# Patient Record
Sex: MALE | Race: WHITE | NOT HISPANIC OR LATINO | Employment: OTHER | URBAN - METROPOLITAN AREA
[De-identification: names, ages, dates, MRNs, and addresses within clinical notes are randomized per-mention and may not be internally consistent; named-entity substitution may affect disease eponyms.]

---

## 2017-01-11 ENCOUNTER — OFFICE VISIT - HEALTHEAST (OUTPATIENT)
Dept: SLEEP MEDICINE | Facility: CLINIC | Age: 67
End: 2017-01-11

## 2017-01-11 DIAGNOSIS — G47.33 OSA (OBSTRUCTIVE SLEEP APNEA): ICD-10-CM

## 2017-01-11 DIAGNOSIS — G47.26 SHIFT WORK SLEEP DISORDER: ICD-10-CM

## 2017-01-11 DIAGNOSIS — G47.52 REM SLEEP BEHAVIOR DISORDER: ICD-10-CM

## 2017-01-11 ASSESSMENT — MIFFLIN-ST. JEOR: SCORE: 1720.62

## 2017-01-26 ENCOUNTER — RECORDS - HEALTHEAST (OUTPATIENT)
Dept: ADMINISTRATIVE | Facility: OTHER | Age: 67
End: 2017-01-26

## 2017-01-26 ENCOUNTER — RECORDS - HEALTHEAST (OUTPATIENT)
Dept: SLEEP MEDICINE | Facility: CLINIC | Age: 67
End: 2017-01-26

## 2017-01-26 DIAGNOSIS — G47.33 OBSTRUCTIVE SLEEP APNEA (ADULT) (PEDIATRIC): ICD-10-CM

## 2017-02-01 ENCOUNTER — COMMUNICATION - HEALTHEAST (OUTPATIENT)
Dept: SLEEP MEDICINE | Facility: CLINIC | Age: 67
End: 2017-02-01

## 2017-02-02 ENCOUNTER — COMMUNICATION - HEALTHEAST (OUTPATIENT)
Dept: SLEEP MEDICINE | Facility: CLINIC | Age: 67
End: 2017-02-02

## 2017-03-16 ENCOUNTER — COMMUNICATION - HEALTHEAST (OUTPATIENT)
Dept: NEUROLOGY | Facility: CLINIC | Age: 67
End: 2017-03-16

## 2017-03-16 DIAGNOSIS — M54.9 BACK PAIN: ICD-10-CM

## 2017-04-03 ENCOUNTER — AMBULATORY - HEALTHEAST (OUTPATIENT)
Dept: SLEEP MEDICINE | Facility: CLINIC | Age: 67
End: 2017-04-03

## 2017-04-03 ENCOUNTER — OFFICE VISIT - HEALTHEAST (OUTPATIENT)
Dept: SLEEP MEDICINE | Facility: CLINIC | Age: 67
End: 2017-04-03

## 2017-04-03 DIAGNOSIS — G47.10 HYPERSOMNIA, UNSPECIFIED: ICD-10-CM

## 2017-04-03 DIAGNOSIS — G47.52 RBD (REM BEHAVIORAL DISORDER): ICD-10-CM

## 2017-04-03 DIAGNOSIS — G20.A1 PARKINSON DISEASE (H): ICD-10-CM

## 2017-04-03 DIAGNOSIS — G47.33 OSA (OBSTRUCTIVE SLEEP APNEA): ICD-10-CM

## 2017-04-03 ASSESSMENT — MIFFLIN-ST. JEOR: SCORE: 1707.01

## 2017-04-05 ENCOUNTER — HOSPITAL ENCOUNTER (OUTPATIENT)
Dept: NEUROLOGY | Facility: CLINIC | Age: 67
Setting detail: THERAPIES SERIES
Discharge: STILL ADMITTED OR ADMINISTRATIVE | End: 2017-04-05
Attending: PSYCHIATRY & NEUROLOGY

## 2017-04-05 ENCOUNTER — RECORDS - HEALTHEAST (OUTPATIENT)
Dept: ADMINISTRATIVE | Facility: OTHER | Age: 67
End: 2017-04-05

## 2017-04-05 ENCOUNTER — COMMUNICATION - HEALTHEAST (OUTPATIENT)
Dept: ADMINISTRATIVE | Facility: CLINIC | Age: 67
End: 2017-04-05

## 2017-04-05 DIAGNOSIS — F32.A DEPRESSION: ICD-10-CM

## 2017-04-05 DIAGNOSIS — G89.29 CHRONIC LOW BACK PAIN WITH SCIATICA, SCIATICA LATERALITY UNSPECIFIED, UNSPECIFIED BACK PAIN LATERALITY: ICD-10-CM

## 2017-04-05 DIAGNOSIS — G31.84 MCI (MILD COGNITIVE IMPAIRMENT): ICD-10-CM

## 2017-04-05 DIAGNOSIS — M54.40 CHRONIC LOW BACK PAIN WITH SCIATICA, SCIATICA LATERALITY UNSPECIFIED, UNSPECIFIED BACK PAIN LATERALITY: ICD-10-CM

## 2017-04-05 DIAGNOSIS — G20.A1 IDIOPATHIC PARKINSON'S DISEASE (H): ICD-10-CM

## 2017-04-05 DIAGNOSIS — G47.33 OSA (OBSTRUCTIVE SLEEP APNEA): ICD-10-CM

## 2017-04-05 DIAGNOSIS — G20.A1 PARKINSON'S DISEASE (H): ICD-10-CM

## 2017-04-06 ENCOUNTER — COMMUNICATION - HEALTHEAST (OUTPATIENT)
Dept: SLEEP MEDICINE | Facility: CLINIC | Age: 67
End: 2017-04-06

## 2017-04-06 ENCOUNTER — AMBULATORY - HEALTHEAST (OUTPATIENT)
Dept: SLEEP MEDICINE | Facility: CLINIC | Age: 67
End: 2017-04-06

## 2017-04-11 ENCOUNTER — OFFICE VISIT - HEALTHEAST (OUTPATIENT)
Dept: ENDOCRINOLOGY | Facility: CLINIC | Age: 67
End: 2017-04-11

## 2017-04-11 DIAGNOSIS — E03.9 HYPOTHYROID: ICD-10-CM

## 2017-04-11 DIAGNOSIS — N40.0 BPH (BENIGN PROSTATIC HYPERPLASIA): ICD-10-CM

## 2017-04-11 LAB
CREAT SERPL-MCNC: 0.84 MG/DL (ref 0.7–1.3)
GFR SERPL CREATININE-BSD FRML MDRD: >60 ML/MIN/1.73M2

## 2017-04-11 ASSESSMENT — MIFFLIN-ST. JEOR: SCORE: 1756.9

## 2017-04-12 LAB
FREE PSA/PSA RATIO: NORMAL RATIO
PSA FREE SERPL-MCNC: 0.5 NG/ML
PSA SERPL-MCNC: 0.95 NG/ML

## 2017-04-18 ENCOUNTER — COMMUNICATION - HEALTHEAST (OUTPATIENT)
Dept: ENDOCRINOLOGY | Facility: CLINIC | Age: 67
End: 2017-04-18

## 2017-04-28 ENCOUNTER — COMMUNICATION - HEALTHEAST (OUTPATIENT)
Dept: SCHEDULING | Facility: CLINIC | Age: 67
End: 2017-04-28

## 2017-05-08 ENCOUNTER — OFFICE VISIT - HEALTHEAST (OUTPATIENT)
Dept: FAMILY MEDICINE | Facility: CLINIC | Age: 67
End: 2017-05-08

## 2017-05-08 DIAGNOSIS — E03.9 HYPOTHYROIDISM: ICD-10-CM

## 2017-05-08 DIAGNOSIS — L60.8 CHANGE IN NAIL APPEARANCE: ICD-10-CM

## 2017-05-08 DIAGNOSIS — K21.9 ESOPHAGEAL REFLUX: ICD-10-CM

## 2017-05-08 DIAGNOSIS — Z00.00 ROUTINE GENERAL MEDICAL EXAMINATION AT A HEALTH CARE FACILITY: ICD-10-CM

## 2017-05-08 DIAGNOSIS — R79.89 LOW TESTOSTERONE: ICD-10-CM

## 2017-05-08 DIAGNOSIS — G20.A1 PARKINSON DISEASE (H): ICD-10-CM

## 2017-05-08 ASSESSMENT — MIFFLIN-ST. JEOR: SCORE: 1695.66

## 2017-05-17 ENCOUNTER — COMMUNICATION - HEALTHEAST (OUTPATIENT)
Dept: SLEEP MEDICINE | Facility: CLINIC | Age: 67
End: 2017-05-17

## 2017-05-22 ENCOUNTER — AMBULATORY - HEALTHEAST (OUTPATIENT)
Dept: LAB | Facility: CLINIC | Age: 67
End: 2017-05-22

## 2017-05-22 ENCOUNTER — AMBULATORY - HEALTHEAST (OUTPATIENT)
Dept: NURSING | Facility: CLINIC | Age: 67
End: 2017-05-22

## 2017-05-22 ENCOUNTER — HOSPITAL ENCOUNTER (OUTPATIENT)
Dept: LAB | Age: 67
Setting detail: SPECIMEN
Discharge: HOME | End: 2017-05-22

## 2017-05-22 DIAGNOSIS — R79.89 LOW TESTOSTERONE: ICD-10-CM

## 2017-05-22 DIAGNOSIS — Z00.00 ROUTINE GENERAL MEDICAL EXAMINATION AT A HEALTH CARE FACILITY: ICD-10-CM

## 2017-05-22 DIAGNOSIS — G20.A1 PARKINSON DISEASE (H): ICD-10-CM

## 2017-05-22 LAB
CHOLEST SERPL-MCNC: 169 MG/DL
FASTING STATUS PATIENT QL REPORTED: YES
HDLC SERPL-MCNC: 35 MG/DL
LDLC SERPL CALC-MCNC: 106 MG/DL
TRIGL SERPL-MCNC: 142 MG/DL

## 2017-06-06 ENCOUNTER — OFFICE VISIT - HEALTHEAST (OUTPATIENT)
Dept: PODIATRY | Age: 67
End: 2017-06-06

## 2017-06-06 ENCOUNTER — AMBULATORY - HEALTHEAST (OUTPATIENT)
Dept: NURSING | Facility: CLINIC | Age: 67
End: 2017-06-06

## 2017-06-06 DIAGNOSIS — Z89.421 HISTORY OF AMPUTATION OF LESSER TOE OF RIGHT FOOT (H): ICD-10-CM

## 2017-06-06 DIAGNOSIS — G20.A1 PARKINSON DISEASE (H): ICD-10-CM

## 2017-06-06 DIAGNOSIS — L60.2 ONYCHAUXIS: ICD-10-CM

## 2017-06-06 DIAGNOSIS — L84 CALLUS OF FOOT: ICD-10-CM

## 2017-06-06 DIAGNOSIS — M79.673 PAIN OF FOOT, UNSPECIFIED LATERALITY: ICD-10-CM

## 2017-06-06 ASSESSMENT — MIFFLIN-ST. JEOR: SCORE: 1695.66

## 2017-06-20 ENCOUNTER — AMBULATORY - HEALTHEAST (OUTPATIENT)
Dept: NURSING | Facility: CLINIC | Age: 67
End: 2017-06-20

## 2017-07-05 ENCOUNTER — AMBULATORY - HEALTHEAST (OUTPATIENT)
Dept: NURSING | Facility: CLINIC | Age: 67
End: 2017-07-05

## 2017-07-28 ENCOUNTER — AMBULATORY - HEALTHEAST (OUTPATIENT)
Dept: NURSING | Facility: CLINIC | Age: 67
End: 2017-07-28

## 2017-08-02 ENCOUNTER — HOSPITAL ENCOUNTER (OUTPATIENT)
Dept: NEUROLOGY | Facility: CLINIC | Age: 67
Setting detail: THERAPIES SERIES
Discharge: STILL ADMITTED OR ADMINISTRATIVE | End: 2017-08-02
Attending: PSYCHIATRY & NEUROLOGY

## 2017-08-02 DIAGNOSIS — F32.A DEPRESSION: ICD-10-CM

## 2017-08-02 DIAGNOSIS — G20.A1 PARKINSON'S DISEASE (H): ICD-10-CM

## 2017-08-02 DIAGNOSIS — G31.84 MCI (MILD COGNITIVE IMPAIRMENT): ICD-10-CM

## 2017-08-02 DIAGNOSIS — G47.33 OSA (OBSTRUCTIVE SLEEP APNEA): ICD-10-CM

## 2017-08-02 DIAGNOSIS — M54.40 CHRONIC LOW BACK PAIN WITH SCIATICA, SCIATICA LATERALITY UNSPECIFIED, UNSPECIFIED BACK PAIN LATERALITY: ICD-10-CM

## 2017-08-02 DIAGNOSIS — F41.1 ANXIETY STATE: ICD-10-CM

## 2017-08-02 DIAGNOSIS — G89.29 CHRONIC LOW BACK PAIN WITH SCIATICA, SCIATICA LATERALITY UNSPECIFIED, UNSPECIFIED BACK PAIN LATERALITY: ICD-10-CM

## 2017-08-14 ENCOUNTER — COMMUNICATION - HEALTHEAST (OUTPATIENT)
Dept: SCHEDULING | Facility: CLINIC | Age: 67
End: 2017-08-14

## 2017-08-15 ENCOUNTER — AMBULATORY - HEALTHEAST (OUTPATIENT)
Dept: PODIATRY | Age: 67
End: 2017-08-15

## 2017-08-15 ENCOUNTER — COMMUNICATION - HEALTHEAST (OUTPATIENT)
Dept: FAMILY MEDICINE | Facility: CLINIC | Age: 67
End: 2017-08-15

## 2017-08-15 DIAGNOSIS — Z89.421 HISTORY OF AMPUTATION OF LESSER TOE OF RIGHT FOOT (H): ICD-10-CM

## 2017-08-15 DIAGNOSIS — G20.A1 PARKINSON DISEASE (H): ICD-10-CM

## 2017-08-15 DIAGNOSIS — M79.673 PAIN OF FOOT, UNSPECIFIED LATERALITY: ICD-10-CM

## 2017-08-15 DIAGNOSIS — L60.2 ONYCHAUXIS: ICD-10-CM

## 2017-08-15 DIAGNOSIS — K21.9 GERD (GASTROESOPHAGEAL REFLUX DISEASE): ICD-10-CM

## 2017-08-15 DIAGNOSIS — L84 CALLUS OF FOOT: ICD-10-CM

## 2017-08-15 ASSESSMENT — MIFFLIN-ST. JEOR: SCORE: 1682.05

## 2017-09-28 ENCOUNTER — COMMUNICATION - HEALTHEAST (OUTPATIENT)
Dept: NEUROLOGY | Facility: CLINIC | Age: 67
End: 2017-09-28

## 2017-09-28 DIAGNOSIS — M54.9 BACK PAIN: ICD-10-CM

## 2017-10-03 ENCOUNTER — RECORDS - HEALTHEAST (OUTPATIENT)
Dept: ADMINISTRATIVE | Facility: OTHER | Age: 67
End: 2017-10-03

## 2017-10-03 ENCOUNTER — OFFICE VISIT - HEALTHEAST (OUTPATIENT)
Dept: FAMILY MEDICINE | Facility: CLINIC | Age: 67
End: 2017-10-03

## 2017-10-03 DIAGNOSIS — R53.83 FATIGUE: ICD-10-CM

## 2017-10-03 DIAGNOSIS — G20.A1 PARKINSON DISEASE (H): ICD-10-CM

## 2017-10-03 DIAGNOSIS — E03.9 HYPOTHYROIDISM: ICD-10-CM

## 2017-10-03 DIAGNOSIS — R79.89 LOW VITAMIN D LEVEL: ICD-10-CM

## 2017-10-03 DIAGNOSIS — Z12.11 COLON CANCER SCREENING: ICD-10-CM

## 2017-10-03 ASSESSMENT — MIFFLIN-ST. JEOR: SCORE: 1695.66

## 2017-10-04 ENCOUNTER — COMMUNICATION - HEALTHEAST (OUTPATIENT)
Dept: FAMILY MEDICINE | Facility: CLINIC | Age: 67
End: 2017-10-04

## 2017-10-17 ENCOUNTER — AMBULATORY - HEALTHEAST (OUTPATIENT)
Dept: PODIATRY | Age: 67
End: 2017-10-17

## 2017-10-17 DIAGNOSIS — Z89.421 HISTORY OF AMPUTATION OF LESSER TOE OF RIGHT FOOT (H): ICD-10-CM

## 2017-10-17 DIAGNOSIS — M79.673 PAIN OF FOOT, UNSPECIFIED LATERALITY: ICD-10-CM

## 2017-10-17 DIAGNOSIS — L60.2 ONYCHAUXIS: ICD-10-CM

## 2017-11-01 ENCOUNTER — HOSPITAL ENCOUNTER (OUTPATIENT)
Dept: NEUROLOGY | Facility: CLINIC | Age: 67
Setting detail: THERAPIES SERIES
Discharge: STILL ADMITTED OR ADMINISTRATIVE | End: 2017-11-01
Attending: PSYCHIATRY & NEUROLOGY

## 2017-11-01 DIAGNOSIS — G31.84 MCI (MILD COGNITIVE IMPAIRMENT): ICD-10-CM

## 2017-11-01 DIAGNOSIS — G20.A1 PARKINSON'S DISEASE (H): ICD-10-CM

## 2017-11-01 DIAGNOSIS — G47.33 OSA (OBSTRUCTIVE SLEEP APNEA): ICD-10-CM

## 2017-11-01 DIAGNOSIS — M54.40 CHRONIC LOW BACK PAIN WITH SCIATICA, SCIATICA LATERALITY UNSPECIFIED, UNSPECIFIED BACK PAIN LATERALITY: ICD-10-CM

## 2017-11-01 DIAGNOSIS — G89.29 CHRONIC LOW BACK PAIN WITH SCIATICA, SCIATICA LATERALITY UNSPECIFIED, UNSPECIFIED BACK PAIN LATERALITY: ICD-10-CM

## 2017-11-01 DIAGNOSIS — G47.10 DAYTIME HYPERSOMNIA: ICD-10-CM

## 2017-11-01 DIAGNOSIS — G20.A1 PARKINSON DISEASE (H): ICD-10-CM

## 2017-11-01 DIAGNOSIS — F41.1 ANXIETY STATE: ICD-10-CM

## 2017-11-01 DIAGNOSIS — G47.11 IDIOPATHIC HYPERSOMNIA: ICD-10-CM

## 2017-11-03 ENCOUNTER — COMMUNICATION - HEALTHEAST (OUTPATIENT)
Dept: FAMILY MEDICINE | Facility: CLINIC | Age: 67
End: 2017-11-03

## 2017-12-11 ENCOUNTER — COMMUNICATION - HEALTHEAST (OUTPATIENT)
Dept: NEUROLOGY | Facility: CLINIC | Age: 67
End: 2017-12-11

## 2017-12-11 DIAGNOSIS — F32.A DEPRESSION: ICD-10-CM

## 2017-12-19 ENCOUNTER — AMBULATORY - HEALTHEAST (OUTPATIENT)
Dept: PODIATRY | Age: 67
End: 2017-12-19

## 2017-12-19 DIAGNOSIS — M79.673 PAIN OF FOOT, UNSPECIFIED LATERALITY: ICD-10-CM

## 2017-12-19 DIAGNOSIS — L60.2 ONYCHAUXIS: ICD-10-CM

## 2017-12-19 DIAGNOSIS — Z89.421 HISTORY OF AMPUTATION OF LESSER TOE OF RIGHT FOOT (H): ICD-10-CM

## 2017-12-19 ASSESSMENT — MIFFLIN-ST. JEOR: SCORE: 1718.34

## 2018-01-17 ENCOUNTER — COMMUNICATION - HEALTHEAST (OUTPATIENT)
Dept: NEUROLOGY | Facility: CLINIC | Age: 68
End: 2018-01-17

## 2018-01-17 DIAGNOSIS — M54.9 BACK PAIN: ICD-10-CM

## 2018-03-07 ENCOUNTER — HOSPITAL ENCOUNTER (OUTPATIENT)
Dept: NEUROLOGY | Facility: CLINIC | Age: 68
Setting detail: THERAPIES SERIES
Discharge: STILL ADMITTED OR ADMINISTRATIVE | End: 2018-03-07
Attending: PSYCHIATRY & NEUROLOGY

## 2018-03-07 DIAGNOSIS — G20.A1 PARKINSON DISEASE (H): ICD-10-CM

## 2018-03-21 ENCOUNTER — HOSPITAL ENCOUNTER (OUTPATIENT)
Dept: NEUROLOGY | Facility: CLINIC | Age: 68
Setting detail: THERAPIES SERIES
Discharge: STILL ADMITTED OR ADMINISTRATIVE | End: 2018-03-21
Attending: PSYCHIATRY & NEUROLOGY

## 2018-03-21 DIAGNOSIS — G20.A1 PARKINSON DISEASE (H): ICD-10-CM

## 2018-03-27 ENCOUNTER — AMBULATORY - HEALTHEAST (OUTPATIENT)
Dept: PODIATRY | Age: 68
End: 2018-03-27

## 2018-03-27 DIAGNOSIS — Z89.421 HISTORY OF AMPUTATION OF LESSER TOE OF RIGHT FOOT (H): ICD-10-CM

## 2018-03-27 DIAGNOSIS — M79.673 PAIN OF FOOT, UNSPECIFIED LATERALITY: ICD-10-CM

## 2018-03-27 DIAGNOSIS — L60.2 ONYCHAUXIS: ICD-10-CM

## 2018-03-27 ASSESSMENT — MIFFLIN-ST. JEOR: SCORE: 1718.34

## 2018-04-11 ENCOUNTER — COMMUNICATION - HEALTHEAST (OUTPATIENT)
Dept: NEUROLOGY | Facility: CLINIC | Age: 68
End: 2018-04-11

## 2018-04-16 ENCOUNTER — HOME CARE/HOSPICE - HEALTHEAST (OUTPATIENT)
Dept: HOME HEALTH SERVICES | Facility: HOME HEALTH | Age: 68
End: 2018-04-16

## 2018-04-18 ENCOUNTER — HOME CARE/HOSPICE - HEALTHEAST (OUTPATIENT)
Dept: HOME HEALTH SERVICES | Facility: HOME HEALTH | Age: 68
End: 2018-04-18

## 2018-04-19 ENCOUNTER — COMMUNICATION - HEALTHEAST (OUTPATIENT)
Dept: HOME HEALTH SERVICES | Facility: HOME HEALTH | Age: 68
End: 2018-04-19

## 2018-04-20 ENCOUNTER — HOME CARE/HOSPICE - HEALTHEAST (OUTPATIENT)
Dept: HOME HEALTH SERVICES | Facility: HOME HEALTH | Age: 68
End: 2018-04-20

## 2018-04-22 ENCOUNTER — COMMUNICATION - HEALTHEAST (OUTPATIENT)
Dept: OCCUPATIONAL THERAPY | Facility: CLINIC | Age: 68
End: 2018-04-22

## 2018-04-22 ENCOUNTER — HOME CARE/HOSPICE - HEALTHEAST (OUTPATIENT)
Dept: HOME HEALTH SERVICES | Facility: HOME HEALTH | Age: 68
End: 2018-04-22

## 2018-04-23 ENCOUNTER — HOME CARE/HOSPICE - HEALTHEAST (OUTPATIENT)
Dept: HOME HEALTH SERVICES | Facility: HOME HEALTH | Age: 68
End: 2018-04-23

## 2018-04-25 ENCOUNTER — HOME CARE/HOSPICE - HEALTHEAST (OUTPATIENT)
Dept: HOME HEALTH SERVICES | Facility: HOME HEALTH | Age: 68
End: 2018-04-25

## 2018-04-26 ENCOUNTER — HOME CARE/HOSPICE - HEALTHEAST (OUTPATIENT)
Dept: HOME HEALTH SERVICES | Facility: HOME HEALTH | Age: 68
End: 2018-04-26

## 2018-04-27 ENCOUNTER — HOME CARE/HOSPICE - HEALTHEAST (OUTPATIENT)
Dept: HOME HEALTH SERVICES | Facility: HOME HEALTH | Age: 68
End: 2018-04-27

## 2018-04-29 ENCOUNTER — HOME CARE/HOSPICE - HEALTHEAST (OUTPATIENT)
Dept: HOME HEALTH SERVICES | Facility: HOME HEALTH | Age: 68
End: 2018-04-29

## 2018-04-30 ENCOUNTER — HOME CARE/HOSPICE - HEALTHEAST (OUTPATIENT)
Dept: HOME HEALTH SERVICES | Facility: HOME HEALTH | Age: 68
End: 2018-04-30

## 2018-05-01 ENCOUNTER — HOME CARE/HOSPICE - HEALTHEAST (OUTPATIENT)
Dept: HOME HEALTH SERVICES | Facility: HOME HEALTH | Age: 68
End: 2018-05-01

## 2018-05-02 ENCOUNTER — HOME CARE/HOSPICE - HEALTHEAST (OUTPATIENT)
Dept: HOME HEALTH SERVICES | Facility: HOME HEALTH | Age: 68
End: 2018-05-02

## 2018-05-03 ENCOUNTER — HOME CARE/HOSPICE - HEALTHEAST (OUTPATIENT)
Dept: HOME HEALTH SERVICES | Facility: HOME HEALTH | Age: 68
End: 2018-05-03

## 2018-05-07 ENCOUNTER — HOME CARE/HOSPICE - HEALTHEAST (OUTPATIENT)
Dept: HOME HEALTH SERVICES | Facility: HOME HEALTH | Age: 68
End: 2018-05-07

## 2018-05-08 ENCOUNTER — HOME CARE/HOSPICE - HEALTHEAST (OUTPATIENT)
Dept: HOME HEALTH SERVICES | Facility: HOME HEALTH | Age: 68
End: 2018-05-08

## 2018-05-11 ENCOUNTER — HOME CARE/HOSPICE - HEALTHEAST (OUTPATIENT)
Dept: HOME HEALTH SERVICES | Facility: HOME HEALTH | Age: 68
End: 2018-05-11

## 2018-05-14 ENCOUNTER — HOME CARE/HOSPICE - HEALTHEAST (OUTPATIENT)
Dept: HOME HEALTH SERVICES | Facility: HOME HEALTH | Age: 68
End: 2018-05-14

## 2018-05-15 ENCOUNTER — HOME CARE/HOSPICE - HEALTHEAST (OUTPATIENT)
Dept: HOME HEALTH SERVICES | Facility: HOME HEALTH | Age: 68
End: 2018-05-15

## 2018-05-16 ENCOUNTER — HOME CARE/HOSPICE - HEALTHEAST (OUTPATIENT)
Dept: HOME HEALTH SERVICES | Facility: HOME HEALTH | Age: 68
End: 2018-05-16

## 2018-05-17 ENCOUNTER — COMMUNICATION - HEALTHEAST (OUTPATIENT)
Dept: HOME HEALTH SERVICES | Facility: HOME HEALTH | Age: 68
End: 2018-05-17

## 2018-05-17 ENCOUNTER — HOME CARE/HOSPICE - HEALTHEAST (OUTPATIENT)
Dept: HOME HEALTH SERVICES | Facility: HOME HEALTH | Age: 68
End: 2018-05-17

## 2018-05-18 ENCOUNTER — HOME CARE/HOSPICE - HEALTHEAST (OUTPATIENT)
Dept: HOME HEALTH SERVICES | Facility: HOME HEALTH | Age: 68
End: 2018-05-18

## 2018-05-18 ENCOUNTER — COMMUNICATION - HEALTHEAST (OUTPATIENT)
Dept: HOME HEALTH SERVICES | Facility: HOME HEALTH | Age: 68
End: 2018-05-18

## 2018-05-21 ENCOUNTER — HOME CARE/HOSPICE - HEALTHEAST (OUTPATIENT)
Dept: HOME HEALTH SERVICES | Facility: HOME HEALTH | Age: 68
End: 2018-05-21

## 2018-05-22 ENCOUNTER — HOME CARE/HOSPICE - HEALTHEAST (OUTPATIENT)
Dept: HOME HEALTH SERVICES | Facility: HOME HEALTH | Age: 68
End: 2018-05-22

## 2018-05-23 ENCOUNTER — HOME CARE/HOSPICE - HEALTHEAST (OUTPATIENT)
Dept: HOME HEALTH SERVICES | Facility: HOME HEALTH | Age: 68
End: 2018-05-23

## 2018-05-23 ENCOUNTER — HOSPITAL ENCOUNTER (OUTPATIENT)
Dept: NEUROLOGY | Facility: CLINIC | Age: 68
Setting detail: THERAPIES SERIES
Discharge: STILL ADMITTED OR ADMINISTRATIVE | End: 2018-05-23
Attending: PSYCHIATRY & NEUROLOGY

## 2018-05-23 DIAGNOSIS — G20.A1 PARKINSON DISEASE (H): ICD-10-CM

## 2018-05-23 ASSESSMENT — MIFFLIN-ST. JEOR
SCORE: 1702.47
SCORE: 1702.47

## 2018-05-24 ENCOUNTER — ANESTHESIA - HEALTHEAST (OUTPATIENT)
Dept: SURGERY | Facility: HOSPITAL | Age: 68
End: 2018-05-24

## 2018-05-24 ENCOUNTER — SURGERY - HEALTHEAST (OUTPATIENT)
Dept: SURGERY | Facility: HOSPITAL | Age: 68
End: 2018-05-24

## 2018-05-24 ENCOUNTER — HOME CARE/HOSPICE - HEALTHEAST (OUTPATIENT)
Dept: HOME HEALTH SERVICES | Facility: HOME HEALTH | Age: 68
End: 2018-05-24

## 2018-05-25 ENCOUNTER — SURGERY - HEALTHEAST (OUTPATIENT)
Dept: SURGERY | Facility: HOSPITAL | Age: 68
End: 2018-05-25

## 2018-05-25 ENCOUNTER — HOME CARE/HOSPICE - HEALTHEAST (OUTPATIENT)
Dept: HOME HEALTH SERVICES | Facility: HOME HEALTH | Age: 68
End: 2018-05-25

## 2018-05-25 ENCOUNTER — ANESTHESIA - HEALTHEAST (OUTPATIENT)
Dept: SURGERY | Facility: HOSPITAL | Age: 68
End: 2018-05-25

## 2018-05-27 ASSESSMENT — MIFFLIN-ST. JEOR
SCORE: 1722.88
SCORE: 1722.88

## 2018-05-28 ASSESSMENT — MIFFLIN-ST. JEOR
SCORE: 1703.83
SCORE: 1703.83

## 2018-05-29 ASSESSMENT — MIFFLIN-ST. JEOR
SCORE: 1692.94
SCORE: 1692.94

## 2018-05-31 ENCOUNTER — OFFICE VISIT - HEALTHEAST (OUTPATIENT)
Dept: GERIATRICS | Facility: CLINIC | Age: 68
End: 2018-05-31

## 2018-05-31 DIAGNOSIS — D72.829 LEUKOCYTOSIS: ICD-10-CM

## 2018-05-31 DIAGNOSIS — R79.0 LOW MAGNESIUM LEVELS: ICD-10-CM

## 2018-05-31 DIAGNOSIS — G93.40 ACUTE ENCEPHALOPATHY: ICD-10-CM

## 2018-05-31 DIAGNOSIS — K85.90 PANCREATITIS: ICD-10-CM

## 2018-05-31 DIAGNOSIS — E87.70 FLUID OVERLOAD: ICD-10-CM

## 2018-05-31 DIAGNOSIS — K80.20 CHOLELITHIASIS: ICD-10-CM

## 2018-05-31 DIAGNOSIS — E87.6 HYPOKALEMIA: ICD-10-CM

## 2018-05-31 DIAGNOSIS — R52 PAIN MANAGEMENT: ICD-10-CM

## 2018-05-31 DIAGNOSIS — Z90.49 STATUS POST LAPAROSCOPIC CHOLECYSTECTOMY: ICD-10-CM

## 2018-06-01 ENCOUNTER — RECORDS - HEALTHEAST (OUTPATIENT)
Dept: LAB | Facility: CLINIC | Age: 68
End: 2018-06-01

## 2018-06-01 LAB
ALBUMIN SERPL-MCNC: 2.4 G/DL (ref 3.5–5)
ALP SERPL-CCNC: 106 U/L (ref 45–120)
ALT SERPL W P-5'-P-CCNC: 14 U/L (ref 0–45)
AST SERPL W P-5'-P-CCNC: 20 U/L (ref 0–40)
BASOPHILS # BLD AUTO: 0.1 THOU/UL (ref 0–0.2)
BASOPHILS NFR BLD AUTO: 0 % (ref 0–2)
BILIRUB DIRECT SERPL-MCNC: 0.2 MG/DL
BILIRUB SERPL-MCNC: 0.5 MG/DL (ref 0–1)
EOSINOPHIL # BLD AUTO: 0.3 THOU/UL (ref 0–0.4)
EOSINOPHIL NFR BLD AUTO: 1 % (ref 0–6)
LIPASE SERPL-CCNC: 31 U/L (ref 0–52)
LYMPHOCYTES # BLD AUTO: 1.2 THOU/UL (ref 0.8–4.4)
LYMPHOCYTES NFR BLD AUTO: 6 % (ref 20–40)
MONOCYTES # BLD AUTO: 1.3 THOU/UL (ref 0–0.9)
MONOCYTES NFR BLD AUTO: 7 % (ref 2–10)
NEUTROPHILS # BLD AUTO: 15.7 THOU/UL (ref 2–7.7)
NEUTROPHILS NFR BLD AUTO: 85 % (ref 50–70)
PROT SERPL-MCNC: 6.6 G/DL (ref 6–8)
WBC: 19.3 THOU/UL (ref 4–11)

## 2018-06-04 ENCOUNTER — OFFICE VISIT - HEALTHEAST (OUTPATIENT)
Dept: GERIATRICS | Facility: CLINIC | Age: 68
End: 2018-06-04

## 2018-06-04 DIAGNOSIS — K81.9 CHOLECYSTITIS: ICD-10-CM

## 2018-06-04 DIAGNOSIS — R53.1 GENERAL WEAKNESS: ICD-10-CM

## 2018-06-04 DIAGNOSIS — K80.50 CHOLEDOCHOLITHIASIS: ICD-10-CM

## 2018-06-04 DIAGNOSIS — K85.10 ACUTE BILIARY PANCREATITIS, UNSPECIFIED COMPLICATION STATUS: ICD-10-CM

## 2018-06-07 ENCOUNTER — OFFICE VISIT - HEALTHEAST (OUTPATIENT)
Dept: GERIATRICS | Facility: CLINIC | Age: 68
End: 2018-06-07

## 2018-06-07 DIAGNOSIS — F32.A DEPRESSION, UNSPECIFIED DEPRESSION TYPE: ICD-10-CM

## 2018-06-07 DIAGNOSIS — G20.A1 PARKINSON DISEASE (H): ICD-10-CM

## 2018-06-07 DIAGNOSIS — K80.50 CHOLEDOCHOLITHIASIS: ICD-10-CM

## 2018-06-07 DIAGNOSIS — K85.10 ACUTE BILIARY PANCREATITIS, UNSPECIFIED COMPLICATION STATUS: ICD-10-CM

## 2018-06-11 ENCOUNTER — OFFICE VISIT - HEALTHEAST (OUTPATIENT)
Dept: GERIATRICS | Facility: CLINIC | Age: 68
End: 2018-06-11

## 2018-06-11 DIAGNOSIS — E03.9 HYPOTHYROIDISM: ICD-10-CM

## 2018-06-11 DIAGNOSIS — K85.10 ACUTE BILIARY PANCREATITIS, UNSPECIFIED COMPLICATION STATUS: ICD-10-CM

## 2018-06-11 DIAGNOSIS — K80.50 CHOLEDOCHOLITHIASIS: ICD-10-CM

## 2018-06-11 DIAGNOSIS — G20.C PARKINSONISM, UNSPECIFIED PARKINSONISM TYPE (H): ICD-10-CM

## 2018-06-14 ENCOUNTER — AMBULATORY - HEALTHEAST (OUTPATIENT)
Dept: GERIATRICS | Facility: CLINIC | Age: 68
End: 2018-06-14

## 2018-06-14 ENCOUNTER — COMMUNICATION - HEALTHEAST (OUTPATIENT)
Dept: GERIATRICS | Facility: CLINIC | Age: 68
End: 2018-06-14

## 2018-06-15 ENCOUNTER — HOME CARE/HOSPICE - HEALTHEAST (OUTPATIENT)
Dept: HOME HEALTH SERVICES | Facility: HOME HEALTH | Age: 68
End: 2018-06-15

## 2018-06-16 ENCOUNTER — HOME CARE/HOSPICE - HEALTHEAST (OUTPATIENT)
Dept: HOME HEALTH SERVICES | Facility: HOME HEALTH | Age: 68
End: 2018-06-16

## 2018-06-19 ENCOUNTER — HOME CARE/HOSPICE - HEALTHEAST (OUTPATIENT)
Dept: HOME HEALTH SERVICES | Facility: HOME HEALTH | Age: 68
End: 2018-06-19

## 2018-06-20 ENCOUNTER — OFFICE VISIT - HEALTHEAST (OUTPATIENT)
Dept: CARDIOLOGY | Facility: CLINIC | Age: 68
End: 2018-06-20

## 2018-06-20 DIAGNOSIS — R93.1 ABNORMAL ECHOCARDIOGRAM: ICD-10-CM

## 2018-06-20 DIAGNOSIS — I50.30 HEART FAILURE WITH PRESERVED EJECTION FRACTION, NYHA CLASS I (H): ICD-10-CM

## 2018-06-20 DIAGNOSIS — E87.71 TRANSFUSION ASSOCIATED CIRCULATORY OVERLOAD: ICD-10-CM

## 2018-06-20 ASSESSMENT — MIFFLIN-ST. JEOR: SCORE: 1693.4

## 2018-06-22 ENCOUNTER — HOME CARE/HOSPICE - HEALTHEAST (OUTPATIENT)
Dept: HOME HEALTH SERVICES | Facility: HOME HEALTH | Age: 68
End: 2018-06-22

## 2018-06-25 ENCOUNTER — COMMUNICATION - HEALTHEAST (OUTPATIENT)
Dept: FAMILY MEDICINE | Facility: CLINIC | Age: 68
End: 2018-06-25

## 2018-06-25 ENCOUNTER — OFFICE VISIT - HEALTHEAST (OUTPATIENT)
Dept: FAMILY MEDICINE | Facility: CLINIC | Age: 68
End: 2018-06-25

## 2018-06-25 ENCOUNTER — HOME CARE/HOSPICE - HEALTHEAST (OUTPATIENT)
Dept: HOME HEALTH SERVICES | Facility: HOME HEALTH | Age: 68
End: 2018-06-25

## 2018-06-25 DIAGNOSIS — G20.C PARKINSONISM, UNSPECIFIED PARKINSONISM TYPE (H): ICD-10-CM

## 2018-06-25 DIAGNOSIS — I50.30 HEART FAILURE WITH PRESERVED EJECTION FRACTION, NYHA CLASS I (H): ICD-10-CM

## 2018-06-25 DIAGNOSIS — E87.71 TRANSFUSION ASSOCIATED CIRCULATORY OVERLOAD: ICD-10-CM

## 2018-06-25 DIAGNOSIS — K81.9 CHOLECYSTITIS: ICD-10-CM

## 2018-06-25 ASSESSMENT — MIFFLIN-ST. JEOR: SCORE: 1707.01

## 2018-06-26 ENCOUNTER — HOME CARE/HOSPICE - HEALTHEAST (OUTPATIENT)
Dept: HOME HEALTH SERVICES | Facility: HOME HEALTH | Age: 68
End: 2018-06-26

## 2018-06-27 ENCOUNTER — HOME CARE/HOSPICE - HEALTHEAST (OUTPATIENT)
Dept: HOME HEALTH SERVICES | Facility: HOME HEALTH | Age: 68
End: 2018-06-27

## 2018-06-28 ENCOUNTER — HOME CARE/HOSPICE - HEALTHEAST (OUTPATIENT)
Dept: HOME HEALTH SERVICES | Facility: HOME HEALTH | Age: 68
End: 2018-06-28

## 2018-06-28 ENCOUNTER — COMMUNICATION - HEALTHEAST (OUTPATIENT)
Dept: NURSING | Facility: CLINIC | Age: 68
End: 2018-06-28

## 2018-06-28 ENCOUNTER — COMMUNICATION - HEALTHEAST (OUTPATIENT)
Dept: FAMILY MEDICINE | Facility: CLINIC | Age: 68
End: 2018-06-28

## 2018-06-29 ENCOUNTER — HOME CARE/HOSPICE - HEALTHEAST (OUTPATIENT)
Dept: HOME HEALTH SERVICES | Facility: HOME HEALTH | Age: 68
End: 2018-06-29

## 2018-07-02 ENCOUNTER — COMMUNICATION - HEALTHEAST (OUTPATIENT)
Dept: OCCUPATIONAL THERAPY | Facility: CLINIC | Age: 68
End: 2018-07-02

## 2018-07-03 ENCOUNTER — HOME CARE/HOSPICE - HEALTHEAST (OUTPATIENT)
Dept: HOME HEALTH SERVICES | Facility: HOME HEALTH | Age: 68
End: 2018-07-03

## 2018-07-03 ENCOUNTER — AMBULATORY - HEALTHEAST (OUTPATIENT)
Dept: NURSING | Facility: CLINIC | Age: 68
End: 2018-07-03

## 2018-07-04 ENCOUNTER — HOME CARE/HOSPICE - HEALTHEAST (OUTPATIENT)
Dept: HOME HEALTH SERVICES | Facility: HOME HEALTH | Age: 68
End: 2018-07-04

## 2018-07-05 ENCOUNTER — COMMUNICATION - HEALTHEAST (OUTPATIENT)
Dept: NURSING | Facility: CLINIC | Age: 68
End: 2018-07-05

## 2018-07-05 ENCOUNTER — HOME CARE/HOSPICE - HEALTHEAST (OUTPATIENT)
Dept: HOME HEALTH SERVICES | Facility: HOME HEALTH | Age: 68
End: 2018-07-05

## 2018-07-06 ENCOUNTER — HOME CARE/HOSPICE - HEALTHEAST (OUTPATIENT)
Dept: HOME HEALTH SERVICES | Facility: HOME HEALTH | Age: 68
End: 2018-07-06

## 2018-07-09 ENCOUNTER — HOME CARE/HOSPICE - HEALTHEAST (OUTPATIENT)
Dept: HOME HEALTH SERVICES | Facility: HOME HEALTH | Age: 68
End: 2018-07-09

## 2018-07-10 ENCOUNTER — HOME CARE/HOSPICE - HEALTHEAST (OUTPATIENT)
Dept: HOME HEALTH SERVICES | Facility: HOME HEALTH | Age: 68
End: 2018-07-10

## 2018-07-11 ENCOUNTER — HOME CARE/HOSPICE - HEALTHEAST (OUTPATIENT)
Dept: HOME HEALTH SERVICES | Facility: HOME HEALTH | Age: 68
End: 2018-07-11

## 2018-07-11 ENCOUNTER — COMMUNICATION - HEALTHEAST (OUTPATIENT)
Dept: NURSING | Facility: CLINIC | Age: 68
End: 2018-07-11

## 2018-07-12 ENCOUNTER — COMMUNICATION - HEALTHEAST (OUTPATIENT)
Dept: FAMILY MEDICINE | Facility: CLINIC | Age: 68
End: 2018-07-12

## 2018-07-12 ENCOUNTER — OFFICE VISIT - HEALTHEAST (OUTPATIENT)
Dept: PHARMACY | Facility: CLINIC | Age: 68
End: 2018-07-12

## 2018-07-12 ENCOUNTER — HOME CARE/HOSPICE - HEALTHEAST (OUTPATIENT)
Dept: HOME HEALTH SERVICES | Facility: HOME HEALTH | Age: 68
End: 2018-07-12

## 2018-07-12 DIAGNOSIS — K21.9 GASTROESOPHAGEAL REFLUX DISEASE, ESOPHAGITIS PRESENCE NOT SPECIFIED: ICD-10-CM

## 2018-07-12 DIAGNOSIS — M54.40 CHRONIC LOW BACK PAIN WITH SCIATICA, SCIATICA LATERALITY UNSPECIFIED, UNSPECIFIED BACK PAIN LATERALITY: ICD-10-CM

## 2018-07-12 DIAGNOSIS — G20.C PARKINSONISM, UNSPECIFIED PARKINSONISM TYPE (H): ICD-10-CM

## 2018-07-12 DIAGNOSIS — F32.A DEPRESSION, UNSPECIFIED DEPRESSION TYPE: ICD-10-CM

## 2018-07-12 DIAGNOSIS — K59.00 CONSTIPATION, UNSPECIFIED CONSTIPATION TYPE: ICD-10-CM

## 2018-07-12 DIAGNOSIS — G89.29 CHRONIC LOW BACK PAIN WITH SCIATICA, SCIATICA LATERALITY UNSPECIFIED, UNSPECIFIED BACK PAIN LATERALITY: ICD-10-CM

## 2018-07-12 DIAGNOSIS — R79.89 LOW VITAMIN D LEVEL: ICD-10-CM

## 2018-07-13 ENCOUNTER — COMMUNICATION - HEALTHEAST (OUTPATIENT)
Dept: FAMILY MEDICINE | Facility: CLINIC | Age: 68
End: 2018-07-13

## 2018-07-13 ENCOUNTER — HOME CARE/HOSPICE - HEALTHEAST (OUTPATIENT)
Dept: HOME HEALTH SERVICES | Facility: HOME HEALTH | Age: 68
End: 2018-07-13

## 2018-07-16 ENCOUNTER — HOME CARE/HOSPICE - HEALTHEAST (OUTPATIENT)
Dept: HOME HEALTH SERVICES | Facility: HOME HEALTH | Age: 68
End: 2018-07-16

## 2018-07-16 ENCOUNTER — AMBULATORY - HEALTHEAST (OUTPATIENT)
Dept: NURSING | Facility: CLINIC | Age: 68
End: 2018-07-16

## 2018-07-17 ENCOUNTER — COMMUNICATION - HEALTHEAST (OUTPATIENT)
Dept: SPEECH THERAPY | Facility: CLINIC | Age: 68
End: 2018-07-17

## 2018-07-17 ENCOUNTER — HOME CARE/HOSPICE - HEALTHEAST (OUTPATIENT)
Dept: HOME HEALTH SERVICES | Facility: HOME HEALTH | Age: 68
End: 2018-07-17

## 2018-07-19 ENCOUNTER — HOME CARE/HOSPICE - HEALTHEAST (OUTPATIENT)
Dept: HOME HEALTH SERVICES | Facility: HOME HEALTH | Age: 68
End: 2018-07-19

## 2018-07-20 ENCOUNTER — COMMUNICATION - HEALTHEAST (OUTPATIENT)
Dept: HOME HEALTH SERVICES | Facility: HOME HEALTH | Age: 68
End: 2018-07-20

## 2018-07-23 ENCOUNTER — HOME CARE/HOSPICE - HEALTHEAST (OUTPATIENT)
Dept: HOME HEALTH SERVICES | Facility: HOME HEALTH | Age: 68
End: 2018-07-23

## 2018-07-24 ENCOUNTER — HOME CARE/HOSPICE - HEALTHEAST (OUTPATIENT)
Dept: HOME HEALTH SERVICES | Facility: HOME HEALTH | Age: 68
End: 2018-07-24

## 2018-07-26 ENCOUNTER — HOME CARE/HOSPICE - HEALTHEAST (OUTPATIENT)
Dept: HOME HEALTH SERVICES | Facility: HOME HEALTH | Age: 68
End: 2018-07-26

## 2018-07-30 ENCOUNTER — HOSPITAL ENCOUNTER (OUTPATIENT)
Dept: CARDIOLOGY | Facility: HOSPITAL | Age: 68
Discharge: HOME | End: 2018-07-30

## 2018-07-30 DIAGNOSIS — I50.30 HEART FAILURE WITH PRESERVED EJECTION FRACTION, NYHA CLASS I (H): ICD-10-CM

## 2018-07-30 LAB
AORTIC ROOT: 3.3 CM
DOP CALC LVOT AREA: 3.14 CM2
DOP CALC LVOT DIAMETER: 2 CM
DOP CALC LVOT STROKE VOLUME: 50.9 CM3
DOP CALCLVOT PEAK VEL VTI: 16.2 CM
EJECTION FRACTION: 44 % (ref 55–75)
FRACTIONAL SHORTENING: 26.1 % (ref 28–44)
INTERVENTRICULAR SEPTUM IN END DIASTOLE: 0.8 CM (ref 0.6–1)
IVS/PW RATIO: 0.7
LA AREA 1: 19.7 CM2
LA AREA 2: 14.4 CM2
LEFT ATRIUM LENGTH: 5.23 CM
LEFT ATRIUM SIZE: 4.4 CM
LEFT ATRIUM TO AORTIC ROOT RATIO: 1.33 NO UNITS
LEFT ATRIUM VOLUME: 46.1 ML
LEFT VENTRICLE DIASTOLIC VOLUME: 126 CM3 (ref 62–150)
LEFT VENTRICLE SYSTOLIC VOLUME: 71 CM3 (ref 21–61)
LEFT VENTRICULAR INTERNAL DIMENSION IN DIASTOLE: 4.6 CM (ref 4.2–5.8)
LEFT VENTRICULAR INTERNAL DIMENSION IN SYSTOLE: 3.4 CM (ref 2.5–4)
LEFT VENTRICULAR MASS: 148.1 G
LEFT VENTRICULAR OUTFLOW TRACT MEAN GRADIENT: 1 MMHG
LEFT VENTRICULAR OUTFLOW TRACT MEAN VELOCITY: 47.5 CM/S
LEFT VENTRICULAR POSTERIOR WALL IN END DIASTOLE: 1.1 CM (ref 0.6–1)
MITRAL VALVE E/A RATIO: 0.7
MV E'TISSUE VEL-LAT: 6.14 CM/S
MV LATERAL E/E' RATIO: 9
MV PEAK A VELOCITY: 83.4 CM/S
MV PEAK E VELOCITY: 55.3 CM/S
TRICUSPID REGURGITATION PEAK PRESSURE GRADIENT: 31.1 MMHG
TRICUSPID VALVE ANULAR PLANE SYSTOLIC EXCURSION: 2.6 CM
TRICUSPID VALVE PEAK REGURGITANT VELOCITY: 279 CM/S

## 2018-07-31 ENCOUNTER — COMMUNICATION - HEALTHEAST (OUTPATIENT)
Dept: NURSING | Facility: CLINIC | Age: 68
End: 2018-07-31

## 2018-07-31 ENCOUNTER — HOME CARE/HOSPICE - HEALTHEAST (OUTPATIENT)
Dept: HOME HEALTH SERVICES | Facility: HOME HEALTH | Age: 68
End: 2018-07-31

## 2018-07-31 ENCOUNTER — AMBULATORY - HEALTHEAST (OUTPATIENT)
Dept: NURSING | Facility: CLINIC | Age: 68
End: 2018-07-31

## 2018-07-31 ENCOUNTER — AMBULATORY - HEALTHEAST (OUTPATIENT)
Dept: CARDIOLOGY | Facility: CLINIC | Age: 68
End: 2018-07-31

## 2018-07-31 ENCOUNTER — COMMUNICATION - HEALTHEAST (OUTPATIENT)
Dept: CARDIOLOGY | Facility: CLINIC | Age: 68
End: 2018-07-31

## 2018-07-31 DIAGNOSIS — I50.20 HEART FAILURE WITH REDUCED EJECTION FRACTION, NYHA CLASS I (H): ICD-10-CM

## 2018-08-01 ENCOUNTER — HOME CARE/HOSPICE - HEALTHEAST (OUTPATIENT)
Dept: HOME HEALTH SERVICES | Facility: HOME HEALTH | Age: 68
End: 2018-08-01

## 2018-08-02 ENCOUNTER — HOME CARE/HOSPICE - HEALTHEAST (OUTPATIENT)
Dept: HOME HEALTH SERVICES | Facility: HOME HEALTH | Age: 68
End: 2018-08-02

## 2018-08-02 ENCOUNTER — COMMUNICATION - HEALTHEAST (OUTPATIENT)
Dept: CARDIOLOGY | Facility: CLINIC | Age: 68
End: 2018-08-02

## 2018-08-02 ENCOUNTER — OFFICE VISIT - HEALTHEAST (OUTPATIENT)
Dept: FAMILY MEDICINE | Facility: CLINIC | Age: 68
End: 2018-08-02

## 2018-08-02 ENCOUNTER — COMMUNICATION - HEALTHEAST (OUTPATIENT)
Dept: FAMILY MEDICINE | Facility: CLINIC | Age: 68
End: 2018-08-02

## 2018-08-02 DIAGNOSIS — K81.9 CHOLECYSTITIS: ICD-10-CM

## 2018-08-02 DIAGNOSIS — I50.20 HEART FAILURE WITH REDUCED EJECTION FRACTION, NYHA CLASS I (H): ICD-10-CM

## 2018-08-02 DIAGNOSIS — G20.C PARKINSONISM, UNSPECIFIED PARKINSONISM TYPE (H): ICD-10-CM

## 2018-08-02 DIAGNOSIS — E03.9 HYPOTHYROIDISM: ICD-10-CM

## 2018-08-02 ASSESSMENT — MIFFLIN-ST. JEOR: SCORE: 1725.15

## 2018-08-06 ENCOUNTER — COMMUNICATION - HEALTHEAST (OUTPATIENT)
Dept: NEUROLOGY | Facility: CLINIC | Age: 68
End: 2018-08-06

## 2018-08-06 DIAGNOSIS — F32.A DEPRESSION: ICD-10-CM

## 2018-08-07 ENCOUNTER — COMMUNICATION - HEALTHEAST (OUTPATIENT)
Dept: NURSING | Facility: CLINIC | Age: 68
End: 2018-08-07

## 2018-08-13 ENCOUNTER — COMMUNICATION - HEALTHEAST (OUTPATIENT)
Dept: CARDIOLOGY | Facility: CLINIC | Age: 68
End: 2018-08-13

## 2018-08-21 ENCOUNTER — COMMUNICATION - HEALTHEAST (OUTPATIENT)
Dept: NURSING | Facility: CLINIC | Age: 68
End: 2018-08-21

## 2018-08-22 ENCOUNTER — OFFICE VISIT - HEALTHEAST (OUTPATIENT)
Dept: CARDIOLOGY | Facility: CLINIC | Age: 68
End: 2018-08-22

## 2018-08-22 DIAGNOSIS — I50.20 HEART FAILURE WITH REDUCED EJECTION FRACTION, NYHA CLASS I (H): ICD-10-CM

## 2018-08-29 ENCOUNTER — HOSPITAL ENCOUNTER (OUTPATIENT)
Dept: NEUROLOGY | Facility: CLINIC | Age: 68
Setting detail: THERAPIES SERIES
Discharge: STILL ADMITTED OR ADMINISTRATIVE | End: 2018-08-29
Attending: PSYCHIATRY & NEUROLOGY

## 2018-08-29 DIAGNOSIS — G47.10 DAYTIME HYPERSOMNIA: ICD-10-CM

## 2018-08-29 DIAGNOSIS — M54.40 CHRONIC LOW BACK PAIN WITH SCIATICA, SCIATICA LATERALITY UNSPECIFIED, UNSPECIFIED BACK PAIN LATERALITY: ICD-10-CM

## 2018-08-29 DIAGNOSIS — G89.29 CHRONIC LOW BACK PAIN WITH SCIATICA, SCIATICA LATERALITY UNSPECIFIED, UNSPECIFIED BACK PAIN LATERALITY: ICD-10-CM

## 2018-08-29 DIAGNOSIS — F41.1 ANXIETY STATE: ICD-10-CM

## 2018-08-29 DIAGNOSIS — F32.A DEPRESSION: ICD-10-CM

## 2018-08-29 DIAGNOSIS — G31.84 MCI (MILD COGNITIVE IMPAIRMENT): ICD-10-CM

## 2018-08-29 DIAGNOSIS — G47.33 OSA (OBSTRUCTIVE SLEEP APNEA): ICD-10-CM

## 2018-08-29 DIAGNOSIS — G20.A1 PARKINSON DISEASE (H): ICD-10-CM

## 2018-08-31 ENCOUNTER — COMMUNICATION - HEALTHEAST (OUTPATIENT)
Dept: NURSING | Facility: CLINIC | Age: 68
End: 2018-08-31

## 2018-09-28 ENCOUNTER — OFFICE VISIT - HEALTHEAST (OUTPATIENT)
Dept: CARDIOLOGY | Facility: CLINIC | Age: 68
End: 2018-09-28

## 2018-09-28 DIAGNOSIS — F32.89 OTHER DEPRESSION: ICD-10-CM

## 2018-09-28 DIAGNOSIS — G20.A1 PARKINSON DISEASE (H): ICD-10-CM

## 2018-09-28 DIAGNOSIS — I50.20 HEART FAILURE WITH REDUCED EJECTION FRACTION, NYHA CLASS I (H): ICD-10-CM

## 2018-10-02 ENCOUNTER — OFFICE VISIT - HEALTHEAST (OUTPATIENT)
Dept: PODIATRY | Facility: CLINIC | Age: 68
End: 2018-10-02

## 2018-10-02 ENCOUNTER — COMMUNICATION - HEALTHEAST (OUTPATIENT)
Dept: NURSING | Facility: CLINIC | Age: 68
End: 2018-10-02

## 2018-10-02 DIAGNOSIS — G20.A1 PARKINSON DISEASE (H): ICD-10-CM

## 2018-10-02 DIAGNOSIS — L60.2 ONYCHAUXIS: ICD-10-CM

## 2018-10-02 DIAGNOSIS — B35.1 NAIL FUNGUS: ICD-10-CM

## 2018-10-08 ENCOUNTER — COMMUNICATION - HEALTHEAST (OUTPATIENT)
Dept: NURSING | Facility: CLINIC | Age: 68
End: 2018-10-08

## 2018-10-10 ENCOUNTER — OFFICE VISIT - HEALTHEAST (OUTPATIENT)
Dept: FAMILY MEDICINE | Facility: CLINIC | Age: 68
End: 2018-10-10

## 2018-10-10 DIAGNOSIS — J01.00 ACUTE MAXILLARY SINUSITIS, RECURRENCE NOT SPECIFIED: ICD-10-CM

## 2018-10-10 DIAGNOSIS — Z12.11 SCREEN FOR COLON CANCER: ICD-10-CM

## 2018-10-10 ASSESSMENT — MIFFLIN-ST. JEOR: SCORE: 1716.08

## 2018-10-18 ENCOUNTER — COMMUNICATION - HEALTHEAST (OUTPATIENT)
Dept: NURSING | Facility: CLINIC | Age: 68
End: 2018-10-18

## 2018-10-22 ENCOUNTER — HOSPITAL ENCOUNTER (OUTPATIENT)
Dept: NUCLEAR MEDICINE | Facility: HOSPITAL | Age: 68
Discharge: HOME | End: 2018-10-22
Attending: INTERNAL MEDICINE

## 2018-10-22 ENCOUNTER — HOSPITAL ENCOUNTER (OUTPATIENT)
Dept: CARDIOLOGY | Facility: HOSPITAL | Age: 68
Discharge: HOME | End: 2018-10-22
Attending: INTERNAL MEDICINE

## 2018-10-22 DIAGNOSIS — I50.20 HEART FAILURE WITH REDUCED EJECTION FRACTION, NYHA CLASS I (H): ICD-10-CM

## 2018-10-22 LAB
CV STRESS CURRENT BP HE: NORMAL
CV STRESS CURRENT HR HE: 55
CV STRESS CURRENT HR HE: 59
CV STRESS CURRENT HR HE: 59
CV STRESS CURRENT HR HE: 60
CV STRESS CURRENT HR HE: 62
CV STRESS CURRENT HR HE: 63
CV STRESS CURRENT HR HE: 64
CV STRESS CURRENT HR HE: 65
CV STRESS CURRENT HR HE: 66
CV STRESS DEVIATION TIME HE: NORMAL
CV STRESS ECHO PERCENT HR HE: NORMAL
CV STRESS EXERCISE STAGE HE: NORMAL
CV STRESS FINAL RESTING BP HE: NORMAL
CV STRESS FINAL RESTING HR HE: 66
CV STRESS MAX HR HE: 67
CV STRESS MAX TREADMILL GRADE HE: 0
CV STRESS MAX TREADMILL SPEED HE: 0
CV STRESS PEAK DIA BP HE: NORMAL
CV STRESS PEAK SYS BP HE: NORMAL
CV STRESS PHASE HE: NORMAL
CV STRESS PROTOCOL HE: NORMAL
CV STRESS RESTING PT POSITION HE: NORMAL
CV STRESS ST DEVIATION AMOUNT HE: NORMAL
CV STRESS ST DEVIATION ELEVATION HE: NORMAL
CV STRESS ST EVELATION AMOUNT HE: NORMAL
CV STRESS TEST TYPE HE: NORMAL
CV STRESS TOTAL STAGE TIME MIN 1 HE: NORMAL
NUC STRESS EJECTION FRACTION: 34 %
STRESS ECHO BASELINE BP: NORMAL
STRESS ECHO BASELINE HR: 56
STRESS ECHO CALCULATED PERCENT HR: 44 %
STRESS ECHO LAST STRESS BP: NORMAL
STRESS ECHO LAST STRESS HR: 66

## 2018-10-22 ASSESSMENT — MIFFLIN-ST. JEOR: SCORE: 1747.83

## 2018-10-24 ENCOUNTER — COMMUNICATION - HEALTHEAST (OUTPATIENT)
Dept: CARDIOLOGY | Facility: CLINIC | Age: 68
End: 2018-10-24

## 2018-10-24 DIAGNOSIS — R94.39 ABNORMAL NUCLEAR STRESS TEST: ICD-10-CM

## 2018-10-25 ENCOUNTER — SURGERY - HEALTHEAST (OUTPATIENT)
Dept: CARDIOLOGY | Facility: CLINIC | Age: 68
End: 2018-10-25

## 2018-10-25 ENCOUNTER — COMMUNICATION - HEALTHEAST (OUTPATIENT)
Dept: CARDIOLOGY | Facility: CLINIC | Age: 68
End: 2018-10-25

## 2018-10-25 ENCOUNTER — COMMUNICATION - HEALTHEAST (OUTPATIENT)
Dept: FAMILY MEDICINE | Facility: CLINIC | Age: 68
End: 2018-10-25

## 2018-10-26 ENCOUNTER — COMMUNICATION - HEALTHEAST (OUTPATIENT)
Dept: NURSING | Facility: CLINIC | Age: 68
End: 2018-10-26

## 2018-10-29 ENCOUNTER — HOSPITAL ENCOUNTER (OUTPATIENT)
Dept: CT IMAGING | Facility: HOSPITAL | Age: 68
Discharge: HOME | End: 2018-10-29

## 2018-10-29 ENCOUNTER — OFFICE VISIT - HEALTHEAST (OUTPATIENT)
Dept: FAMILY MEDICINE | Facility: CLINIC | Age: 68
End: 2018-10-29

## 2018-10-29 DIAGNOSIS — R94.39 ABNORMAL NUCLEAR STRESS TEST: ICD-10-CM

## 2018-10-29 DIAGNOSIS — Z01.818 PRE-OP EVALUATION: ICD-10-CM
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
## 2018-10-29 LAB
ALBUMIN SERPL-MCNC: 3.4 G/DL (ref 3.5–5)
ALP SERPL-CCNC: 107 U/L (ref 45–120)
ALT SERPL W P-5'-P-CCNC: <9 U/L (ref 0–45)
ANION GAP SERPL CALCULATED.3IONS-SCNC: 11 MMOL/L (ref 5–18)
AST SERPL W P-5'-P-CCNC: 17 U/L (ref 0–40)
BILIRUB SERPL-MCNC: 0.5 MG/DL (ref 0–1)
BUN SERPL-MCNC: 17 MG/DL (ref 8–22)
CALCIUM SERPL-MCNC: 9.5 MG/DL (ref 8.5–10.5)
CHLORIDE BLD-SCNC: 103 MMOL/L (ref 98–107)
CO2 SERPL-SCNC: 26 MMOL/L (ref 22–31)
CREAT SERPL-MCNC: 0.83 MG/DL (ref 0.7–1.3)
GFR SERPL CREATININE-BSD FRML MDRD: >60 ML/MIN/1.73M2
GLUCOSE BLD-MCNC: 104 MG/DL (ref 70–125)
MAGNESIUM SERPL-MCNC: 2.1 MG/DL (ref 1.8–2.6)
POTASSIUM BLD-SCNC: 4.4 MMOL/L (ref 3.5–5)
PROT SERPL-MCNC: 6.7 G/DL (ref 6–8)
SODIUM SERPL-SCNC: 140 MMOL/L (ref 136–145)

## 2018-10-29 ASSESSMENT — MIFFLIN-ST. JEOR: SCORE: 1765.97

## 2018-10-30 ENCOUNTER — COMMUNICATION - HEALTHEAST (OUTPATIENT)
Dept: FAMILY MEDICINE | Facility: CLINIC | Age: 68
End: 2018-10-30

## 2018-10-30 LAB
BASOPHILS # BLD AUTO: 0.1 THOU/UL (ref 0–0.2)
BASOPHILS NFR BLD AUTO: 1 % (ref 0–2)
EOSINOPHIL # BLD AUTO: 0.3 THOU/UL (ref 0–0.4)
EOSINOPHIL NFR BLD AUTO: 5 % (ref 0–6)
ERYTHROCYTE [DISTWIDTH] IN BLOOD BY AUTOMATED COUNT: 13.2 % (ref 11–14.5)
HCT VFR BLD AUTO: 47.5 % (ref 40–54)
HGB BLD-MCNC: 14.9 G/DL (ref 14–18)
LAB AP CHARGES (HE HISTORICAL CONVERSION): NORMAL
LYMPHOCYTES # BLD AUTO: 1.4 THOU/UL (ref 0.8–4.4)
LYMPHOCYTES NFR BLD AUTO: 22 % (ref 20–40)
MCH RBC QN AUTO: 28.2 PG (ref 27–34)
MCHC RBC AUTO-ENTMCNC: 31.4 G/DL (ref 32–36)
MCV RBC AUTO: 90 FL (ref 80–100)
MONOCYTES # BLD AUTO: 0.7 THOU/UL (ref 0–0.9)
MONOCYTES NFR BLD AUTO: 11 % (ref 2–10)
NEUTROPHILS # BLD AUTO: 3.9 THOU/UL (ref 2–7.7)
NEUTROPHILS NFR BLD AUTO: 61 % (ref 50–70)
PATH REPORT.COMMENTS IMP SPEC: NORMAL
PATH REPORT.COMMENTS IMP SPEC: NORMAL
PATH REPORT.FINAL DX SPEC: NORMAL
PATH REPORT.MICROSCOPIC SPEC OTHER STN: ABNORMAL
PATH REPORT.MICROSCOPIC SPEC OTHER STN: NORMAL
PATH REPORT.RELEVANT HX SPEC: NORMAL
PLATELET # BLD AUTO: 259 THOU/UL (ref 140–440)
PMV BLD AUTO: 9.7 FL (ref 8.5–12.5)
RBC # BLD AUTO: 5.28 MILL/UL (ref 4.4–6.2)
WBC: 6.4 THOU/UL (ref 4–11)

## 2018-10-31 ENCOUNTER — SURGERY - HEALTHEAST (OUTPATIENT)
Dept: CARDIOLOGY | Facility: CLINIC | Age: 68
End: 2018-10-31

## 2018-10-31 ASSESSMENT — MIFFLIN-ST. JEOR: SCORE: 1743.29

## 2018-11-05 ENCOUNTER — OFFICE VISIT - HEALTHEAST (OUTPATIENT)
Dept: FAMILY MEDICINE | Facility: CLINIC | Age: 68
End: 2018-11-05

## 2018-11-05 DIAGNOSIS — R42 DIZZINESS: ICD-10-CM
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
## 2018-11-05 DIAGNOSIS — I50.20 HEART FAILURE WITH REDUCED EJECTION FRACTION, NYHA CLASS I (H): ICD-10-CM

## 2018-11-05 DIAGNOSIS — G20.A1 PARKINSON DISEASE (H): ICD-10-CM

## 2018-11-05 DIAGNOSIS — I25.119 CORONARY ARTERY DISEASE INVOLVING NATIVE CORONARY ARTERY OF NATIVE HEART WITH ANGINA PECTORIS (H): ICD-10-CM

## 2018-11-06 ENCOUNTER — COMMUNICATION - HEALTHEAST (OUTPATIENT)
Dept: PHARMACY | Facility: CLINIC | Age: 68
End: 2018-11-06

## 2018-11-06 DIAGNOSIS — G20.A1 PARKINSON DISEASE (H): ICD-10-CM

## 2018-11-06 DIAGNOSIS — I25.119 CORONARY ARTERY DISEASE INVOLVING NATIVE CORONARY ARTERY OF NATIVE HEART WITH ANGINA PECTORIS (H): ICD-10-CM

## 2018-11-09 ENCOUNTER — AMBULATORY - HEALTHEAST (OUTPATIENT)
Dept: CARDIAC REHAB | Facility: HOSPITAL | Age: 68
End: 2018-11-09

## 2018-11-09 ENCOUNTER — COMMUNICATION - HEALTHEAST (OUTPATIENT)
Dept: NURSING | Facility: CLINIC | Age: 68
End: 2018-11-09

## 2018-11-09 DIAGNOSIS — Z95.5 STENTED CORONARY ARTERY: ICD-10-CM

## 2018-11-09 DIAGNOSIS — R94.39 ABNORMAL NUCLEAR STRESS TEST: ICD-10-CM

## 2018-11-13 ENCOUNTER — COMMUNICATION - HEALTHEAST (OUTPATIENT)
Dept: NURSING | Facility: CLINIC | Age: 68
End: 2018-11-13

## 2018-11-15 ENCOUNTER — OFFICE VISIT - HEALTHEAST (OUTPATIENT)
Dept: CARDIOLOGY | Facility: CLINIC | Age: 68
End: 2018-11-15

## 2018-11-15 ENCOUNTER — COMMUNICATION - HEALTHEAST (OUTPATIENT)
Dept: NEUROLOGY | Facility: CLINIC | Age: 68
End: 2018-11-15

## 2018-11-15 DIAGNOSIS — I25.5 ISCHEMIC CARDIOMYOPATHY: ICD-10-CM

## 2018-11-15 DIAGNOSIS — I50.20 HEART FAILURE WITH REDUCED EJECTION FRACTION, NYHA CLASS II (H): ICD-10-CM

## 2018-11-15 DIAGNOSIS — M54.9 BACK PAIN: ICD-10-CM

## 2018-11-15 DIAGNOSIS — E78.5 DYSLIPIDEMIA, GOAL LDL BELOW 70: ICD-10-CM

## 2018-11-15 DIAGNOSIS — I25.119 CORONARY ARTERY DISEASE INVOLVING NATIVE CORONARY ARTERY OF NATIVE HEART WITH ANGINA PECTORIS (H): ICD-10-CM

## 2018-11-15 DIAGNOSIS — R94.39 ABNORMAL NUCLEAR STRESS TEST: ICD-10-CM

## 2018-11-15 ASSESSMENT — MIFFLIN-ST. JEOR: SCORE: 1756.9

## 2018-11-16 ENCOUNTER — AMBULATORY - HEALTHEAST (OUTPATIENT)
Dept: CARDIAC REHAB | Facility: HOSPITAL | Age: 68
End: 2018-11-16

## 2018-11-16 DIAGNOSIS — Z95.5 STENTED CORONARY ARTERY: ICD-10-CM

## 2018-11-19 ENCOUNTER — AMBULATORY - HEALTHEAST (OUTPATIENT)
Dept: CARDIAC REHAB | Facility: HOSPITAL | Age: 68
End: 2018-11-19

## 2018-11-19 DIAGNOSIS — Z95.5 STENTED CORONARY ARTERY: ICD-10-CM

## 2018-11-23 ENCOUNTER — AMBULATORY - HEALTHEAST (OUTPATIENT)
Dept: CARDIAC REHAB | Facility: HOSPITAL | Age: 68
End: 2018-11-23

## 2018-11-23 DIAGNOSIS — Z95.5 STENTED CORONARY ARTERY: ICD-10-CM

## 2018-11-26 ENCOUNTER — COMMUNICATION - HEALTHEAST (OUTPATIENT)
Dept: FAMILY MEDICINE | Facility: CLINIC | Age: 68
End: 2018-11-26

## 2018-11-26 ENCOUNTER — AMBULATORY - HEALTHEAST (OUTPATIENT)
Dept: CARDIAC REHAB | Facility: HOSPITAL | Age: 68
End: 2018-11-26

## 2018-11-26 ENCOUNTER — COMMUNICATION - HEALTHEAST (OUTPATIENT)
Dept: NURSING | Facility: CLINIC | Age: 68
End: 2018-11-26

## 2018-11-26 DIAGNOSIS — Z95.5 STENTED CORONARY ARTERY: ICD-10-CM

## 2018-11-28 ENCOUNTER — HOSPITAL ENCOUNTER (OUTPATIENT)
Dept: NEUROLOGY | Facility: CLINIC | Age: 68
Setting detail: THERAPIES SERIES
Discharge: STILL ADMITTED OR ADMINISTRATIVE | End: 2018-11-28
Attending: PSYCHIATRY & NEUROLOGY

## 2018-11-28 DIAGNOSIS — G20.A1 PARKINSON DISEASE (H): ICD-10-CM

## 2018-11-28 DIAGNOSIS — M54.40 CHRONIC LOW BACK PAIN WITH SCIATICA, SCIATICA LATERALITY UNSPECIFIED, UNSPECIFIED BACK PAIN LATERALITY: ICD-10-CM

## 2018-11-28 DIAGNOSIS — G89.29 CHRONIC LOW BACK PAIN WITH SCIATICA, SCIATICA LATERALITY UNSPECIFIED, UNSPECIFIED BACK PAIN LATERALITY: ICD-10-CM

## 2018-11-28 DIAGNOSIS — F41.1 ANXIETY STATE: ICD-10-CM

## 2018-11-30 ENCOUNTER — AMBULATORY - HEALTHEAST (OUTPATIENT)
Dept: CARDIAC REHAB | Facility: HOSPITAL | Age: 68
End: 2018-11-30

## 2018-11-30 DIAGNOSIS — Z95.5 STENTED CORONARY ARTERY: ICD-10-CM

## 2018-12-03 ENCOUNTER — MEDICAL CORRESPONDENCE (OUTPATIENT)
Dept: HEALTH INFORMATION MANAGEMENT | Facility: CLINIC | Age: 68
End: 2018-12-03
Payer: COMMERCIAL

## 2018-12-03 ENCOUNTER — AMBULATORY - HEALTHEAST (OUTPATIENT)
Dept: CARDIAC REHAB | Facility: HOSPITAL | Age: 68
End: 2018-12-03

## 2018-12-03 ENCOUNTER — COMMUNICATION - HEALTHEAST (OUTPATIENT)
Dept: NEUROLOGY | Facility: CLINIC | Age: 68
End: 2018-12-03

## 2018-12-03 ENCOUNTER — COMMUNICATION - HEALTHEAST (OUTPATIENT)
Dept: NURSING | Facility: CLINIC | Age: 68
End: 2018-12-03

## 2018-12-03 DIAGNOSIS — Z95.5 STENTED CORONARY ARTERY: ICD-10-CM

## 2018-12-06 ENCOUNTER — AMBULATORY - HEALTHEAST (OUTPATIENT)
Dept: NURSING | Facility: CLINIC | Age: 68
End: 2018-12-06

## 2018-12-07 ENCOUNTER — AMBULATORY - HEALTHEAST (OUTPATIENT)
Dept: CARDIAC REHAB | Facility: HOSPITAL | Age: 68
End: 2018-12-07

## 2018-12-07 DIAGNOSIS — Z95.5 STENTED CORONARY ARTERY: ICD-10-CM

## 2018-12-10 ENCOUNTER — AMBULATORY - HEALTHEAST (OUTPATIENT)
Dept: CARDIAC REHAB | Facility: HOSPITAL | Age: 68
End: 2018-12-10

## 2018-12-10 DIAGNOSIS — Z95.5 STENTED CORONARY ARTERY: ICD-10-CM

## 2018-12-11 ENCOUNTER — COMMUNICATION - HEALTHEAST (OUTPATIENT)
Dept: NURSING | Facility: CLINIC | Age: 68
End: 2018-12-11

## 2018-12-13 ENCOUNTER — COMMUNICATION - HEALTHEAST (OUTPATIENT)
Dept: NURSING | Facility: CLINIC | Age: 68
End: 2018-12-13

## 2018-12-13 ENCOUNTER — OFFICE VISIT - HEALTHEAST (OUTPATIENT)
Dept: NEUROSURGERY | Facility: CLINIC | Age: 68
End: 2018-12-13

## 2018-12-13 DIAGNOSIS — R29.6 FALLS FREQUENTLY: ICD-10-CM

## 2018-12-13 DIAGNOSIS — R26.81 GAIT INSTABILITY: ICD-10-CM

## 2018-12-13 DIAGNOSIS — G20.A1 PARKINSON DISEASE (H): ICD-10-CM

## 2018-12-13 DIAGNOSIS — G95.20 CORD COMPRESSION MYELOPATHY (H): ICD-10-CM

## 2018-12-13 DIAGNOSIS — R39.15 URINARY URGENCY: ICD-10-CM

## 2018-12-13 DIAGNOSIS — Z95.5 H/O HEART ARTERY STENT: ICD-10-CM

## 2018-12-13 DIAGNOSIS — I99.8 VASCULAR INSUFFICIENCY: ICD-10-CM

## 2018-12-13 ASSESSMENT — MIFFLIN-ST. JEOR: SCORE: 1741.02

## 2018-12-14 ENCOUNTER — OFFICE VISIT - HEALTHEAST (OUTPATIENT)
Dept: CARDIOLOGY | Facility: CLINIC | Age: 68
End: 2018-12-14

## 2018-12-14 ENCOUNTER — OFFICE VISIT - HEALTHEAST (OUTPATIENT)
Dept: FAMILY MEDICINE | Facility: CLINIC | Age: 68
End: 2018-12-14

## 2018-12-14 ENCOUNTER — AMBULATORY - HEALTHEAST (OUTPATIENT)
Dept: NURSING | Facility: CLINIC | Age: 68
End: 2018-12-14

## 2018-12-14 DIAGNOSIS — I25.119 CORONARY ARTERY DISEASE INVOLVING NATIVE CORONARY ARTERY OF NATIVE HEART WITH ANGINA PECTORIS (H): ICD-10-CM

## 2018-12-14 DIAGNOSIS — I25.5 ISCHEMIC CARDIOMYOPATHY: ICD-10-CM

## 2018-12-14 DIAGNOSIS — M48.02 SPINAL STENOSIS IN CERVICAL REGION: ICD-10-CM

## 2018-12-14 DIAGNOSIS — L97.519 ULCER OF TOE OF RIGHT FOOT, UNSPECIFIED ULCER STAGE (H): ICD-10-CM
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
## 2018-12-14 DIAGNOSIS — I50.22 CHRONIC SYSTOLIC HEART FAILURE (H): ICD-10-CM

## 2018-12-14 DIAGNOSIS — G20.A1 PARKINSON DISEASE (H): ICD-10-CM

## 2018-12-14 ASSESSMENT — MIFFLIN-ST. JEOR: SCORE: 1759.16

## 2018-12-17 ENCOUNTER — AMBULATORY - HEALTHEAST (OUTPATIENT)
Dept: CARDIAC REHAB | Facility: HOSPITAL | Age: 68
End: 2018-12-17

## 2018-12-17 ENCOUNTER — COMMUNICATION - HEALTHEAST (OUTPATIENT)
Dept: CARDIOLOGY | Facility: CLINIC | Age: 68
End: 2018-12-17

## 2018-12-17 ENCOUNTER — COMMUNICATION - HEALTHEAST (OUTPATIENT)
Dept: NURSING | Facility: CLINIC | Age: 68
End: 2018-12-17

## 2018-12-17 DIAGNOSIS — Z95.5 STENTED CORONARY ARTERY: ICD-10-CM

## 2018-12-18 ENCOUNTER — OFFICE VISIT - HEALTHEAST (OUTPATIENT)
Dept: PHARMACY | Facility: CLINIC | Age: 68
End: 2018-12-18

## 2018-12-18 DIAGNOSIS — M48.00 SPINAL STENOSIS, UNSPECIFIED SPINAL REGION: ICD-10-CM

## 2018-12-18 DIAGNOSIS — I25.119 CORONARY ARTERY DISEASE INVOLVING NATIVE CORONARY ARTERY OF NATIVE HEART WITH ANGINA PECTORIS (H): ICD-10-CM

## 2018-12-18 DIAGNOSIS — G20.A1 PARKINSON DISEASE (H): ICD-10-CM

## 2018-12-18 DIAGNOSIS — L97.509 ULCER OF FOOT, UNSPECIFIED LATERALITY, UNSPECIFIED ULCER STAGE (H): ICD-10-CM

## 2018-12-19 ENCOUNTER — COMMUNICATION - HEALTHEAST (OUTPATIENT)
Dept: NEUROLOGY | Facility: CLINIC | Age: 68
End: 2018-12-19

## 2018-12-19 ENCOUNTER — AMBULATORY - HEALTHEAST (OUTPATIENT)
Dept: NURSING | Facility: CLINIC | Age: 68
End: 2018-12-19

## 2018-12-19 DIAGNOSIS — F32.0 CURRENT MILD EPISODE OF MAJOR DEPRESSIVE DISORDER, UNSPECIFIED WHETHER RECURRENT (H): ICD-10-CM

## 2018-12-21 ENCOUNTER — AMBULATORY - HEALTHEAST (OUTPATIENT)
Dept: CARDIAC REHAB | Facility: HOSPITAL | Age: 68
End: 2018-12-21

## 2018-12-21 DIAGNOSIS — Z95.5 STENTED CORONARY ARTERY: ICD-10-CM

## 2018-12-27 ENCOUNTER — HOSPITAL ENCOUNTER (OUTPATIENT)
Dept: NEUROLOGY | Facility: CLINIC | Age: 68
Setting detail: THERAPIES SERIES
Discharge: STILL ADMITTED OR ADMINISTRATIVE | End: 2018-12-27
Attending: NURSE PRACTITIONER

## 2018-12-27 DIAGNOSIS — G47.52 RBD (REM BEHAVIORAL DISORDER): ICD-10-CM

## 2018-12-27 DIAGNOSIS — G20.A1 PARKINSON DISEASE (H): ICD-10-CM

## 2018-12-27 DIAGNOSIS — F32.89 OTHER DEPRESSION: ICD-10-CM

## 2018-12-27 DIAGNOSIS — F41.1 ANXIETY STATE: ICD-10-CM

## 2018-12-27 DIAGNOSIS — G47.00 INSOMNIA, UNSPECIFIED TYPE: ICD-10-CM

## 2018-12-27 DIAGNOSIS — R44.3 HALLUCINATIONS: ICD-10-CM

## 2018-12-28 ENCOUNTER — AMBULATORY - HEALTHEAST (OUTPATIENT)
Dept: CARDIAC REHAB | Facility: HOSPITAL | Age: 68
End: 2018-12-28

## 2018-12-28 ENCOUNTER — RECORDS - HEALTHEAST (OUTPATIENT)
Dept: ADMINISTRATIVE | Facility: OTHER | Age: 68
End: 2018-12-28

## 2018-12-28 ENCOUNTER — OFFICE VISIT - HEALTHEAST (OUTPATIENT)
Dept: VASCULAR SURGERY | Facility: CLINIC | Age: 68
End: 2018-12-28

## 2018-12-28 DIAGNOSIS — L74.4 ANHIDROSIS: ICD-10-CM

## 2018-12-28 DIAGNOSIS — L97.519 ULCER OF RIGHT FOOT, UNSPECIFIED ULCER STAGE (H): ICD-10-CM

## 2018-12-28 DIAGNOSIS — Z95.5 STENTED CORONARY ARTERY: ICD-10-CM

## 2018-12-28 ASSESSMENT — MIFFLIN-ST. JEOR: SCORE: 1750.09

## 2018-12-31 ENCOUNTER — COMMUNICATION - HEALTHEAST (OUTPATIENT)
Dept: PHARMACY | Facility: CLINIC | Age: 68
End: 2018-12-31

## 2018-12-31 ENCOUNTER — AMBULATORY - HEALTHEAST (OUTPATIENT)
Dept: CARDIAC REHAB | Facility: HOSPITAL | Age: 68
End: 2018-12-31

## 2018-12-31 DIAGNOSIS — Z95.5 STENTED CORONARY ARTERY: ICD-10-CM

## 2019-01-03 ENCOUNTER — OFFICE VISIT - HEALTHEAST (OUTPATIENT)
Dept: FAMILY MEDICINE | Facility: CLINIC | Age: 69
End: 2019-01-03

## 2019-01-03 DIAGNOSIS — M25.511 ACUTE PAIN OF RIGHT SHOULDER: ICD-10-CM

## 2019-01-03 DIAGNOSIS — I50.22 CHRONIC SYSTOLIC HEART FAILURE (H): ICD-10-CM

## 2019-01-03 DIAGNOSIS — M25.531 RIGHT WRIST PAIN: ICD-10-CM

## 2019-01-03 DIAGNOSIS — G20.A1 PARKINSON DISEASE (H): ICD-10-CM

## 2019-01-03 ASSESSMENT — MIFFLIN-ST. JEOR: SCORE: 1731.95

## 2019-01-04 ENCOUNTER — HOSPITAL ENCOUNTER (OUTPATIENT)
Dept: NEUROLOGY | Facility: CLINIC | Age: 69
Setting detail: THERAPIES SERIES
Discharge: STILL ADMITTED OR ADMINISTRATIVE | End: 2019-01-04
Attending: NURSE PRACTITIONER

## 2019-01-04 ENCOUNTER — COMMUNICATION - HEALTHEAST (OUTPATIENT)
Dept: CARDIOLOGY | Facility: CLINIC | Age: 69
End: 2019-01-04

## 2019-01-04 DIAGNOSIS — F32.89 OTHER DEPRESSION: ICD-10-CM

## 2019-01-04 DIAGNOSIS — F41.1 ANXIETY STATE: ICD-10-CM

## 2019-01-04 DIAGNOSIS — G31.84 MCI (MILD COGNITIVE IMPAIRMENT): ICD-10-CM

## 2019-01-04 DIAGNOSIS — G47.00 INSOMNIA, UNSPECIFIED TYPE: ICD-10-CM

## 2019-01-04 DIAGNOSIS — G20.A1 PARKINSON DISEASE (H): ICD-10-CM

## 2019-01-07 ENCOUNTER — AMBULATORY - HEALTHEAST (OUTPATIENT)
Dept: CARDIAC REHAB | Facility: HOSPITAL | Age: 69
End: 2019-01-07

## 2019-01-07 DIAGNOSIS — Z95.5 STENTED CORONARY ARTERY: ICD-10-CM

## 2019-01-11 ENCOUNTER — AMBULATORY - HEALTHEAST (OUTPATIENT)
Dept: CARDIAC REHAB | Facility: HOSPITAL | Age: 69
End: 2019-01-11

## 2019-01-11 DIAGNOSIS — Z95.5 STENTED CORONARY ARTERY: ICD-10-CM

## 2019-01-14 ENCOUNTER — AMBULATORY - HEALTHEAST (OUTPATIENT)
Dept: CARDIAC REHAB | Facility: HOSPITAL | Age: 69
End: 2019-01-14

## 2019-01-14 ENCOUNTER — RECORDS - HEALTHEAST (OUTPATIENT)
Dept: ADMINISTRATIVE | Facility: OTHER | Age: 69
End: 2019-01-14

## 2019-01-14 DIAGNOSIS — Z95.5 STENTED CORONARY ARTERY: ICD-10-CM

## 2019-01-18 ENCOUNTER — COMMUNICATION - HEALTHEAST (OUTPATIENT)
Dept: NURSING | Facility: CLINIC | Age: 69
End: 2019-01-18

## 2019-01-18 ENCOUNTER — AMBULATORY - HEALTHEAST (OUTPATIENT)
Dept: NEUROLOGY | Facility: CLINIC | Age: 69
End: 2019-01-18

## 2019-01-18 ENCOUNTER — OFFICE VISIT - HEALTHEAST (OUTPATIENT)
Dept: CARDIOLOGY | Facility: CLINIC | Age: 69
End: 2019-01-18

## 2019-01-18 DIAGNOSIS — G20.A1 PARKINSON DISEASE (H): ICD-10-CM

## 2019-01-18 DIAGNOSIS — I25.5 ISCHEMIC CARDIOMYOPATHY: ICD-10-CM

## 2019-01-18 DIAGNOSIS — R07.9 CHEST PAIN: ICD-10-CM

## 2019-01-18 DIAGNOSIS — I25.119 CORONARY ARTERY DISEASE INVOLVING NATIVE CORONARY ARTERY OF NATIVE HEART WITH ANGINA PECTORIS (H): ICD-10-CM

## 2019-01-18 LAB
ATRIAL RATE - MUSE: 63 BPM
DIASTOLIC BLOOD PRESSURE - MUSE: NORMAL MMHG
INTERPRETATION ECG - MUSE: NORMAL
P AXIS - MUSE: 16 DEGREES
PR INTERVAL - MUSE: 136 MS
QRS DURATION - MUSE: 96 MS
QT - MUSE: 422 MS
QTC - MUSE: 431 MS
R AXIS - MUSE: -2 DEGREES
SYSTOLIC BLOOD PRESSURE - MUSE: NORMAL MMHG
T AXIS - MUSE: 109 DEGREES
VENTRICULAR RATE- MUSE: 63 BPM

## 2019-01-21 ENCOUNTER — AMBULATORY - HEALTHEAST (OUTPATIENT)
Dept: CARDIAC REHAB | Facility: HOSPITAL | Age: 69
End: 2019-01-21

## 2019-01-21 DIAGNOSIS — Z95.5 STENTED CORONARY ARTERY: ICD-10-CM

## 2019-01-22 ENCOUNTER — HOSPITAL ENCOUNTER (OUTPATIENT)
Dept: NEUROLOGY | Facility: CLINIC | Age: 69
Setting detail: THERAPIES SERIES
Discharge: STILL ADMITTED OR ADMINISTRATIVE | End: 2019-01-22
Attending: NURSE PRACTITIONER

## 2019-01-22 DIAGNOSIS — G47.52 RBD (REM BEHAVIORAL DISORDER): ICD-10-CM

## 2019-01-22 DIAGNOSIS — R53.1 WEAKNESS: ICD-10-CM

## 2019-01-22 DIAGNOSIS — F41.1 ANXIETY STATE: ICD-10-CM

## 2019-01-22 DIAGNOSIS — R44.3 HALLUCINATIONS: ICD-10-CM

## 2019-01-22 DIAGNOSIS — F32.89 OTHER DEPRESSION: ICD-10-CM

## 2019-01-22 DIAGNOSIS — G20.A1 PARKINSON DISEASE (H): ICD-10-CM

## 2019-01-23 ENCOUNTER — COMMUNICATION - HEALTHEAST (OUTPATIENT)
Dept: NEUROLOGY | Facility: CLINIC | Age: 69
End: 2019-01-23

## 2019-01-23 DIAGNOSIS — G20.A1 PARKINSON DISEASE (H): ICD-10-CM

## 2019-01-24 ENCOUNTER — COMMUNICATION - HEALTHEAST (OUTPATIENT)
Dept: NEUROLOGY | Facility: CLINIC | Age: 69
End: 2019-01-24

## 2019-01-25 ENCOUNTER — AMBULATORY - HEALTHEAST (OUTPATIENT)
Dept: CARDIAC REHAB | Facility: HOSPITAL | Age: 69
End: 2019-01-25

## 2019-01-25 DIAGNOSIS — Z95.5 STENTED CORONARY ARTERY: ICD-10-CM

## 2019-01-28 ENCOUNTER — COMMUNICATION - HEALTHEAST (OUTPATIENT)
Dept: NEUROLOGY | Facility: CLINIC | Age: 69
End: 2019-01-28

## 2019-01-28 DIAGNOSIS — G20.A1 PARKINSON DISEASE (H): ICD-10-CM

## 2019-02-01 ENCOUNTER — AMBULATORY - HEALTHEAST (OUTPATIENT)
Dept: CARDIAC REHAB | Facility: HOSPITAL | Age: 69
End: 2019-02-01

## 2019-02-01 ENCOUNTER — HOSPITAL ENCOUNTER (OUTPATIENT)
Dept: CARDIOLOGY | Facility: HOSPITAL | Age: 69
Discharge: HOME | End: 2019-02-01
Attending: INTERNAL MEDICINE

## 2019-02-01 DIAGNOSIS — I25.5 ISCHEMIC CARDIOMYOPATHY: ICD-10-CM

## 2019-02-01 DIAGNOSIS — Z95.5 STENTED CORONARY ARTERY: ICD-10-CM

## 2019-02-01 DIAGNOSIS — I25.119 CORONARY ARTERY DISEASE INVOLVING NATIVE CORONARY ARTERY OF NATIVE HEART WITH ANGINA PECTORIS (H): ICD-10-CM

## 2019-02-01 ASSESSMENT — MIFFLIN-ST. JEOR: SCORE: 1745.56

## 2019-02-04 LAB
AORTIC ROOT: 3.8 CM
AORTIC VALVE MEAN VELOCITY: 96.3 CM/S
AV DIMENSIONLESS INDEX VTI: 0.7
AV MEAN GRADIENT: 4 MMHG
AV PEAK GRADIENT: 4.9 MMHG
AV VALVE AREA: 2.9 CM2
AV VELOCITY RATIO: 0.8
BSA FOR ECHO PROCEDURE: 2.2 M2
CV BLOOD PRESSURE: ABNORMAL MMHG
CV ECHO HEIGHT: 70 IN
CV ECHO WEIGHT: 217 LBS
DOP CALC AO PEAK VEL: 111 CM/S
DOP CALC AO VTI: 32.2 CM
DOP CALC LVOT AREA: 4.15 CM2
DOP CALC LVOT DIAMETER: 2.3 CM
DOP CALC LVOT PEAK VEL: 87.9 CM/S
DOP CALC LVOT STROKE VOLUME: 94.7 CM3
DOP CALCLVOT PEAK VEL VTI: 22.8 CM
EJECTION FRACTION: 51 % (ref 55–75)
FRACTIONAL SHORTENING: 35.8 % (ref 28–44)
INTERVENTRICULAR SEPTUM IN END DIASTOLE: 0.89 CM (ref 0.6–1)
IVS/PW RATIO: 0.8
LA AREA 1: 26 CM2
LA AREA 2: 28 CM2
LEFT ATRIUM LENGTH: 6 CM
LEFT ATRIUM SIZE: 4.6 CM
LEFT ATRIUM VOLUME INDEX: 46.9 ML/M2
LEFT ATRIUM VOLUME: 103.1 ML
LEFT VENTRICLE CARDIAC INDEX: 2.8 L/MIN/M2
LEFT VENTRICLE CARDIAC OUTPUT: 6.2 L/MIN
LEFT VENTRICLE DIASTOLIC VOLUME INDEX: 56.4 CM3/M2 (ref 34–74)
LEFT VENTRICLE DIASTOLIC VOLUME: 124 CM3 (ref 62–150)
LEFT VENTRICLE HEART RATE: 65 BPM
LEFT VENTRICLE MASS INDEX: 107.3 G/M2
LEFT VENTRICLE SYSTOLIC VOLUME INDEX: 27.4 CM3/M2 (ref 11–31)
LEFT VENTRICLE SYSTOLIC VOLUME: 60.2 CM3 (ref 21–61)
LEFT VENTRICULAR INTERNAL DIMENSION IN DIASTOLE: 5.95 CM (ref 4.2–5.8)
LEFT VENTRICULAR INTERNAL DIMENSION IN SYSTOLE: 3.82 CM (ref 2.5–4)
LEFT VENTRICULAR MASS: 236 G
LEFT VENTRICULAR OUTFLOW TRACT MEAN GRADIENT: 2 MMHG
LEFT VENTRICULAR OUTFLOW TRACT MEAN VELOCITY: 67 CM/S
LEFT VENTRICULAR OUTFLOW TRACT PEAK GRADIENT: 3 MMHG
LEFT VENTRICULAR POSTERIOR WALL IN END DIASTOLE: 1.06 CM (ref 0.6–1)
LV STROKE VOLUME INDEX: 43 ML/M2
MITRAL VALVE E/A RATIO: 1
MV AVERAGE E/E' RATIO: 12.5 CM/S
MV DECELERATION TIME: 197 MS
MV E'TISSUE VEL-LAT: 6 CM/S
MV E'TISSUE VEL-MED: 5.32 CM/S
MV LATERAL E/E' RATIO: 11.8
MV MEDIAL E/E' RATIO: 13.3
MV PEAK A VELOCITY: 70.8 CM/S
MV PEAK E VELOCITY: 70.8 CM/S
NUC REST DIASTOLIC VOLUME INDEX: 3472 LBS
NUC REST SYSTOLIC VOLUME INDEX: 70 IN
TRICUSPID VALVE ANULAR PLANE SYSTOLIC EXCURSION: 2.8 CM

## 2019-02-05 ENCOUNTER — AMBULATORY - HEALTHEAST (OUTPATIENT)
Dept: NEUROLOGY | Facility: CLINIC | Age: 69
End: 2019-02-05

## 2019-02-05 DIAGNOSIS — G20.A1 PARKINSON DISEASE (H): ICD-10-CM

## 2019-02-05 DIAGNOSIS — R44.3 HALLUCINATIONS: ICD-10-CM

## 2019-02-06 ENCOUNTER — COMMUNICATION - HEALTHEAST (OUTPATIENT)
Dept: NEUROLOGY | Facility: CLINIC | Age: 69
End: 2019-02-06

## 2019-02-06 ENCOUNTER — AMBULATORY - HEALTHEAST (OUTPATIENT)
Dept: NEUROLOGY | Facility: CLINIC | Age: 69
End: 2019-02-06

## 2019-02-06 DIAGNOSIS — G20.A1 PARKINSON DISEASE (H): ICD-10-CM

## 2019-02-07 ENCOUNTER — OFFICE VISIT - HEALTHEAST (OUTPATIENT)
Dept: FAMILY MEDICINE | Facility: CLINIC | Age: 69
End: 2019-02-07

## 2019-02-07 DIAGNOSIS — G20.A1 PARKINSON DISEASE (H): ICD-10-CM

## 2019-02-07 DIAGNOSIS — T14.8XXA OPEN WOUND: ICD-10-CM

## 2019-02-07 DIAGNOSIS — I25.119 CORONARY ARTERY DISEASE INVOLVING NATIVE CORONARY ARTERY OF NATIVE HEART WITH ANGINA PECTORIS (H): ICD-10-CM

## 2019-02-07 DIAGNOSIS — M48.02 CERVICAL STENOSIS OF SPINAL CANAL: ICD-10-CM

## 2019-02-07 DIAGNOSIS — I50.22 CHRONIC SYSTOLIC HEART FAILURE (H): ICD-10-CM

## 2019-02-07 ASSESSMENT — MIFFLIN-ST. JEOR: SCORE: 1745.56

## 2019-02-08 ENCOUNTER — AMBULATORY - HEALTHEAST (OUTPATIENT)
Dept: CARDIAC REHAB | Facility: HOSPITAL | Age: 69
End: 2019-02-08

## 2019-02-08 DIAGNOSIS — Z95.5 STENTED CORONARY ARTERY: ICD-10-CM

## 2019-02-12 ENCOUNTER — COMMUNICATION - HEALTHEAST (OUTPATIENT)
Dept: NEUROLOGY | Facility: CLINIC | Age: 69
End: 2019-02-12

## 2019-02-13 ENCOUNTER — COMMUNICATION - HEALTHEAST (OUTPATIENT)
Dept: NURSING | Facility: CLINIC | Age: 69
End: 2019-02-13

## 2019-02-18 ENCOUNTER — COMMUNICATION - HEALTHEAST (OUTPATIENT)
Dept: NEUROLOGY | Facility: CLINIC | Age: 69
End: 2019-02-18

## 2019-02-18 ENCOUNTER — ALLIED HEALTH/NURSE VISIT (OUTPATIENT)
Dept: PHARMACY | Facility: CLINIC | Age: 69
End: 2019-02-18
Payer: COMMERCIAL

## 2019-02-18 DIAGNOSIS — E78.2 MIXED HYPERLIPIDEMIA: ICD-10-CM

## 2019-02-18 DIAGNOSIS — G25.81 RESTLESS LEGS SYNDROME (RLS): ICD-10-CM

## 2019-02-18 DIAGNOSIS — G20.A1 PARKINSON'S DISEASE (H): Primary | ICD-10-CM

## 2019-02-18 DIAGNOSIS — F32.A DEPRESSION, UNSPECIFIED DEPRESSION TYPE: ICD-10-CM

## 2019-02-18 DIAGNOSIS — I50.9 CONGESTIVE HEART FAILURE, UNSPECIFIED HF CHRONICITY, UNSPECIFIED HEART FAILURE TYPE (H): ICD-10-CM

## 2019-02-18 DIAGNOSIS — E55.9 VITAMIN D DEFICIENCY: ICD-10-CM

## 2019-02-18 DIAGNOSIS — K59.00 CONSTIPATION, UNSPECIFIED CONSTIPATION TYPE: ICD-10-CM

## 2019-02-18 DIAGNOSIS — K21.9 GASTROESOPHAGEAL REFLUX DISEASE, ESOPHAGITIS PRESENCE NOT SPECIFIED: ICD-10-CM

## 2019-02-18 DIAGNOSIS — I25.10 CORONARY ARTERY DISEASE INVOLVING NATIVE HEART, ANGINA PRESENCE UNSPECIFIED, UNSPECIFIED VESSEL OR LESION TYPE: ICD-10-CM

## 2019-02-18 DIAGNOSIS — E03.9 HYPOTHYROIDISM, UNSPECIFIED TYPE: ICD-10-CM

## 2019-02-18 PROCEDURE — 99607 MTMS BY PHARM ADDL 15 MIN: CPT | Performed by: PHARMACIST

## 2019-02-18 PROCEDURE — 99605 MTMS BY PHARM NP 15 MIN: CPT | Performed by: PHARMACIST

## 2019-02-18 RX ORDER — POLYETHYLENE GLYCOL 3350 17 G/17G
1 POWDER, FOR SOLUTION ORAL DAILY
COMMUNITY
End: 2021-01-01

## 2019-02-18 RX ORDER — ATORVASTATIN CALCIUM 80 MG/1
80 TABLET, FILM COATED ORAL DAILY
COMMUNITY
End: 2021-01-01

## 2019-02-18 RX ORDER — NITROGLYCERIN 0.4 MG/1
0.4 TABLET SUBLINGUAL EVERY 5 MIN PRN
COMMUNITY

## 2019-02-18 RX ORDER — VENLAFAXINE HYDROCHLORIDE 150 MG/1
150 TABLET, EXTENDED RELEASE ORAL DAILY
COMMUNITY
End: 2021-01-01

## 2019-02-18 RX ORDER — ASPIRIN 81 MG
200 TABLET, DELAYED RELEASE (ENTERIC COATED) ORAL DAILY
COMMUNITY
End: 2021-01-01

## 2019-02-18 RX ORDER — VENLAFAXINE HYDROCHLORIDE 75 MG/1
75 TABLET, EXTENDED RELEASE ORAL DAILY
COMMUNITY
End: 2021-01-01

## 2019-02-18 RX ORDER — CALCIUM CARBONATE 500 MG/1
1 TABLET, CHEWABLE ORAL PRN
COMMUNITY

## 2019-02-18 RX ORDER — CHOLECALCIFEROL (VITAMIN D3) 50 MCG
1 TABLET ORAL DAILY
COMMUNITY

## 2019-02-18 RX ORDER — CARBIDOPA AND LEVODOPA 25; 100 MG/1; MG/1
1 TABLET, EXTENDED RELEASE ORAL AT BEDTIME
COMMUNITY
End: 2021-01-01

## 2019-02-18 RX ORDER — ASPIRIN 81 MG/1
81 TABLET ORAL DAILY
COMMUNITY

## 2019-02-18 RX ORDER — NICOTINE POLACRILEX 4 MG/1
20 GUM, CHEWING ORAL PRN
Status: ON HOLD | COMMUNITY
End: 2020-02-27

## 2019-02-18 RX ORDER — GABAPENTIN 300 MG/1
CAPSULE ORAL
Status: ON HOLD | COMMUNITY
End: 2020-02-27

## 2019-02-18 RX ORDER — METOPROLOL SUCCINATE 25 MG/1
12.5 TABLET, EXTENDED RELEASE ORAL DAILY
COMMUNITY
End: 2021-01-01

## 2019-02-18 RX ORDER — LEVOTHYROXINE SODIUM 137 UG/1
137 TABLET ORAL AT BEDTIME
COMMUNITY
End: 2021-01-01

## 2019-02-18 NOTE — PROGRESS NOTES
SUBJECTIVE/OBJECTIVE:                           Silver Zamora is a 69 year old male called for an initial visit for Medication Therapy Management.  He was referred to me from Bk ParikhD at [facility]. Visit was conducted with Wife, Luz Marina, per consent by patient.     Chief Complaint: initial MTM visit - feels that current PD medications are not working    Allergies/ADRs: Reviewed in Epic  Tobacco: History of tobacco dependence - quit in 20s  Alcohol: not currently using  Caffeine: 1 cups/day of coffee   Activity: minimal  PMH: Reviewed in Epic    Medication Adherence/Access:  Patient uses pill box(es) and has assistance with medication administration: family member.  Patient takes medications 4 time(s) per day.   Per patient, misses medication 0 times per week.   Medication barriers: none.   The patient fills medications at [facility]: NO, fills medications at [facility] (Nuplazid is from mail order [facility]).    Parkinson's Disease:  Patient is seen at [facility] Neurology clinic. Current medications include: Carbidopa-levodopa (Rytary) 23.75-95 mg 4 capsules 3 times daily at 8-8:30 am, 2 pm, and 10 pm plus carbidopa-levodopa  mg CR tablet at 10 pm. He also takes Nuplazid 34 mg once daily.  Wife states that patient was initially taking carbidopa-levodopa  mg 3 tablets 4 times daily + 200 mg of entacapone at each of the 4 doses but they were told that this dose is too high and so they were transitioned to controlled release  mg 2 tablets at each dose and then eventually transitioned to Rytary.  Wife states that since starting Nuplazid his thinking is clear and his sleep is better but the Rytary has made his physical symptoms worse.  They tried increasing the morning dose of Rytary to 5 capsules but they report that his shakiness got even worse.  He feels best in the morning before any medication.  Currently he has been having a difficult time walking and is freezing frequently.  Wife  states that they have been unable to leave the house and he can get up to go to the bathroom on his own.  She describes the freezing is different than before which resulted in stuttering but now the freezing results in no movement at all.  Wife states that the patient wanted her to call the ambulance today to go to the hospital because of his difficulty moving and his fear of getting stuck somewhere.  She reports that his initial regimen of carbidopa levodopa immediate release tablets and entacapone worked the best for him.  She recalls that this regimen was changed because they were told the dose was too high and the medication was not being absorbed into his body.  Wife states that he did have some mild hallucinations and this has improved since starting Nuplazid about 6 weeks ago (Rytary was started about a month ago). Patient has an appointment with neurology on 2/26/19 but they are concerned about waiting until then to make any medication changes.     RLS: Only taking gabapentin 300 mg at supper and 600 mg at bedtime.  Wife states that she is not sure what he is taking this medication for but per [facility] records it is for RLS. Did not discuss further during our visit.  Wife states that the neurologist would like him to go off of this medication eventually.  But he was previously taking 300 mg 4 times per day and they are working on weaning the dose. He does have back pain and spinal stenosis.     Depression:  Current medications include: Venlafaxine  mg once daily. Wife reports that depression symptoms are unchanged vand he has been on this medication for many years. Did not assess further.    CAD/CHF: Currently taking metoprolol XL 12.5 mg daily. He had a drug-eluting stent placed in right coronary artery in October 2018 and is on dual antiplatelet therapy (aspirin 81 mg daily and ticagrelor 90 mg twice daily) which cardiology recommends continues for 12 months. Wife reports his EF was 34% and is  now up to 51%.  Patient has been told that he cannot have surgery for cervical spinal stenosis until off antiplatelet regimen. Denies unusual bruising/bleeding. Has nitroglycerin on hand.      Hyperlipidemia: Current therapy includes atorvastatin 80 mg once daily.  Pt reports no significant myalgias or other side effects. Lipid panel October 2018: , HDL 40, , Trig 119.     GERD: Current medications include: Omeprazole 20 mg as needed or Tums as needed.  Wife states that Tums are usually effective and he does not often need omeprazole to manage his reflux symptoms.    Hypothyroidism: Patient is taking levothyroxine 137 mcg daily at bedtime. Patient is having the following symptoms: none. Last TSH was 1.97 on 12/7/18.    Constipation: Currently taking MiraLAX 1 capful daily and docusate 200 mg daily.  Wife states that he drinks water with each dose of medication and sometimes another glass of water with meals.  This is worked well in managing his constipation.    Vitamin D deficiency: Taking vitamin D 3 2000 units daily. No Vitamin D level in Epic.     ASSESSMENT:                             Current medications were reviewed today.     Medication Adherence: excellent, no issues identified    Parkinson's Disease: Needs improvement.  It seems as though the patient may benefit from higher doses of levodopa in order to manage his akinesia.  It seems as though the dose of Rytary that he is taking now is considerably lower than the equivalent carbidopa-levodopa dose he was previously on, so he may need a higher dose of Rytary, or to switch back to carbidopa-levodopa.  Will offer these as recommendations for the patient's neurologist at [facility].     RLS: Not fully assessed.    Depression: Appears stable.    CAD/CHF: Appears stable.    Hyperlipidemia: Appears stable.    GERD: Stable.    Hypothyroidism: Stable.    Constipation: Stable.    Vitamin D deficiency: Unable to assess.    PLAN:                             Recommend that [facility] Neurology team consider increasing Rytary dose or switching back to carbidopa-levodopa immediate-release formulation. [facility] pharmacist spoke with KARRIE Carlisle at [facility] regarding these recommendations and she will discuss recommendations with the team.    I spent 45 minutes with this patient today. I offer these suggestions for consideration by Dr. Gomez/Kelli Walker A copy of the visit note was provided to the patient's neurology provider.    Will follow up as needed - with Clare Joshi PharmD or myself if needed    The patient was mailed a summary of these recommendations as an after visit summary.     Chart documentation was completed in part with Dragon voice-recognition software. Even though reviewed, some grammatical, spelling, and word errors may remain.    Juliana Gong, Pharm.D.  Medication Therapy Management Pharmacist  Phone: [phone]

## 2019-02-20 ENCOUNTER — COMMUNICATION - HEALTHEAST (OUTPATIENT)
Dept: NEUROLOGY | Facility: CLINIC | Age: 69
End: 2019-02-20

## 2019-02-26 ENCOUNTER — HOSPITAL ENCOUNTER (OUTPATIENT)
Dept: NEUROLOGY | Facility: CLINIC | Age: 69
Setting detail: THERAPIES SERIES
Discharge: STILL ADMITTED OR ADMINISTRATIVE | End: 2019-02-26
Attending: NURSE PRACTITIONER

## 2019-02-26 DIAGNOSIS — F32.89 OTHER DEPRESSION: ICD-10-CM

## 2019-02-26 DIAGNOSIS — R44.3 HALLUCINATIONS: ICD-10-CM

## 2019-02-26 DIAGNOSIS — G47.52 RBD (REM BEHAVIORAL DISORDER): ICD-10-CM

## 2019-02-26 DIAGNOSIS — R53.1 WEAKNESS: ICD-10-CM

## 2019-02-26 DIAGNOSIS — R79.89 LOW VITAMIN D LEVEL: ICD-10-CM

## 2019-02-26 DIAGNOSIS — G20.A1 PARKINSON DISEASE (H): ICD-10-CM

## 2019-02-26 DIAGNOSIS — F41.1 ANXIETY STATE: ICD-10-CM

## 2019-02-28 ENCOUNTER — COMMUNICATION - HEALTHEAST (OUTPATIENT)
Dept: NEUROLOGY | Facility: CLINIC | Age: 69
End: 2019-02-28

## 2019-02-28 DIAGNOSIS — G20.A1 PARKINSON'S DISEASE (H): ICD-10-CM

## 2019-03-01 ENCOUNTER — OFFICE VISIT - HEALTHEAST (OUTPATIENT)
Dept: VASCULAR SURGERY | Facility: CLINIC | Age: 69
End: 2019-03-01

## 2019-03-01 ENCOUNTER — COMMUNICATION - HEALTHEAST (OUTPATIENT)
Dept: TELEHEALTH | Facility: CLINIC | Age: 69
End: 2019-03-01

## 2019-03-01 ENCOUNTER — COMMUNICATION - HEALTHEAST (OUTPATIENT)
Dept: CARDIOLOGY | Facility: CLINIC | Age: 69
End: 2019-03-01

## 2019-03-01 DIAGNOSIS — L30.9 DERMATITIS OF FOOT: ICD-10-CM

## 2019-03-01 DIAGNOSIS — I25.119 CORONARY ARTERY DISEASE INVOLVING NATIVE CORONARY ARTERY OF NATIVE HEART WITH ANGINA PECTORIS (H): ICD-10-CM

## 2019-03-01 DIAGNOSIS — E78.5 DYSLIPIDEMIA, GOAL LDL BELOW 70: ICD-10-CM

## 2019-03-05 ENCOUNTER — COMMUNICATION - HEALTHEAST (OUTPATIENT)
Dept: NEUROLOGY | Facility: CLINIC | Age: 69
End: 2019-03-05

## 2019-03-05 DIAGNOSIS — G20.A1 PARKINSON'S DISEASE (H): ICD-10-CM

## 2019-03-07 ENCOUNTER — COMMUNICATION - HEALTHEAST (OUTPATIENT)
Dept: NURSING | Facility: CLINIC | Age: 69
End: 2019-03-07

## 2019-03-07 ENCOUNTER — COMMUNICATION - HEALTHEAST (OUTPATIENT)
Dept: NEUROLOGY | Facility: CLINIC | Age: 69
End: 2019-03-07

## 2019-03-07 DIAGNOSIS — G20.A1 PARKINSON'S DISEASE (H): ICD-10-CM

## 2019-03-11 ENCOUNTER — COMMUNICATION - HEALTHEAST (OUTPATIENT)
Dept: NURSING | Facility: CLINIC | Age: 69
End: 2019-03-11

## 2019-03-12 ENCOUNTER — HOSPITAL ENCOUNTER (OUTPATIENT)
Dept: NEUROLOGY | Facility: CLINIC | Age: 69
Setting detail: THERAPIES SERIES
Discharge: STILL ADMITTED OR ADMINISTRATIVE | End: 2019-03-12
Attending: NURSE PRACTITIONER

## 2019-03-12 DIAGNOSIS — F32.89 OTHER DEPRESSION: ICD-10-CM

## 2019-03-12 DIAGNOSIS — F41.1 ANXIETY STATE: ICD-10-CM

## 2019-03-12 DIAGNOSIS — G47.52 RBD (REM BEHAVIORAL DISORDER): ICD-10-CM

## 2019-03-12 DIAGNOSIS — G24.9 DYSTONIA: ICD-10-CM

## 2019-03-12 DIAGNOSIS — G20.A1 PARKINSON DISEASE (H): ICD-10-CM

## 2019-03-12 DIAGNOSIS — R53.1 WEAKNESS: ICD-10-CM

## 2019-03-13 ENCOUNTER — COMMUNICATION - HEALTHEAST (OUTPATIENT)
Dept: NEUROLOGY | Facility: CLINIC | Age: 69
End: 2019-03-13

## 2019-03-13 DIAGNOSIS — G20.A1 PARKINSON'S DISEASE (H): ICD-10-CM

## 2019-03-14 ENCOUNTER — AMBULATORY - HEALTHEAST (OUTPATIENT)
Dept: NEUROLOGY | Facility: CLINIC | Age: 69
End: 2019-03-14

## 2019-03-14 DIAGNOSIS — G20.A1 PARKINSON DISEASE (H): ICD-10-CM

## 2019-03-14 DIAGNOSIS — R44.3 HALLUCINATION: ICD-10-CM

## 2019-03-21 ENCOUNTER — COMMUNICATION - HEALTHEAST (OUTPATIENT)
Dept: NEUROLOGY | Facility: CLINIC | Age: 69
End: 2019-03-21

## 2019-03-22 ENCOUNTER — OFFICE VISIT - HEALTHEAST (OUTPATIENT)
Dept: CARDIOLOGY | Facility: CLINIC | Age: 69
End: 2019-03-22

## 2019-03-22 DIAGNOSIS — I25.5 ISCHEMIC CARDIOMYOPATHY: ICD-10-CM

## 2019-03-22 DIAGNOSIS — I25.119 CORONARY ARTERY DISEASE INVOLVING NATIVE CORONARY ARTERY OF NATIVE HEART WITH ANGINA PECTORIS (H): ICD-10-CM

## 2019-03-22 DIAGNOSIS — I50.22 CHRONIC SYSTOLIC HEART FAILURE (H): ICD-10-CM

## 2019-03-22 ASSESSMENT — MIFFLIN-ST. JEOR: SCORE: 1727.41

## 2019-03-25 ENCOUNTER — RECORDS - HEALTHEAST (OUTPATIENT)
Dept: ADMINISTRATIVE | Facility: OTHER | Age: 69
End: 2019-03-25

## 2019-03-25 ENCOUNTER — ALLIED HEALTH/NURSE VISIT (OUTPATIENT)
Dept: PHARMACY | Facility: CLINIC | Age: 69
End: 2019-03-25
Payer: COMMERCIAL

## 2019-03-25 ENCOUNTER — TELEPHONE (OUTPATIENT)
Dept: PHARMACY | Facility: CLINIC | Age: 69
End: 2019-03-25

## 2019-03-25 DIAGNOSIS — G25.81 RESTLESS LEGS SYNDROME (RLS): ICD-10-CM

## 2019-03-25 DIAGNOSIS — G20.A1 PARKINSON'S DISEASE (H): Primary | ICD-10-CM

## 2019-03-25 PROCEDURE — 99607 MTMS BY PHARM ADDL 15 MIN: CPT | Performed by: PHARMACIST

## 2019-03-25 PROCEDURE — 99606 MTMS BY PHARM EST 15 MIN: CPT | Performed by: PHARMACIST

## 2019-03-25 NOTE — TELEPHONE ENCOUNTER
Luz Marina called with medication questions for Juliana Gong regarding Silver possible starting a new medication and they are wondering about interactions and side effects.     Thanks,  Ludivina Grimaldo [facility] Coordinator

## 2019-03-25 NOTE — PROGRESS NOTES
SUBJECTIVE/OBJECTIVE:                Silver Zamora is a 69 year old male called for a follow-up visit for Medication Therapy Management.  He was referred to me from Clare Joshi, PharmD at [facility]. Visit was conducted with wife, Luz Marina, per consent from patient.    Chief Complaint: Follow up from our visit on 2/18/19 - would like to discuss Gocovri  Tobacco: History of tobacco dependence - quit in his 20s  Alcohol: not currently using    Medication Adherence/Access:  Medication barriers: affording medications - Gocovri    Parkinson's Disease/RLS:  Current medications include: Carbidopa-levodopa (Rytary) 23.75-95 mg 4 caps at 6 am, 6 caps at 12 pm, 6 caps at 4 pm, 4 caps at 8 pm, and carbidopa-levodopa  mg CR tablet at bedtime; he has Parcopa on hand for off-times which he uses only sparingly now. He also takes gabapentin 300 mg at supper and 600 mg at bedtime for RLS and Nuplazid 34 mg nightly for psychosis.  Wife states that the patient is doing very well on this regimen of Parkinson medications.  Rytary has been increased by his neurologist since our last visit and this has helped his movement (walking, freezing); they deny recent hallucinations.  Wife states that he is not using a walker in the house anymore and is able to get by with just a cane. He does use a walker when he goes outside.  Wife denies that he has dyskinesia but his neurologist has mentioned that he may have dyskinesia. Therefore, the neurologist is recommending he start Gocovri (amantadine ER) for this. Wife and patient are concerned about adding another medication because he is doing well right now. They are also concerned about the cost of Gocovri as well. Per [facility] records, patient was on amantadine in the past but it was reportedly discontinued because he was not experiencing dyskinesias.    ASSESSMENT:              Current medications were reviewed today as discussed above.      Medication Adherence: excellent, no issues  identified    Parkinson's Disease/RLS: Appears stable. If patient is not experiencing bothersome dyskinesias, it does not seem necessary to start another medication, particularly given the high cost of Gocovri. Rytary seems to be managing his PD symptoms well and if he is having motor fluctuations, the dose of Rytary could be adjusted rather than adding another medication to his regimen. Provided wife with this information but encouraged them to discuss with their neurologist at next appointment on Thursday.      PLAN:                  No medication changes recommended -- patient to follow up with MARY Rosenthal on Thursday at [facility] neurology clinic.     I spent 20 minutes with this patient today. I offer these suggestions for consideration by Kelli Walker. A copy of the visit note was provided to the patient's referring provider.     Will follow up as needed.    The patient declined a summary of these recommendations as an after visit summary.    Chart documentation was completed in part with Dragon voice-recognition software. Even though reviewed, some grammatical, spelling, and word errors may remain.    Juliana Gong, Pharm.D.  Medication Therapy Management Pharmacist  Phone: [phone]

## 2019-03-25 NOTE — TELEPHONE ENCOUNTER
Will call patient today regarding medication questions. See MTM encounter dated 3/25/19.    Juliana Gong, Pharm.D.  Medication Therapy Management Pharmacist  Phone: [phone]

## 2019-03-26 RX ORDER — CARBIDOPA AND LEVODOPA 25; 100 MG/1; MG/1
1 TABLET, ORALLY DISINTEGRATING ORAL PRN
COMMUNITY
End: 2021-01-01

## 2019-03-29 ENCOUNTER — HOSPITAL ENCOUNTER (OUTPATIENT)
Dept: NEUROLOGY | Facility: CLINIC | Age: 69
Setting detail: THERAPIES SERIES
Discharge: STILL ADMITTED OR ADMINISTRATIVE | End: 2019-03-29
Attending: NURSE PRACTITIONER

## 2019-03-29 DIAGNOSIS — G89.29 CHRONIC LOW BACK PAIN WITH SCIATICA, SCIATICA LATERALITY UNSPECIFIED, UNSPECIFIED BACK PAIN LATERALITY: ICD-10-CM

## 2019-03-29 DIAGNOSIS — G47.52 RBD (REM BEHAVIORAL DISORDER): ICD-10-CM

## 2019-03-29 DIAGNOSIS — R53.1 WEAKNESS: ICD-10-CM

## 2019-03-29 DIAGNOSIS — M54.40 CHRONIC LOW BACK PAIN WITH SCIATICA, SCIATICA LATERALITY UNSPECIFIED, UNSPECIFIED BACK PAIN LATERALITY: ICD-10-CM

## 2019-03-29 DIAGNOSIS — G20.A1 PARKINSON DISEASE (H): ICD-10-CM

## 2019-03-29 DIAGNOSIS — G31.84 MCI (MILD COGNITIVE IMPAIRMENT): ICD-10-CM

## 2019-03-29 DIAGNOSIS — F41.1 ANXIETY STATE: ICD-10-CM

## 2019-03-29 DIAGNOSIS — G20.A1 PARKINSON'S DISEASE (H): ICD-10-CM

## 2019-03-29 DIAGNOSIS — F32.89 OTHER DEPRESSION: ICD-10-CM

## 2019-04-08 ENCOUNTER — COMMUNICATION - HEALTHEAST (OUTPATIENT)
Dept: NEUROLOGY | Facility: CLINIC | Age: 69
End: 2019-04-08

## 2019-04-11 ENCOUNTER — COMMUNICATION - HEALTHEAST (OUTPATIENT)
Dept: NEUROLOGY | Facility: CLINIC | Age: 69
End: 2019-04-11

## 2019-04-15 ENCOUNTER — COMMUNICATION - HEALTHEAST (OUTPATIENT)
Dept: NEUROLOGY | Facility: CLINIC | Age: 69
End: 2019-04-15

## 2019-04-18 ENCOUNTER — COMMUNICATION - HEALTHEAST (OUTPATIENT)
Dept: NURSING | Facility: CLINIC | Age: 69
End: 2019-04-18

## 2019-04-22 ENCOUNTER — HOSPITAL ENCOUNTER (OUTPATIENT)
Dept: NEUROLOGY | Facility: CLINIC | Age: 69
Setting detail: THERAPIES SERIES
Discharge: STILL ADMITTED OR ADMINISTRATIVE | End: 2019-04-22
Attending: NURSE PRACTITIONER

## 2019-04-22 DIAGNOSIS — G47.52 RBD (REM BEHAVIORAL DISORDER): ICD-10-CM

## 2019-04-22 DIAGNOSIS — G47.00 INSOMNIA, UNSPECIFIED TYPE: ICD-10-CM

## 2019-04-22 DIAGNOSIS — F32.89 OTHER DEPRESSION: ICD-10-CM

## 2019-04-22 DIAGNOSIS — R44.3 HALLUCINATIONS: ICD-10-CM

## 2019-04-22 DIAGNOSIS — G20.A1 PARKINSON DISEASE (H): ICD-10-CM

## 2019-04-22 DIAGNOSIS — R53.1 WEAKNESS: ICD-10-CM

## 2019-05-02 ENCOUNTER — AMBULATORY - HEALTHEAST (OUTPATIENT)
Dept: CARE COORDINATION | Facility: CLINIC | Age: 69
End: 2019-05-02

## 2019-05-03 ENCOUNTER — COMMUNICATION - HEALTHEAST (OUTPATIENT)
Dept: NURSING | Facility: CLINIC | Age: 69
End: 2019-05-03

## 2019-05-10 ENCOUNTER — HOSPITAL ENCOUNTER (OUTPATIENT)
Dept: PHYSICAL THERAPY | Facility: CLINIC | Age: 69
Setting detail: THERAPIES SERIES
End: 2019-05-10
Attending: NURSE PRACTITIONER
Payer: MEDICARE

## 2019-05-10 PROCEDURE — 97161 PT EVAL LOW COMPLEX 20 MIN: CPT | Mod: GP | Performed by: PHYSICAL THERAPIST

## 2019-05-10 PROCEDURE — 97530 THERAPEUTIC ACTIVITIES: CPT | Mod: GP | Performed by: PHYSICAL THERAPIST

## 2019-05-10 NOTE — PROGRESS NOTES
[facility]        OUTPATIENT PHYSICAL THERAPY FUNCTIONAL EVALUATION  PLAN OF TREATMENT FOR OUTPATIENT REHABILITATION  (COMPLETE FOR INITIAL CLAIMS ONLY)  Patient's Last Name, First Name, M.I.  YOB: 1950  Silver Zamora        Provider's Name   [facility]   Medical Record No.  [id number]     Start of Care Date:  05/10/19   Onset Date:  03/29/19(date of PT order)   Type:     _X__PT   ____OT  ____SLP Medical Diagnosis:  (P) Parkinson's disease     PT Diagnosis:  (P) instability of gait Visits from SOC:  1                              __________________________________________________________________________________  Plan of Treatment/Functional Goals:  (P) balance training, motor coordination training, neuromuscular re-education, strengthening, stretching, transfer training, manual therapy, gait training           GOALS     improve balance as demonstrated by improved balance scores by 10%  07/19/19       in/out of bed independent with little difficulty to decrease caregiver burden  07/19/19       Independent in HEP with help of wife (Luz Marina) to manage symptoms of Parkinson's disease  07/19/19       Therapy Frequency:  (P) 2 times/Week(decrease to 1x/week after 2 weeks)   Predicted Duration of Therapy Intervention:  (P) 90 days    Kelsie Kerr, PT, DPT                                    I CERTIFY THE NEED FOR THESE SERVICES FURNISHED UNDER        THIS PLAN OF TREATMENT AND WHILE UNDER MY CARE .             Physician Signature               Date    X_____________________________________________________                      Certification Date From:  (P) 05/10/19   Certification Date To:  (P) 08/02/19    Referring Provider:  Dr Kelli Walker; Aly    Initial Assessment  See Epic Evaluation- Start of Care Date: 05/10/19

## 2019-05-10 NOTE — PROGRESS NOTES
Silver Zamora  1950   05/10/19 1400   Quick Adds   Quick Adds Certification   Type of Visit Initial OP PT Evaluation   General Information   Start of Care Date 05/10/19   Referring Physician Dr Kelli Walker; Aly   Orders Evaluate and Treat as Indicated   Order Date 03/29/19   Medical Diagnosis Parkinson's Disease   Onset of illness/injury or Date of Surgery 03/29/19  (date of PT order)   Pertinent history of current problem (include personal factors and/or comorbidities that impact the POC) Silver is a 68 yo male with Parkinson's disease (diagnosed 2004),  referred to PT due to decreased balance and functional mobility.  Silver and wife Luz Marina report difficulty with bed mobiity; sit to stand; ambulation and balance.  He has started using 4ww as he has severe spinal stenosis and risk of falling may lead to in ability to walk (?SCI).  PMH:  severe cervical stenosis, LBP, ischemic cardiomyopathy, Heart failure, CAD, depression, anxiety, hypothyroidism, urinary incontinence, constipation;  REM behavioral sleep disorder, delirium, fall wth TBI Dec 2018   Prior level of function comment struggles sit to stand if NOT hooking legs on chair seat   Current Community Support Family/friend caregiver   Patient role/Employment history Retired   Living environment [place]   Current Assistive Devices Front Wheeled Walker;Four Wheeled Walker   Assistive Devices Comments often leaves walker and approaches chair   Patient/Family Goals Statement improve balance; get in/out of bed better   Fall Risk Screen   Fall screen completed by PT   Have you fallen 2 or more times in the past year? Yes   Have you fallen and had an injury in the past year? Yes   Timed Up and Go score (seconds) 26.8 sec; 11 sec sit to stand   Is patient a fall risk? Yes;Department fall risk interventions implemented   Cognitive Status Examination   Cognitive Comment appears adequate for PT   Posture   Posture Comments posterior pelvic tilt; rounded  shoulders forward head   Range of Motion (ROM)   ROM Comment decreased but wff   Strength   Strength Comments decreased but WFL   Bed Mobility   Bed Mobility Comments supine to sit with much effort and valsalva   Transfer Skills   Transfer Comments inconsistent placing hands on seat; often lacking forward wt shift in sit to stand   Gait   Gait Comments short shuffling steps; emerging festination when changing direction   Balance Special Tests Timed Up and Go   Seconds 26.82 Seconds  (11 sec to stand)   Comments TUG with count backward by 3 from 100 19.16 sec 2 sec to stand using legs hooked on chair seat   Balance Special Tests Sit to Stand Reps in 30 Seconds   Reps in 30 seconds 6   Comments 5 reps in 26 sec   Muscle Tone   Muscle Tone Comments L UE cogwheel slight   Planned Therapy Interventions   Planned Therapy Interventions balance training;motor coordination training;neuromuscular re-education;strengthening;stretching;transfer training;manual therapy;gait training   Clinical Impression   Criteria for Skilled Therapeutic Interventions Met yes, treatment indicated   PT Diagnosis instability of gait   Influenced by the following impairments decreased balance and functional mobiity; sanford kinesia   Functional limitations due to impairments difficulty with ADLs; increased risk of falling   Clinical Presentation Stable/Uncomplicated   Clinical Decision Making (Complexity) Low complexity   Therapy Frequency 2 times/Week  (decrease to 1x/week after 2 weeks)   Predicted Duration of Therapy Intervention (days/wks) 90 days   Risk & Benefits of therapy have been explained Yes   Patient, Family & other staff in agreement with plan of care Yes   Clinical Impression Comments Patient will benefit from skilled intervention to improve balance and safety thus decreasing caregiver burden allowing him to remain living in own home with asssist from wife   GOALS   PT Eval Goals 1;2;3   Goal 1   Goal Description improve balance as  demonstrated by improved balance scores by 10%   Target Date 07/19/19   Goal 2   Goal Description in/out of bed independent with little difficulty to decrease caregiver burden   Target Date 07/19/19   Goal 3   Goal Description Independent in HEP with help of wife (Luz Marina) to manage symptoms of Parkinson's disease   Target Date 07/19/19   Total Evaluation Time   PT Eval, Low Complexity Minutes (43557) 20   Therapy Certification   Certification date from 05/10/19   Certification date to 08/02/19   Medical Diagnosis Parkinson's disease   Certification I certify the need for these services furnished under this plan of treatment and while under my care.  (Physician co-signature of this document indicates review and certification of the therapy plan).

## 2019-05-14 ENCOUNTER — HOSPITAL ENCOUNTER (OUTPATIENT)
Dept: PHYSICAL THERAPY | Facility: CLINIC | Age: 69
Setting detail: THERAPIES SERIES
End: 2019-05-14
Attending: NURSE PRACTITIONER
Payer: MEDICARE

## 2019-05-14 ENCOUNTER — COMMUNICATION - HEALTHEAST (OUTPATIENT)
Dept: NEUROLOGY | Facility: CLINIC | Age: 69
End: 2019-05-14

## 2019-05-14 PROCEDURE — 97530 THERAPEUTIC ACTIVITIES: CPT | Mod: GP | Performed by: PHYSICAL THERAPIST

## 2019-05-17 ENCOUNTER — HOSPITAL ENCOUNTER (OUTPATIENT)
Dept: PHYSICAL THERAPY | Facility: CLINIC | Age: 69
Setting detail: THERAPIES SERIES
End: 2019-05-17
Attending: NURSE PRACTITIONER
Payer: MEDICARE

## 2019-05-17 PROCEDURE — 97530 THERAPEUTIC ACTIVITIES: CPT | Mod: GP | Performed by: PHYSICAL THERAPIST

## 2019-05-21 ENCOUNTER — HOSPITAL ENCOUNTER (OUTPATIENT)
Dept: PHYSICAL THERAPY | Facility: CLINIC | Age: 69
Setting detail: THERAPIES SERIES
End: 2019-05-21
Attending: NURSE PRACTITIONER
Payer: MEDICARE

## 2019-05-21 PROCEDURE — 97530 THERAPEUTIC ACTIVITIES: CPT | Mod: GP | Performed by: PHYSICAL THERAPIST

## 2019-05-22 ENCOUNTER — HOSPITAL ENCOUNTER (OUTPATIENT)
Dept: NEUROLOGY | Facility: CLINIC | Age: 69
Setting detail: THERAPIES SERIES
Discharge: STILL ADMITTED OR ADMINISTRATIVE | End: 2019-05-22
Attending: NURSE PRACTITIONER

## 2019-05-22 DIAGNOSIS — F32.89 OTHER DEPRESSION: ICD-10-CM

## 2019-05-22 DIAGNOSIS — G31.84 MCI (MILD COGNITIVE IMPAIRMENT): ICD-10-CM

## 2019-05-22 DIAGNOSIS — G47.52 RBD (REM BEHAVIORAL DISORDER): ICD-10-CM

## 2019-05-22 DIAGNOSIS — R53.1 WEAKNESS: ICD-10-CM

## 2019-05-22 DIAGNOSIS — F41.1 ANXIETY STATE: ICD-10-CM

## 2019-06-04 ENCOUNTER — HOSPITAL ENCOUNTER (OUTPATIENT)
Dept: PHYSICAL THERAPY | Facility: CLINIC | Age: 69
Setting detail: THERAPIES SERIES
End: 2019-06-04
Attending: NURSE PRACTITIONER
Payer: MEDICARE

## 2019-06-04 ENCOUNTER — AMBULATORY - HEALTHEAST (OUTPATIENT)
Dept: NEUROLOGY | Facility: CLINIC | Age: 69
End: 2019-06-04

## 2019-06-04 DIAGNOSIS — G20.A1 PARKINSON'S DISEASE (H): ICD-10-CM

## 2019-06-04 PROCEDURE — 97530 THERAPEUTIC ACTIVITIES: CPT | Mod: GP | Performed by: PHYSICAL THERAPIST

## 2019-06-06 ENCOUNTER — AMBULATORY - HEALTHEAST (OUTPATIENT)
Dept: LAB | Facility: CLINIC | Age: 69
End: 2019-06-06

## 2019-06-06 DIAGNOSIS — N39.0 UTI (URINARY TRACT INFECTION): ICD-10-CM

## 2019-06-06 LAB
ALBUMIN UR-MCNC: NEGATIVE MG/DL
APPEARANCE UR: CLEAR
BILIRUB UR QL STRIP: NEGATIVE
COLOR UR AUTO: YELLOW
GLUCOSE UR STRIP-MCNC: NEGATIVE MG/DL
HGB UR QL STRIP: NEGATIVE
KETONES UR STRIP-MCNC: ABNORMAL MG/DL
LEUKOCYTE ESTERASE UR QL STRIP: NEGATIVE
NITRATE UR QL: NEGATIVE
PH UR STRIP: 5.5 [PH] (ref 5–8)
SP GR UR STRIP: 1.02 (ref 1–1.03)
UROBILINOGEN UR STRIP-ACNC: ABNORMAL

## 2019-06-07 ENCOUNTER — HOSPITAL ENCOUNTER (OUTPATIENT)
Dept: PHYSICAL THERAPY | Facility: CLINIC | Age: 69
Setting detail: THERAPIES SERIES
End: 2019-06-07
Attending: NURSE PRACTITIONER
Payer: MEDICARE

## 2019-06-07 PROCEDURE — 97530 THERAPEUTIC ACTIVITIES: CPT | Mod: GP | Performed by: PHYSICAL THERAPIST

## 2019-06-11 ENCOUNTER — OFFICE VISIT - HEALTHEAST (OUTPATIENT)
Dept: CARDIOLOGY | Facility: CLINIC | Age: 69
End: 2019-06-11

## 2019-06-11 DIAGNOSIS — G20.A1 PARKINSON DISEASE (H): ICD-10-CM

## 2019-06-11 DIAGNOSIS — I10 ESSENTIAL HYPERTENSION: ICD-10-CM

## 2019-06-11 DIAGNOSIS — I25.5 ISCHEMIC CARDIOMYOPATHY: ICD-10-CM

## 2019-06-11 DIAGNOSIS — I25.119 CORONARY ARTERY DISEASE INVOLVING NATIVE CORONARY ARTERY OF NATIVE HEART WITH ANGINA PECTORIS (H): ICD-10-CM

## 2019-06-11 ASSESSMENT — MIFFLIN-ST. JEOR: SCORE: 1722.88

## 2019-06-19 ENCOUNTER — HOSPITAL ENCOUNTER (OUTPATIENT)
Dept: PHYSICAL THERAPY | Facility: CLINIC | Age: 69
Setting detail: THERAPIES SERIES
End: 2019-06-19
Attending: NURSE PRACTITIONER
Payer: MEDICARE

## 2019-06-19 PROCEDURE — 97530 THERAPEUTIC ACTIVITIES: CPT | Mod: GP | Performed by: PHYSICAL THERAPIST

## 2019-06-21 ENCOUNTER — HOSPITAL ENCOUNTER (OUTPATIENT)
Dept: PHYSICAL THERAPY | Facility: CLINIC | Age: 69
Setting detail: THERAPIES SERIES
End: 2019-06-21
Attending: NURSE PRACTITIONER
Payer: MEDICARE

## 2019-06-21 PROCEDURE — 97530 THERAPEUTIC ACTIVITIES: CPT | Mod: GP | Performed by: PHYSICAL THERAPIST

## 2019-06-26 ENCOUNTER — HOSPITAL ENCOUNTER (OUTPATIENT)
Dept: PHYSICAL THERAPY | Facility: CLINIC | Age: 69
Setting detail: THERAPIES SERIES
End: 2019-06-26
Attending: NURSE PRACTITIONER
Payer: MEDICARE

## 2019-06-26 PROCEDURE — 97530 THERAPEUTIC ACTIVITIES: CPT | Mod: GP | Performed by: PHYSICAL THERAPIST

## 2019-07-03 ENCOUNTER — HOSPITAL ENCOUNTER (OUTPATIENT)
Dept: PHYSICAL THERAPY | Facility: CLINIC | Age: 69
Setting detail: THERAPIES SERIES
End: 2019-07-03
Attending: NURSE PRACTITIONER
Payer: MEDICARE

## 2019-07-03 PROCEDURE — 97530 THERAPEUTIC ACTIVITIES: CPT | Mod: GP | Performed by: PHYSICAL THERAPIST

## 2019-07-03 NOTE — PROGRESS NOTES
Silver Zamora  1950  Kelli Walker NP  [facility]  [address] [place] [address]  Diagnosis:  Instability of gait due to Parkinson's disease Physical Therapy Progress Note  07/03/19 1100   Signing Clinician's Name / Credentials   Signing clinician's name / credentials Kelsie Kerr PT, DPT   Session Number   Session Number 10 Medicare  soc 5/10/2019   Progress Note/Recertification   Progress Note Completed Date 07/03/19   Recertification Due Date 08/02/19   Goal 1   Goal Description improve balance as demonstrated by improved balance scores by 10%   Target Date 07/19/19   Goal 2   Goal Description in/out of bed independent with little difficulty to decrease caregiver burden  (In no trouble; out moderate difficulty; max assist if needed)   Target Date 07/19/19   Goal 3   Goal Description Independent in HEP with help of wife (Luz Marina) to manage symptoms of Parkinson's disease   Target Date 07/19/19   Date Met 07/03/19   Subjective Report   Subjective Report fell the other day; was during the night, night light went out; Silver let go of walker to reach for bed post and missed.  Much discussion about Never letting go of walker; Silver reports difficulty turning to sit in chair   Therapeutic Activity   Therapeutic Activities: dynamic activities to improve functional performance minutes (81223) 30   Skilled Intervention gait and mobility and exercise instruction using large amplitude movements   Patient Response simply gets stuck in sitting and not able to lean head forward; Luz Marina with grasp him by elbows and lead him forward to stand and wt shift to get gait started   Treatment Detail sit to stand, walking turning to sit on various chairs   Progress improved stepping with cues to touch red band tied on walker   Education   Learner Patient;Family   Readiness Acceptance;Eager  (wife Luz Marina)   Method Explanation;Demonstration   Response Needs Reinforcement   Plan   Home program continue previous  exercises; above strategies turning    Updates to plan of care 1x/week 2 possibley 3 more visits total   Plan for next session mobility and exercise    Total Session Time   Timed Code Treatment Minutes 30   Total Treatment Time (sum of timed and untimed services) 30

## 2019-07-10 ENCOUNTER — HOSPITAL ENCOUNTER (OUTPATIENT)
Dept: PHYSICAL THERAPY | Facility: CLINIC | Age: 69
Setting detail: THERAPIES SERIES
End: 2019-07-10
Attending: NURSE PRACTITIONER
Payer: MEDICARE

## 2019-07-10 PROCEDURE — 97530 THERAPEUTIC ACTIVITIES: CPT | Mod: GP | Performed by: PHYSICAL THERAPIST

## 2019-07-11 ENCOUNTER — OFFICE VISIT (OUTPATIENT)
Dept: PODIATRY | Facility: CLINIC | Age: 69
End: 2019-07-11
Payer: MEDICARE

## 2019-07-11 VITALS
BODY MASS INDEX: 28.63 KG/M2 | WEIGHT: 200 LBS | SYSTOLIC BLOOD PRESSURE: 128 MMHG | HEART RATE: 76 BPM | HEIGHT: 70 IN | DIASTOLIC BLOOD PRESSURE: 72 MMHG

## 2019-07-11 DIAGNOSIS — L97.511 FOOT ULCER, RIGHT, LIMITED TO BREAKDOWN OF SKIN (H): Primary | ICD-10-CM

## 2019-07-11 PROCEDURE — 11042 DBRDMT SUBQ TIS 1ST 20SQCM/<: CPT | Performed by: PODIATRIST

## 2019-07-11 PROCEDURE — 99203 OFFICE O/P NEW LOW 30 MIN: CPT | Mod: 25 | Performed by: PODIATRIST

## 2019-07-11 RX ORDER — AMMONIUM LACTATE 12 G/100G
CREAM TOPICAL
COMMUNITY
Start: 2018-12-28

## 2019-07-11 RX ORDER — SENNA AND DOCUSATE SODIUM 50; 8.6 MG/1; MG/1
2 TABLET, FILM COATED ORAL
COMMUNITY

## 2019-07-11 RX ORDER — ACETAMINOPHEN 325 MG/1
500 TABLET ORAL
COMMUNITY
Start: 2018-05-29

## 2019-07-11 RX ORDER — BISACODYL 10 MG
SUPPOSITORY, RECTAL RECTAL
COMMUNITY
Start: 2018-05-30

## 2019-07-11 ASSESSMENT — MIFFLIN-ST. JEOR: SCORE: 1678.44

## 2019-07-11 NOTE — NURSING NOTE
"Chief Complaint   Patient presents with     Wound Check     right foot       Initial /72   Pulse 76   Ht 1.778 m (5' 10\")   Wt 90.7 kg (200 lb)   BMI 28.70 kg/m   Estimated body mass index is 28.7 kg/m  as calculated from the following:    Height as of this encounter: 1.778 m (5' 10\").    Weight as of this encounter: 90.7 kg (200 lb).  Medications and allergies reviewed.      Rose Marie HAHN MA    "

## 2019-07-11 NOTE — LETTER
7/11/2019         RE: Silver Zamora  [address] [place] [id number]        Dear Colleague,    Thank you for referring your patient, Silver Zamora, to the [facility]. Please see a copy of my visit note below.    Silver Zamora is a 69 year old male who presents to the office with a chief complaint of an ulcer on the bottom of the right foot.  The patient relates the ulcer has been present for the past several weeks.  The patient denies any fever, chills, nausea or vomiting.  The patient denies any pus or blood draining from the ulcer.  The patient denies any redness or swelling around the ulcer.  The patient denies of any pain involving the ulcer.  The patient denies any previous ulceration on either foot or leg.  The patient relates blood sugars are under control.        REVIEW OF SYSTEMS:  Constitutional, HEENT, cardiovascular, pulmonary, GI, , musculoskeletal, neuro, skin, endocrine and psych systems are negative, except as otherwise noted.     PAST MEDICAL HISTORY: No past medical history on file.     PAST SURGICAL HISTORY: No past surgical history on file.     MEDICATIONS:   Current Outpatient Medications:      acetaminophen (TYLENOL) 325 MG tablet, Take 650 mg by mouth, Disp: , Rfl:      ammonium lactate (AMLACTIN) 12 % external cream, Apply two times a day, Disp: , Rfl:      aspirin 81 MG EC tablet, Take 81 mg by mouth daily, Disp: , Rfl:      atorvastatin (LIPITOR) 80 MG tablet, Take 80 mg by mouth daily, Disp: , Rfl:      bisacodyl (DULCOLAX) 10 MG suppository, One supp [place] qday prn constipation.  Give if no BM in prior 3 days., Disp: , Rfl:      calcium carbonate (TUMS) 500 MG chewable tablet, Take 1 chew tab by mouth as needed for heartburn, Disp: , Rfl:      carbidopa-levodopa (PARCOPA)  MG ODT, Take 1 tablet by mouth as needed, Disp: , Rfl:      carbidopa-levodopa (SINEMET CR)  MG CR tablet, Take 1 tablet by mouth At Bedtime, Disp: , Rfl:       carbidopa-levodopa ER (RYTARY) 23.75-95 MG CPCR per CR capsule, Take 5 capsules at 8 am, 6 capsules at 12 pm, 6 capsules at 4 pm, and 4 capsules at 8 pm, Disp: , Rfl:      Cholecalciferol (VITAMIN D3) 2000 units TABS, Take 1 tablet by mouth daily , Disp: , Rfl:      docusate sodium (COLACE) 100 MG tablet, Take 200 mg by mouth daily, Disp: , Rfl:      levothyroxine (SYNTHROID/LEVOTHROID) 137 MCG tablet, Take 137 mcg by mouth At Bedtime, Disp: , Rfl:      metoprolol succinate ER (TOPROL-XL) 25 MG 24 hr tablet, Take 12.5 mg by mouth daily, Disp: , Rfl:      nitroGLYcerin (NITROSTAT) 0.4 MG sublingual tablet, Place 0.4 mg under the tongue every 5 minutes as needed for chest pain For chest pain place 1 tablet under the tongue every 5 minutes for 3 doses. If symptoms persist 5 minutes after 1st dose call 911., Disp: , Rfl:      Pimavanserin Tartrate (NUPLAZID) 34 MG CAPS, Take 1 capsule by mouth At Bedtime , Disp: , Rfl:      polyethylene glycol (MIRALAX) powder, Take 1 capful by mouth daily, Disp: , Rfl:      SENNA-docusate sodium (SENNA S) 8.6-50 MG tablet, Take 1 tablet by mouth, Disp: , Rfl:      ticagrelor (BRILINTA) 90 MG tablet, Take 90 mg by mouth 2 times daily, Disp: , Rfl:      venlafaxine (EFFEXOR-ER) 150 MG 24 hr tablet, Take 150 mg by mouth daily (with 75 mg = 225 mg total), Disp: , Rfl:      venlafaxine (EFFEXOR-ER) 75 MG 24 hr tablet, Take 75 mg by mouth daily (with 150 mg = 225 mg total), Disp: , Rfl:      gabapentin (NEURONTIN) 300 MG capsule, 300 mg at supper, 600 mg at bedtime , Disp: , Rfl:      omeprazole 20 MG tablet, Take 20 mg by mouth as needed, Disp: , Rfl:      ALLERGIES:    Allergies   Allergen Reactions     Clonazepam      Rash         SOCIAL HISTORY:   Social History     Socioeconomic History     Marital status:      Spouse name: Not on file     Number of children: Not on file     Years of education: Not on file     Highest education level: Not on file   Occupational History     Not on  "file   Social Needs     Financial resource strain: Not on file     Food insecurity:     Worry: Not on file     Inability: Not on file     Transportation needs:     Medical: Not on file     Non-medical: Not on file   Tobacco Use     Smoking status: Former Smoker     Packs/day: 1.00     Years: 5.00     Pack years: 5.00     Types: Cigars     Last attempt to quit: 1972     Years since quittin.5     Smokeless tobacco: Never Used   Substance and Sexual Activity     Alcohol use: Not on file     Drug use: Not on file     Sexual activity: Not on file   Lifestyle     Physical activity:     Days per week: Not on file     Minutes per session: Not on file     Stress: Not on file   Relationships     Social connections:     Talks on phone: Not on file     Gets together: Not on file     Attends Rastafarian service: Not on file     Active member of club or organization: Not on file     Attends meetings of clubs or organizations: Not on file     Relationship status: Not on file     Intimate partner violence:     Fear of current or ex partner: Not on file     Emotionally abused: Not on file     Physically abused: Not on file     Forced sexual activity: Not on file   Other Topics Concern     Not on file   Social History Narrative     Not on file        FAMILY HISTORY: No family history on file.     EXAM:Vitals: /72   Pulse 76   Ht 1.778 m (5' 10\")   Wt 90.7 kg (200 lb)   BMI 28.70 kg/m     BMI= Body mass index is 28.7 kg/m .      General appearance: Patient is alert and fully cooperative with history & exam.  No sign of distress is noted during the visit.     Psychiatric: Affect is pleasant & appropriate.  Patient appears motivated to improve health.     Respiratory: Breathing is regular & unlabored while sitting.     HEENT: Hearing is intact to spoken word.  Speech is clear.  No gross evidence of visual impairment that would impact ambulation.     Dermatologic:  One notes grade II ulceration located on the plantar aspect " of the foot underlying the first metatarsal on the right.  The ulcer measures out to be approximately 3 cm x 2 cm x 0.2 cm.  One notes negative probing to bone, negative tracking.  The subcutaneous granular base appears beefy red with hyperkeratotic wound borders as well as a slight serous drainage and absence of mal odor.  There is no noted swelling, purulent drainage or ascending erythema.    Vascular: DP & PT pulses are intact & regular bilaterally.  No significant edema or varicosities noted.  CFT and skin temperature is normal to both lower extremities.     Neurologic: Lower extremity sensation is absent to light touch.  No evidence of weakness or contracture in the lower extremities.  Noted evidence of neuropathy.     Musculoskeletal: Patient is ambulatory without assistive device or brace.  No gross ankle deformity noted.  No foot or ankle joint effusion is noted.      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    Plan:  I have explained to Silver the condition of the diabetic malperforans ulceration.  After verbal consent, #15 blade was used to debride ulcer down to and including subcutaneous tissue.  Bleeding controlled with light pressure.   No drainage noted.  No anesthesia was used due to neuropathy. Dry dressing applied to foot.  Patient tolerated procedure well.  I explained to him  potential risks infection and loss of tissue, including loss of limb.  The patient will follow up in two weeks for further evaluation.        SELMA Alonso.P.YASH., F.A.C.F.A.S.      Again, thank you for allowing me to participate in the care of your patient.        Sincerely,        Francois Wilson DPM

## 2019-07-11 NOTE — PATIENT INSTRUCTIONS
FOOT ULCER (WOUND) EDUCATION  Ulceration ofthe foot involves a break or hole in the skin. Skin is our best protection against infection. Skin is quite durable, however, the underlying tissues are fragile. For this reason, the wound is likely to deepen rapidly. Deep wounds usually get infected and require amputation. Prompt healing is therefore essential to avoid limb loss.     Foot ulcers do not heal without intervention. Walking on the foot and living your normal life is not typically compatible with healing the sore. Successful healing will require several months of significant alteration of your daily activities.   Ulcer complications frequently develop. This primarily includes infection of skin, which then spreads deep into your joints, bones and tendons. Spreading infection may travel up your leg and into other parts of your body. Deep infection is usually treated with amputation ofpart ofyour foot or your leg. Signs of infection include fever, chills, nausea, vomiting, erratic blood sugars, local redness, pus, strong odor and localized warmth. Signs of infection should be taken seriously. Prompt evaluation in the clinic or hospital emergency room is required.   Ulcer treatment requires debridement or surgical removal of devitalized tissue. Your doctor will trim away callused, moistened, unhealthy tissue from the wound surface and margin. This helps to clean the wound and allows proper inspection. Debridement also stimulates healing even though the wound originally appears larger. Expect some bleeding with each debridement. You will be given instruction regarding wound bandaging. This often includes ointment and gauze. Avoid tape directly on the skin. Hand washing is essential since most infections will come from your fingertips. Ulcer care requires a no touch technique. Your fingers should not touch the margin or base of the wound.    HELPFUL HEALING TIPS:  1. Debridement: Getting rid of bad tissue makes way  for good tissue to promote healing  2. Addressing Foot Deformities: Hammertoes and bunions can cause increased pressure  3. Pressure Reduction: If pressure remains to the wound, it won't heal  4. Good Pulses: If bloodflow is not getting to the foot, the ulcer will not heal  5. Good Nutrition: If you are not getting proper nutrition your body can't heal.Protein!  6. Infection Control: Keep the ulcer clean with wound cleanser. DO NOT SOAK IT!  7. Moisture Control: Keep edema down and make sure that drainage is getting pulled away from the ulcer    IMPORTANCE OF DEBRIDEMENT    Reduces bioburden to help control or reduce infection. Even if an ulcer is not  infected,  the bacterial bioburden causes increased local inflammation.    Allows more accurate visualization of the wound base and edges, which allows for more precise staging.    Removes necrotic/non-viable tissue, which impedes wound healing, causes protein loss and can be a nidus for infection.    Stimulates new circulation (angiogenesis) and allows adequate oxygen delivery to the wound.    Removes undermining and tunneling, and may help reduce abscess formation.    Releases healing growth factors at the edge of the wound.    Prepares the wound bed by leaving only tissues that are capable of regenerating.    Dr. Tapia's Gel Bunion Cushion    $9.99   amazon.com

## 2019-07-11 NOTE — PROGRESS NOTES
Silver Zamora is a 69 year old male who presents to the office with a chief complaint of an ulcer on the bottom of the right foot.  The patient relates the ulcer has been present for the past several weeks.  The patient denies any fever, chills, nausea or vomiting.  The patient denies any pus or blood draining from the ulcer.  The patient denies any redness or swelling around the ulcer.  The patient denies of any pain involving the ulcer.  The patient denies any previous ulceration on either foot or leg.  The patient relates blood sugars are under control.        REVIEW OF SYSTEMS:  Constitutional, HEENT, cardiovascular, pulmonary, GI, , musculoskeletal, neuro, skin, endocrine and psych systems are negative, except as otherwise noted.     PAST MEDICAL HISTORY: No past medical history on file.     PAST SURGICAL HISTORY: No past surgical history on file.     MEDICATIONS:   Current Outpatient Medications:      acetaminophen (TYLENOL) 325 MG tablet, Take 650 mg by mouth, Disp: , Rfl:      ammonium lactate (AMLACTIN) 12 % external cream, Apply two times a day, Disp: , Rfl:      aspirin 81 MG EC tablet, Take 81 mg by mouth daily, Disp: , Rfl:      atorvastatin (LIPITOR) 80 MG tablet, Take 80 mg by mouth daily, Disp: , Rfl:      bisacodyl (DULCOLAX) 10 MG suppository, One supp [place] qday prn constipation.  Give if no BM in prior 3 days., Disp: , Rfl:      calcium carbonate (TUMS) 500 MG chewable tablet, Take 1 chew tab by mouth as needed for heartburn, Disp: , Rfl:      carbidopa-levodopa (PARCOPA)  MG ODT, Take 1 tablet by mouth as needed, Disp: , Rfl:      carbidopa-levodopa (SINEMET CR)  MG CR tablet, Take 1 tablet by mouth At Bedtime, Disp: , Rfl:      carbidopa-levodopa ER (RYTARY) 23.75-95 MG CPCR per CR capsule, Take 5 capsules at 8 am, 6 capsules at 12 pm, 6 capsules at 4 pm, and 4 capsules at 8 pm, Disp: , Rfl:      Cholecalciferol (VITAMIN D3) 2000 units TABS, Take 1 tablet by mouth daily , Disp: , Rfl:       docusate sodium (COLACE) 100 MG tablet, Take 200 mg by mouth daily, Disp: , Rfl:      levothyroxine (SYNTHROID/LEVOTHROID) 137 MCG tablet, Take 137 mcg by mouth At Bedtime, Disp: , Rfl:      metoprolol succinate ER (TOPROL-XL) 25 MG 24 hr tablet, Take 12.5 mg by mouth daily, Disp: , Rfl:      nitroGLYcerin (NITROSTAT) 0.4 MG sublingual tablet, Place 0.4 mg under the tongue every 5 minutes as needed for chest pain For chest pain place 1 tablet under the tongue every 5 minutes for 3 doses. If symptoms persist 5 minutes after 1st dose call 911., Disp: , Rfl:      Pimavanserin Tartrate (NUPLAZID) 34 MG CAPS, Take 1 capsule by mouth At Bedtime , Disp: , Rfl:      polyethylene glycol (MIRALAX) powder, Take 1 capful by mouth daily, Disp: , Rfl:      SENNA-docusate sodium (SENNA S) 8.6-50 MG tablet, Take 1 tablet by mouth, Disp: , Rfl:      ticagrelor (BRILINTA) 90 MG tablet, Take 90 mg by mouth 2 times daily, Disp: , Rfl:      venlafaxine (EFFEXOR-ER) 150 MG 24 hr tablet, Take 150 mg by mouth daily (with 75 mg = 225 mg total), Disp: , Rfl:      venlafaxine (EFFEXOR-ER) 75 MG 24 hr tablet, Take 75 mg by mouth daily (with 150 mg = 225 mg total), Disp: , Rfl:      gabapentin (NEURONTIN) 300 MG capsule, 300 mg at supper, 600 mg at bedtime , Disp: , Rfl:      omeprazole 20 MG tablet, Take 20 mg by mouth as needed, Disp: , Rfl:      ALLERGIES:    Allergies   Allergen Reactions     Clonazepam      Rash         SOCIAL HISTORY:   Social History     Socioeconomic History     Marital status:      Spouse name: Not on file     Number of children: Not on file     Years of education: Not on file     Highest education level: Not on file   Occupational History     Not on file   Social Needs     Financial resource strain: Not on file     Food insecurity:     Worry: Not on file     Inability: Not on file     Transportation needs:     Medical: Not on file     Non-medical: Not on file   Tobacco Use     Smoking status: Former Smoker  "    Packs/day: 1.00     Years: 5.00     Pack years: 5.00     Types: Cigars     Last attempt to quit: 1972     Years since quittin.5     Smokeless tobacco: Never Used   Substance and Sexual Activity     Alcohol use: Not on file     Drug use: Not on file     Sexual activity: Not on file   Lifestyle     Physical activity:     Days per week: Not on file     Minutes per session: Not on file     Stress: Not on file   Relationships     Social connections:     Talks on phone: Not on file     Gets together: Not on file     Attends [employer] service: Not on file     Active member of club or organization: Not on file     Attends meetings of clubs or organizations: Not on file     Relationship status: Not on file     Intimate partner violence:     Fear of current or ex partner: Not on file     Emotionally abused: Not on file     Physically abused: Not on file     Forced sexual activity: Not on file   Other Topics Concern     Not on file   Social History Narrative     Not on file        FAMILY HISTORY: No family history on file.     EXAM:Vitals: /72   Pulse 76   Ht 1.778 m (5' 10\")   Wt 90.7 kg (200 lb)   BMI 28.70 kg/m    BMI= Body mass index is 28.7 kg/m .      General appearance: Patient is alert and fully cooperative with history & exam.  No sign of distress is noted during the visit.     Psychiatric: Affect is pleasant & appropriate.  Patient appears motivated to improve health.     Respiratory: Breathing is regular & unlabored while sitting.     HEENT: Hearing is intact to spoken word.  Speech is clear.  No gross evidence of visual impairment that would impact ambulation.     Dermatologic:  One notes grade II ulceration located on the plantar aspect of the foot underlying the first metatarsal on the right.  The ulcer measures out to be approximately 3 cm x 2 cm x 0.2 cm.  One notes negative probing to bone, negative tracking.  The subcutaneous granular base appears beefy red with hyperkeratotic wound borders " as well as a slight serous drainage and absence of mal odor.  There is no noted swelling, purulent drainage or ascending erythema.    Vascular: DP & PT pulses are intact & regular bilaterally.  No significant edema or varicosities noted.  CFT and skin temperature is normal to both lower extremities.     Neurologic: Lower extremity sensation is absent to light touch.  No evidence of weakness or contracture in the lower extremities.  Noted evidence of neuropathy.     Musculoskeletal: Patient is ambulatory without assistive device or brace.  No gross ankle deformity noted.  No foot or ankle joint effusion is noted.      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    Plan:  I have explained to Silver the condition of the diabetic malperforans ulceration.  After verbal consent, #15 blade was used to debride ulcer down to and including subcutaneous tissue.  Bleeding controlled with light pressure.   No drainage noted.  No anesthesia was used due to neuropathy. Dry dressing applied to foot.  Patient tolerated procedure well.  I explained to him  potential risks infection and loss of tissue, including loss of limb.  The patient will follow up in two weeks for further evaluation.        SILVIA AlonsoPHARSHA., FVICENTE.C.F.A.S.

## 2019-07-15 ENCOUNTER — COMMUNICATION - HEALTHEAST (OUTPATIENT)
Dept: NURSING | Facility: CLINIC | Age: 69
End: 2019-07-15

## 2019-07-18 ENCOUNTER — COMMUNICATION - HEALTHEAST (OUTPATIENT)
Dept: NURSING | Facility: CLINIC | Age: 69
End: 2019-07-18

## 2019-07-19 ENCOUNTER — HOSPITAL ENCOUNTER (OUTPATIENT)
Dept: PHYSICAL THERAPY | Facility: CLINIC | Age: 69
Setting detail: THERAPIES SERIES
End: 2019-07-19
Attending: NURSE PRACTITIONER
Payer: MEDICARE

## 2019-07-19 PROCEDURE — 97530 THERAPEUTIC ACTIVITIES: CPT | Mod: GP,KX | Performed by: PHYSICAL THERAPIST

## 2019-07-19 NOTE — PROGRESS NOTES
Outpatient Physical Therapy Discharge Note     Patient: Silver Zamora  : 1950    Beginning/End Dates of Reporting Period:  2019 to 2019  12 treatments since soc on 2019    Referring Provider:  [facility]    Therapy Diagnosis: Instability of gait due to Parkinson's disease     Client Self Report: Silver continues to have intermittent episodes of freezing and difficulty with transfers or gait.  He continues to take sleeping pill at night (?8pm) but will get up out of bed and engage in activity; then btwn 2:30-4 am will have sudden onset of sleepiness when in the middle of an activity (eating bowl of cereal) and struggle getting safely back to bed. His daytime behaviors are typically being awake for a few hours to eat meals and take meds then returning to bed for 3+ hours. Silver's wife Luz Marina and daughter Cookie have repeatedly tried to encourage him to stay up and awake during daytime and in bed, lights out during night.    Physical Therapy Treatments have consisted of transfer training including body mechanics for Luz Marina (wife/caregiver) strategies when freezing; target for step length in gait; and  large amplitude movement exercises    Goals:  Goal Identifier     Goal Description improve balance as demonstrated by improved balance scores by 10%(not met)   Target Date 19   Date Met      Progress:     Goal Identifier     Goal Description in/out of bed independent with little difficulty to decrease caregiver burden(assist varies depending on meds and time of day )   Target Date 19   Date Met      Progress:     Goal Identifier     Goal Description Independent in HEP with help of wife (Luz Marina) to manage symptoms of Parkinson's disease   Target Date 19   Date Met  19   Progress:       Progress Toward Goals:   Progress limited due to what appears to be Silver's reluctance to accept strategies and suggestions from PT, wife, daughter.    Plan:  Discharge from  therapy.    Discharge:    Reason for Discharge: No further expectation of progress.    Equipment Issued: theraband    Discharge Plan: Patient to continue home program.    Kelsie Kerr PT, DPT  #[id number]    [facility]  [address] [address] [phone]

## 2019-07-22 ENCOUNTER — HOSPITAL ENCOUNTER (OUTPATIENT)
Dept: NEUROLOGY | Facility: CLINIC | Age: 69
Setting detail: THERAPIES SERIES
Discharge: STILL ADMITTED OR ADMINISTRATIVE | End: 2019-07-22
Attending: NURSE PRACTITIONER

## 2019-07-22 DIAGNOSIS — G31.84 MCI (MILD COGNITIVE IMPAIRMENT): ICD-10-CM

## 2019-07-22 DIAGNOSIS — F06.4 ANXIETY DISORDER DUE TO MEDICAL CONDITION: ICD-10-CM

## 2019-07-22 DIAGNOSIS — G20.A1 PARKINSON DISEASE (H): ICD-10-CM

## 2019-07-22 DIAGNOSIS — F32.89 OTHER DEPRESSION: ICD-10-CM

## 2019-07-22 DIAGNOSIS — G47.52 RBD (REM BEHAVIORAL DISORDER): ICD-10-CM

## 2019-07-22 DIAGNOSIS — R53.1 WEAKNESS: ICD-10-CM

## 2019-07-22 DIAGNOSIS — F41.1 ANXIETY STATE: ICD-10-CM

## 2019-07-22 DIAGNOSIS — R79.89 LOW VITAMIN D LEVEL: ICD-10-CM

## 2019-07-25 ENCOUNTER — COMMUNICATION - HEALTHEAST (OUTPATIENT)
Dept: NURSING | Facility: CLINIC | Age: 69
End: 2019-07-25

## 2019-07-26 ENCOUNTER — COMMUNICATION - HEALTHEAST (OUTPATIENT)
Dept: NURSING | Facility: CLINIC | Age: 69
End: 2019-07-26

## 2019-07-26 ENCOUNTER — COMMUNICATION - HEALTHEAST (OUTPATIENT)
Dept: CARE COORDINATION | Facility: CLINIC | Age: 69
End: 2019-07-26

## 2019-07-26 DIAGNOSIS — G20.A1 PARKINSON DISEASE (H): ICD-10-CM

## 2019-07-29 ENCOUNTER — AMBULATORY - HEALTHEAST (OUTPATIENT)
Dept: NEUROLOGY | Facility: CLINIC | Age: 69
End: 2019-07-29

## 2019-07-29 ENCOUNTER — COMMUNICATION - HEALTHEAST (OUTPATIENT)
Dept: FAMILY MEDICINE | Facility: CLINIC | Age: 69
End: 2019-07-29

## 2019-07-29 DIAGNOSIS — G20.A1 PARKINSON'S DISEASE (H): ICD-10-CM

## 2019-07-31 ENCOUNTER — COMMUNICATION - HEALTHEAST (OUTPATIENT)
Dept: NEUROLOGY | Facility: CLINIC | Age: 69
End: 2019-07-31

## 2019-08-01 ENCOUNTER — COMMUNICATION - HEALTHEAST (OUTPATIENT)
Dept: FAMILY MEDICINE | Facility: CLINIC | Age: 69
End: 2019-08-01

## 2019-08-02 ENCOUNTER — COMMUNICATION - HEALTHEAST (OUTPATIENT)
Dept: NURSING | Facility: CLINIC | Age: 69
End: 2019-08-02

## 2019-08-05 ENCOUNTER — AMBULATORY - HEALTHEAST (OUTPATIENT)
Dept: NURSING | Facility: CLINIC | Age: 69
End: 2019-08-05

## 2019-08-07 ENCOUNTER — COMMUNICATION - HEALTHEAST (OUTPATIENT)
Dept: FAMILY MEDICINE | Facility: CLINIC | Age: 69
End: 2019-08-07

## 2019-08-14 ENCOUNTER — TELEPHONE (OUTPATIENT)
Dept: CARE COORDINATION | Facility: CLINIC | Age: 69
End: 2019-08-14

## 2019-08-14 ENCOUNTER — COMMUNICATION - HEALTHEAST (OUTPATIENT)
Dept: CARDIOLOGY | Facility: CLINIC | Age: 69
End: 2019-08-14

## 2019-08-14 DIAGNOSIS — I25.10 CAD (CORONARY ARTERY DISEASE): ICD-10-CM

## 2019-08-14 NOTE — TELEPHONE ENCOUNTER
[facility] now requests orders and shares plan of care/discharge summaries for some patients through [employer].  Please REPLY TO THIS MESSAGE OR ROUTE BACK TO THE AUTHOR in order to give authorization for orders when needed.  This is considered a verbal order, you will still receive a faxed copy of orders for signature.  Thank you for your assistance in improving collaboration for our patients.    ORDER    Requesting orders for occupational therapy treatment 2x per week for 4 weeks starting week of 8/18/19. Treatment to focus on equipment needs, energy conservation, environmental set up, and improving activity tolerance to maximize safety and independence in the home.    Thank you,  Tiffanie Cuba MA, OTR/L

## 2019-08-15 ENCOUNTER — COMMUNICATION - HEALTHEAST (OUTPATIENT)
Dept: FAMILY MEDICINE | Facility: CLINIC | Age: 69
End: 2019-08-15

## 2019-08-23 ENCOUNTER — COMMUNICATION - HEALTHEAST (OUTPATIENT)
Dept: SCHEDULING | Facility: CLINIC | Age: 69
End: 2019-08-23

## 2019-08-24 ENCOUNTER — AMBULATORY - HEALTHEAST (OUTPATIENT)
Dept: LAB | Facility: HOSPITAL | Age: 69
End: 2019-08-24

## 2019-08-24 ENCOUNTER — COMMUNICATION - HEALTHEAST (OUTPATIENT)
Dept: SCHEDULING | Facility: CLINIC | Age: 69
End: 2019-08-24

## 2019-08-24 DIAGNOSIS — R30.0 DYSURIA: ICD-10-CM

## 2019-08-24 LAB
ALBUMIN UR-MCNC: NEGATIVE MG/DL
APPEARANCE UR: CLEAR
BILIRUB UR QL STRIP: NEGATIVE
COLOR UR AUTO: YELLOW
GLUCOSE UR STRIP-MCNC: NEGATIVE MG/DL
HGB UR QL STRIP: NEGATIVE
KETONES UR STRIP-MCNC: NEGATIVE MG/DL
LEUKOCYTE ESTERASE UR QL STRIP: NEGATIVE
NITRATE UR QL: NEGATIVE
PH UR STRIP: 6 [PH] (ref 4.5–8)
SP GR UR STRIP: 1.02 (ref 1–1.03)
UROBILINOGEN UR STRIP-ACNC: NORMAL

## 2019-08-26 ENCOUNTER — COMMUNICATION - HEALTHEAST (OUTPATIENT)
Dept: FAMILY MEDICINE | Facility: CLINIC | Age: 69
End: 2019-08-26

## 2019-08-26 DIAGNOSIS — E03.9 HYPOTHYROIDISM: ICD-10-CM

## 2019-08-28 ENCOUNTER — COMMUNICATION - HEALTHEAST (OUTPATIENT)
Dept: FAMILY MEDICINE | Facility: CLINIC | Age: 69
End: 2019-08-28

## 2019-08-29 ENCOUNTER — COMMUNICATION - HEALTHEAST (OUTPATIENT)
Dept: NURSING | Facility: CLINIC | Age: 69
End: 2019-08-29

## 2019-09-05 ENCOUNTER — HOSPITAL ENCOUNTER (OUTPATIENT)
Dept: NEUROLOGY | Facility: CLINIC | Age: 69
Setting detail: THERAPIES SERIES
Discharge: STILL ADMITTED OR ADMINISTRATIVE | End: 2019-09-05
Attending: NURSE PRACTITIONER

## 2019-09-05 DIAGNOSIS — F32.89 OTHER DEPRESSION: ICD-10-CM

## 2019-09-05 DIAGNOSIS — G25.81 RESTLESS LEG SYNDROME: ICD-10-CM

## 2019-09-05 DIAGNOSIS — R53.1 WEAKNESS: ICD-10-CM

## 2019-09-05 DIAGNOSIS — G31.84 MILD COGNITIVE IMPAIRMENT: ICD-10-CM

## 2019-09-05 DIAGNOSIS — G47.52 RBD (REM BEHAVIORAL DISORDER): ICD-10-CM

## 2019-09-05 DIAGNOSIS — G20.A1 PARKINSON DISEASE (H): ICD-10-CM

## 2019-09-05 DIAGNOSIS — F41.1 ANXIETY STATE: ICD-10-CM

## 2019-09-05 DIAGNOSIS — Z71.82 EXERCISE COUNSELING: ICD-10-CM

## 2019-09-06 ENCOUNTER — OFFICE VISIT - HEALTHEAST (OUTPATIENT)
Dept: FAMILY MEDICINE | Facility: CLINIC | Age: 69
End: 2019-09-06

## 2019-09-06 DIAGNOSIS — I50.22 CHRONIC SYSTOLIC HEART FAILURE (H): ICD-10-CM

## 2019-09-06 DIAGNOSIS — G95.20 CORD COMPRESSION MYELOPATHY (H): ICD-10-CM

## 2019-09-06 DIAGNOSIS — Z12.5 SCREENING FOR PROSTATE CANCER: ICD-10-CM

## 2019-09-06 DIAGNOSIS — M48.02 CERVICAL STENOSIS OF SPINAL CANAL: ICD-10-CM

## 2019-09-06 DIAGNOSIS — I25.5 ISCHEMIC CARDIOMYOPATHY: ICD-10-CM

## 2019-09-06 DIAGNOSIS — L97.519 ULCER OF RIGHT FOOT, UNSPECIFIED ULCER STAGE (H): ICD-10-CM

## 2019-09-06 DIAGNOSIS — E03.9 HYPOTHYROIDISM, UNSPECIFIED TYPE: ICD-10-CM

## 2019-09-06 DIAGNOSIS — R79.9 ABNORMAL FINDING OF BLOOD CHEMISTRY: ICD-10-CM

## 2019-09-06 DIAGNOSIS — F32.1 MODERATE MAJOR DEPRESSION (H): ICD-10-CM

## 2019-09-06 DIAGNOSIS — Z00.00 MEDICARE ANNUAL WELLNESS VISIT, SUBSEQUENT: ICD-10-CM

## 2019-09-06 DIAGNOSIS — I25.119 CORONARY ARTERY DISEASE INVOLVING NATIVE CORONARY ARTERY OF NATIVE HEART WITH ANGINA PECTORIS (H): ICD-10-CM

## 2019-09-06 DIAGNOSIS — G20.A1 PARKINSON DISEASE (H): ICD-10-CM

## 2019-09-06 LAB
ERYTHROCYTE [DISTWIDTH] IN BLOOD BY AUTOMATED COUNT: 13.3 % (ref 11–14.5)
HBA1C MFR BLD: 5.9 % (ref 3.5–6)
HCT VFR BLD AUTO: 42.5 % (ref 40–54)
HGB BLD-MCNC: 14 G/DL (ref 14–18)
MCH RBC QN AUTO: 28.9 PG (ref 27–34)
MCHC RBC AUTO-ENTMCNC: 33 G/DL (ref 32–36)
MCV RBC AUTO: 88 FL (ref 80–100)
PLATELET # BLD AUTO: 180 THOU/UL (ref 140–440)
PMV BLD AUTO: 7.4 FL (ref 7–10)
RBC # BLD AUTO: 4.85 MILL/UL (ref 4.4–6.2)
WBC: 6.2 THOU/UL (ref 4–11)

## 2019-09-06 ASSESSMENT — ANXIETY QUESTIONNAIRES
GAD7 TOTAL SCORE: 4
4. TROUBLE RELAXING: SEVERAL DAYS
5. BEING SO RESTLESS THAT IT IS HARD TO SIT STILL: SEVERAL DAYS
7. FEELING AFRAID AS IF SOMETHING AWFUL MIGHT HAPPEN: NOT AT ALL
1. FEELING NERVOUS, ANXIOUS, OR ON EDGE: SEVERAL DAYS
2. NOT BEING ABLE TO STOP OR CONTROL WORRYING: NOT AT ALL
3. WORRYING TOO MUCH ABOUT DIFFERENT THINGS: NOT AT ALL
6. BECOMING EASILY ANNOYED OR IRRITABLE: SEVERAL DAYS

## 2019-09-06 ASSESSMENT — MIFFLIN-ST. JEOR: SCORE: 1688.29

## 2019-09-06 ASSESSMENT — PATIENT HEALTH QUESTIONNAIRE - PHQ9: SUM OF ALL RESPONSES TO PHQ QUESTIONS 1-9: 11

## 2019-09-07 LAB
ALBUMIN SERPL-MCNC: 3.7 G/DL (ref 3.5–5)
ALP SERPL-CCNC: 99 U/L (ref 45–120)
ALT SERPL W P-5'-P-CCNC: 13 U/L (ref 0–45)
ANION GAP SERPL CALCULATED.3IONS-SCNC: 11 MMOL/L (ref 5–18)
AST SERPL W P-5'-P-CCNC: 23 U/L (ref 0–40)
BILIRUB DIRECT SERPL-MCNC: 0.4 MG/DL
BILIRUB SERPL-MCNC: 0.9 MG/DL (ref 0–1)
BUN SERPL-MCNC: 15 MG/DL (ref 8–22)
CALCIUM SERPL-MCNC: 9.2 MG/DL (ref 8.5–10.5)
CHLORIDE BLD-SCNC: 107 MMOL/L (ref 98–107)
CHOLEST SERPL-MCNC: 101 MG/DL
CO2 SERPL-SCNC: 22 MMOL/L (ref 22–31)
CREAT SERPL-MCNC: 0.77 MG/DL (ref 0.7–1.3)
FASTING STATUS PATIENT QL REPORTED: YES
GFR SERPL CREATININE-BSD FRML MDRD: >60 ML/MIN/1.73M2
GLUCOSE BLD-MCNC: 100 MG/DL (ref 70–125)
HDLC SERPL-MCNC: 43 MG/DL
LDLC SERPL CALC-MCNC: 39 MG/DL
POTASSIUM BLD-SCNC: 4.1 MMOL/L (ref 3.5–5)
PROT SERPL-MCNC: 6.5 G/DL (ref 6–8)
PSA SERPL-MCNC: 0.4 NG/ML (ref 0–4.5)
SODIUM SERPL-SCNC: 140 MMOL/L (ref 136–145)
TRIGL SERPL-MCNC: 96 MG/DL
TSH SERPL DL<=0.005 MIU/L-ACNC: 2.91 UIU/ML (ref 0.3–5)

## 2019-09-10 ENCOUNTER — COMMUNICATION - HEALTHEAST (OUTPATIENT)
Dept: FAMILY MEDICINE | Facility: CLINIC | Age: 69
End: 2019-09-10

## 2019-09-13 ENCOUNTER — COMMUNICATION - HEALTHEAST (OUTPATIENT)
Dept: FAMILY MEDICINE | Facility: CLINIC | Age: 69
End: 2019-09-13

## 2019-09-13 ENCOUNTER — TELEPHONE (OUTPATIENT)
Dept: CARE COORDINATION | Facility: CLINIC | Age: 69
End: 2019-09-13

## 2019-09-13 DIAGNOSIS — L97.509 ULCER OF FOOT, UNSPECIFIED LATERALITY, UNSPECIFIED ULCER STAGE (H): ICD-10-CM

## 2019-09-16 ENCOUNTER — COMMUNICATION - HEALTHEAST (OUTPATIENT)
Dept: FAMILY MEDICINE | Facility: CLINIC | Age: 69
End: 2019-09-16

## 2019-09-23 ENCOUNTER — RECORDS - HEALTHEAST (OUTPATIENT)
Dept: ADMINISTRATIVE | Facility: OTHER | Age: 69
End: 2019-09-23

## 2019-09-25 ENCOUNTER — RECORDS - HEALTHEAST (OUTPATIENT)
Dept: ADMINISTRATIVE | Facility: OTHER | Age: 69
End: 2019-09-25

## 2019-09-25 LAB — COLOGUARD-ABSTRACT: NEGATIVE

## 2019-09-27 ENCOUNTER — COMMUNICATION - HEALTHEAST (OUTPATIENT)
Dept: FAMILY MEDICINE | Facility: CLINIC | Age: 69
End: 2019-09-27

## 2019-09-27 ENCOUNTER — COMMUNICATION - HEALTHEAST (OUTPATIENT)
Dept: NEUROLOGY | Facility: CLINIC | Age: 69
End: 2019-09-27

## 2019-09-27 DIAGNOSIS — G20.A1 PARKINSON DISEASE (H): ICD-10-CM

## 2019-09-27 DIAGNOSIS — R44.3 HALLUCINATION: ICD-10-CM

## 2019-10-01 ENCOUNTER — COMMUNICATION - HEALTHEAST (OUTPATIENT)
Dept: NEUROLOGY | Facility: CLINIC | Age: 69
End: 2019-10-01

## 2019-10-01 DIAGNOSIS — F32.0 CURRENT MILD EPISODE OF MAJOR DEPRESSIVE DISORDER, UNSPECIFIED WHETHER RECURRENT (H): ICD-10-CM

## 2019-10-02 ENCOUNTER — COMMUNICATION - HEALTHEAST (OUTPATIENT)
Dept: NEUROLOGY | Facility: CLINIC | Age: 69
End: 2019-10-02

## 2019-10-02 ENCOUNTER — COMMUNICATION - HEALTHEAST (OUTPATIENT)
Dept: FAMILY MEDICINE | Facility: CLINIC | Age: 69
End: 2019-10-02

## 2019-10-02 DIAGNOSIS — R44.3 HALLUCINATION: ICD-10-CM

## 2019-10-02 DIAGNOSIS — G20.A1 PARKINSON DISEASE (H): ICD-10-CM

## 2019-10-02 DIAGNOSIS — K59.00 CONSTIPATION: ICD-10-CM

## 2019-10-07 ENCOUNTER — RECORDS - HEALTHEAST (OUTPATIENT)
Dept: ADMINISTRATIVE | Facility: OTHER | Age: 69
End: 2019-10-07

## 2019-10-10 ENCOUNTER — TELEPHONE (OUTPATIENT)
Dept: FAMILY MEDICINE | Facility: CLINIC | Age: 69
End: 2019-10-10

## 2019-10-10 NOTE — TELEPHONE ENCOUNTER
[facility] now requests orders and shares plan of care/discharge summaries for some patients through [employer].  Please REPLY TO THIS MESSAGE OR ROUTE BACK TO THE AUTHOR in order to give authorization for orders when needed.  This is considered a verbal order, you will still receive a faxed copy of orders for signature.  Thank you for your assistance in improving collaboration for our patients.    ORDER    Home Occupational Therapy for cognition, ADLs, equipment, home safety and falls prevention.     2w3

## 2019-10-11 ENCOUNTER — COMMUNICATION - HEALTHEAST (OUTPATIENT)
Dept: FAMILY MEDICINE | Facility: CLINIC | Age: 69
End: 2019-10-11

## 2019-10-15 ENCOUNTER — RECORDS - HEALTHEAST (OUTPATIENT)
Dept: ADMINISTRATIVE | Facility: OTHER | Age: 69
End: 2019-10-15

## 2019-10-17 ENCOUNTER — RECORDS - HEALTHEAST (OUTPATIENT)
Dept: HEALTH INFORMATION MANAGEMENT | Facility: CLINIC | Age: 69
End: 2019-10-17

## 2019-10-21 ENCOUNTER — COMMUNICATION - HEALTHEAST (OUTPATIENT)
Dept: NEUROLOGY | Facility: CLINIC | Age: 69
End: 2019-10-21

## 2019-10-21 DIAGNOSIS — G20.A1 PARKINSON'S DISEASE (H): ICD-10-CM

## 2019-10-22 ENCOUNTER — AMBULATORY - HEALTHEAST (OUTPATIENT)
Dept: NEUROLOGY | Facility: CLINIC | Age: 69
End: 2019-10-22

## 2019-10-22 DIAGNOSIS — G20.A1 PARKINSON'S DISEASE (H): ICD-10-CM

## 2019-10-23 ENCOUNTER — COMMUNICATION - HEALTHEAST (OUTPATIENT)
Dept: FAMILY MEDICINE | Facility: CLINIC | Age: 69
End: 2019-10-23

## 2019-10-24 ENCOUNTER — COMMUNICATION - HEALTHEAST (OUTPATIENT)
Dept: CARDIOLOGY | Facility: CLINIC | Age: 69
End: 2019-10-24

## 2019-10-24 DIAGNOSIS — I25.119 CORONARY ARTERY DISEASE INVOLVING NATIVE CORONARY ARTERY OF NATIVE HEART WITH ANGINA PECTORIS (H): ICD-10-CM

## 2019-10-25 ENCOUNTER — COMMUNICATION - HEALTHEAST (OUTPATIENT)
Dept: NURSING | Facility: CLINIC | Age: 69
End: 2019-10-25

## 2019-10-25 ENCOUNTER — RECORDS - HEALTHEAST (OUTPATIENT)
Dept: ADMINISTRATIVE | Facility: OTHER | Age: 69
End: 2019-10-25

## 2019-10-25 ENCOUNTER — COMMUNICATION - HEALTHEAST (OUTPATIENT)
Dept: CARE COORDINATION | Facility: CLINIC | Age: 69
End: 2019-10-25

## 2019-11-06 ENCOUNTER — COMMUNICATION - HEALTHEAST (OUTPATIENT)
Dept: NURSING | Facility: CLINIC | Age: 69
End: 2019-11-06

## 2019-11-15 ENCOUNTER — COMMUNICATION - HEALTHEAST (OUTPATIENT)
Dept: FAMILY MEDICINE | Facility: CLINIC | Age: 69
End: 2019-11-15

## 2019-11-20 ENCOUNTER — OFFICE VISIT - HEALTHEAST (OUTPATIENT)
Dept: CARDIOLOGY | Facility: CLINIC | Age: 69
End: 2019-11-20

## 2019-11-20 DIAGNOSIS — I25.10 CORONARY ARTERY DISEASE INVOLVING NATIVE HEART, ANGINA PRESENCE UNSPECIFIED, UNSPECIFIED VESSEL OR LESION TYPE: ICD-10-CM

## 2019-11-20 DIAGNOSIS — I25.5 ISCHEMIC CARDIOMYOPATHY: ICD-10-CM

## 2019-11-20 DIAGNOSIS — E78.00 HYPERCHOLESTEROLEMIA: ICD-10-CM

## 2019-11-20 DIAGNOSIS — G20.A1 PARKINSON DISEASE (H): ICD-10-CM

## 2019-11-20 ASSESSMENT — MIFFLIN-ST. JEOR: SCORE: 1710.97

## 2019-11-27 ENCOUNTER — COMMUNICATION - HEALTHEAST (OUTPATIENT)
Dept: FAMILY MEDICINE | Facility: CLINIC | Age: 69
End: 2019-11-27

## 2019-11-27 DIAGNOSIS — E03.9 HYPOTHYROIDISM: ICD-10-CM

## 2019-12-02 ENCOUNTER — HOSPITAL ENCOUNTER (OUTPATIENT)
Dept: CARDIOLOGY | Facility: HOSPITAL | Age: 69
Discharge: HOME | End: 2019-12-02
Attending: INTERNAL MEDICINE

## 2019-12-02 ENCOUNTER — HOSPITAL ENCOUNTER (OUTPATIENT)
Dept: NUCLEAR MEDICINE | Facility: HOSPITAL | Age: 69
Discharge: HOME | End: 2019-12-02
Attending: INTERNAL MEDICINE

## 2019-12-02 DIAGNOSIS — I25.10 CORONARY ARTERY DISEASE INVOLVING NATIVE HEART, ANGINA PRESENCE UNSPECIFIED, UNSPECIFIED VESSEL OR LESION TYPE: ICD-10-CM

## 2019-12-02 DIAGNOSIS — I25.5 ISCHEMIC CARDIOMYOPATHY: ICD-10-CM

## 2019-12-02 LAB
CV STRESS CURRENT BP HE: NORMAL
CV STRESS CURRENT HR HE: 58
CV STRESS CURRENT HR HE: 58
CV STRESS CURRENT HR HE: 62
CV STRESS CURRENT HR HE: 73
CV STRESS CURRENT HR HE: 74
CV STRESS CURRENT HR HE: 76
CV STRESS CURRENT HR HE: 77
CV STRESS CURRENT HR HE: 78
CV STRESS CURRENT HR HE: 79
CV STRESS CURRENT HR HE: 79
CV STRESS DEVIATION TIME HE: NORMAL
CV STRESS ECHO PERCENT HR HE: NORMAL
CV STRESS EXERCISE STAGE HE: NORMAL
CV STRESS FINAL RESTING BP HE: NORMAL
CV STRESS FINAL RESTING HR HE: 73
CV STRESS MAX HR HE: 79
CV STRESS MAX TREADMILL GRADE HE: 0
CV STRESS MAX TREADMILL SPEED HE: 0
CV STRESS PEAK DIA BP HE: NORMAL
CV STRESS PEAK SYS BP HE: NORMAL
CV STRESS PHASE HE: NORMAL
CV STRESS PROTOCOL HE: NORMAL
CV STRESS RESTING PT POSITION HE: NORMAL
CV STRESS ST DEVIATION AMOUNT HE: NORMAL
CV STRESS ST DEVIATION ELEVATION HE: NORMAL
CV STRESS ST EVELATION AMOUNT HE: NORMAL
CV STRESS TEST TYPE HE: NORMAL
CV STRESS TOTAL STAGE TIME MIN 1 HE: NORMAL
NUC STRESS EJECTION FRACTION: 48 %
RATE PRESSURE PRODUCT: 9401
STRESS ECHO BASELINE DIASTOLIC HE: 60
STRESS ECHO BASELINE HR: 61
STRESS ECHO BASELINE SYSTOLIC BP: 142
STRESS ECHO CALCULATED PERCENT HR: 52 %
STRESS ECHO LAST STRESS DIASTOLIC BP: 63
STRESS ECHO LAST STRESS HR: 79
STRESS ECHO LAST STRESS SYSTOLIC BP: 119
STRESS ECHO TARGET HR: 151

## 2019-12-02 ASSESSMENT — MIFFLIN-ST. JEOR: SCORE: 1745.56

## 2019-12-05 ENCOUNTER — AMBULATORY - HEALTHEAST (OUTPATIENT)
Dept: CARDIOLOGY | Facility: CLINIC | Age: 69
End: 2019-12-05

## 2019-12-05 ENCOUNTER — HOSPITAL ENCOUNTER (OUTPATIENT)
Dept: NEUROLOGY | Facility: CLINIC | Age: 69
Setting detail: THERAPIES SERIES
Discharge: STILL ADMITTED OR ADMINISTRATIVE | End: 2019-12-05
Attending: NURSE PRACTITIONER

## 2019-12-05 DIAGNOSIS — G20.A1 PARKINSON DISEASE (H): ICD-10-CM

## 2019-12-05 DIAGNOSIS — G25.81 RLS (RESTLESS LEGS SYNDROME): ICD-10-CM

## 2019-12-05 DIAGNOSIS — F32.89 OTHER DEPRESSION: ICD-10-CM

## 2019-12-05 DIAGNOSIS — R53.1 WEAKNESS: ICD-10-CM

## 2019-12-05 DIAGNOSIS — G89.29 CHRONIC LOW BACK PAIN WITH SCIATICA, SCIATICA LATERALITY UNSPECIFIED, UNSPECIFIED BACK PAIN LATERALITY: ICD-10-CM

## 2019-12-05 DIAGNOSIS — F41.1 ANXIETY STATE: ICD-10-CM

## 2019-12-05 DIAGNOSIS — M48.02 CERVICAL STENOSIS OF SPINAL CANAL: ICD-10-CM

## 2019-12-05 DIAGNOSIS — M54.40 CHRONIC LOW BACK PAIN WITH SCIATICA, SCIATICA LATERALITY UNSPECIFIED, UNSPECIFIED BACK PAIN LATERALITY: ICD-10-CM

## 2019-12-05 DIAGNOSIS — L97.519 ULCER OF RIGHT FOOT, UNSPECIFIED ULCER STAGE (H): ICD-10-CM

## 2019-12-05 DIAGNOSIS — F32.1 MODERATE MAJOR DEPRESSION (H): ICD-10-CM

## 2019-12-05 DIAGNOSIS — G47.52 RBD (REM BEHAVIORAL DISORDER): ICD-10-CM

## 2019-12-06 ENCOUNTER — COMMUNICATION - HEALTHEAST (OUTPATIENT)
Dept: FAMILY MEDICINE | Facility: CLINIC | Age: 69
End: 2019-12-06

## 2019-12-06 DIAGNOSIS — L97.409 ULCER OF HEEL AND MIDFOOT, UNSPECIFIED LATERALITY, UNSPECIFIED ULCER STAGE (H): ICD-10-CM

## 2019-12-06 DIAGNOSIS — G20.A1 PARKINSON DISEASE (H): ICD-10-CM

## 2019-12-11 ENCOUNTER — OFFICE VISIT - HEALTHEAST (OUTPATIENT)
Dept: FAMILY MEDICINE | Facility: CLINIC | Age: 69
End: 2019-12-11

## 2019-12-11 ENCOUNTER — COMMUNICATION - HEALTHEAST (OUTPATIENT)
Dept: NEUROLOGY | Facility: CLINIC | Age: 69
End: 2019-12-11

## 2019-12-11 ENCOUNTER — COMMUNICATION - HEALTHEAST (OUTPATIENT)
Dept: FAMILY MEDICINE | Facility: CLINIC | Age: 69
End: 2019-12-11

## 2019-12-11 DIAGNOSIS — L97.519 ULCER OF RIGHT FOOT, UNSPECIFIED ULCER STAGE (H): ICD-10-CM

## 2019-12-11 DIAGNOSIS — L21.9 SEBORRHEIC DERMATITIS: ICD-10-CM

## 2019-12-11 DIAGNOSIS — L84 CALLUS OF FOOT: ICD-10-CM

## 2020-01-02 ENCOUNTER — COMMUNICATION - HEALTHEAST (OUTPATIENT)
Dept: FAMILY MEDICINE | Facility: CLINIC | Age: 70
End: 2020-01-02

## 2020-01-02 ENCOUNTER — OFFICE VISIT - HEALTHEAST (OUTPATIENT)
Dept: FAMILY MEDICINE | Facility: CLINIC | Age: 70
End: 2020-01-02

## 2020-01-02 DIAGNOSIS — L97.519 ULCER OF RIGHT FOOT, UNSPECIFIED ULCER STAGE (H): ICD-10-CM

## 2020-01-02 DIAGNOSIS — Z89.421 ACQUIRED ABSENCE OF OTHER RIGHT TOE(S) (H): ICD-10-CM

## 2020-01-02 DIAGNOSIS — G95.20 CORD COMPRESSION MYELOPATHY (H): ICD-10-CM

## 2020-01-02 DIAGNOSIS — F32.1 MODERATE MAJOR DEPRESSION (H): ICD-10-CM

## 2020-01-02 DIAGNOSIS — I50.22 CHRONIC SYSTOLIC HEART FAILURE (H): ICD-10-CM

## 2020-01-02 DIAGNOSIS — G20.A1 PARKINSON DISEASE (H): ICD-10-CM

## 2020-01-02 DIAGNOSIS — J01.90 ACUTE SINUSITIS, RECURRENCE NOT SPECIFIED, UNSPECIFIED LOCATION: ICD-10-CM

## 2020-01-02 DIAGNOSIS — I25.119 CORONARY ARTERY DISEASE INVOLVING NATIVE CORONARY ARTERY OF NATIVE HEART WITH ANGINA PECTORIS (H): ICD-10-CM

## 2020-01-03 ENCOUNTER — COMMUNICATION - HEALTHEAST (OUTPATIENT)
Dept: NEUROLOGY | Facility: CLINIC | Age: 70
End: 2020-01-03

## 2020-01-09 ENCOUNTER — COMMUNICATION - HEALTHEAST (OUTPATIENT)
Dept: FAMILY MEDICINE | Facility: CLINIC | Age: 70
End: 2020-01-09

## 2020-01-10 ENCOUNTER — COMMUNICATION - HEALTHEAST (OUTPATIENT)
Dept: FAMILY MEDICINE | Facility: CLINIC | Age: 70
End: 2020-01-10

## 2020-01-13 ENCOUNTER — COMMUNICATION - HEALTHEAST (OUTPATIENT)
Dept: FAMILY MEDICINE | Facility: CLINIC | Age: 70
End: 2020-01-13

## 2020-01-14 ENCOUNTER — COMMUNICATION - HEALTHEAST (OUTPATIENT)
Dept: FAMILY MEDICINE | Facility: CLINIC | Age: 70
End: 2020-01-14

## 2020-01-16 ENCOUNTER — RECORDS - HEALTHEAST (OUTPATIENT)
Dept: ADMINISTRATIVE | Facility: OTHER | Age: 70
End: 2020-01-16

## 2020-01-21 ENCOUNTER — COMMUNICATION - HEALTHEAST (OUTPATIENT)
Dept: FAMILY MEDICINE | Facility: CLINIC | Age: 70
End: 2020-01-21

## 2020-01-30 ENCOUNTER — RECORDS - HEALTHEAST (OUTPATIENT)
Dept: ADMINISTRATIVE | Facility: OTHER | Age: 70
End: 2020-01-30

## 2020-01-31 ENCOUNTER — COMMUNICATION - HEALTHEAST (OUTPATIENT)
Dept: FAMILY MEDICINE | Facility: CLINIC | Age: 70
End: 2020-01-31

## 2020-02-07 ENCOUNTER — COMMUNICATION - HEALTHEAST (OUTPATIENT)
Dept: FAMILY MEDICINE | Facility: CLINIC | Age: 70
End: 2020-02-07

## 2020-02-11 ENCOUNTER — COMMUNICATION - HEALTHEAST (OUTPATIENT)
Dept: FAMILY MEDICINE | Facility: CLINIC | Age: 70
End: 2020-02-11

## 2020-02-12 ENCOUNTER — MEDICAL CORRESPONDENCE (OUTPATIENT)
Dept: HEALTH INFORMATION MANAGEMENT | Facility: CLINIC | Age: 70
End: 2020-02-12

## 2020-02-15 ENCOUNTER — HOSPITAL ENCOUNTER (OUTPATIENT)
Dept: MRI IMAGING | Facility: CLINIC | Age: 70
Discharge: HOME | End: 2020-02-15
Attending: PODIATRIST | Admitting: PODIATRIST
Payer: MEDICARE

## 2020-02-15 DIAGNOSIS — L97.529 FOOT ULCER, LEFT (H): ICD-10-CM

## 2020-02-15 LAB — RADIOLOGIST FLAGS: NORMAL

## 2020-02-15 PROCEDURE — 73718 MRI LOWER EXTREMITY W/O DYE: CPT | Mod: LT

## 2020-02-21 ENCOUNTER — COMMUNICATION - HEALTHEAST (OUTPATIENT)
Dept: FAMILY MEDICINE | Facility: CLINIC | Age: 70
End: 2020-02-21

## 2020-02-24 ENCOUNTER — COMMUNICATION - HEALTHEAST (OUTPATIENT)
Dept: CARDIOLOGY | Facility: CLINIC | Age: 70
End: 2020-02-24

## 2020-02-24 DIAGNOSIS — I25.119 CORONARY ARTERY DISEASE INVOLVING NATIVE CORONARY ARTERY OF NATIVE HEART WITH ANGINA PECTORIS (H): ICD-10-CM

## 2020-02-24 DIAGNOSIS — E78.5 DYSLIPIDEMIA, GOAL LDL BELOW 70: ICD-10-CM

## 2020-02-24 RX ORDER — DOXYCYCLINE 100 MG/1
CAPSULE ORAL
COMMUNITY
Start: 2020-01-02 | End: 2021-01-01

## 2020-02-24 RX ORDER — GABAPENTIN 100 MG/1
CAPSULE ORAL
COMMUNITY
Start: 2020-02-06 | End: 2021-01-01

## 2020-02-24 RX ORDER — LEVODOPA AND CARBIDOPA 245; 61.25 MG/1; MG/1
CAPSULE, EXTENDED RELEASE ORAL
COMMUNITY
Start: 2020-02-07

## 2020-02-24 RX ORDER — FLUTICASONE PROPIONATE 50 MCG
2 SPRAY, SUSPENSION (ML) NASAL
COMMUNITY
Start: 2020-01-02

## 2020-02-24 RX ORDER — AMANTADINE 137 MG/1
CAPSULE, COATED PELLETS ORAL
Status: ON HOLD | COMMUNITY
Start: 2019-04-03 | End: 2020-02-27

## 2020-02-24 RX ORDER — TRIAMCINOLONE ACETONIDE 0.25 MG/G
OINTMENT TOPICAL
COMMUNITY
Start: 2019-12-11 | End: 2022-01-01

## 2020-02-25 ENCOUNTER — OFFICE VISIT - HEALTHEAST (OUTPATIENT)
Dept: FAMILY MEDICINE | Facility: CLINIC | Age: 70
End: 2020-02-25

## 2020-02-25 DIAGNOSIS — I50.22 CHRONIC SYSTOLIC HEART FAILURE (H): ICD-10-CM

## 2020-02-25 DIAGNOSIS — I25.119 CORONARY ARTERY DISEASE INVOLVING NATIVE CORONARY ARTERY OF NATIVE HEART WITH ANGINA PECTORIS (H): ICD-10-CM

## 2020-02-25 DIAGNOSIS — Z01.818 PREOP GENERAL PHYSICAL EXAM: ICD-10-CM

## 2020-02-25 DIAGNOSIS — M86.9 OSTEOMYELITIS OF LEFT FOOT, UNSPECIFIED TYPE (H): ICD-10-CM

## 2020-02-25 DIAGNOSIS — G20.A1 PARKINSON DISEASE (H): ICD-10-CM

## 2020-02-25 LAB
ANION GAP SERPL CALCULATED.3IONS-SCNC: 7 MMOL/L (ref 5–18)
BUN SERPL-MCNC: 21 MG/DL (ref 8–28)
CALCIUM SERPL-MCNC: 9.1 MG/DL (ref 8.5–10.5)
CHLORIDE BLD-SCNC: 105 MMOL/L (ref 98–107)
CO2 SERPL-SCNC: 27 MMOL/L (ref 22–31)
CREAT SERPL-MCNC: 0.74 MG/DL (ref 0.7–1.3)
ERYTHROCYTE [DISTWIDTH] IN BLOOD BY AUTOMATED COUNT: 12.8 % (ref 11–14.5)
GFR SERPL CREATININE-BSD FRML MDRD: >60 ML/MIN/1.73M2
GLUCOSE BLD-MCNC: 109 MG/DL (ref 70–125)
HCT VFR BLD AUTO: 43.2 % (ref 40–54)
HGB BLD-MCNC: 14.3 G/DL (ref 14–18)
MCH RBC QN AUTO: 29.4 PG (ref 27–34)
MCHC RBC AUTO-ENTMCNC: 33.2 G/DL (ref 32–36)
MCV RBC AUTO: 89 FL (ref 80–100)
PLATELET # BLD AUTO: 159 THOU/UL (ref 140–440)
PMV BLD AUTO: 7.6 FL (ref 7–10)
POTASSIUM BLD-SCNC: 4.1 MMOL/L (ref 3.5–5)
RBC # BLD AUTO: 4.87 MILL/UL (ref 4.4–6.2)
SODIUM SERPL-SCNC: 139 MMOL/L (ref 136–145)
WBC: 5.7 THOU/UL (ref 4–11)

## 2020-02-25 ASSESSMENT — MIFFLIN-ST. JEOR: SCORE: 1775.04

## 2020-02-26 ENCOUNTER — ANESTHESIA EVENT (OUTPATIENT)
Dept: SURGERY | Facility: CLINIC | Age: 70
End: 2020-02-26
Payer: MEDICARE

## 2020-02-26 ENCOUNTER — COMMUNICATION - HEALTHEAST (OUTPATIENT)
Dept: FAMILY MEDICINE | Facility: CLINIC | Age: 70
End: 2020-02-26

## 2020-02-26 LAB
ATRIAL RATE - MUSE: 56 BPM
DIASTOLIC BLOOD PRESSURE - MUSE: NORMAL
INTERPRETATION ECG - MUSE: NORMAL
P AXIS - MUSE: 38 DEGREES
PR INTERVAL - MUSE: 148 MS
QRS DURATION - MUSE: 96 MS
QT - MUSE: 460 MS
QTC - MUSE: 443 MS
R AXIS - MUSE: 19 DEGREES
SYSTOLIC BLOOD PRESSURE - MUSE: NORMAL
T AXIS - MUSE: 116 DEGREES
VENTRICULAR RATE- MUSE: 56 BPM

## 2020-02-26 ASSESSMENT — LIFESTYLE VARIABLES: TOBACCO_USE: 1

## 2020-02-26 ASSESSMENT — PATIENT HEALTH QUESTIONNAIRE - PHQ9: SUM OF ALL RESPONSES TO PHQ QUESTIONS 1-9: 4

## 2020-02-26 NOTE — ANESTHESIA PREPROCEDURE EVALUATION
Anesthesia Pre-Procedure Evaluation    Patient: Silver Zamora   MRN: [id number] : 1950          Preoperative Diagnosis: Osteomyelitis (H) [M86.9]    Procedure(s):  Left Foot 2nd Toe Amputation,foot/toe off- loading procedures    No past medical history on file.  History reviewed. No pertinent surgical history.    Anesthesia Evaluation     . Pt has had prior anesthetic.     No history of anesthetic complications          ROS/MED HX    ENT/Pulmonary: Comment: Deviated septum  Sinusitis  Hx of SOB    (+)sleep apnea, tobacco use, Past use asthma , . .    Neurologic:     (+)Parkinson's disease RLS; hx of acute encephalopathy    (-) Other neuro hx   Cardiovascular:     (+) Dyslipidemia, --CAD, angina-past MI,-stent,Oct. 2018  . : . . . :. . Previous cardiac testing date:results:Stress Testdate:2019 results:Previous area of ischemia now area of nontransmural infarct per H&P--patient has not uses NTG date: results: date: results:          METS/Exercise Tolerance:  1 - Eating, dressing   Hematologic:         Musculoskeletal:   (+)  other musculoskeletal- cervical stenosis, LBP , toe amputations, osteomyelitis      GI/Hepatic:     (+) GERD       Renal/Genitourinary:  - ROS Renal section negative       Endo:     (+) thyroid problem hypothyroidism, Obesity, .      Psychiatric:     (+) psychiatric history depression and anxiety      Infectious Disease:  - neg infectious disease ROS       Malignancy:      - no malignancy   Other: Comment: Hx of hypomagnesemia and hypokalemia                           Physical Exam  Normal systems: cardiovascular, pulmonary and dental    Airway   Mallampati: II  TM distance: >3 FB  Neck ROM: full    Dental     Cardiovascular       Pulmonary             No results found for: WBC, HGB, HCT, PLT, CRP, SED, NA, POTASSIUM, CHLORIDE, CO2, BUN, CR, GLC, EFREM, PHOS, MAG, ALBUMIN, PROTTOTAL, ALT, AST, GGT, ALKPHOS, BILITOTAL, BILIDIRECT, LIPASE, AMYLASE, ELIGIO, PTT, INR, FIBR, TSH, T4, T3, HCG,  "HCGS, CKTOTAL, CKMB, TROPN    Preop Vitals  BP Readings from Last 3 Encounters:   07/11/19 128/72    Pulse Readings from Last 3 Encounters:   07/11/19 76      Resp Readings from Last 3 Encounters:   No data found for Resp    SpO2 Readings from Last 3 Encounters:   No data found for SpO2      Temp Readings from Last 1 Encounters:   No data found for Temp    Ht Readings from Last 1 Encounters:   07/11/19 1.778 m (5' 10\")      Wt Readings from Last 1 Encounters:   07/11/19 90.7 kg (200 lb)    Estimated body mass index is 28.7 kg/m  as calculated from the following:    Height as of 7/11/19: 1.778 m (5' 10\").    Weight as of 7/11/19: 90.7 kg (200 lb).       Anesthesia Plan      History & Physical Review  History and physical reviewed and following examination; no interval change.    ASA Status:  3 .    NPO Status:  > 8 hours    Plan for MAC and General with Propofol and Intravenous induction. Maintenance will be TIVA.  Reason for MAC:  Difficulty with conscious sedation (QS)  PONV prophylaxis:  Ondansetron (or other 5HT-3)       Postoperative Care  Postoperative pain management:  IV analgesics and Oral pain medications.      Consents  Anesthetic plan, risks, benefits and alternatives discussed with:  Patient..                 KASHIF Fung CRNA  "

## 2020-02-27 ENCOUNTER — ANESTHESIA (OUTPATIENT)
Dept: SURGERY | Facility: CLINIC | Age: 70
End: 2020-02-27
Payer: MEDICARE

## 2020-02-27 ENCOUNTER — HOSPITAL ENCOUNTER (OUTPATIENT)
Facility: CLINIC | Age: 70
Discharge: HOME | End: 2020-02-27
Attending: PODIATRIST | Admitting: PODIATRIST
Payer: MEDICARE

## 2020-02-27 VITALS
HEART RATE: 68 BPM | SYSTOLIC BLOOD PRESSURE: 147 MMHG | OXYGEN SATURATION: 95 % | TEMPERATURE: 97.4 F | BODY MASS INDEX: 31.92 KG/M2 | HEIGHT: 70 IN | RESPIRATION RATE: 20 BRPM | DIASTOLIC BLOOD PRESSURE: 71 MMHG | WEIGHT: 223 LBS

## 2020-02-27 DIAGNOSIS — S91.309A WOUND OF FOOT: ICD-10-CM

## 2020-02-27 DIAGNOSIS — G89.18 ACUTE POST-OPERATIVE PAIN: Primary | ICD-10-CM

## 2020-02-27 LAB
GRAM STN SPEC: ABNORMAL
GRAM STN SPEC: ABNORMAL
Lab: ABNORMAL
SPECIMEN SOURCE: ABNORMAL

## 2020-02-27 PROCEDURE — 88305 TISSUE EXAM BY PATHOLOGIST: CPT | Performed by: PODIATRIST

## 2020-02-27 PROCEDURE — 25000125 ZZHC RX 250: Performed by: PODIATRIST

## 2020-02-27 PROCEDURE — 87075 CULTR BACTERIA EXCEPT BLOOD: CPT | Performed by: PODIATRIST

## 2020-02-27 PROCEDURE — 87077 CULTURE AEROBIC IDENTIFY: CPT | Performed by: PODIATRIST

## 2020-02-27 PROCEDURE — 37000009 ZZH ANESTHESIA TECHNICAL FEE, EACH ADDTL 15 MIN: Performed by: PODIATRIST

## 2020-02-27 PROCEDURE — 25000125 ZZHC RX 250: Performed by: NURSE ANESTHETIST, CERTIFIED REGISTERED

## 2020-02-27 PROCEDURE — 87186 SC STD MICRODIL/AGAR DIL: CPT | Performed by: PODIATRIST

## 2020-02-27 PROCEDURE — 88311 DECALCIFY TISSUE: CPT | Mod: 26 | Performed by: PODIATRIST

## 2020-02-27 PROCEDURE — 25000132 ZZH RX MED GY IP 250 OP 250 PS 637: Mod: GY | Performed by: NURSE ANESTHETIST, CERTIFIED REGISTERED

## 2020-02-27 PROCEDURE — 88311 DECALCIFY TISSUE: CPT | Performed by: PODIATRIST

## 2020-02-27 PROCEDURE — 36000050 ZZH SURGERY LEVEL 2 1ST 30 MIN: Performed by: PODIATRIST

## 2020-02-27 PROCEDURE — 25000132 ZZH RX MED GY IP 250 OP 250 PS 637: Mod: GY | Performed by: PODIATRIST

## 2020-02-27 PROCEDURE — 27210794 ZZH OR GENERAL SUPPLY STERILE: Performed by: PODIATRIST

## 2020-02-27 PROCEDURE — 25800030 ZZH RX IP 258 OP 636: Performed by: NURSE ANESTHETIST, CERTIFIED REGISTERED

## 2020-02-27 PROCEDURE — 71000027 ZZH RECOVERY PHASE 2 EACH 15 MINS: Performed by: PODIATRIST

## 2020-02-27 PROCEDURE — 40000305 ZZH STATISTIC PRE PROC ASSESS I: Performed by: PODIATRIST

## 2020-02-27 PROCEDURE — 88305 TISSUE EXAM BY PATHOLOGIST: CPT | Mod: 26 | Performed by: PODIATRIST

## 2020-02-27 PROCEDURE — 36000052 ZZH SURGERY LEVEL 2 EA 15 ADDTL MIN: Performed by: PODIATRIST

## 2020-02-27 PROCEDURE — 87205 SMEAR GRAM STAIN: CPT | Performed by: PODIATRIST

## 2020-02-27 PROCEDURE — 37000008 ZZH ANESTHESIA TECHNICAL FEE, 1ST 30 MIN: Performed by: PODIATRIST

## 2020-02-27 PROCEDURE — 87070 CULTURE OTHR SPECIMN AEROBIC: CPT | Performed by: PODIATRIST

## 2020-02-27 PROCEDURE — 25000128 H RX IP 250 OP 636: Performed by: NURSE ANESTHETIST, CERTIFIED REGISTERED

## 2020-02-27 PROCEDURE — 25000128 H RX IP 250 OP 636: Performed by: PODIATRIST

## 2020-02-27 RX ORDER — CEFAZOLIN SODIUM 1 G/50ML
1 INJECTION, SOLUTION INTRAVENOUS SEE ADMIN INSTRUCTIONS
Status: DISCONTINUED | OUTPATIENT
Start: 2020-02-27 | End: 2020-02-27 | Stop reason: HOSPADM

## 2020-02-27 RX ORDER — HYDROXYZINE HYDROCHLORIDE 25 MG/1
25 TABLET, FILM COATED ORAL EVERY 6 HOURS PRN
Status: DISCONTINUED | OUTPATIENT
Start: 2020-02-27 | End: 2020-02-27 | Stop reason: HOSPADM

## 2020-02-27 RX ORDER — FENTANYL CITRATE 50 UG/ML
25-50 INJECTION, SOLUTION INTRAMUSCULAR; INTRAVENOUS
Status: DISCONTINUED | OUTPATIENT
Start: 2020-02-27 | End: 2020-02-27 | Stop reason: HOSPADM

## 2020-02-27 RX ORDER — OXYCODONE AND ACETAMINOPHEN 5; 325 MG/1; MG/1
1 TABLET ORAL
Status: COMPLETED | OUTPATIENT
Start: 2020-02-27 | End: 2020-02-27

## 2020-02-27 RX ORDER — ACETAMINOPHEN 325 MG/1
975 TABLET ORAL ONCE
Status: COMPLETED | OUTPATIENT
Start: 2020-02-27 | End: 2020-02-27

## 2020-02-27 RX ORDER — OXYCODONE AND ACETAMINOPHEN 5; 325 MG/1; MG/1
1-2 TABLET ORAL EVERY 4 HOURS PRN
Qty: 16 TABLET | Refills: 0 | Status: SHIPPED | OUTPATIENT
Start: 2020-02-27 | End: 2021-01-01

## 2020-02-27 RX ORDER — PROPOFOL 10 MG/ML
INJECTION, EMULSION INTRAVENOUS CONTINUOUS PRN
Status: DISCONTINUED | OUTPATIENT
Start: 2020-02-27 | End: 2020-02-27

## 2020-02-27 RX ORDER — LIDOCAINE 40 MG/G
CREAM TOPICAL
Status: DISCONTINUED | OUTPATIENT
Start: 2020-02-27 | End: 2020-02-27 | Stop reason: HOSPADM

## 2020-02-27 RX ORDER — NALOXONE HYDROCHLORIDE 0.4 MG/ML
.1-.4 INJECTION, SOLUTION INTRAMUSCULAR; INTRAVENOUS; SUBCUTANEOUS
Status: DISCONTINUED | OUTPATIENT
Start: 2020-02-27 | End: 2020-02-27 | Stop reason: HOSPADM

## 2020-02-27 RX ORDER — ALBUTEROL SULFATE 0.83 MG/ML
2.5 SOLUTION RESPIRATORY (INHALATION) EVERY 4 HOURS PRN
Status: DISCONTINUED | OUTPATIENT
Start: 2020-02-27 | End: 2020-02-27 | Stop reason: HOSPADM

## 2020-02-27 RX ORDER — METOPROLOL TARTRATE 1 MG/ML
1-2 INJECTION, SOLUTION INTRAVENOUS EVERY 5 MIN PRN
Status: DISCONTINUED | OUTPATIENT
Start: 2020-02-27 | End: 2020-02-27 | Stop reason: HOSPADM

## 2020-02-27 RX ORDER — LIDOCAINE HYDROCHLORIDE 10 MG/ML
INJECTION, SOLUTION INFILTRATION; PERINEURAL PRN
Status: DISCONTINUED | OUTPATIENT
Start: 2020-02-27 | End: 2020-02-27

## 2020-02-27 RX ORDER — PROPOFOL 10 MG/ML
INJECTION, EMULSION INTRAVENOUS PRN
Status: DISCONTINUED | OUTPATIENT
Start: 2020-02-27 | End: 2020-02-27

## 2020-02-27 RX ORDER — DEXAMETHASONE SODIUM PHOSPHATE 4 MG/ML
4 INJECTION, SOLUTION INTRA-ARTICULAR; INTRALESIONAL; INTRAMUSCULAR; INTRAVENOUS; SOFT TISSUE EVERY 10 MIN PRN
Status: DISCONTINUED | OUTPATIENT
Start: 2020-02-27 | End: 2020-02-27 | Stop reason: HOSPADM

## 2020-02-27 RX ORDER — CELECOXIB 200 MG/1
200 CAPSULE ORAL ONCE
Status: COMPLETED | OUTPATIENT
Start: 2020-02-27 | End: 2020-02-27

## 2020-02-27 RX ORDER — SODIUM CHLORIDE, SODIUM LACTATE, POTASSIUM CHLORIDE, CALCIUM CHLORIDE 600; 310; 30; 20 MG/100ML; MG/100ML; MG/100ML; MG/100ML
INJECTION, SOLUTION INTRAVENOUS CONTINUOUS
Status: DISCONTINUED | OUTPATIENT
Start: 2020-02-27 | End: 2020-02-27 | Stop reason: HOSPADM

## 2020-02-27 RX ORDER — BUPIVACAINE HYDROCHLORIDE 5 MG/ML
INJECTION, SOLUTION PERINEURAL PRN
Status: DISCONTINUED | OUTPATIENT
Start: 2020-02-27 | End: 2020-02-27 | Stop reason: HOSPADM

## 2020-02-27 RX ORDER — HYDROXYZINE HYDROCHLORIDE 50 MG/1
50 TABLET, FILM COATED ORAL EVERY 6 HOURS PRN
Status: DISCONTINUED | OUTPATIENT
Start: 2020-02-27 | End: 2020-02-27 | Stop reason: HOSPADM

## 2020-02-27 RX ORDER — ONDANSETRON 2 MG/ML
4 INJECTION INTRAMUSCULAR; INTRAVENOUS EVERY 30 MIN PRN
Status: DISCONTINUED | OUTPATIENT
Start: 2020-02-27 | End: 2020-02-27 | Stop reason: HOSPADM

## 2020-02-27 RX ORDER — ONDANSETRON 4 MG/1
4 TABLET, ORALLY DISINTEGRATING ORAL EVERY 30 MIN PRN
Status: DISCONTINUED | OUTPATIENT
Start: 2020-02-27 | End: 2020-02-27 | Stop reason: HOSPADM

## 2020-02-27 RX ORDER — CEFAZOLIN SODIUM 2 G/100ML
2 INJECTION, SOLUTION INTRAVENOUS
Status: COMPLETED | OUTPATIENT
Start: 2020-02-27 | End: 2020-02-27

## 2020-02-27 RX ORDER — HYDROMORPHONE HYDROCHLORIDE 1 MG/ML
.3-.5 INJECTION, SOLUTION INTRAMUSCULAR; INTRAVENOUS; SUBCUTANEOUS EVERY 10 MIN PRN
Status: DISCONTINUED | OUTPATIENT
Start: 2020-02-27 | End: 2020-02-27 | Stop reason: HOSPADM

## 2020-02-27 RX ADMIN — OXYCODONE HYDROCHLORIDE AND ACETAMINOPHEN 1 TABLET: 5; 325 TABLET ORAL at 20:07

## 2020-02-27 RX ADMIN — ACETAMINOPHEN 975 MG: 325 TABLET, FILM COATED ORAL at 15:56

## 2020-02-27 RX ADMIN — LIDOCAINE HYDROCHLORIDE 0.1 ML: 10 INJECTION, SOLUTION EPIDURAL; INFILTRATION; INTRACAUDAL; PERINEURAL at 15:52

## 2020-02-27 RX ADMIN — PROPOFOL 100 MCG/KG/MIN: 10 INJECTION, EMULSION INTRAVENOUS at 17:48

## 2020-02-27 RX ADMIN — LIDOCAINE HYDROCHLORIDE 50 MG: 10 INJECTION, SOLUTION INFILTRATION; PERINEURAL at 17:45

## 2020-02-27 RX ADMIN — CELECOXIB 200 MG: 200 CAPSULE ORAL at 15:56

## 2020-02-27 RX ADMIN — SODIUM CHLORIDE, POTASSIUM CHLORIDE, SODIUM LACTATE AND CALCIUM CHLORIDE: 600; 310; 30; 20 INJECTION, SOLUTION INTRAVENOUS at 15:52

## 2020-02-27 RX ADMIN — PROPOFOL 50 MG: 10 INJECTION, EMULSION INTRAVENOUS at 17:45

## 2020-02-27 RX ADMIN — CEFAZOLIN SODIUM 2 G: 2 INJECTION, SOLUTION INTRAVENOUS at 17:42

## 2020-02-27 ASSESSMENT — MIFFLIN-ST. JEOR: SCORE: 1777.77

## 2020-02-27 NOTE — H&P
H&P by Ivonne Sapp DO reviewed from 2/25/20--the patient has been examined and no changes have occurred in the patient's condition since the H & P was completed.

## 2020-02-27 NOTE — BRIEF OP NOTE
[facility] Operative Note    Pre-operative diagnosis: Osteomyelitis (H) [M86.9]   Post-operative diagnosis * No post-op diagnosis entered *   Procedure: Procedure(s):  Left Foot 2nd Toe Amputation,foot/toe off- loading procedures   Surgeon: Hakeem Markham DPM   Anesthesia: Monitor Anesthesia Care    Estimated blood loss: * No values recorded between  and 2/27/2020  2:56 PM *   Blood transfusion: No transfusion was given during surgery   Drains: None   Specimens:  Deep wound cultures obtained and sent to the lab for Gram stain culture sensitivity aerobic and anaerobic organisms.   Findings:  Left foot full-thickness wound involving the second toe middle and distal phalanx margins with more healthy-appearing proximal phalanx.  Flexor tendon contractures second through fifth digits of the left side with confirmed evidence of peripheral neuropathy forefoot margins midfoot forward.   Complications: No direct complications encountered throughout procedures.   Condition: Stable  Transferred to post-anesthesia recovery   Comments: See dictated operative report for full details.           Hakeem Markham DPM, FACFAS  Foot & Ankle Surgeon/Specialist  [facility]

## 2020-02-27 NOTE — OP NOTE
[facility] ORTHOPEDICS OPERATIVE REPORT  Operative Report - Orthopedics  Silver Zamora,  1950, MRN [id number]    Surgery Date: 20    PCP: Valentín Monique, [phone]   Code status:  No Order       OPERATION SITE:  [facility] Operating Room       OPERATIVE REPORT  DR. HAKEEM SALAZAR  FOOT & ANKLE SURGEON  [facility] ORTHOPEDICS    DATE OF PROCEDURE: 20    SITE: [facility] Operating Room    SURGEON: Dr. Hakeem Salazar - [facility]    ASSISTANT: Yahaira Peralta-  Surgeon - [facility]      Pre-Operative Diagnosis:  1.  Left foot second toe full-thickness wound with osteomyelitis of phalangeal margins middle and distal phalanx  2.  Left foot second, third, fourth and fifth flexor tendon advantage contractures with distal pre-ulcerative lesions  3.  Peripheral neuropathy with xerotic skin and pressure side effects  4.  Parkinson's condition    Post-Operative Diagnosis:  1.  Left foot second toe full-thickness wound with osteomyelitis of phalangeal margins middle and distal phalanx  2.  Left foot second, third, fourth and fifth flexor tendon advantage contractures with distal pre-ulcerative lesions  3.  Peripheral neuropathy with xerotic skin and pressure side effects  4.  Parkinson's condition    Procedures Performed:  1.  Left foot second toe partial amputation and primary closure  2.  Left foot second, third, fourth and fifth flexor tendon release, percutaneous level of middle phalanx with tenotomy of the flexor tendon structure.    Anesthesia: Monitored Anesthesia Care with Local/Regional  Hemostasis: Ankle Tourniquet at 250mmHg  EBL: < 10 mL  Findings:  Left foot full-thickness wound involving the second toe middle and distal phalanx margins with more healthy-appearing proximal phalanx.  Flexor tendon contractures second through fifth digits of the left side with confirmed evidence of peripheral neuropathy forefoot margins midfoot  forward.  Implants: None  Specimens: None    Indications for the Operation:  The patient has been seen and evaluated in clinic for the above-mentioned diagnoses.  They have failed to respond to nonoperative care measures and/or surgical care for condition was indicated.  They have elected to proceed as recommended/indicated with surgical care after a thorough discussion of the associated pros, cons, risks and benefits of the operations as well as the postoperative course and details.  All associated questions were answered.  Verbal and written form informed consent was obtained.  Please see additional information within the clinical notes.    Description of the Procedure:  Patient was seen and evaluated in the preoperative holding area.  The surgical site was marked.  The consent was signed.  The H&P was updated/reviewed.  Patient was transported from the preoperative holding area to the surgical suite.  The patient was placed on the operative table.  Anesthesia was obtained.  Antibiotics were administered via IV.  Tourniquet was applied.  The operative extremity was prepped and draped sterilely.  Then, a timeout was performed to identify the proper patient, surgical site and the procedures to be performed.  Local anesthetic was infiltrated about the operative margins for regional blockade utilizing a one-to-one mixture of 2% lidocaine plain and 0.5% Marcaine plain approximately 30 cc of the mixture was utilized.  The foot/ankle was exsanguinated, and the tourniquet was inflated.    Attention was then directed to the left foot.  #15 blade utilized to make a fishmouth incision about the healthy margins of the left second contracted toe.  The full-thickness wound found to involve the middle and distal phalanges of the second toe.  The toe was resected back to healthy tissue sagittal saw utilized to remove the remaining healthy margins of the proximal phalanx.  The site was thoroughly lavaged after deep cultures were  obtained.  Primary closure completed here with 3-0 nylon suture.  Then flexor tendon releases conducted of the second, third, fourth and fifth digits percutaneously with an inferior approach and dorsiflexion release of the semirigid contractures to eliminate the flexor he flexor tendon force with distal tip pre-ulcerative lesions about the third fourth and fifth digits residual.    Following this, thorough irrigation of the surgical sites was conducted.  Closure completed with 3-0 nylon with careful apposition of the skin and surfaces for primary healing.  A compressive sterile splint/dressing was applied.  Vascular status was intact after deflation of the tourniquet.  COMPLICATIONS: No direct complications encountered throughout the case.    The patient tolerated the procedure & anesthesia well.  They were transported from the operative suite to the postoperative holding area.  The patient was given postoperative orders as well as specific postoperative instructions which were reviewed by the nursing staff.  Orders were placed for weightbearing status/activity, postoperative oral pain management, DVT prophylaxis measures both with mechanical and medicinal measures reviewed.  Splint/dressing care measures were reviewed as well as appropriate cryotherapy measures and nutrition.  Postoperative follow-up to be conducted in the next 10-14 days for outpatient clinical follow-up in the Orthopedic clinic at [facility].  If concerns or questions arise or develop they will contact our clinic and postoperative contact numbers provided.  Case details and post-operative care requirements reviewed with family/support present today.  Additionally, a detailed postoperative instruction sheet was provided to the patient and family.  All additional questions were answered postoperatively.    Post Operative Plan:  1. Activity: WBAT in protective surgical shoe  2. Assistive Devices: rolling walker recommended  3. Pain  Management: PO pain management prescribed as reviewed in pre-op with patient.  4. Dressing Care: patient and family present instructed on dressing change in 7-10 days vs. Leaving splintage dressing in place until post op appointment.  Clinical contact direct information provided.  5. DVT prevention: knee exercises and ankle pump exercises reviewed.  Ankle and foot ROM exercises reviewed.   6. Infection prevention: pre-op IV antibiotics completed.  PO Rx as indicated.  7. Disposition: to home self care with assistance and assistive devices.  8. Clinical Follow-up: as arranged from clinic in 10-14 days post op.    Please note that this report was completed with the assistance of voice recognition and transcription services.  Although every effort has been made to correct and avoid errors, errors may remain.    Dr. Hakeem Markham, DPM, [facility]AS  Foot & Ankle Surgeon/Specialist  [facility]          CC: [facility], Dr. Markham's Clinical Team

## 2020-02-28 NOTE — DISCHARGE INSTRUCTIONS
Same Day Surgery Discharge Instructions  Special Precautions After Surgery - Adult    1. It is not unusual to feel lightheaded or faint, up to 24 hours after surgery or while taking pain medication.  If you have these symptoms; sit for a few minutes before standing and have someone assist you when getting up.  2. You should rest and relax for the next 24 hours and must have someone stay with you for at least 24 hours after your discharge.  3. DO NOT DRIVE any vehicle or operate mechanical equipment for 24 hours following the end of your surgery.  DO NOT DRIVE while taking narcotic pain medications that have been prescribed by your physician.  If you had a limb operated on, you must be able to use it fully to drive.  4. DO NOT drink alcoholic beverages for 24 hours following surgery or while taking prescription pain medication.  5. Drink clear liquids (apple juice, ginger ale, broth, 7-Up, etc.).  Progress to your regular diet as you feel able.  6. Any questions call your physician and do not make important decisions for 24 hours.    ACTIVITY  ? Per Dr. Markham's instructions     INCISIONAL CARE  ? May reinforce dressing if it becomes saturated.  May change dressing if needed.  ? Keep dressing dry!  Please change dressing if it becomes wet/moist from bathing/showering.  ? May  shower after 24 hours.  ? Keep extremity elevated above the level of the heart if possible.    ? Apply ice 1/2 hour on and 1/2 hour off for first 48 hours.  ? Be alert for signs of infection:  redness, swelling, heat, drainage of pus, and/or elevated temperature.  Contact your doctor if these occur.        Call for an appointment to return to the clinic to see Dr. Markham in 10-14 days    Medications:  ? Acetaminophen & Oxycodone (Percocet):  Next dose: ___________  ? Ibuprofen (Motrin, Advil):  Next dose: ____________  ? Amoxicillin Clav (Augmentin) as directed.  ? Stool softeners to avoid constipation.  ? Follow the  instructions on the bottle.       __________________________________________________________________________________________________________________________________  IMPORTANT NUMBERS:    [facility] Main Number:  [phone], [phone]  Pharmacy:  [phone]  Same Day Surgery:  [phone], Monday - Friday until 8:30 p.m.  Urgent Care:  [phone]  Emergency Room:  [phone]      [facility]:  [phone]                                                                             [facility]:  [phone] option [address] [place] [address] Orthopedics:  [phone]     [facility]:  [phone]   Surgery Specialty Clinic:  [phone]   Home Medical Equipment: [phone]  [facility]:  [phone]

## 2020-02-28 NOTE — ANESTHESIA POSTPROCEDURE EVALUATION
Patient: Silver Zamora    Procedure(s):  Left Foot 2nd Toe Amputation,foot/toe off- loading procedures    Diagnosis:Osteomyelitis (H) [M86.9]  Diagnosis Additional Information: No value filed.    Anesthesia Type:  MAC, General    Note:  Anesthesia Post Evaluation    Patient location during evaluation: Bedside  Patient participation: Able to fully participate in evaluation  Level of consciousness: awake and alert  Pain management: adequate  multimodal analgesia used between 6 hours prior to anesthesia start to PACU dischargeAirway patency: patent  Cardiovascular status: acceptable  Respiratory status: acceptable  two or more mitigation strategies used for obstructive sleep apneaHydration status: acceptable  PONV: none     Anesthetic complications: None          Last vitals:  Vitals:    02/27/20 1500 02/27/20 1824   BP: (!) 146/71 (!) 141/65   Pulse: 58 50   Resp: 20 12   Temp: 36.7  C (98  F) 36.3  C (97.4  F)   SpO2: 99% 99%         Electronically Signed By: KASHIF Dyson CRNA  February 27, 2020  6:52 PM

## 2020-03-01 LAB
BACTERIA SPEC CULT: ABNORMAL
Lab: ABNORMAL
SPECIMEN SOURCE: ABNORMAL

## 2020-03-02 ENCOUNTER — COMMUNICATION - HEALTHEAST (OUTPATIENT)
Dept: NEUROLOGY | Facility: CLINIC | Age: 70
End: 2020-03-02

## 2020-03-02 DIAGNOSIS — G20.A1 PARKINSON DISEASE (H): ICD-10-CM

## 2020-03-02 DIAGNOSIS — R44.3 HALLUCINATION: ICD-10-CM

## 2020-03-04 LAB — COPATH REPORT: NORMAL

## 2020-03-05 LAB
BACTERIA SPEC CULT: NORMAL
Lab: NORMAL
SPECIMEN SOURCE: NORMAL

## 2020-03-09 ENCOUNTER — AMBULATORY - HEALTHEAST (OUTPATIENT)
Dept: NEUROLOGY | Facility: CLINIC | Age: 70
End: 2020-03-09

## 2020-03-09 ENCOUNTER — COMMUNICATION - HEALTHEAST (OUTPATIENT)
Dept: NEUROLOGY | Facility: CLINIC | Age: 70
End: 2020-03-09

## 2020-03-09 DIAGNOSIS — F43.21 ADJUSTMENT DISORDER WITH DEPRESSED MOOD: ICD-10-CM

## 2020-03-09 DIAGNOSIS — G47.00 INSOMNIA, UNSPECIFIED TYPE: ICD-10-CM

## 2020-03-10 ENCOUNTER — COMMUNICATION - HEALTHEAST (OUTPATIENT)
Dept: FAMILY MEDICINE | Facility: CLINIC | Age: 70
End: 2020-03-10

## 2020-03-12 ENCOUNTER — COMMUNICATION - HEALTHEAST (OUTPATIENT)
Dept: NEUROLOGY | Facility: CLINIC | Age: 70
End: 2020-03-12

## 2020-03-12 DIAGNOSIS — G20.A1 PARKINSON DISEASE (H): ICD-10-CM

## 2020-03-12 DIAGNOSIS — R44.3 HALLUCINATION: ICD-10-CM

## 2020-03-12 DIAGNOSIS — G25.81 RLS (RESTLESS LEGS SYNDROME): ICD-10-CM

## 2020-03-12 DIAGNOSIS — G47.52 RBD (REM BEHAVIORAL DISORDER): ICD-10-CM

## 2020-03-13 ENCOUNTER — COMMUNICATION - HEALTHEAST (OUTPATIENT)
Dept: FAMILY MEDICINE | Facility: CLINIC | Age: 70
End: 2020-03-13

## 2020-03-16 ENCOUNTER — RECORDS - HEALTHEAST (OUTPATIENT)
Dept: ADMINISTRATIVE | Facility: OTHER | Age: 70
End: 2020-03-16

## 2020-03-17 ENCOUNTER — COMMUNICATION - HEALTHEAST (OUTPATIENT)
Dept: FAMILY MEDICINE | Facility: CLINIC | Age: 70
End: 2020-03-17

## 2020-03-17 DIAGNOSIS — R21 RASH: ICD-10-CM

## 2020-03-25 ENCOUNTER — RECORDS - HEALTHEAST (OUTPATIENT)
Dept: ADMINISTRATIVE | Facility: OTHER | Age: 70
End: 2020-03-25

## 2020-03-30 ENCOUNTER — COMMUNICATION - HEALTHEAST (OUTPATIENT)
Dept: NEUROLOGY | Facility: CLINIC | Age: 70
End: 2020-03-30

## 2020-03-30 DIAGNOSIS — G20.A1 PARKINSON DISEASE (H): ICD-10-CM

## 2020-04-06 ENCOUNTER — COMMUNICATION - HEALTHEAST (OUTPATIENT)
Dept: NEUROLOGY | Facility: CLINIC | Age: 70
End: 2020-04-06

## 2020-04-06 DIAGNOSIS — G20.A1 PARKINSON DISEASE (H): ICD-10-CM

## 2020-04-09 ENCOUNTER — HOSPITAL ENCOUNTER (OUTPATIENT)
Dept: NEUROLOGY | Facility: CLINIC | Age: 70
Setting detail: THERAPIES SERIES
Discharge: STILL ADMITTED OR ADMINISTRATIVE | End: 2020-04-09
Attending: NURSE PRACTITIONER

## 2020-04-09 DIAGNOSIS — R53.1 WEAKNESS: ICD-10-CM

## 2020-04-09 DIAGNOSIS — F41.1 ANXIETY STATE: ICD-10-CM

## 2020-04-09 DIAGNOSIS — G31.84 MCI (MILD COGNITIVE IMPAIRMENT): ICD-10-CM

## 2020-04-09 DIAGNOSIS — Z91.81 AT HIGH RISK FOR FALLS: ICD-10-CM

## 2020-04-09 DIAGNOSIS — G47.00 INSOMNIA, UNSPECIFIED TYPE: ICD-10-CM

## 2020-04-09 DIAGNOSIS — Z89.421 ACQUIRED ABSENCE OF OTHER RIGHT TOE(S) (H): ICD-10-CM

## 2020-04-09 DIAGNOSIS — R25.1 TREMOR: ICD-10-CM

## 2020-04-09 DIAGNOSIS — G20.A1 PARKINSON DISEASE (H): ICD-10-CM

## 2020-04-09 DIAGNOSIS — G89.29 CHRONIC LOW BACK PAIN WITH SCIATICA, SCIATICA LATERALITY UNSPECIFIED, UNSPECIFIED BACK PAIN LATERALITY: ICD-10-CM

## 2020-04-09 DIAGNOSIS — M54.40 CHRONIC LOW BACK PAIN WITH SCIATICA, SCIATICA LATERALITY UNSPECIFIED, UNSPECIFIED BACK PAIN LATERALITY: ICD-10-CM

## 2020-04-09 DIAGNOSIS — G47.52 RBD (REM BEHAVIORAL DISORDER): ICD-10-CM

## 2020-04-09 DIAGNOSIS — F32.89 OTHER DEPRESSION: ICD-10-CM

## 2020-04-09 DIAGNOSIS — R44.3 HALLUCINATIONS: ICD-10-CM

## 2020-04-13 ENCOUNTER — RECORDS - HEALTHEAST (OUTPATIENT)
Dept: ADMINISTRATIVE | Facility: OTHER | Age: 70
End: 2020-04-13

## 2020-04-14 ENCOUNTER — COMMUNICATION - HEALTHEAST (OUTPATIENT)
Dept: FAMILY MEDICINE | Facility: CLINIC | Age: 70
End: 2020-04-14

## 2020-04-17 ENCOUNTER — RECORDS - HEALTHEAST (OUTPATIENT)
Dept: ADMINISTRATIVE | Facility: OTHER | Age: 70
End: 2020-04-17

## 2020-05-07 ENCOUNTER — HOSPITAL ENCOUNTER (OUTPATIENT)
Dept: NEUROLOGY | Facility: CLINIC | Age: 70
Setting detail: THERAPIES SERIES
Discharge: STILL ADMITTED OR ADMINISTRATIVE | End: 2020-05-07
Attending: NURSE PRACTITIONER

## 2020-05-07 DIAGNOSIS — R53.81 PHYSICAL DECONDITIONING: ICD-10-CM

## 2020-05-07 DIAGNOSIS — F06.4 ANXIETY DISORDER DUE TO MEDICAL CONDITION: ICD-10-CM

## 2020-05-07 DIAGNOSIS — R25.1 TREMOR: ICD-10-CM

## 2020-05-07 DIAGNOSIS — G20.A1 PARKINSON DISEASE (H): ICD-10-CM

## 2020-05-07 DIAGNOSIS — G31.84 MILD COGNITIVE IMPAIRMENT: ICD-10-CM

## 2020-05-07 DIAGNOSIS — G89.29 OTHER CHRONIC PAIN: ICD-10-CM

## 2020-05-11 ENCOUNTER — COMMUNICATION - HEALTHEAST (OUTPATIENT)
Dept: CARDIOLOGY | Facility: CLINIC | Age: 70
End: 2020-05-11

## 2020-05-11 DIAGNOSIS — I25.10 CAD (CORONARY ARTERY DISEASE): ICD-10-CM

## 2020-05-13 ENCOUNTER — RECORDS - HEALTHEAST (OUTPATIENT)
Dept: ADMINISTRATIVE | Facility: OTHER | Age: 70
End: 2020-05-13

## 2020-05-28 ENCOUNTER — COMMUNICATION - HEALTHEAST (OUTPATIENT)
Dept: FAMILY MEDICINE | Facility: CLINIC | Age: 70
End: 2020-05-28

## 2020-06-11 ENCOUNTER — RECORDS - HEALTHEAST (OUTPATIENT)
Dept: ADMINISTRATIVE | Facility: OTHER | Age: 70
End: 2020-06-11

## 2020-06-12 ENCOUNTER — COMMUNICATION - HEALTHEAST (OUTPATIENT)
Dept: NEUROLOGY | Facility: CLINIC | Age: 70
End: 2020-06-12

## 2020-06-12 DIAGNOSIS — G25.81 RLS (RESTLESS LEGS SYNDROME): ICD-10-CM

## 2020-06-12 DIAGNOSIS — G47.52 RBD (REM BEHAVIORAL DISORDER): ICD-10-CM

## 2020-06-23 ENCOUNTER — COMMUNICATION - HEALTHEAST (OUTPATIENT)
Dept: NEUROLOGY | Facility: CLINIC | Age: 70
End: 2020-06-23

## 2020-06-23 DIAGNOSIS — F32.0 CURRENT MILD EPISODE OF MAJOR DEPRESSIVE DISORDER, UNSPECIFIED WHETHER RECURRENT (H): ICD-10-CM

## 2020-06-23 DIAGNOSIS — G20.A1 PARKINSON DISEASE (H): ICD-10-CM

## 2020-06-30 ENCOUNTER — HOSPITAL ENCOUNTER (OUTPATIENT)
Dept: NEUROLOGY | Facility: CLINIC | Age: 70
Setting detail: THERAPIES SERIES
Discharge: STILL ADMITTED OR ADMINISTRATIVE | End: 2020-06-30
Attending: NURSE PRACTITIONER

## 2020-06-30 DIAGNOSIS — F32.1 MODERATE MAJOR DEPRESSION (H): ICD-10-CM

## 2020-06-30 DIAGNOSIS — R25.1 TREMOR: ICD-10-CM

## 2020-06-30 DIAGNOSIS — Z91.81 AT HIGH RISK FOR FALLS: ICD-10-CM

## 2020-06-30 DIAGNOSIS — M54.40 CHRONIC LOW BACK PAIN WITH SCIATICA, SCIATICA LATERALITY UNSPECIFIED, UNSPECIFIED BACK PAIN LATERALITY: ICD-10-CM

## 2020-06-30 DIAGNOSIS — G89.29 CHRONIC LOW BACK PAIN WITH SCIATICA, SCIATICA LATERALITY UNSPECIFIED, UNSPECIFIED BACK PAIN LATERALITY: ICD-10-CM

## 2020-06-30 DIAGNOSIS — G20.A1 PARKINSON DISEASE (H): ICD-10-CM

## 2020-06-30 DIAGNOSIS — G31.84 MILD COGNITIVE IMPAIRMENT: ICD-10-CM

## 2020-06-30 DIAGNOSIS — F41.1 ANXIETY STATE: ICD-10-CM

## 2020-06-30 DIAGNOSIS — R53.81 PHYSICAL DECONDITIONING: ICD-10-CM

## 2020-06-30 DIAGNOSIS — R26.81 GAIT INSTABILITY: ICD-10-CM

## 2020-06-30 DIAGNOSIS — F06.4 ANXIETY DISORDER DUE TO MEDICAL CONDITION: ICD-10-CM

## 2020-06-30 DIAGNOSIS — L97.501 ULCER OF FOOT, LIMITED TO BREAKDOWN OF SKIN, UNSPECIFIED LATERALITY (H): ICD-10-CM

## 2020-06-30 DIAGNOSIS — G47.00 INSOMNIA, UNSPECIFIED TYPE: ICD-10-CM

## 2020-06-30 DIAGNOSIS — G89.29 OTHER CHRONIC PAIN: ICD-10-CM

## 2020-07-15 ENCOUNTER — HOSPITAL ENCOUNTER (OUTPATIENT)
Dept: NEUROLOGY | Facility: CLINIC | Age: 70
Setting detail: THERAPIES SERIES
Discharge: STILL ADMITTED OR ADMINISTRATIVE | End: 2020-07-15
Attending: NURSE PRACTITIONER

## 2020-07-15 DIAGNOSIS — G31.84 MCI (MILD COGNITIVE IMPAIRMENT): ICD-10-CM

## 2020-07-15 DIAGNOSIS — F06.4 ANXIETY DISORDER DUE TO MEDICAL CONDITION: ICD-10-CM

## 2020-07-15 DIAGNOSIS — F32.1 MODERATE MAJOR DEPRESSION (H): ICD-10-CM

## 2020-07-15 DIAGNOSIS — F32.89 OTHER DEPRESSION: ICD-10-CM

## 2020-07-15 DIAGNOSIS — G20.A1 PARKINSON DISEASE (H): ICD-10-CM

## 2020-07-15 DIAGNOSIS — F41.1 ANXIETY STATE: ICD-10-CM

## 2020-07-15 DIAGNOSIS — L97.504: ICD-10-CM

## 2020-07-15 DIAGNOSIS — M54.40 CHRONIC LOW BACK PAIN WITH SCIATICA, SCIATICA LATERALITY UNSPECIFIED, UNSPECIFIED BACK PAIN LATERALITY: ICD-10-CM

## 2020-07-15 DIAGNOSIS — R25.1 TREMOR: ICD-10-CM

## 2020-07-15 DIAGNOSIS — G89.4 CHRONIC PAIN SYNDROME: ICD-10-CM

## 2020-07-15 DIAGNOSIS — Z91.81 AT HIGH RISK FOR FALLS: ICD-10-CM

## 2020-07-15 DIAGNOSIS — R53.81 PHYSICAL DECONDITIONING: ICD-10-CM

## 2020-07-15 DIAGNOSIS — G89.29 CHRONIC LOW BACK PAIN WITH SCIATICA, SCIATICA LATERALITY UNSPECIFIED, UNSPECIFIED BACK PAIN LATERALITY: ICD-10-CM

## 2020-07-20 ENCOUNTER — RECORDS - HEALTHEAST (OUTPATIENT)
Dept: ADMINISTRATIVE | Facility: OTHER | Age: 70
End: 2020-07-20

## 2020-08-14 ENCOUNTER — COMMUNICATION - HEALTHEAST (OUTPATIENT)
Dept: SCHEDULING | Facility: CLINIC | Age: 70
End: 2020-08-14

## 2020-08-14 ENCOUNTER — HOSPITAL ENCOUNTER (OUTPATIENT)
Dept: NEUROLOGY | Facility: CLINIC | Age: 70
Setting detail: THERAPIES SERIES
Discharge: STILL ADMITTED OR ADMINISTRATIVE | End: 2020-08-14
Attending: NURSE PRACTITIONER

## 2020-08-14 DIAGNOSIS — F06.4 ANXIETY DISORDER DUE TO MEDICAL CONDITION: ICD-10-CM

## 2020-08-14 DIAGNOSIS — G20.A1 PARKINSON DISEASE (H): ICD-10-CM

## 2020-08-14 DIAGNOSIS — R25.1 TREMOR: ICD-10-CM

## 2020-08-14 DIAGNOSIS — L97.502 ULCER OF FOOT WITH FAT LAYER EXPOSED, UNSPECIFIED LATERALITY (H): ICD-10-CM

## 2020-08-14 DIAGNOSIS — Z91.81 RISK FOR FALLS: ICD-10-CM

## 2020-08-14 DIAGNOSIS — R44.3 HALLUCINATIONS: ICD-10-CM

## 2020-08-14 DIAGNOSIS — G47.00 INSOMNIA, UNSPECIFIED TYPE: ICD-10-CM

## 2020-08-14 DIAGNOSIS — F41.1 ANXIETY STATE: ICD-10-CM

## 2020-08-14 DIAGNOSIS — Z89.421 ACQUIRED ABSENCE OF OTHER RIGHT TOE(S) (H): ICD-10-CM

## 2020-08-14 DIAGNOSIS — R53.81 PHYSICAL DECONDITIONING: ICD-10-CM

## 2020-08-20 ENCOUNTER — RECORDS - HEALTHEAST (OUTPATIENT)
Dept: ADMINISTRATIVE | Facility: OTHER | Age: 70
End: 2020-08-20

## 2020-08-21 ENCOUNTER — OFFICE VISIT - HEALTHEAST (OUTPATIENT)
Dept: FAMILY MEDICINE | Facility: CLINIC | Age: 70
End: 2020-08-21

## 2020-08-21 DIAGNOSIS — L89.894: ICD-10-CM

## 2020-08-21 DIAGNOSIS — L97.513: ICD-10-CM

## 2020-08-21 DIAGNOSIS — G20.A1 PARKINSON DISEASE (H): ICD-10-CM

## 2020-08-27 ENCOUNTER — COMMUNICATION - HEALTHEAST (OUTPATIENT)
Dept: CARDIOLOGY | Facility: CLINIC | Age: 70
End: 2020-08-27

## 2020-08-27 DIAGNOSIS — I25.119 CORONARY ARTERY DISEASE INVOLVING NATIVE CORONARY ARTERY OF NATIVE HEART WITH ANGINA PECTORIS (H): ICD-10-CM

## 2020-08-27 DIAGNOSIS — E78.5 DYSLIPIDEMIA, GOAL LDL BELOW 70: ICD-10-CM

## 2020-09-01 ENCOUNTER — RECORDS - HEALTHEAST (OUTPATIENT)
Dept: ADMINISTRATIVE | Facility: OTHER | Age: 70
End: 2020-09-01

## 2020-09-01 ENCOUNTER — HOSPITAL ENCOUNTER (OUTPATIENT)
Dept: WOUND CARE | Facility: CLINIC | Age: 70
Discharge: HOME | End: 2020-09-01
Attending: FAMILY MEDICINE | Admitting: FAMILY MEDICINE
Payer: MEDICARE

## 2020-09-01 PROCEDURE — A6235 HYDROCOLLD DRG >16<=48 W/O B: HCPCS

## 2020-09-01 PROCEDURE — G0463 HOSPITAL OUTPT CLINIC VISIT: HCPCS

## 2020-09-01 PROCEDURE — A6211 FOAM DRG > 48 SQ IN W/O BRDR: HCPCS

## 2020-09-01 NOTE — DISCHARGE INSTRUCTIONS
R Foot Dressing Change  Wash your hands before and after the dressing change. You may wear gloves if you choose. Gloves are available over the counter at [facility], [facility], [facility], etc.   Use scissors at home that you can designate solely for wound care and cleanse well with each use, (rubbing alcohol or alcohol pads work well for this).    Wash hands.  Remove old dressing.  Wash hands.  1.) Cleanse wound with wound cleanser and gauze. Pat wound and surrounding skin dry with gauze.   2.) Cut a double layer of Aquacel Ag like a plug and insert into wound.    3.) Cut a piece of Mepilex Ag Foam to cover wound  4.) Secure with Kerlix (rolled gauze) and tape.  5.) Apply SpandaGrip Size E. Apply in the morning and take off at night.  Change dressing every 1-2 days depending on drainage.   Remove dressing for showers and then perform the above cares.  Hand wash SpandaGrip in mild soap/water and then hang to dry.     Wear orthopedic shoes daily even when in the home from when you get up until you go to bed.   Elevate leg and stay off of foot as much as you're able.    Follow up Thursday, September 10th at 1:30 pm.    Signs and symptoms of infection:  Bright green drainage  Foul odor  Increasing pain in area  New redness or swelling at the site of the wound or streaks up from wound  New warmth to touch around wound accompanied by redness and swelling  Fever    Need to be seen as soon as possible for infection concerns.    Please call with any questions or concerns.    Virgie Smith RN, CWOCN  Odessa Boyer RN, CWOCN, [facility]  [phone]

## 2020-09-01 NOTE — PROGRESS NOTES
Reason For Visit: Silver Zamora, 70 year old male, seen as outpatient to evaluate and treat right plantar foot wound. Referred by Dr. Jackson. Patient presents with spouse  today.      History: Wound has been present since around October of '19.  Sounds like may have started as a callus here.  He is followed by Dr. Markham in podiatry.  Reportedly Dr. Markham would like to do surgery to offload the area however pt does not want to do that due to concerns over COVID 19 pandemic.  They have been using Medihoney and a bandaide at home.  Pt has diabetic shoes and custom inserts that he was fitted for [facility] in the past 6 months but is wearing some older diabetic shoes and inserts he get on [place].  Reportedly walks with no shoes at home and shuffles his feet when walking due to Parkinson's.  He was seen recently by his neurologist who recommended he see a wound care provider because he feels this wound is healable. Pt has history of partial amputations of several other toes both feet due to wounds where he subsequently developed osteomyelitis.  Most recently was a left second toe amputation in February, this is healed.  Pt has neuropathy, not certain of cause though does have Parkinson's as well as a history of back issues including cervical stenosis and cord compression myelopathy.    Other co-morbidities including anxiety and depression, ischemic cardiomyopathy, heart failure.    Personal/social history: Lives at home with spouse who assists him.  Prior homecare, this was discontinued    Objective:   Physical appearance: appears stated age  Current treatment plan: medihoney and bandaide  Last changed: this am    Wound #1 right plantar 1st MTP, bunion    Pre and post debridement of callus    Stage/tissue depth: full thickness  1.4 cm L x 1.5 cm W x 0.5-0.7 (depth with callus prior to debridement was 1.1cm) cm D  Tunneling: no  Undermining: small lip of undermining along callus  Wound bed type/amount: clean and red;  non fluctuant  Wound Edges: soft callus  Periwound: about 3cm erythema up side of bunion, may be slightly warm though hard to tell  Drainage: appears to be moderate to heavy serosanguinous  Odor: no  Pain: states there is some pain on occasion from the area over the past few weeks    1st toe has shifted laterally due to extensive bunion and second toe is turned upward at distal joint, 3rd and 4th toes have been amputated at distal joint    Dorsalis Pedal Pulse: palpable: yes doppler: monophasic  Posterior Tibial Pulse: palpable: no doppler: biphasic  Hair growth:   Capillary Refill: <2 seconds  Feet/toes color: pink  Nails:     Mobility: with 3 wheeled walker, shuffling gait  Current offloading/footwear: wearing diabetic shoes with custom inserts.  They are leather and may be applying some pressure over bunion on exam. States he has newer diabetic shoes and inserts at home and uppers are softer/stretchable on these.    Sensation: has some neuropathy  HgbA1C: n/a     Diet: not assessed this visit  Smoking: not assessed this visit    Discussed: offloading options.  This is complicated at pt is prone to falling so anything heavy/rigid or causing differing shoe heights may cause him to fall and knee walker would not be possible.  Discussed topical plan of care with rationale      Assessment:  Right plantar foot neuropathic ulceration, full thickness, with bony prominence caused by extensive bunion in need of offloading and appropriate wound care    Pt's gait pattern and walking with no shoes at home contributing factor to non-healing    Concern may need work-up for osteomyelitis due to length of time wound has been open, presence of some erythema and history or prior wounds progressing to osteomyelitis quickly per pt      Plan:    Topical care: Will begin absorptive wound care including Aquacel Ag for filler and Mepilex foam for cover bandage, will need to secure with rolled gauze in a manner that will be retained with  his shuffling  Offloading: Needs to wear his newer orthotics all day when he is up.  Spouse states he gets up and walks a lot, even at night.  He does need assistance with shoes so cannot wear at night though even wearing during the day would be improvement.  If not doing better with this, next step would be to have him fit with a DH walker offloading shoe.  Unfortunately do have concerns that a total contact cast or CAM boot would likely cause him to fall.    Additional recommendations:  X-ray and CRP, ESR to screen for osteomyelitis - this was discussed with Dr. Markham who will order this      Wound Care: Wound cleansed with Vashe cleanser and gauze then bandaged as below    Discussed plan of care with patient spouse. Teaching done with spouse for dressing changes; spouse is able and willing to perform.    The following discharge instructions were reviewed with and sent home with the patient:  R Foot Dressing Change  Wash your hands before and after the dressing change. You may wear gloves if you choose. Gloves are available over the counter at [facility], [facility], [facility], etc.   Use scissors at home that you can designate solely for wound care and cleanse well with each use, (rubbing alcohol or alcohol pads work well for this).    Wash hands.  Remove old dressing.  Wash hands.  1.) Cleanse wound with wound cleanser and gauze. Pat wound and surrounding skin dry with gauze.   2.) Cut a double layer of Aquacel Ag like a plug and insert into wound.    3.) Cut a piece of Mepilex Ag Foam to cover wound  4.) Secure with Kerlix (rolled gauze) and tape.  5.) Apply SpandaGrip Size E. Apply in the morning and take off at night.  Change dressing every 1-2 days depending on drainage.   Remove dressing for showers and then perform the above cares.  Hand wash SpandaGrip in mild soap/water and then hang to dry.     Wear orthopedic shoes daily even when in the home from when you get up until you go to bed.   Elevate leg and stay  off of foot as much as you're able.    Follow up Thursday, September 10th at 1:30 pm.    Signs and symptoms of infection:  Bright green drainage  Foul odor  Increasing pain in area  New redness or swelling at the site of the wound or streaks up from wound  New warmth to touch around wound accompanied by redness and swelling  Fever    Need to be seen as soon as possible for infection concerns.    Please call with any questions or concerns.    The following supplies were sent home with the patient:   Will reassess care plan in 1 week and order patient supplies as needed    Return visit: 9/10/20    Verbal, written, & demonstrative education provided.  Face to face time (excluding procedure): approximately 60 minutes.    Odessa Boyer RN, CWOCN   [phone]

## 2020-09-04 ENCOUNTER — COMMUNICATION - HEALTHEAST (OUTPATIENT)
Dept: NEUROLOGY | Facility: CLINIC | Age: 70
End: 2020-09-04

## 2020-09-04 DIAGNOSIS — F32.1 MODERATE MAJOR DEPRESSION (H): ICD-10-CM

## 2020-09-04 DIAGNOSIS — F41.1 ANXIETY STATE: ICD-10-CM

## 2020-09-04 DIAGNOSIS — M54.40 CHRONIC LOW BACK PAIN WITH SCIATICA, SCIATICA LATERALITY UNSPECIFIED, UNSPECIFIED BACK PAIN LATERALITY: ICD-10-CM

## 2020-09-04 DIAGNOSIS — G89.29 CHRONIC LOW BACK PAIN WITH SCIATICA, SCIATICA LATERALITY UNSPECIFIED, UNSPECIFIED BACK PAIN LATERALITY: ICD-10-CM

## 2020-09-10 ENCOUNTER — HOSPITAL ENCOUNTER (OUTPATIENT)
Dept: WOUND CARE | Facility: CLINIC | Age: 70
Discharge: HOME | End: 2020-09-10
Attending: FAMILY MEDICINE | Admitting: FAMILY MEDICINE
Payer: MEDICARE

## 2020-09-10 ENCOUNTER — RECORDS - HEALTHEAST (OUTPATIENT)
Dept: ADMINISTRATIVE | Facility: OTHER | Age: 70
End: 2020-09-10

## 2020-09-10 DIAGNOSIS — L97.502: Primary | ICD-10-CM

## 2020-09-10 PROCEDURE — A6210 FOAM DRG >16<=48 SQ IN W/O B: HCPCS

## 2020-09-10 PROCEDURE — G0463 HOSPITAL OUTPT CLINIC VISIT: HCPCS

## 2020-09-10 NOTE — DISCHARGE INSTRUCTIONS
Wash your hands before and after the dressing change. You may wear gloves if you choose. Gloves are available over the counter at [employer], [employer], [facility], etc.   Use scissors at home that you can designate solely for wound care and cleanse well with each use, (rubbing alcohol or alcohol pads work well for this).     Wash hands.  Remove old dressing.  Wash hands.  1.) Cleanse wound with wound cleanser and gauze. Pat wound and surrounding skin dry with gauze.   2.) Cut a piece of IoPlex like a plug and insert into wound.    3.) Cut a piece of Mepilex Foam (the white or gray foam) to cover wound  4.) Secure with Kerlix (rolled gauze) and tape.  Change dressing every 1-2 days depending on drainage.   Remove dressing for showers and then perform the above cares.       Wear orthopedic shoes daily even when in the home from when you get up until you go to bed.   Elevate leg and stay off of foot as much as you're able.     Follow up Thursday, September 28th at 2 pm.     Signs and symptoms of infection:  Bright green drainage  Foul odor  Increasing pain in area  New redness or swelling at the site of the wound or streaks up from wound  New warmth to touch around wound accompanied by redness and swelling  Fever     Need to be seen as soon as possible for infection concerns.     Please call with any questions or concerns.

## 2020-09-11 ENCOUNTER — COMMUNICATION - HEALTHEAST (OUTPATIENT)
Dept: FAMILY MEDICINE | Facility: CLINIC | Age: 70
End: 2020-09-11

## 2020-09-11 NOTE — PROGRESS NOTES
Reason For Visit/Interval History: Silver Zamora, 70 year old male, seen as outpatient for follow-up for right plantar foot wound. Referred by Dr. Jackson/Dr. Washington. Patient presents with spouse  Today.    Writer did speak with Dr. Markham last Thursday via phone to recommend x-ray and inflammatory markers ESR and CRP to screen for osteomyelitis and was told by Dr. Markham he would order this, though has not been ordered and did learn today that Dr. Markham is no longer with [facility].  Pt was not aware of this.  He still hopes to avoid surgery if possible so does not intend to follow-up with podiatry unless necessary at this time.  In talking with pt did learn that he has not been wearing his orthotic shoe around the house during the day as recommended, just really doesn't like to and cannot give more specific reason for this.   Spouse can hear his feet sliding across floor as he shuffles when walking, in only stocking feet.  Spouse notes that the Aquacel Ag is not staying in wound.  Bandages are moving. She thinks wound is looking better.    History: Wound has been present since around October of '19.  Sounds like may have started as a callus here.  He is followed by Dr. Markham in podiatry.  Reportedly Dr. Markham would like to do surgery to offload the area however pt does not want to do that due to concerns over COVID 19 pandemic.  They have been using Medihoney and a bandaide at home.  Pt has diabetic shoes and custom inserts that he was [facility] for [facility] in the past 6 months but is wearing some older diabetic shoes and inserts he get on [place].  Reportedly walks with no shoes at home and shuffles his feet when walking due to Parkinson's.  He was seen recently by his neurologist who recommended he see a wound care provider because he feels this wound is healable. Pt has history of partial amputations of several other toes both feet due to wounds where he subsequently developed osteomyelitis.   Most recently was a left second toe amputation in February, this is healed.  Pt has neuropathy, not certain of cause though does have Parkinson's as well as a history of back issues including cervical stenosis and cord compression myelopathy.    Other co-morbidities including anxiety and depression, ischemic cardiomyopathy, heart failure.    Personal/social history: Lives at home with spouse who assists him.  Prior homecare, this was discontinued    Objective:   Physical appearance: appears stated age  Current treatment plan: Aquacel Ag, Mepilex Ag foam and rolled gauze changed daily  Last changed: this am she just applied a bandaide since coming here    Wound #1 right plantar 1st MTP, bunion    Pre and post debridement of callus at initial visit.    Stage/tissue depth: full thickness  1.4 cm L x 1.8 cm W x 1cm incluiding callus which is about 2mm thick   Tunneling: no  Undermining: no  Wound bed type/amount: clean and pink but non-granular, deeper in the center; soft but non-fluctuant  Wound Edges: 2mm rim of callus but immediate wound edges with epithelium that is free for migration  Periwound: resolved erythema (prior visit noted up side of bunion)  Drainage: appears to be moderate to heavy serosanguinous  Odor: no  Pain: states there is some pain on occasion from the area over the past few weeks    1st toe has shifted laterally due to extensive bunion and second toe is turned upward at distal joint, 3rd and 4th toes have been amputated at distal joint.  Some blanchable erythema noted on 2nd toe from pressure of 1st toe.     Dorsalis Pedal Pulse: palpable: yes doppler: monophasic  Posterior Tibial Pulse: palpable: no doppler: biphasic  Hair growth:   Capillary Refill: <2 seconds  Feet/toes color: pink  Nails:     Mobility: with 3 wheeled walker, shuffling gait  Current offloading/footwear: Today he is wearing his newer diabetic shoes and custom inserts, these are much softer/stretchable compared to leather ones  he wore last visit.    Sensation: has some neuropathy  HgbA1C: n/a     Diet: not assessed this visit  Smoking: not assessed this visit    Discussed: This visit revisited importance of wearing the orthotic shoes and custom inserts he has on now during the day - advised he if there is any chance of healing this he much take the pressure off and at this time this is the best option for him to do so. Discussed adjustment in topical plan of care with rationale.          Assessment:  Right plantar foot neuropathic ulceration, full thickness, with bony prominence caused by extensive bunion in need of offloading and appropriate wound care    Pt's gait pattern and walking with no shoes at home contributing factor to non-healing - continues to be non-compliant with wearing orthotic shoes    Concern may need work-up for osteomyelitis due to length of time wound has been open, presence of some erythema and history or prior wounds progressing to osteomyelitis quickly per pt    Needs adjustment in topical plan of care to improve bandage retention.    Plan:    Topical care: Will begin absorptive wound care including IoPlex foam in wound in place of Aquacel Ag for improved retention, (as does not gel like Aquacel,) for filler and antimicrobial and Mepilex foam for cover bandage but will try Medipore tape for retention with his shuffling - wearing his shoe will also help this.    Offloading: Offloading pt is complicated at he is prone to falling so anything heavy/rigid or causing differing shoe heights may cause him to fall and knee walker would not be possible.  Needs to wear his newer orthotics all day when he is up.  Spouse states he gets up and walks a lot, even at night.  He does need assistance with shoes so cannot wear at night though even wearing during the day would be improvement.  If not doing better with this, next step would be to have him fit with a DH walker offloading shoe though some concern height may be different.   Unfortunately do have concerns that a total contact cast or CAM boot would likely cause him to fall.    Additional recommendations:  X-ray and CRP, ESR to screen for osteomyelitis - this was discussed with Dr. Markham who will order this (not done, this provider no longer available, will discuss with referring provider).      Wound Care: Wound cleansed with Vashe cleanser and gauze then bandaged as below    Discussed plan of care with patient spouse. Teaching done with spouse for dressing changes; spouse is able and willing to perform.    The following discharge instructions were reviewed with and sent home with the patient:  R Foot Dressing Change  Wash your hands before and after the dressing change. You may wear gloves if you choose. Gloves are available over the counter at [employer], [email or web address], [employer], etc.   Use scissors at home that you can designate solely for wound care and cleanse well with each use, (rubbing alcohol or alcohol pads work well for this).     Wash hands.  Remove old dressing.  Wash hands.  1.) Cleanse wound with wound cleanser and gauze. Pat wound and surrounding skin dry with gauze.   2.) Cut a piece of IoPlex like a plug and insert into wound.    3.) Cut a piece of Mepilex Foam (the white or gray foam) to cover wound  4.) Secure with Kerlix (rolled gauze) and tape.  Change dressing every 1-2 days depending on drainage.   Remove dressing for showers and then perform the above cares.       Wear orthopedic shoes daily even when in the home from when you get up until you go to bed.   Elevate leg and stay off of foot as much as you're able.     Follow up Thursday, September 28th at 2 pm.     Signs and symptoms of infection:  Bright green drainage  Foul odor  Increasing pain in area  New redness or swelling at the site of the wound or streaks up from wound  New warmth to touch around wound accompanied by redness and swelling  Fever     Need to be seen as soon as possible for infection  concerns.     Please call with any questions or concerns.    The following supplies were sent home with the patient:   IoPlex foam and Medipore tape, he has some supplies at home and will order more from [facility]    Return visit: 9/28/20    Verbal, written, & demonstrative education provided.  Face to face time (excluding procedure): approximately 45 minutes.    Odessa Boyer RN, CWOCN   [phone]

## 2020-09-14 ENCOUNTER — RECORDS - HEALTHEAST (OUTPATIENT)
Dept: ADMINISTRATIVE | Facility: OTHER | Age: 70
End: 2020-09-14

## 2020-09-21 ENCOUNTER — COMMUNICATION - HEALTHEAST (OUTPATIENT)
Dept: SCHEDULING | Facility: CLINIC | Age: 70
End: 2020-09-21

## 2020-09-28 ENCOUNTER — HOSPITAL ENCOUNTER (OUTPATIENT)
Dept: WOUND CARE | Facility: CLINIC | Age: 70
Discharge: HOME | End: 2020-09-28
Attending: FAMILY MEDICINE | Admitting: FAMILY MEDICINE
Payer: MEDICARE

## 2020-09-28 PROCEDURE — A6210 FOAM DRG >16<=48 SQ IN W/O B: HCPCS

## 2020-09-28 PROCEDURE — G0463 HOSPITAL OUTPT CLINIC VISIT: HCPCS

## 2020-09-28 NOTE — PROGRESS NOTES
Reason For Visit/Interval History: Silver Zamora, 70 year old male, seen as outpatient for follow-up for right plantar foot wound. Referred by Dr. Jackson/Dr. Washington. Patient presents with spouse  Today.    Writer did have some communication with primary care provider Dr. Washington regarding possibility of doing x-ray and inflammatory markers ESR and CRP to screen for osteomyelitis and at this time he would like to defer that decision to [facility].  Pt still hopes to avoid surgery if possible so did not intend to follow-up with podiatry unless necessary.  Spouse states he has been wearing his orthotic shoes a bit more around the house during the day as recommended.   Spouse can hear his feet sliding across floor as he shuffles when walking, in only stocking feet.  Spouse notes that the Aquacel Ag is not staying in wound.  Bandages staying in place better with current routine.    History: Wound has been present since around October of '19.  Sounds like may have started as a callus here.  He is followed by Dr. Markham in podiatry.  Reportedly Dr. Markham would like to do surgery to offload the area however pt does not want to do that due to concerns over COVID 19 pandemic.  They have been using Medihoney and a bandaide at home.  Pt has diabetic shoes and custom inserts that he was fitted for at  [facility] in the past 6 months but is wearing some older diabetic shoes and inserts he get on [place].  Reportedly walks with no shoes at home and shuffles his feet when walking due to Parkinson's.  He was seen recently by his neurologist who recommended he see a wound care provider because he feels this wound is healable. Pt has history of partial amputations of several other toes both feet due to wounds where he subsequently developed osteomyelitis.  Most recently was a left second toe amputation in February, this is healed.  Pt has neuropathy, not certain of cause though does have Parkinson's as well as a  history of back issues including cervical stenosis and cord compression myelopathy.    Other co-morbidities including anxiety and depression, ischemic cardiomyopathy, heart failure.    Personal/social history: Lives at home with spouse who assists him.  Prior homecare, this was discontinued    Objective:   Physical appearance: appears stated age  Current treatment plan: IoPlex, Mepilex foam secured with Medipore tape changed daily  Last changed: this am     Wound #1 right plantar 1st MTP, bunion        Pre and post debridement of callus at initial visit.    Stage/tissue depth: full thickness  1.4 cm L x 1.3 cm W x 0.1cm to callus edge, 0.3 including callus.   Tunneling: no  Undermining: no  Wound bed type/amount: clean and pink/red though not necessarily granular; soft but non-fluctuant  Wound Edges: 2mm rim of callus, this was debrided using #15 blade conservatively, no bleeding.  Noted wound edges are slightly rolled.  Periwound: mild blanchable erythema up side of bunion  Drainage: appears to be moderate to heavy serosanguinous - IoPlex bandage with minimal white since placed this morning.  Odor: no  Pain: states there is some pain on occasion from the area over the past few weeks    1st toe has shifted laterally due to extensive bunion and second toe is turned upward at distal joint, 3rd and 4th toes have been amputated at distal joint.  Some blanchable erythema noted on 2nd toe from pressure of 1st toe.     Dorsalis Pedal Pulse: palpable: yes doppler: monophasic  Posterior Tibial Pulse: palpable: no doppler: biphasic  Hair growth:   Capillary Refill: <2 seconds  Feet/toes color: pink  Nails:     Mobility: with 3 wheeled walker, shuffling gait  Current offloading/footwear: diabetic shoes and custom inserts, he is not diabetic so did pay out of pocket for these.    Sensation: has some neuropathy  HgbA1C: n/a     Diet: not assessed this visit  Smoking: not assessed this visit    Discussed: Some improvement noted  though described rolling edges and how this can impede closure.  Do recommend he follow up with podiatry, may need wound edge debridement and can discuss obtaining x-ray and labs for inflammatory markers.          Assessment:  Right plantar foot neuropathic ulceration, full thickness, with bony prominence caused by extensive bunion- some improvement noted    Pt's gait pattern and walking with no shoes at home contributing factor to non-healing - some improvement in complience with wearing orthotic shoes    Concern may need work-up for osteomyelitis due to length of time wound has been open, presence of some erythema and history or prior wounds progressing to osteomyelitis quickly per pt, awaiting work up for this, needs to follow-up with podiatry (needs new podiatrist since his is now gone,) to discuss    Bandaging regimen is appropriate for wound    Plan:    Topical care: Continue IoPlex foam in wound for filler and antimicrobial and Mepilex foam for cover bandage and Medipore tape for retention with his shuffling.    Offloading: Offloading pt is complicated at he is prone to falling so anything heavy/rigid or causing differing shoe heights may cause him to fall and knee walker would not be possible.  Needs to wear his newer orthotics all day when he is up.  Spouse states he gets up and walks a lot, even at night.  He does need assistance with shoes so cannot wear at night though even wearing during the day would be improvement.  If not doing better with this, next step would be to have him fit with a DH walker offloading shoe though some concern height may be different.  Unfortunately do have concerns that a total contact cast or CAM boot would likely cause him to fall.    Additional recommendations:  X-ray and CRP, ESR to screen for osteomyelitis - spouse states will reschedule with podiatry at  Ortho      Wound Care: Wound cleansed with Vashe cleanser and gauze then bandaged as below    Discussed plan of care  with patient spouse. Teaching done with spouse for dressing changes; spouse is able and willing to perform.    The following discharge instructions were reviewed with and sent home with the patient (prior visit, no change):  R Foot Dressing Change  Wash your hands before and after the dressing change. You may wear gloves if you choose. Gloves are available over the counter at [facility], [facility], [facility], etc.   Use scissors at home that you can designate solely for wound care and cleanse well with each use, (rubbing alcohol or alcohol pads work well for this).     Wash hands.  Remove old dressing.  Wash hands.  1.) Cleanse wound with wound cleanser and gauze. Pat wound and surrounding skin dry with gauze.   2.) Cut a piece of IoPlex like a plug and insert into wound.    3.) Cut a piece of Mepilex Foam (the white or gray foam) to cover wound  4.) Secure with Kerlix (rolled gauze) and tape.  Change dressing every 1-2 days depending on drainage.   Remove dressing for showers and then perform the above cares.       Wear orthopedic shoes daily even when in the home from when you get up until you go to bed.   Elevate leg and stay off of foot as much as you're able.     Follow up Thursday, September 28th at 2 pm.     Signs and symptoms of infection:  Bright green drainage  Foul odor  Increasing pain in area  New redness or swelling at the site of the wound or streaks up from wound  New warmth to touch around wound accompanied by redness and swelling  Fever     Need to be seen as soon as possible for infection concerns.     Please call with any questions or concerns.    The following supplies were sent home with the patient:   IoPlex foam remainder    Return visit: 10/19/20     Verbal, written, & demonstrative education provided.  Face to face time (excluding procedure): approximately 30 minutes.    Odessa Boyer RN, CWOCN   [phone]

## 2020-09-29 ENCOUNTER — COMMUNICATION - HEALTHEAST (OUTPATIENT)
Dept: FAMILY MEDICINE | Facility: CLINIC | Age: 70
End: 2020-09-29

## 2020-09-30 ENCOUNTER — COMMUNICATION - HEALTHEAST (OUTPATIENT)
Dept: NEUROLOGY | Facility: CLINIC | Age: 70
End: 2020-09-30

## 2020-09-30 DIAGNOSIS — R44.3 HALLUCINATION: ICD-10-CM

## 2020-09-30 DIAGNOSIS — G20.A1 PARKINSON DISEASE (H): ICD-10-CM

## 2020-10-06 ENCOUNTER — HOSPITAL ENCOUNTER (OUTPATIENT)
Dept: NEUROLOGY | Facility: CLINIC | Age: 70
Setting detail: THERAPIES SERIES
Discharge: STILL ADMITTED OR ADMINISTRATIVE | End: 2020-10-06
Attending: NURSE PRACTITIONER

## 2020-10-06 ENCOUNTER — COMMUNICATION - HEALTHEAST (OUTPATIENT)
Dept: NEUROLOGY | Facility: CLINIC | Age: 70
End: 2020-10-06

## 2020-10-06 DIAGNOSIS — L97.513: ICD-10-CM

## 2020-10-06 DIAGNOSIS — G47.00 INSOMNIA, UNSPECIFIED TYPE: ICD-10-CM

## 2020-10-06 DIAGNOSIS — R53.1 WEAKNESS: ICD-10-CM

## 2020-10-06 DIAGNOSIS — G89.29 OTHER CHRONIC PAIN: ICD-10-CM

## 2020-10-06 DIAGNOSIS — F32.1 MODERATE MAJOR DEPRESSION (H): ICD-10-CM

## 2020-10-06 DIAGNOSIS — G47.52 RBD (REM BEHAVIORAL DISORDER): ICD-10-CM

## 2020-10-06 DIAGNOSIS — R53.81 PHYSICAL DECONDITIONING: ICD-10-CM

## 2020-10-06 DIAGNOSIS — G20.A1 PARKINSON DISEASE (H): ICD-10-CM

## 2020-10-06 DIAGNOSIS — F32.89 OTHER DEPRESSION: ICD-10-CM

## 2020-10-06 DIAGNOSIS — F41.1 ANXIETY STATE: ICD-10-CM

## 2020-10-06 DIAGNOSIS — Z91.81 AT HIGH RISK FOR FALLS: ICD-10-CM

## 2020-10-06 DIAGNOSIS — N39.498 OTHER URINARY INCONTINENCE: ICD-10-CM

## 2020-10-06 DIAGNOSIS — R25.1 TREMOR: ICD-10-CM

## 2020-10-06 DIAGNOSIS — F06.4 ANXIETY DISORDER DUE TO MEDICAL CONDITION: ICD-10-CM

## 2020-10-06 DIAGNOSIS — Z79.899 MEDICATION MANAGEMENT: ICD-10-CM

## 2020-10-09 ENCOUNTER — COMMUNICATION - HEALTHEAST (OUTPATIENT)
Dept: NEUROLOGY | Facility: CLINIC | Age: 70
End: 2020-10-09

## 2020-10-09 DIAGNOSIS — N39.498 OTHER URINARY INCONTINENCE: ICD-10-CM

## 2020-10-19 ENCOUNTER — TRANSFERRED RECORDS (OUTPATIENT)
Dept: HEALTH INFORMATION MANAGEMENT | Facility: CLINIC | Age: 70
End: 2020-10-19

## 2020-10-19 ENCOUNTER — HOSPITAL ENCOUNTER (OUTPATIENT)
Dept: WOUND CARE | Facility: CLINIC | Age: 70
Discharge: HOME | End: 2020-10-19
Attending: FAMILY MEDICINE | Admitting: FAMILY MEDICINE
Payer: MEDICARE

## 2020-10-19 PROCEDURE — G0463 HOSPITAL OUTPT CLINIC VISIT: HCPCS

## 2020-10-21 NOTE — PROGRESS NOTES
Reason For Visit/Interval History: Silver Zamora, 70 year old male, seen as outpatient for follow-up for right plantar foot wound. Referred by Dr. Jackson/Dr. Washington. Patient presents with spouse today.    From previous visit: writer did have some communication with primary care provider Dr. Washington regarding possibility of doing x-ray and inflammatory markers ESR and CRP to screen for osteomyelitis and at this time he would like to defer that decision to [facility].  Pt still hopes to avoid surgery if possible so did not intend to follow-up with podiatry unless necessary.  Spouse states he has been wearing his orthotic shoes a bit more around the house during the day as recommended.   Spouse can hear his feet sliding across floor as he shuffles when walking, in only stocking feet.  Spouse notes that the Aquacel Ag is not staying in wound.  Bandages staying in place better with current routine.    Update 10/19: Patient established care this morning with Dr. Almodovar, podiatry at [facility] [place] location. Per the visit summary that the spouse brought with her to this appointment, he had 3 x-rays of the right foot which had no evidence of osteomyelitis. His note also stated that he assessed the inserts and feels they are appropriate for offloading the area of the wound. He doesn't recommend a CAM boot because he feels it would increase patient's falls risk even more. He's scheduled to follow up again in 1 month. Patient's spouse states Dr. Almodovar didn't mention need for surgery.    Today he was c/o pain in his left foot, dorsal surface. Area of pink, rapidly blanchable noted on the dorsal midfoot. His spouse states he requests that his velcro straps be tightly fastened when she puts them on; she wonders if this is what's causing the pain. Educated them both that the spouse's explanation most likely the cause, to try not to tighten straps as snugly, and to continue to watch this  area.    History: Wound has been present since around October of '19.  Sounds like may have started as a callus here.  He is followed by Dr. Markham in podiatry.  Reportedly Dr. Markham would like to do surgery to offload the area however pt does not want to do that due to concerns over COVID 19 pandemic.  They have been using Medihoney and a bandaide at home.  Pt has diabetic shoes and custom inserts that he was fitted for at  Orth[facility] in the past 6 months but is wearing some older diabetic shoes and inserts he get on [place].  Reportedly walks with no shoes at home and shuffles his feet when walking due to Parkinson's.  He was seen recently by his neurologist who recommended he see a wound care provider because he feels this wound is healable. Pt has history of partial amputations of several other toes both feet due to wounds where he subsequently developed osteomyelitis.  Most recently was a left second toe amputation in February, this is healed.  Pt has neuropathy, not certain of cause though does have Parkinson's as well as a history of back issues including cervical stenosis and cord compression myelopathy.    Other co-morbidities including anxiety and depression, ischemic cardiomyopathy, heart failure.    Personal/social history: Lives at home with spouse who assists him.  Prior homecare, this was discontinued    Objective:   Physical appearance: appears stated age  Current treatment plan: IoPlex, Mepilex foam secured with Medipore tape changed daily  Last changed: patient currently has bandaid over wound since he saw Dr. Almodovar this morning    Wound #1 right plantar 1st MTP, bunion  1st and 2nd photos are from prev/post debridement initial visit          Stage/tissue depth: full thickness  1.1 cm L x 1.2 cm W x 0.2 cm to callus edge, depth from 5:00 to 9:00 at edge  Tunneling: no  Undermining: no  Wound bed type/amount: clean and pink/red though not necessarily granular; soft but non-fluctuant  Wound Edges:  minimal callus today as it was trimmed by Dr. Almodovar with [facility] this morning  Periwound: scant maceration on callus from 8:00 to 11:00  Drainage: difficult to assess this visit as bandage just changed by podiatry this AM; bandaid in place with serosanguineous drainage covering majority of pad without strikethrough  Odor: no  Pain: pt denies    1st toe has shifted laterally due to extensive bunion and second toe is turned upward at distal joint, 3rd and 4th toes have been amputated at distal joint.  Some blanchable erythema noted on 2nd toe from pressure of 1st toe.     Dorsalis Pedal Pulse: palpable: yes doppler: monophasic  Posterior Tibial Pulse: palpable: no doppler: biphasic  Hair growth:   Capillary Refill: <2 seconds  Feet/toes color: pink  Nails:     Mobility: with 3 wheeled walker, shuffling gait  Current offloading/footwear: diabetic shoes and custom inserts, he is not diabetic so did pay out of pocket for these.    Sensation: has some neuropathy  HgbA1C: n/a     Diet: not assessed this visit  Smoking: not assessed this visit    Discussed: Although not healing quickly, improvement is noted from last visit. Patient's spouse states he's been wearing his orthopedic shoes more, but she still thinks he could be wear them even more than he already is. Educated patient that staying off foot AND wearing his orthopedic shoes as much as possible with ambulating are critical to continued wound healing.    Assessment:  Right plantar foot neuropathic ulceration, full thickness, with bony prominence caused by extensive bunion - continuing to slowly heal    Pt's gait pattern and walking with no shoes at home contributing factor to non-healing - continued improvement in compliance with wearing orthotic shoes    Bandaging regimen is appropriate for wound    Pt now with podiatry involvement and xray confirming no evidence of osteomyelitis.    Plan:    Topical care: Continue IoPlex foam in wound for filler and  antimicrobial properties, Mepilex foam for cover bandage, and Medipore tape for retention with his shuffling.    Offloading: Offloading pt is complicated at he is prone to falling so anything heavy/rigid or causing differing shoe heights may cause him to fall and knee walker would not be possible.  Needs to wear his newer orthotics all day when he is up.  Spouse states he gets up and walks a lot, even at night.  He does need assistance with shoes so cannot wear at night though even wearing during the day would be improvement. Previously discussed that if not doing better with this, next step would be to have him fit with a DH walker offloading shoe though some concern height may be different.  Unfortunately do have concerns that a total contact cast or CAM boot would likely cause him to fall.    Additional recommendations:  Continuing to follow with podiatry at [facility].    Wound Care: Wound cleansed with Vashe cleanser and gauze then bandaged as below    Discussed plan of care with patient spouse. Teaching done with spouse for dressing changes; spouse is able and willing to perform.    The following discharge instructions were reviewed with and sent home with the patient (prior visit, no change):  R Foot Dressing Change  Wash your hands before and after the dressing change. You may wear gloves if you choose. Gloves are available over the counter at [facility], [facility], [facility], etc.   Use scissors at home that you can designate solely for wound care and cleanse well with each use, (rubbing alcohol or alcohol pads work well for this).     Wash hands.  Remove old dressing.  Wash hands.  1.) Cleanse wound with wound cleanser and gauze. Pat wound and surrounding skin dry with gauze.   2.) Cut a piece of IoPlex like a plug and insert into wound.    3.) Cut a piece of Mepilex Foam (the white or gray foam) to cover wound  4.) Secure with Kerlix (rolled gauze) and tape.  Change dressing every 1-2 days  depending on drainage.   Remove dressing for showers and then perform the above cares.       Wear orthopedic shoes daily even when in the home from when you get up until you go to bed.   Elevate leg and stay off of foot as much as you're able.     Follow up Thursday, September 28th at 2 pm.     Signs and symptoms of infection:  Bright green drainage  Foul odor  Increasing pain in area  New redness or swelling at the site of the wound or streaks up from wound  New warmth to touch around wound accompanied by redness and swelling  Fever     Need to be seen as soon as possible for infection concerns.     Please call with any questions or concerns.    The following supplies were sent home with the patient:   IoPlex foam remainder    Return visit: 11/18/20     Verbal, written, & demonstrative education provided.  Face to face time: approximately 40 minutes.    Virgie Smith RN, CWOCN   [phone]

## 2020-10-23 ENCOUNTER — COMMUNICATION - HEALTHEAST (OUTPATIENT)
Dept: NEUROLOGY | Facility: CLINIC | Age: 70
End: 2020-10-23

## 2020-10-23 DIAGNOSIS — G47.52 RBD (REM BEHAVIORAL DISORDER): ICD-10-CM

## 2020-10-23 DIAGNOSIS — G25.81 RLS (RESTLESS LEGS SYNDROME): ICD-10-CM

## 2020-11-02 ENCOUNTER — COMMUNICATION - HEALTHEAST (OUTPATIENT)
Dept: CARDIOLOGY | Facility: CLINIC | Age: 70
End: 2020-11-02

## 2020-11-03 ENCOUNTER — OFFICE VISIT - HEALTHEAST (OUTPATIENT)
Dept: CARDIOLOGY | Facility: CLINIC | Age: 70
End: 2020-11-03

## 2020-11-03 DIAGNOSIS — I25.5 ISCHEMIC CARDIOMYOPATHY: ICD-10-CM

## 2020-11-03 DIAGNOSIS — I25.119 CORONARY ARTERY DISEASE INVOLVING NATIVE CORONARY ARTERY OF NATIVE HEART WITH ANGINA PECTORIS (H): ICD-10-CM

## 2020-11-03 DIAGNOSIS — E78.00 HYPERCHOLESTEROLEMIA: ICD-10-CM

## 2020-11-03 DIAGNOSIS — G20.A1 PARKINSON DISEASE (H): ICD-10-CM

## 2020-11-03 ASSESSMENT — MIFFLIN-ST. JEOR: SCORE: 1722.88

## 2020-11-04 ENCOUNTER — OFFICE VISIT - HEALTHEAST (OUTPATIENT)
Dept: FAMILY MEDICINE | Facility: CLINIC | Age: 70
End: 2020-11-04

## 2020-11-04 DIAGNOSIS — I50.20 HEART FAILURE WITH REDUCED EJECTION FRACTION (H): ICD-10-CM

## 2020-11-04 DIAGNOSIS — G95.20 CORD COMPRESSION MYELOPATHY (H): ICD-10-CM

## 2020-11-04 DIAGNOSIS — I25.119 CORONARY ARTERY DISEASE INVOLVING NATIVE CORONARY ARTERY OF NATIVE HEART WITH ANGINA PECTORIS (H): ICD-10-CM

## 2020-11-04 DIAGNOSIS — F32.1 MODERATE MAJOR DEPRESSION (H): ICD-10-CM

## 2020-11-04 DIAGNOSIS — E03.9 HYPOTHYROIDISM, UNSPECIFIED TYPE: ICD-10-CM

## 2020-11-04 DIAGNOSIS — Z89.421 ACQUIRED ABSENCE OF OTHER RIGHT TOE(S) (H): ICD-10-CM

## 2020-11-04 DIAGNOSIS — G20.A1 PARKINSON DISEASE (H): ICD-10-CM

## 2020-11-04 DIAGNOSIS — L97.513: ICD-10-CM

## 2020-11-04 DIAGNOSIS — Z00.00 MEDICARE ANNUAL WELLNESS VISIT, SUBSEQUENT: ICD-10-CM

## 2020-11-04 DIAGNOSIS — Z12.5 SCREENING FOR PROSTATE CANCER: ICD-10-CM

## 2020-11-04 DIAGNOSIS — N52.9 VASCULOGENIC ERECTILE DYSFUNCTION, UNSPECIFIED VASCULOGENIC ERECTILE DYSFUNCTION TYPE: ICD-10-CM

## 2020-11-04 LAB
ALBUMIN SERPL-MCNC: 3.8 G/DL (ref 3.5–5)
ALP SERPL-CCNC: 109 U/L (ref 45–120)
ALT SERPL W P-5'-P-CCNC: <9 U/L (ref 0–45)
ANION GAP SERPL CALCULATED.3IONS-SCNC: 8 MMOL/L (ref 5–18)
AST SERPL W P-5'-P-CCNC: 18 U/L (ref 0–40)
BILIRUB SERPL-MCNC: 0.9 MG/DL (ref 0–1)
BUN SERPL-MCNC: 17 MG/DL (ref 8–28)
CALCIUM SERPL-MCNC: 9.4 MG/DL (ref 8.5–10.5)
CHLORIDE BLD-SCNC: 104 MMOL/L (ref 98–107)
CHOLEST SERPL-MCNC: 110 MG/DL
CO2 SERPL-SCNC: 26 MMOL/L (ref 22–31)
CREAT SERPL-MCNC: 0.76 MG/DL (ref 0.7–1.3)
ERYTHROCYTE [DISTWIDTH] IN BLOOD BY AUTOMATED COUNT: 13.8 % (ref 11–14.5)
FASTING STATUS PATIENT QL REPORTED: YES
GFR SERPL CREATININE-BSD FRML MDRD: >60 ML/MIN/1.73M2
GLUCOSE BLD-MCNC: 96 MG/DL (ref 70–125)
HCT VFR BLD AUTO: 44.4 % (ref 40–54)
HDLC SERPL-MCNC: 40 MG/DL
HGB BLD-MCNC: 14.5 G/DL (ref 14–18)
LDLC SERPL CALC-MCNC: 52 MG/DL
MCH RBC QN AUTO: 28.4 PG (ref 27–34)
MCHC RBC AUTO-ENTMCNC: 32.7 G/DL (ref 32–36)
MCV RBC AUTO: 87 FL (ref 80–100)
PLATELET # BLD AUTO: 175 THOU/UL (ref 140–440)
PMV BLD AUTO: 7.4 FL (ref 7–10)
POTASSIUM BLD-SCNC: 3.9 MMOL/L (ref 3.5–5)
PROT SERPL-MCNC: 6.7 G/DL (ref 6–8)
PSA SERPL-MCNC: 0.4 NG/ML (ref 0–6.5)
RBC # BLD AUTO: 5.1 MILL/UL (ref 4.4–6.2)
SODIUM SERPL-SCNC: 138 MMOL/L (ref 136–145)
TRIGL SERPL-MCNC: 88 MG/DL
TSH SERPL DL<=0.005 MIU/L-ACNC: 2.88 UIU/ML (ref 0.3–5)
WBC: 5.9 THOU/UL (ref 4–11)

## 2020-11-04 ASSESSMENT — ANXIETY QUESTIONNAIRES
GAD7 TOTAL SCORE: 4
2. NOT BEING ABLE TO STOP OR CONTROL WORRYING: NOT AT ALL
3. WORRYING TOO MUCH ABOUT DIFFERENT THINGS: NOT AT ALL
6. BECOMING EASILY ANNOYED OR IRRITABLE: SEVERAL DAYS
1. FEELING NERVOUS, ANXIOUS, OR ON EDGE: SEVERAL DAYS
5. BEING SO RESTLESS THAT IT IS HARD TO SIT STILL: SEVERAL DAYS
7. FEELING AFRAID AS IF SOMETHING AWFUL MIGHT HAPPEN: NOT AT ALL
4. TROUBLE RELAXING: SEVERAL DAYS

## 2020-11-04 ASSESSMENT — PATIENT HEALTH QUESTIONNAIRE - PHQ9: SUM OF ALL RESPONSES TO PHQ QUESTIONS 1-9: 11

## 2020-11-04 ASSESSMENT — MIFFLIN-ST. JEOR: SCORE: 1720.15

## 2020-11-05 ENCOUNTER — COMMUNICATION - HEALTHEAST (OUTPATIENT)
Dept: FAMILY MEDICINE | Facility: CLINIC | Age: 70
End: 2020-11-05

## 2020-11-20 ENCOUNTER — COMMUNICATION - HEALTHEAST (OUTPATIENT)
Dept: FAMILY MEDICINE | Facility: CLINIC | Age: 70
End: 2020-11-20

## 2020-11-20 ENCOUNTER — HOSPITAL ENCOUNTER (OUTPATIENT)
Dept: NEUROLOGY | Facility: CLINIC | Age: 70
Setting detail: THERAPIES SERIES
Discharge: STILL ADMITTED OR ADMINISTRATIVE | End: 2020-11-20
Attending: NURSE PRACTITIONER

## 2020-11-20 ENCOUNTER — COMMUNICATION - HEALTHEAST (OUTPATIENT)
Dept: NEUROLOGY | Facility: CLINIC | Age: 70
End: 2020-11-20

## 2020-11-20 DIAGNOSIS — F06.4 ANXIETY DISORDER DUE TO MEDICAL CONDITION: ICD-10-CM
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
## 2020-11-20 DIAGNOSIS — G20.A1 PARKINSON'S DISEASE (H): ICD-10-CM

## 2020-11-20 DIAGNOSIS — Z91.81 AT HIGH RISK FOR FALLS: ICD-10-CM

## 2020-11-20 DIAGNOSIS — G20.A1 PARKINSON DISEASE (H): ICD-10-CM

## 2020-11-20 DIAGNOSIS — G47.52 RBD (REM BEHAVIORAL DISORDER): ICD-10-CM

## 2020-11-20 DIAGNOSIS — G89.29 OTHER CHRONIC PAIN: ICD-10-CM

## 2020-11-20 DIAGNOSIS — R53.1 WEAKNESS: ICD-10-CM

## 2020-11-20 DIAGNOSIS — R25.1 TREMOR: ICD-10-CM

## 2020-11-20 DIAGNOSIS — G31.84 MCI (MILD COGNITIVE IMPAIRMENT): ICD-10-CM

## 2020-11-20 DIAGNOSIS — E03.9 HYPOTHYROIDISM: ICD-10-CM

## 2020-11-20 DIAGNOSIS — F32.89 OTHER DEPRESSION: ICD-10-CM

## 2020-11-20 DIAGNOSIS — F41.1 ANXIETY STATE: ICD-10-CM

## 2020-11-20 DIAGNOSIS — G47.00 INSOMNIA, UNSPECIFIED TYPE: ICD-10-CM

## 2020-11-20 ASSESSMENT — MIFFLIN-ST. JEOR: SCORE: 1718.34

## 2020-12-01 ENCOUNTER — COMMUNICATION - HEALTHEAST (OUTPATIENT)
Dept: SCHEDULING | Facility: CLINIC | Age: 70
End: 2020-12-01

## 2020-12-02 ENCOUNTER — RECORDS - HEALTHEAST (OUTPATIENT)
Dept: ADMINISTRATIVE | Facility: OTHER | Age: 70
End: 2020-12-02

## 2020-12-04 ENCOUNTER — HOSPITAL ENCOUNTER (OUTPATIENT)
Dept: WOUND CARE | Facility: CLINIC | Age: 70
Discharge: HOME | End: 2020-12-04
Attending: FAMILY MEDICINE | Admitting: FAMILY MEDICINE
Payer: MEDICARE

## 2020-12-04 DIAGNOSIS — L97.512: Primary | ICD-10-CM

## 2020-12-04 PROCEDURE — G0463 HOSPITAL OUTPT CLINIC VISIT: HCPCS

## 2020-12-04 PROCEDURE — A6211 FOAM DRG > 48 SQ IN W/O BRDR: HCPCS

## 2020-12-04 NOTE — PROGRESS NOTES
Reason For Visit/Interval History: Silver Zamora, 70 year old male, seen as outpatient for follow-up for right plantar foot wound. Referred by Dr. Jackson/Dr. Washington. Patient presents with spouse today.    From previous visit: writer did have some communication with primary care provider Dr. Washington regarding possibility of doing x-ray and inflammatory markers ESR and CRP to screen for osteomyelitis and at this time he would like to defer that decision to [facility].  Pt still hopes to avoid surgery if possible so did not intend to follow-up with podiatry unless necessary.  Spouse states he has been wearing his orthotic shoes a bit more around the house during the day as recommended.   Spouse can hear his feet sliding across floor as he shuffles when walking, in only stocking feet.  Spouse notes that the Aquacel Ag is not staying in wound.  Bandages staying in place better with current routine.    Patient established care in October with Dr. Almodovar, podiatry at [facility] [place] location. Per the visit summary that the spouse brought with her to this appointment, he had 3 x-rays of the right foot which had no evidence of osteomyelitis. His note also stated that he assessed the inserts and feels they are appropriate for offloading the area of the wound. He doesn't recommend a CAM boot because he feels it would increase patient's falls risk even more. He's scheduled to follow up again in 1 month. Patient's spouse states Dr. Almodovar didn't mention need for surgery.    History: Wound has been present since around October of '19.  Sounds like may have started as a callus here.  He is followed by Dr. Markham in podiatry.  Reportedly Dr. Markham would like to do surgery to offload the area however pt does not want to do that due to concerns over COVID 19 pandemic.  They have been using Medihoney and a bandaide at home.  Pt has diabetic shoes and custom inserts that he was fitted for at   Or[facility]otics in the past 6 months but is wearing some older diabetic shoes and inserts he get on [place].  Reportedly walks with no shoes at home and shuffles his feet when walking due to Parkinson's.  He was seen recently by his neurologist who recommended he see a wound care provider because he feels this wound is healable. Pt has history of partial amputations of several other toes both feet due to wounds where he subsequently developed osteomyelitis.  Most recently was a left second toe amputation in February, this is healed.  Pt has neuropathy, not certain of cause though does have Parkinson's as well as a history of back issues including cervical stenosis and cord compression myelopathy.    Other co-morbidities including anxiety and depression, ischemic cardiomyopathy, heart failure.    Personal/social history: Lives at home with spouse who assists him.  Prior homecare, this was discontinued    Objective:   Physical appearance: appears stated age  Current treatment plan: IoPlex, Mepilex foam secured with Medipore tape changed daily  Last changed: this morning; spouse states [facility] sent Mepilex border dressing instead of Mepilex foam so she used this today    Wound #1 right plantar 1st MTP, bunion  1st and 2nd photos are from prev/post debridement initial visit          Date on last photo with birth date taken [birth year]    Stage/tissue depth: full thickness  0.6 cm L x 0.9 cm W x 0.2 cm; depth at center of wound bed  Tunneling: no  Undermining: no  Wound bed type/amount: pink/red, clean but doesn't appear granular  Wound Edges: attached to new epithelium immediate to wound   Periwound: callus with maceration (see photo)  Drainage: moderate serosanguineous  Odor: no  Pain: pt denies    1st toe has shifted laterally due to extensive bunion and second toe is turned upward at distal joint, 3rd and 4th toes have been amputated at distal joint.  Some blanchable erythema noted on 2nd toe from pressure of 1st toe.      Dorsalis Pedal Pulse: palpable: yes doppler: monophasic  Posterior Tibial Pulse: palpable: no doppler: biphasic  Hair growth:   Capillary Refill: <2 seconds  Feet/toes color: pink  Nails:     Mobility: with 3 wheeled walker, shuffling gait  Current offloading/footwear: diabetic shoes and custom inserts, he is not diabetic so did pay out of pocket for these.    Sensation: has some neuropathy  HgbA1C: n/a     Diet: not assessed this visit  Smoking: not assessed this visit    Discussed: Although not healing quickly, improvement is noted from last visit. Patient saw foot  earlier this week who provided them with a felt pad to help with offloading. Spouse states this didn't stay in place. She's planning to order padded socks as patient still isn't wearing shoes as much as he should be. She's hoping the padding will help to offload some. Educated patient that staying off foot AND wearing his orthopedic shoes as much as possible with ambulating will result in faster healing time as pressure/friction combined with bony prominence are causative factors of wound.    Assessment:  Right plantar foot neuropathic ulceration, full thickness, with bony prominence caused by extensive bunion - continuing to slowly heal    Pt's gait pattern and walking with no shoes at home contributing factor to non-healing - inconsistent adherence to wearing orthotic shoes when ambulating in the home    Trialed felt offloading pad but didn't stay in place.    Bandaging regimen is appropriate for wound    Pt now with podiatry involvement and xray confirming no evidence of osteomyelitis.    Plan:    Topical care: Continue IoPlex foam in wound for filler and antimicrobial properties, Mepilex foam for cover bandage, and Medipore tape for retention with his shuffling.    Offloading: Offloading pt is complicated at he is prone to falling so anything heavy/rigid or causing differing shoe heights may cause him to fall and knee walker  "would not be possible.  Recommendation continues to be that patient stay off foot as much as possible while wearing orthotic shoes at all times when ambulating. Despite this, patient voices not wearing shoes often when ambulating because he doesn't feel as \"safe\" when wearing them. Other offloading options concerning with patient's gait instability/shuffling (such as DH Walker). They both voice understanding that patient's wound would progress the best with him wearing orthotic shoes with all ambulation.    Additional recommendations:  Continuing to follow with podiatry at [facility].    Wound Care: Wound cleansed with Vashe cleanser and gauze then bandaged as below    Discussed plan of care with patient and spouse. Teaching done with spouse for dressing changes; spouse is able and willing to perform.    The following discharge instructions were reviewed with and sent home with the patient (prior visit, no change):  R Foot Dressing Change  Wash your hands before and after the dressing change. You may wear gloves if you choose. Gloves are available over the counter at [facility], [facility], [facility], etc.   Use scissors at home that you can designate solely for wound care and cleanse well with each use, (rubbing alcohol or alcohol pads work well for this).     Wash hands.  Remove old dressing.  Wash hands.  1.) Cleanse wound with wound cleanser and gauze. Pat wound and surrounding skin dry with gauze.   2.) Cut a piece of IoPlex like a plug and insert into wound.    3.) Cut a piece of Mepilex Foam (the white or gray foam) to cover wound  4.) Secure with Kerlix (rolled gauze) and tape.  Change dressing every 1-2 days depending on drainage.   Remove dressing for showers and then perform the above cares.       Wear orthopedic shoes daily even when in the home from when you get up until you go to bed.   Elevate leg and stay off of foot as much as you're able.     Follow up Thursday, September 28th at 2 " pm.     Signs and symptoms of infection:  Bright green drainage  Foul odor  Increasing pain in area  New redness or swelling at the site of the wound or streaks up from wound  New warmth to touch around wound accompanied by redness and swelling  Fever     Need to be seen as soon as possible for infection concerns.     Please call with any questions or concerns.    The following supplies were sent home with the patient:   Remainder of Mepilex Ag 8x8; remainder of IoPlex foam; remainder of roll of Medipore tape    Return visit: approx 6 weeks; they will call to schedule appointment once they have scheduled with Dr. Almodovar with [facility] (then they can have both visits same day/decreased need for leaving home)    Verbal & demonstrative education provided.  Face to face time: approximately 60 minutes.    Virgie Smith RN, CWOCN   [phone]

## 2020-12-16 ENCOUNTER — RECORDS - HEALTHEAST (OUTPATIENT)
Dept: ADMINISTRATIVE | Facility: OTHER | Age: 70
End: 2020-12-16

## 2020-12-28 ENCOUNTER — COMMUNICATION - HEALTHEAST (OUTPATIENT)
Dept: NEUROLOGY | Facility: CLINIC | Age: 70
End: 2020-12-28

## 2020-12-28 DIAGNOSIS — G20.A1 PARKINSON DISEASE (H): ICD-10-CM

## 2020-12-31 ENCOUNTER — COMMUNICATION - HEALTHEAST (OUTPATIENT)
Dept: FAMILY MEDICINE | Facility: CLINIC | Age: 70
End: 2020-12-31

## 2020-12-31 ENCOUNTER — OFFICE VISIT - HEALTHEAST (OUTPATIENT)
Dept: FAMILY MEDICINE | Facility: CLINIC | Age: 70
End: 2020-12-31

## 2020-12-31 ENCOUNTER — RECORDS - HEALTHEAST (OUTPATIENT)
Dept: ADMINISTRATIVE | Facility: OTHER | Age: 70
End: 2020-12-31

## 2020-12-31 DIAGNOSIS — R35.0 URINARY FREQUENCY: Primary | ICD-10-CM

## 2020-12-31 DIAGNOSIS — R35.0 URINARY FREQUENCY: ICD-10-CM

## 2020-12-31 DIAGNOSIS — G20.A1 PARKINSON DISEASE (H): ICD-10-CM

## 2020-12-31 LAB
ALBUMIN UR-MCNC: NEGATIVE MG/DL
APPEARANCE UR: CLEAR
BILIRUB UR QL STRIP: ABNORMAL
COLOR UR AUTO: YELLOW
GLUCOSE UR STRIP-MCNC: NEGATIVE MG/DL
HGB UR QL STRIP: NEGATIVE
KETONES UR STRIP-MCNC: 15 MG/DL
LEUKOCYTE ESTERASE UR QL STRIP: NEGATIVE
NITRATE UR QL: NEGATIVE
PH UR STRIP: 5.5 PH (ref 5–7)
RBC #/AREA URNS AUTO: ABNORMAL /HPF
SOURCE: ABNORMAL
SP GR UR STRIP: >1.03 (ref 1–1.03)
UROBILINOGEN UR STRIP-ACNC: 1 EU/DL (ref 0.2–1)
WBC #/AREA URNS AUTO: ABNORMAL /HPF

## 2020-12-31 PROCEDURE — 87086 URINE CULTURE/COLONY COUNT: CPT | Performed by: FAMILY MEDICINE

## 2020-12-31 PROCEDURE — 81001 URINALYSIS AUTO W/SCOPE: CPT | Performed by: FAMILY MEDICINE

## 2021-01-01 ENCOUNTER — RECORDS - HEALTHEAST (OUTPATIENT)
Dept: ADMINISTRATIVE | Facility: OTHER | Age: 71
End: 2021-01-01

## 2021-01-01 ENCOUNTER — OFFICE VISIT (OUTPATIENT)
Dept: NEUROLOGY | Facility: CLINIC | Age: 71
End: 2021-01-01
Payer: MEDICARE

## 2021-01-01 ENCOUNTER — RECORDS - HEALTHEAST (OUTPATIENT)
Dept: NEUROLOGY | Facility: CLINIC | Age: 71
End: 2021-01-01

## 2021-01-01 ENCOUNTER — OFFICE VISIT (OUTPATIENT)
Dept: FAMILY MEDICINE | Facility: CLINIC | Age: 71
End: 2021-01-01
Payer: MEDICARE

## 2021-01-01 ENCOUNTER — HOSPITAL ENCOUNTER (OUTPATIENT)
Dept: NEUROLOGY | Facility: CLINIC | Age: 71
Setting detail: THERAPIES SERIES
Discharge: STILL ADMITTED OR ADMINISTRATIVE | End: 2021-04-08
Attending: NURSE PRACTITIONER

## 2021-01-01 ENCOUNTER — TELEPHONE (OUTPATIENT)
Dept: NEUROLOGY | Facility: CLINIC | Age: 71
End: 2021-01-01
Payer: MEDICARE

## 2021-01-01 ENCOUNTER — COMMUNICATION - HEALTHEAST (OUTPATIENT)
Dept: FAMILY MEDICINE | Facility: CLINIC | Age: 71
End: 2021-01-01

## 2021-01-01 ENCOUNTER — TELEPHONE (OUTPATIENT)
Dept: NEUROLOGY | Facility: CLINIC | Age: 71
End: 2021-01-01

## 2021-01-01 ENCOUNTER — RECORDS - HEALTHEAST (OUTPATIENT)
Dept: FAMILY MEDICINE | Facility: CLINIC | Age: 71
End: 2021-01-01

## 2021-01-01 ENCOUNTER — ANCILLARY PROCEDURE (OUTPATIENT)
Dept: GENERAL RADIOLOGY | Facility: CLINIC | Age: 71
End: 2021-01-01
Attending: FAMILY MEDICINE
Payer: MEDICARE

## 2021-01-01 ENCOUNTER — COMMUNICATION - HEALTHEAST (OUTPATIENT)
Dept: NEUROLOGY | Facility: CLINIC | Age: 71
End: 2021-01-01

## 2021-01-01 ENCOUNTER — AMBULATORY - HEALTHEAST (OUTPATIENT)
Dept: NEUROLOGY | Facility: CLINIC | Age: 71
End: 2021-01-01

## 2021-01-01 ENCOUNTER — HOSPITAL ENCOUNTER (OUTPATIENT)
Dept: WOUND CARE | Facility: CLINIC | Age: 71
Discharge: HOME | End: 2021-03-24
Attending: FAMILY MEDICINE | Admitting: FAMILY MEDICINE
Payer: MEDICARE

## 2021-01-01 ENCOUNTER — RECORDS - HEALTHEAST (OUTPATIENT)
Dept: ADMINISTRATIVE | Facility: CLINIC | Age: 71
End: 2021-01-01

## 2021-01-01 ENCOUNTER — COMMUNICATION - HEALTHEAST (OUTPATIENT)
Dept: SCHEDULING | Facility: CLINIC | Age: 71
End: 2021-01-01

## 2021-01-01 ENCOUNTER — TELEPHONE (OUTPATIENT)
Dept: LAB | Facility: CLINIC | Age: 71
End: 2021-01-01

## 2021-01-01 ENCOUNTER — HOSPITAL ENCOUNTER (OUTPATIENT)
Dept: WOUND CARE | Facility: CLINIC | Age: 71
Discharge: HOME | End: 2021-02-17
Attending: SURGERY | Admitting: SURGERY
Payer: MEDICARE

## 2021-01-01 ENCOUNTER — TELEPHONE (OUTPATIENT)
Dept: WOUND CARE | Facility: CLINIC | Age: 71
End: 2021-01-01
Payer: MEDICARE

## 2021-01-01 ENCOUNTER — HOSPITAL ENCOUNTER (EMERGENCY)
Facility: CLINIC | Age: 71
Discharge: HOME | End: 2021-08-13
Attending: EMERGENCY MEDICINE | Admitting: EMERGENCY MEDICINE
Payer: MEDICARE

## 2021-01-01 ENCOUNTER — MEDICAL CORRESPONDENCE (OUTPATIENT)
Dept: HEALTH INFORMATION MANAGEMENT | Facility: CLINIC | Age: 71
End: 2021-01-01
Payer: MEDICARE

## 2021-01-01 ENCOUNTER — TELEPHONE (OUTPATIENT)
Dept: FAMILY MEDICINE | Facility: CLINIC | Age: 71
End: 2021-01-01

## 2021-01-01 ENCOUNTER — COMMUNICATION - HEALTHEAST (OUTPATIENT)
Dept: CARDIOLOGY | Facility: CLINIC | Age: 71
End: 2021-01-01

## 2021-01-01 ENCOUNTER — VIRTUAL VISIT (OUTPATIENT)
Dept: NEUROLOGY | Facility: CLINIC | Age: 71
End: 2021-01-01
Payer: MEDICARE

## 2021-01-01 VITALS — HEIGHT: 68 IN | WEIGHT: 218 LBS | BODY MASS INDEX: 33.04 KG/M2

## 2021-01-01 VITALS — HEIGHT: 70 IN | WEIGHT: 213 LBS | BODY MASS INDEX: 30.49 KG/M2

## 2021-01-01 VITALS
SYSTOLIC BLOOD PRESSURE: 124 MMHG | HEART RATE: 68 BPM | HEIGHT: 67 IN | BODY MASS INDEX: 34.37 KG/M2 | RESPIRATION RATE: 14 BRPM | WEIGHT: 219 LBS | DIASTOLIC BLOOD PRESSURE: 70 MMHG

## 2021-01-01 VITALS
RESPIRATION RATE: 18 BRPM | HEART RATE: 70 BPM | WEIGHT: 203 LBS | SYSTOLIC BLOOD PRESSURE: 122 MMHG | TEMPERATURE: 97.8 F | HEIGHT: 70 IN | BODY MASS INDEX: 29.06 KG/M2 | DIASTOLIC BLOOD PRESSURE: 80 MMHG

## 2021-01-01 VITALS
DIASTOLIC BLOOD PRESSURE: 68 MMHG | RESPIRATION RATE: 16 BRPM | BODY MASS INDEX: 30.35 KG/M2 | SYSTOLIC BLOOD PRESSURE: 138 MMHG | WEIGHT: 212 LBS | HEART RATE: 53 BPM | HEIGHT: 70 IN

## 2021-01-01 VITALS — BODY MASS INDEX: 33.15 KG/M2 | WEIGHT: 218 LBS | BODY MASS INDEX: 33.15 KG/M2 | WEIGHT: 218 LBS

## 2021-01-01 VITALS — BODY MASS INDEX: 29.71 KG/M2 | WEIGHT: 212.2 LBS | HEIGHT: 71 IN

## 2021-01-01 VITALS
HEART RATE: 64 BPM | DIASTOLIC BLOOD PRESSURE: 70 MMHG | TEMPERATURE: 98.3 F | SYSTOLIC BLOOD PRESSURE: 122 MMHG | WEIGHT: 220 LBS | RESPIRATION RATE: 20 BRPM | BODY MASS INDEX: 31.57 KG/M2

## 2021-01-01 VITALS — HEIGHT: 70 IN | BODY MASS INDEX: 31.5 KG/M2 | WEIGHT: 220 LBS

## 2021-01-01 VITALS — BODY MASS INDEX: 30.85 KG/M2 | WEIGHT: 215 LBS

## 2021-01-01 VITALS — WEIGHT: 212 LBS | HEIGHT: 70 IN | BODY MASS INDEX: 30.35 KG/M2

## 2021-01-01 VITALS — BODY MASS INDEX: 30.22 KG/M2 | WEIGHT: 216.7 LBS

## 2021-01-01 VITALS — WEIGHT: 219 LBS | BODY MASS INDEX: 31.42 KG/M2

## 2021-01-01 VITALS — WEIGHT: 211 LBS | HEIGHT: 70 IN | BODY MASS INDEX: 30.21 KG/M2

## 2021-01-01 VITALS
HEART RATE: 70 BPM | SYSTOLIC BLOOD PRESSURE: 153 MMHG | OXYGEN SATURATION: 97 % | RESPIRATION RATE: 16 BRPM | DIASTOLIC BLOOD PRESSURE: 76 MMHG | BODY MASS INDEX: 25.36 KG/M2 | WEIGHT: 181.8 LBS

## 2021-01-01 VITALS — BODY MASS INDEX: 30.27 KG/M2 | WEIGHT: 217 LBS

## 2021-01-01 VITALS — WEIGHT: 213 LBS | BODY MASS INDEX: 32.28 KG/M2 | HEIGHT: 68 IN

## 2021-01-01 VITALS
HEIGHT: 70 IN | BODY MASS INDEX: 30.26 KG/M2 | OXYGEN SATURATION: 98 % | WEIGHT: 211.4 LBS | SYSTOLIC BLOOD PRESSURE: 135 MMHG | DIASTOLIC BLOOD PRESSURE: 76 MMHG | HEART RATE: 53 BPM

## 2021-01-01 VITALS
RESPIRATION RATE: 12 BRPM | HEIGHT: 67 IN | TEMPERATURE: 98.1 F | WEIGHT: 214 LBS | SYSTOLIC BLOOD PRESSURE: 116 MMHG | DIASTOLIC BLOOD PRESSURE: 64 MMHG | HEART RATE: 60 BPM | BODY MASS INDEX: 33.59 KG/M2

## 2021-01-01 VITALS — WEIGHT: 211 LBS | BODY MASS INDEX: 31.16 KG/M2

## 2021-01-01 VITALS — BODY MASS INDEX: 30.52 KG/M2 | HEIGHT: 71 IN | WEIGHT: 218 LBS

## 2021-01-01 VITALS — WEIGHT: 215 LBS | BODY MASS INDEX: 30.85 KG/M2

## 2021-01-01 VITALS — WEIGHT: 212.25 LBS | BODY MASS INDEX: 31.34 KG/M2

## 2021-01-01 VITALS — BODY MASS INDEX: 31.14 KG/M2 | WEIGHT: 217 LBS

## 2021-01-01 VITALS — HEIGHT: 67 IN | WEIGHT: 219 LBS | BODY MASS INDEX: 34.37 KG/M2

## 2021-01-01 VITALS
WEIGHT: 203 LBS | TEMPERATURE: 97.5 F | OXYGEN SATURATION: 98 % | BODY MASS INDEX: 29.06 KG/M2 | HEART RATE: 91 BPM | RESPIRATION RATE: 18 BRPM | DIASTOLIC BLOOD PRESSURE: 82 MMHG | HEIGHT: 70 IN | SYSTOLIC BLOOD PRESSURE: 124 MMHG

## 2021-01-01 VITALS — BODY MASS INDEX: 31.07 KG/M2 | HEIGHT: 70 IN | WEIGHT: 217 LBS

## 2021-01-01 VITALS — WEIGHT: 218 LBS | BODY MASS INDEX: 31.28 KG/M2

## 2021-01-01 VITALS
RESPIRATION RATE: 16 BRPM | BODY MASS INDEX: 31.71 KG/M2 | SYSTOLIC BLOOD PRESSURE: 120 MMHG | HEART RATE: 64 BPM | TEMPERATURE: 97.9 F | WEIGHT: 221 LBS | DIASTOLIC BLOOD PRESSURE: 60 MMHG

## 2021-01-01 VITALS — WEIGHT: 209.4 LBS | BODY MASS INDEX: 30.92 KG/M2

## 2021-01-01 VITALS — HEIGHT: 68 IN | WEIGHT: 210 LBS | BODY MASS INDEX: 31.83 KG/M2

## 2021-01-01 VITALS — HEIGHT: 69 IN | WEIGHT: 214 LBS | BODY MASS INDEX: 31.7 KG/M2

## 2021-01-01 VITALS — WEIGHT: 215 LBS | BODY MASS INDEX: 29.99 KG/M2

## 2021-01-01 VITALS — BODY MASS INDEX: 31.21 KG/M2 | WEIGHT: 218 LBS | BODY MASS INDEX: 30.71 KG/M2 | HEIGHT: 70 IN | WEIGHT: 214 LBS

## 2021-01-01 VITALS
RESPIRATION RATE: 20 BRPM | HEART RATE: 69 BPM | SYSTOLIC BLOOD PRESSURE: 125 MMHG | WEIGHT: 217.8 LBS | TEMPERATURE: 96.3 F | DIASTOLIC BLOOD PRESSURE: 57 MMHG | BODY MASS INDEX: 31.25 KG/M2

## 2021-01-01 VITALS — BODY MASS INDEX: 31.4 KG/M2 | WEIGHT: 212 LBS | HEIGHT: 69 IN

## 2021-01-01 VITALS — BODY MASS INDEX: 29.99 KG/M2 | WEIGHT: 215 LBS

## 2021-01-01 VITALS — WEIGHT: 214 LBS | BODY MASS INDEX: 31.42 KG/M2 | BODY MASS INDEX: 30.64 KG/M2 | WEIGHT: 219 LBS | HEIGHT: 70 IN

## 2021-01-01 VITALS — BODY MASS INDEX: 30.92 KG/M2 | WEIGHT: 216 LBS | HEIGHT: 70 IN

## 2021-01-01 VITALS — BODY MASS INDEX: 30.92 KG/M2 | WEIGHT: 209.4 LBS

## 2021-01-01 VITALS
HEIGHT: 70 IN | BODY MASS INDEX: 32 KG/M2 | SYSTOLIC BLOOD PRESSURE: 120 MMHG | RESPIRATION RATE: 20 BRPM | WEIGHT: 223.5 LBS | DIASTOLIC BLOOD PRESSURE: 62 MMHG | HEART RATE: 64 BPM | TEMPERATURE: 98 F

## 2021-01-01 VITALS
BODY MASS INDEX: 30.94 KG/M2 | WEIGHT: 208.9 LBS | WEIGHT: 208.9 LBS | HEIGHT: 69 IN | HEIGHT: 69 IN | BODY MASS INDEX: 30.94 KG/M2

## 2021-01-01 VITALS — HEIGHT: 67 IN | WEIGHT: 222 LBS | BODY MASS INDEX: 34.84 KG/M2

## 2021-01-01 VITALS — WEIGHT: 216 LBS | BODY MASS INDEX: 32.84 KG/M2

## 2021-01-01 VITALS
HEART RATE: 88 BPM | WEIGHT: 203 LBS | RESPIRATION RATE: 16 BRPM | SYSTOLIC BLOOD PRESSURE: 102 MMHG | BODY MASS INDEX: 29.06 KG/M2 | DIASTOLIC BLOOD PRESSURE: 62 MMHG | TEMPERATURE: 97.8 F | HEIGHT: 70 IN

## 2021-01-01 VITALS — BODY MASS INDEX: 32.28 KG/M2 | HEIGHT: 68 IN | WEIGHT: 213 LBS

## 2021-01-01 VITALS — HEIGHT: 70 IN | WEIGHT: 217 LBS | BODY MASS INDEX: 31.07 KG/M2

## 2021-01-01 VITALS — HEIGHT: 69 IN | BODY MASS INDEX: 33.03 KG/M2 | WEIGHT: 223 LBS

## 2021-01-01 VITALS
TEMPERATURE: 98 F | DIASTOLIC BLOOD PRESSURE: 85 MMHG | RESPIRATION RATE: 16 BRPM | HEART RATE: 58 BPM | BODY MASS INDEX: 28 KG/M2 | OXYGEN SATURATION: 98 % | HEIGHT: 71 IN | SYSTOLIC BLOOD PRESSURE: 167 MMHG | WEIGHT: 200 LBS

## 2021-01-01 VITALS — WEIGHT: 216 LBS | BODY MASS INDEX: 31.9 KG/M2

## 2021-01-01 VITALS — BODY MASS INDEX: 31.9 KG/M2 | WEIGHT: 216 LBS

## 2021-01-01 VITALS — BODY MASS INDEX: 34.61 KG/M2 | WEIGHT: 221 LBS

## 2021-01-01 VITALS — WEIGHT: 209 LBS | BODY MASS INDEX: 30.96 KG/M2 | HEIGHT: 69 IN

## 2021-01-01 VITALS — WEIGHT: 222 LBS | BODY MASS INDEX: 31.85 KG/M2

## 2021-01-01 VITALS — BODY MASS INDEX: 30.99 KG/M2 | WEIGHT: 216 LBS

## 2021-01-01 VITALS — WEIGHT: 218 LBS | BODY MASS INDEX: 30.4 KG/M2

## 2021-01-01 VITALS — BODY MASS INDEX: 30.24 KG/M2 | HEIGHT: 71 IN | WEIGHT: 216 LBS

## 2021-01-01 VITALS — WEIGHT: 214 LBS | BODY MASS INDEX: 29.96 KG/M2 | HEIGHT: 71 IN

## 2021-01-01 VITALS — BODY MASS INDEX: 29.57 KG/M2 | WEIGHT: 212 LBS

## 2021-01-01 VITALS — BODY MASS INDEX: 31.99 KG/M2 | WEIGHT: 216 LBS | HEIGHT: 69 IN

## 2021-01-01 VITALS — WEIGHT: 212 LBS | BODY MASS INDEX: 29.57 KG/M2

## 2021-01-01 VITALS — BODY MASS INDEX: 32.84 KG/M2 | WEIGHT: 216 LBS

## 2021-01-01 VITALS — BODY MASS INDEX: 30.71 KG/M2 | WEIGHT: 214 LBS

## 2021-01-01 VITALS — WEIGHT: 220.2 LBS | BODY MASS INDEX: 31.6 KG/M2

## 2021-01-01 DIAGNOSIS — I25.5 ISCHEMIC CARDIOMYOPATHY: ICD-10-CM

## 2021-01-01 DIAGNOSIS — Z51.5 HOSPICE CARE PATIENT: ICD-10-CM

## 2021-01-01 DIAGNOSIS — G20.A1 PARKINSON DISEASE (H): Primary | ICD-10-CM

## 2021-01-01 DIAGNOSIS — I50.20 HEART FAILURE WITH REDUCED EJECTION FRACTION (H): ICD-10-CM

## 2021-01-01 DIAGNOSIS — I25.119 CORONARY ARTERY DISEASE INVOLVING NATIVE CORONARY ARTERY OF NATIVE HEART WITH ANGINA PECTORIS (H): ICD-10-CM

## 2021-01-01 DIAGNOSIS — M54.50 CHRONIC LOW BACK PAIN, UNSPECIFIED BACK PAIN LATERALITY, UNSPECIFIED WHETHER SCIATICA PRESENT: ICD-10-CM

## 2021-01-01 DIAGNOSIS — M25.531 RIGHT WRIST PAIN: ICD-10-CM

## 2021-01-01 DIAGNOSIS — G89.29 CHRONIC LEFT SHOULDER PAIN: ICD-10-CM

## 2021-01-01 DIAGNOSIS — F32.1 MODERATE MAJOR DEPRESSION (H): ICD-10-CM

## 2021-01-01 DIAGNOSIS — F32.5 MAJOR DEPRESSION IN COMPLETE REMISSION (H): ICD-10-CM

## 2021-01-01 DIAGNOSIS — E03.9 HYPOTHYROIDISM, UNSPECIFIED TYPE: ICD-10-CM

## 2021-01-01 DIAGNOSIS — G95.20 CORD COMPRESSION MYELOPATHY (H): ICD-10-CM

## 2021-01-01 DIAGNOSIS — R21 RASH: ICD-10-CM

## 2021-01-01 DIAGNOSIS — E78.5 DYSLIPIDEMIA, GOAL LDL BELOW 70: ICD-10-CM

## 2021-01-01 DIAGNOSIS — G20.A1 PARKINSON DISEASE (H): ICD-10-CM

## 2021-01-01 DIAGNOSIS — M79.632 PAIN OF LEFT FOREARM: ICD-10-CM

## 2021-01-01 DIAGNOSIS — R79.89 ELEVATED TROPONIN LEVEL: ICD-10-CM

## 2021-01-01 DIAGNOSIS — M25.512 CHRONIC LEFT SHOULDER PAIN: ICD-10-CM

## 2021-01-01 DIAGNOSIS — R10.32 LEFT LOWER QUADRANT ABDOMINAL PAIN: ICD-10-CM

## 2021-01-01 DIAGNOSIS — Z00.00 ENCOUNTER FOR MEDICARE ANNUAL WELLNESS EXAM: ICD-10-CM

## 2021-01-01 DIAGNOSIS — G89.29 CHRONIC LOW BACK PAIN WITH SCIATICA, SCIATICA LATERALITY UNSPECIFIED, UNSPECIFIED BACK PAIN LATERALITY: ICD-10-CM

## 2021-01-01 DIAGNOSIS — R07.9 ACUTE CHEST PAIN: ICD-10-CM

## 2021-01-01 DIAGNOSIS — E78.5 DYSLIPIDEMIA, GOAL LDL BELOW 70: Primary | ICD-10-CM

## 2021-01-01 DIAGNOSIS — G89.29 CHRONIC LOW BACK PAIN, UNSPECIFIED BACK PAIN LATERALITY, UNSPECIFIED WHETHER SCIATICA PRESENT: ICD-10-CM

## 2021-01-01 DIAGNOSIS — M54.40 CHRONIC LOW BACK PAIN WITH SCIATICA, SCIATICA LATERALITY UNSPECIFIED, UNSPECIFIED BACK PAIN LATERALITY: ICD-10-CM

## 2021-01-01 DIAGNOSIS — L03.031 CELLULITIS OF TOE OF RIGHT FOOT: Primary | ICD-10-CM

## 2021-01-01 DIAGNOSIS — G20.A1 PARKINSON'S DISEASE (H): ICD-10-CM

## 2021-01-01 DIAGNOSIS — M25.512 LEFT SHOULDER PAIN, UNSPECIFIED CHRONICITY: ICD-10-CM

## 2021-01-01 DIAGNOSIS — M19.012 PRIMARY OSTEOARTHRITIS OF LEFT SHOULDER: Primary | ICD-10-CM

## 2021-01-01 DIAGNOSIS — F41.1 ANXIETY STATE: ICD-10-CM

## 2021-01-01 DIAGNOSIS — Z23 ENCOUNTER FOR IMMUNIZATION: ICD-10-CM

## 2021-01-01 DIAGNOSIS — M25.512 LEFT SHOULDER PAIN, UNSPECIFIED CHRONICITY: Primary | ICD-10-CM

## 2021-01-01 DIAGNOSIS — Z00.00 MEDICARE ANNUAL WELLNESS VISIT, SUBSEQUENT: Primary | ICD-10-CM

## 2021-01-01 DIAGNOSIS — L97.512: Primary | ICD-10-CM

## 2021-01-01 LAB
ALBUMIN SERPL-MCNC: 3.5 G/DL (ref 3.5–5)
ALP SERPL-CCNC: 94 U/L (ref 45–120)
ALT SERPL W P-5'-P-CCNC: <9 U/L (ref 0–45)
ANION GAP SERPL CALCULATED.3IONS-SCNC: 11 MMOL/L (ref 5–18)
ANION GAP SERPL CALCULATED.3IONS-SCNC: 6 MMOL/L (ref 3–14)
AST SERPL W P-5'-P-CCNC: 9 U/L (ref 0–40)
BACTERIA SPEC CULT: NORMAL
BASOPHILS # BLD AUTO: 0 10E3/UL (ref 0–0.2)
BASOPHILS NFR BLD AUTO: 1 %
BILIRUB SERPL-MCNC: 0.5 MG/DL (ref 0–1)
BUN SERPL-MCNC: 17 MG/DL (ref 7–30)
BUN SERPL-MCNC: 17 MG/DL (ref 8–28)
CALCIUM SERPL-MCNC: 8.6 MG/DL (ref 8.5–10.1)
CALCIUM SERPL-MCNC: 9.4 MG/DL (ref 8.5–10.5)
CHLORIDE BLD-SCNC: 104 MMOL/L (ref 98–107)
CHLORIDE BLD-SCNC: 108 MMOL/L (ref 94–109)
CO2 SERPL-SCNC: 24 MMOL/L (ref 22–31)
CO2 SERPL-SCNC: 25 MMOL/L (ref 20–32)
CREAT SERPL-MCNC: 0.62 MG/DL (ref 0.66–1.25)
CREAT SERPL-MCNC: 0.69 MG/DL (ref 0.7–1.3)
EOSINOPHIL # BLD AUTO: 0.1 10E3/UL (ref 0–0.7)
EOSINOPHIL NFR BLD AUTO: 2 %
ERYTHROCYTE [DISTWIDTH] IN BLOOD BY AUTOMATED COUNT: 13.6 % (ref 10–15)
ERYTHROCYTE [DISTWIDTH] IN BLOOD BY AUTOMATED COUNT: 14 % (ref 10–15)
GFR SERPL CREATININE-BSD FRML MDRD: >90 ML/MIN/1.73M2
GFR SERPL CREATININE-BSD FRML MDRD: >90 ML/MIN/1.73M2
GLUCOSE BLD-MCNC: 102 MG/DL (ref 70–125)
GLUCOSE BLD-MCNC: 108 MG/DL (ref 70–99)
HCT VFR BLD AUTO: 43.4 % (ref 40–53)
HCT VFR BLD AUTO: 44.9 % (ref 40–53)
HGB BLD-MCNC: 13.5 G/DL (ref 13.3–17.7)
HGB BLD-MCNC: 14.2 G/DL (ref 13.3–17.7)
HOLD SPECIMEN: NORMAL
IMM GRANULOCYTES # BLD: 0 10E3/UL
IMM GRANULOCYTES NFR BLD: 1 %
LDLC SERPL CALC-MCNC: 116 MG/DL
LYMPHOCYTES # BLD AUTO: 1.2 10E3/UL (ref 0.8–5.3)
LYMPHOCYTES NFR BLD AUTO: 16 %
Lab: NORMAL
MCH RBC QN AUTO: 28 PG (ref 26.5–33)
MCH RBC QN AUTO: 28.2 PG (ref 26.5–33)
MCHC RBC AUTO-ENTMCNC: 31.1 G/DL (ref 31.5–36.5)
MCHC RBC AUTO-ENTMCNC: 31.6 G/DL (ref 31.5–36.5)
MCV RBC AUTO: 89 FL (ref 78–100)
MCV RBC AUTO: 91 FL (ref 78–100)
MONOCYTES # BLD AUTO: 0.8 10E3/UL (ref 0–1.3)
MONOCYTES NFR BLD AUTO: 11 %
NEUTROPHILS # BLD AUTO: 5.3 10E3/UL (ref 1.6–8.3)
NEUTROPHILS NFR BLD AUTO: 69 %
NRBC # BLD AUTO: 0 10E3/UL
NRBC BLD AUTO-RTO: 0 /100
PLATELET # BLD AUTO: 192 10E3/UL (ref 150–450)
PLATELET # BLD AUTO: 222 10E3/UL (ref 150–450)
POTASSIUM BLD-SCNC: 4 MMOL/L (ref 3.4–5.3)
POTASSIUM BLD-SCNC: 4.1 MMOL/L (ref 3.5–5)
PROT SERPL-MCNC: 6.7 G/DL (ref 6–8)
RBC # BLD AUTO: 4.78 10E6/UL (ref 4.4–5.9)
RBC # BLD AUTO: 5.07 10E6/UL (ref 4.4–5.9)
SODIUM SERPL-SCNC: 139 MMOL/L (ref 133–144)
SODIUM SERPL-SCNC: 139 MMOL/L (ref 136–145)
SPECIMEN SOURCE: NORMAL
TROPONIN I SERPL-MCNC: 0.08 UG/L (ref 0–0.04)
TSH SERPL DL<=0.005 MIU/L-ACNC: 1.72 UIU/ML (ref 0.3–5)
WBC # BLD AUTO: 6.8 10E3/UL (ref 4–11)
WBC # BLD AUTO: 7.4 10E3/UL (ref 4–11)

## 2021-01-01 PROCEDURE — A6210 FOAM DRG >16<=48 SQ IN W/O B: HCPCS

## 2021-01-01 PROCEDURE — 96372 THER/PROPH/DIAG INJ SC/IM: CPT | Performed by: NURSE PRACTITIONER

## 2021-01-01 PROCEDURE — 99203 OFFICE O/P NEW LOW 30 MIN: CPT | Mod: 25 | Performed by: NURSE PRACTITIONER

## 2021-01-01 PROCEDURE — 99215 OFFICE O/P EST HI 40 MIN: CPT | Mod: 95 | Performed by: NURSE PRACTITIONER

## 2021-01-01 PROCEDURE — G0463 HOSPITAL OUTPT CLINIC VISIT: HCPCS

## 2021-01-01 PROCEDURE — 80048 BASIC METABOLIC PNL TOTAL CA: CPT | Performed by: EMERGENCY MEDICINE

## 2021-01-01 PROCEDURE — 99284 EMERGENCY DEPT VISIT MOD MDM: CPT | Performed by: EMERGENCY MEDICINE

## 2021-01-01 PROCEDURE — 73030 X-RAY EXAM OF SHOULDER: CPT | Mod: LT | Performed by: RADIOLOGY

## 2021-01-01 PROCEDURE — 83721 ASSAY OF BLOOD LIPOPROTEIN: CPT | Mod: GW | Performed by: FAMILY MEDICINE

## 2021-01-01 PROCEDURE — 80053 COMPREHEN METABOLIC PANEL: CPT | Mod: GW | Performed by: FAMILY MEDICINE

## 2021-01-01 PROCEDURE — 73090 X-RAY EXAM OF FOREARM: CPT | Mod: LT | Performed by: RADIOLOGY

## 2021-01-01 PROCEDURE — G0008 ADMIN INFLUENZA VIRUS VAC: HCPCS | Mod: GW | Performed by: FAMILY MEDICINE

## 2021-01-01 PROCEDURE — G0439 PPPS, SUBSEQ VISIT: HCPCS | Mod: GW | Performed by: FAMILY MEDICINE

## 2021-01-01 PROCEDURE — 20610 DRAIN/INJ JOINT/BURSA W/O US: CPT | Mod: LT | Performed by: FAMILY MEDICINE

## 2021-01-01 PROCEDURE — 72100 X-RAY EXAM L-S SPINE 2/3 VWS: CPT | Mod: GW | Performed by: RADIOLOGY

## 2021-01-01 PROCEDURE — 99213 OFFICE O/P EST LOW 20 MIN: CPT | Mod: 25 | Performed by: FAMILY MEDICINE

## 2021-01-01 PROCEDURE — 99214 OFFICE O/P EST MOD 30 MIN: CPT | Mod: GW | Performed by: FAMILY MEDICINE

## 2021-01-01 PROCEDURE — 84484 ASSAY OF TROPONIN QUANT: CPT | Performed by: EMERGENCY MEDICINE

## 2021-01-01 PROCEDURE — 93010 ELECTROCARDIOGRAM REPORT: CPT | Mod: GW | Performed by: EMERGENCY MEDICINE

## 2021-01-01 PROCEDURE — 99285 EMERGENCY DEPT VISIT HI MDM: CPT | Mod: 25 | Performed by: EMERGENCY MEDICINE

## 2021-01-01 PROCEDURE — 93005 ELECTROCARDIOGRAM TRACING: CPT | Performed by: EMERGENCY MEDICINE

## 2021-01-01 PROCEDURE — 90662 IIV NO PRSV INCREASED AG IM: CPT | Mod: GW | Performed by: FAMILY MEDICINE

## 2021-01-01 PROCEDURE — 85025 COMPLETE CBC W/AUTO DIFF WBC: CPT | Performed by: EMERGENCY MEDICINE

## 2021-01-01 PROCEDURE — 36415 COLL VENOUS BLD VENIPUNCTURE: CPT | Performed by: FAMILY MEDICINE

## 2021-01-01 PROCEDURE — 84443 ASSAY THYROID STIM HORMONE: CPT | Mod: GW | Performed by: FAMILY MEDICINE

## 2021-01-01 PROCEDURE — 85027 COMPLETE CBC AUTOMATED: CPT | Performed by: FAMILY MEDICINE

## 2021-01-01 PROCEDURE — 250N000013 HC RX MED GY IP 250 OP 250 PS 637: Performed by: EMERGENCY MEDICINE

## 2021-01-01 PROCEDURE — 73110 X-RAY EXAM OF WRIST: CPT | Mod: RT | Performed by: RADIOLOGY

## 2021-01-01 PROCEDURE — 36415 COLL VENOUS BLD VENIPUNCTURE: CPT | Performed by: EMERGENCY MEDICINE

## 2021-01-01 PROCEDURE — 99215 OFFICE O/P EST HI 40 MIN: CPT | Mod: GW | Performed by: NURSE PRACTITIONER

## 2021-01-01 PROCEDURE — A6211 FOAM DRG > 48 SQ IN W/O BRDR: HCPCS

## 2021-01-01 RX ORDER — LEVODOPA AND CARBIDOPA 95; 23.75 MG/1; MG/1
1 CAPSULE, EXTENDED RELEASE ORAL 2 TIMES DAILY
Qty: 60 CAPSULE | Refills: 11 | Status: SHIPPED | OUTPATIENT
Start: 2021-01-01

## 2021-01-01 RX ORDER — DULOXETIN HYDROCHLORIDE 20 MG/1
CAPSULE, DELAYED RELEASE ORAL
COMMUNITY
Start: 2021-01-01 | End: 2021-01-01

## 2021-01-01 RX ORDER — NYSTATIN 100000 U/G
CREAM TOPICAL
COMMUNITY
Start: 2021-01-01

## 2021-01-01 RX ORDER — LEVODOPA AND CARBIDOPA 95; 23.75 MG/1; MG/1
CAPSULE, EXTENDED RELEASE ORAL
COMMUNITY
Start: 2021-01-01 | End: 2021-01-01

## 2021-01-01 RX ORDER — CEPHALEXIN 500 MG/1
500 CAPSULE ORAL 4 TIMES DAILY
Qty: 28 CAPSULE | Refills: 0 | Status: SHIPPED | OUTPATIENT
Start: 2021-01-01 | End: 2021-01-01

## 2021-01-01 RX ORDER — FENTANYL 25 UG/1
1 PATCH TRANSDERMAL
COMMUNITY

## 2021-01-01 RX ORDER — TRAMADOL HYDROCHLORIDE 50 MG/1
50 TABLET ORAL ONCE
Status: COMPLETED | OUTPATIENT
Start: 2021-01-01 | End: 2021-01-01

## 2021-01-01 RX ORDER — TRIAMCINOLONE ACETONIDE 40 MG/ML
40 INJECTION, SUSPENSION INTRA-ARTICULAR; INTRAMUSCULAR ONCE
Status: COMPLETED | OUTPATIENT
Start: 2021-01-01 | End: 2021-01-01

## 2021-01-01 RX ORDER — CEFTRIAXONE SODIUM 1 G
1 VIAL (EA) INJECTION ONCE
Status: COMPLETED | OUTPATIENT
Start: 2021-01-01 | End: 2021-01-01

## 2021-01-01 RX ORDER — LEVOTHYROXINE SODIUM 137 UG/1
137 TABLET ORAL AT BEDTIME
Qty: 90 TABLET | Refills: 3 | Status: SHIPPED | OUTPATIENT
Start: 2021-01-01 | End: 2021-01-01

## 2021-01-01 RX ORDER — BISACODYL 5 MG
5 TABLET, DELAYED RELEASE (ENTERIC COATED) ORAL DAILY PRN
COMMUNITY

## 2021-01-01 RX ORDER — MORPHINE SULFATE 30 MG/1
5 TABLET ORAL EVERY 4 HOURS PRN
COMMUNITY

## 2021-01-01 RX ORDER — ATORVASTATIN CALCIUM 80 MG/1
80 TABLET, FILM COATED ORAL DAILY
Qty: 90 TABLET | Refills: 1 | Status: SHIPPED | OUTPATIENT
Start: 2021-01-01 | End: 2021-01-01

## 2021-01-01 RX ORDER — LEVOTHYROXINE SODIUM 137 UG/1
TABLET ORAL
Qty: 90 TABLET | Refills: 3
Start: 2021-01-01

## 2021-01-01 RX ORDER — ISOSORBIDE MONONITRATE 30 MG/1
30 TABLET, EXTENDED RELEASE ORAL DAILY
Qty: 30 TABLET | Refills: 0 | Status: SHIPPED | OUTPATIENT
Start: 2021-01-01 | End: 2021-01-01

## 2021-01-01 RX ADMIN — TRAMADOL HYDROCHLORIDE 50 MG: 50 TABLET, FILM COATED ORAL at 14:23

## 2021-01-01 RX ADMIN — Medication 1 G: at 10:37

## 2021-01-01 RX ADMIN — TRIAMCINOLONE ACETONIDE 40 MG: 40 INJECTION, SUSPENSION INTRA-ARTICULAR; INTRAMUSCULAR at 15:10

## 2021-01-01 SDOH — ECONOMIC STABILITY: INCOME INSECURITY: IN THE LAST 12 MONTHS, WAS THERE A TIME WHEN YOU WERE NOT ABLE TO PAY THE MORTGAGE OR RENT ON TIME?: NO

## 2021-01-01 SDOH — HEALTH STABILITY: PHYSICAL HEALTH: ON AVERAGE, HOW MANY MINUTES DO YOU ENGAGE IN EXERCISE AT THIS LEVEL?: 0 MIN

## 2021-01-01 SDOH — HEALTH STABILITY: MENTAL HEALTH: HOW OFTEN DO YOU HAVE A DRINK CONTAINING ALCOHOL?: NEVER

## 2021-01-01 SDOH — ECONOMIC STABILITY: FOOD INSECURITY: WITHIN THE PAST 12 MONTHS, THE FOOD YOU BOUGHT JUST DIDN'T LAST AND YOU DIDN'T HAVE MONEY TO GET MORE.: NEVER TRUE

## 2021-01-01 SDOH — ECONOMIC STABILITY: TRANSPORTATION INSECURITY
IN THE PAST 12 MONTHS, HAS LACK OF TRANSPORTATION KEPT YOU FROM MEETINGS, WORK, OR FROM GETTING THINGS NEEDED FOR DAILY LIVING?: NO

## 2021-01-01 SDOH — HEALTH STABILITY: PHYSICAL HEALTH: ON AVERAGE, HOW MANY DAYS PER WEEK DO YOU ENGAGE IN MODERATE TO STRENUOUS EXERCISE (LIKE A BRISK WALK)?: 0 DAYS

## 2021-01-01 SDOH — ECONOMIC STABILITY: FOOD INSECURITY: WITHIN THE PAST 12 MONTHS, YOU WORRIED THAT YOUR FOOD WOULD RUN OUT BEFORE YOU GOT MONEY TO BUY MORE.: NEVER TRUE

## 2021-01-01 SDOH — ECONOMIC STABILITY: TRANSPORTATION INSECURITY
IN THE PAST 12 MONTHS, HAS THE LACK OF TRANSPORTATION KEPT YOU FROM MEDICAL APPOINTMENTS OR FROM GETTING MEDICATIONS?: NO

## 2021-01-01 SDOH — ECONOMIC STABILITY: INCOME INSECURITY: HOW HARD IS IT FOR YOU TO PAY FOR THE VERY BASICS LIKE FOOD, HOUSING, MEDICAL CARE, AND HEATING?: NOT HARD AT ALL

## 2021-01-01 ASSESSMENT — PATIENT HEALTH QUESTIONNAIRE - PHQ9
SUM OF ALL RESPONSES TO PHQ QUESTIONS 1-9: 15
SUM OF ALL RESPONSES TO PHQ QUESTIONS 1-9: 15
SUM OF ALL RESPONSES TO PHQ QUESTIONS 1-9: 11
SUM OF ALL RESPONSES TO PHQ QUESTIONS 1-9: 15
SUM OF ALL RESPONSES TO PHQ QUESTIONS 1-9: 4
SUM OF ALL RESPONSES TO PHQ QUESTIONS 1-9: 11
10. IF YOU CHECKED OFF ANY PROBLEMS, HOW DIFFICULT HAVE THESE PROBLEMS MADE IT FOR YOU TO DO YOUR WORK, TAKE CARE OF THINGS AT HOME, OR GET ALONG WITH OTHER PEOPLE: EXTREMELY DIFFICULT

## 2021-01-01 ASSESSMENT — LIFESTYLE VARIABLES
HOW OFTEN DO YOU HAVE SIX OR MORE DRINKS ON ONE OCCASION: NEVER
HOW MANY STANDARD DRINKS CONTAINING ALCOHOL DO YOU HAVE ON A TYPICAL DAY: PATIENT DECLINED
HOW OFTEN DO YOU HAVE A DRINK CONTAINING ALCOHOL: NEVER

## 2021-01-01 ASSESSMENT — ANXIETY QUESTIONNAIRES
GAD7 TOTAL SCORE: 4
GAD7 TOTAL SCORE: 4

## 2021-01-01 ASSESSMENT — SOCIAL DETERMINANTS OF HEALTH (SDOH)
HOW OFTEN DO YOU GET TOGETHER WITH FRIENDS OR RELATIVES?: NEVER
HOW OFTEN DO YOU ATTEND CHURCH OR RELIGIOUS SERVICES?: NEVER
DO YOU BELONG TO ANY CLUBS OR ORGANIZATIONS SUCH AS CHURCH GROUPS UNIONS, FRATERNAL OR ATHLETIC GROUPS, OR SCHOOL GROUPS?: NO
IN A TYPICAL WEEK, HOW MANY TIMES DO YOU TALK ON THE PHONE WITH FAMILY, FRIENDS, OR NEIGHBORS?: TWICE A WEEK

## 2021-01-01 ASSESSMENT — MIFFLIN-ST. JEOR
SCORE: 1682.05
SCORE: 1682.05
SCORE: 1684.32
SCORE: 1682.05

## 2021-01-01 ASSESSMENT — ACTIVITIES OF DAILY LIVING (ADL)
CURRENT_FUNCTION: MEDICATION ADMINISTRATION REQUIRES ASSISTANCE
CURRENT_FUNCTION: SHOPPING REQUIRES ASSISTANCE
CURRENT_FUNCTION: MONEY MANAGEMENT REQUIRES ASSISTANCE
CURRENT_FUNCTION: LAUNDRY REQUIRES ASSISTANCE
CURRENT_FUNCTION: PREPARING MEALS REQUIRES ASSISTANCE
CURRENT_FUNCTION: TRANSPORTATION REQUIRES ASSISTANCE
CURRENT_FUNCTION: HOUSEWORK REQUIRES ASSISTANCE
CURRENT_FUNCTION: TELEPHONE REQUIRES ASSISTANCE
CURRENT_FUNCTION: BATHING REQUIRES ASSISTANCE

## 2021-01-05 ENCOUNTER — HOSPITAL ENCOUNTER (OUTPATIENT)
Dept: NEUROLOGY | Facility: CLINIC | Age: 71
Setting detail: THERAPIES SERIES
Discharge: STILL ADMITTED OR ADMINISTRATIVE | End: 2021-01-05
Attending: NURSE PRACTITIONER

## 2021-01-11 ENCOUNTER — COMMUNICATION - HEALTHEAST (OUTPATIENT)
Dept: NEUROLOGY | Facility: CLINIC | Age: 71
End: 2021-01-11

## 2021-01-11 DIAGNOSIS — F41.1 ANXIETY STATE: ICD-10-CM

## 2021-01-11 DIAGNOSIS — M54.40 CHRONIC LOW BACK PAIN WITH SCIATICA, SCIATICA LATERALITY UNSPECIFIED, UNSPECIFIED BACK PAIN LATERALITY: ICD-10-CM

## 2021-01-11 DIAGNOSIS — F32.1 MODERATE MAJOR DEPRESSION (H): ICD-10-CM

## 2021-01-11 DIAGNOSIS — G89.29 CHRONIC LOW BACK PAIN WITH SCIATICA, SCIATICA LATERALITY UNSPECIFIED, UNSPECIFIED BACK PAIN LATERALITY: ICD-10-CM

## 2021-01-13 ENCOUNTER — RECORDS - HEALTHEAST (OUTPATIENT)
Dept: ADMINISTRATIVE | Facility: OTHER | Age: 71
End: 2021-01-13

## 2021-01-13 ENCOUNTER — TRANSFERRED RECORDS (OUTPATIENT)
Dept: HEALTH INFORMATION MANAGEMENT | Facility: CLINIC | Age: 71
End: 2021-01-13

## 2021-01-15 ENCOUNTER — HOSPITAL ENCOUNTER (OUTPATIENT)
Dept: WOUND CARE | Facility: CLINIC | Age: 71
Discharge: HOME | End: 2021-01-15
Attending: FAMILY MEDICINE | Admitting: FAMILY MEDICINE
Payer: MEDICARE

## 2021-01-15 PROCEDURE — A6211 FOAM DRG > 48 SQ IN W/O BRDR: HCPCS

## 2021-01-15 PROCEDURE — G0463 HOSPITAL OUTPT CLINIC VISIT: HCPCS

## 2021-01-15 NOTE — DISCHARGE INSTRUCTIONS
Jayjay Wyman MD - [facility] Foot and Ankle Surgeon at [facility]  Appt. Info  [phone]

## 2021-01-18 NOTE — PROGRESS NOTES
Reason For Visit/Interval History: Silver Zamora, 70 year old male, seen as outpatient for follow-up for right plantar foot wound. Referred by Dr. Jackson/Dr. Washington. Patient presents with spouse today.    Today Luz Marina is asking about ordering a DH Walker to help with offloading.    From previous visit: writer did have some communication with primary care provider Dr. Washington regarding possibility of doing x-ray and inflammatory markers ESR and CRP to screen for osteomyelitis and at this time he would like to defer that decision to [facility].  Pt still hopes to avoid surgery if possible so did not intend to follow-up with podiatry unless necessary.  Spouse states he has been wearing his orthotic shoes a bit more around the house during the day as recommended.   Spouse can hear his feet sliding across floor as he shuffles when walking, in only stocking feet.  Spouse notes that the Aquacel Ag is not staying in wound.  Bandages staying in place better with current routine.    Patient established care in October with Dr. Almodovar, podiatry at [facility]. Per the visit summary that the spouse brought with her to a prior appointment, he had 3 x-rays of the right foot which had no evidence of osteomyelitis. His note also stated that he assessed the inserts and feels they are appropriate for offloading the area of the wound. He doesn't recommend a CAM boot because he feels it would increase patient's falls risk even more. Patient's spouse states Dr. Almodovar didn't mention need for surgery. He's been following monthly with Dr. Almodovar.    History: Wound has been present since around October of '19.  Sounds like may have started as a callus here.  He is followed by Dr. Markham in podiatry.  Reportedly Dr. Markham would like to do surgery to offload the area however pt does not want to do that due to concerns over COVID 19 pandemic.  They have been using Medihoney and a bandaide at  home.  Pt has diabetic shoes and custom inserts that he was fitted for at  [facility] in the past 6 months but is wearing some older diabetic shoes and inserts he get on [place].  Reportedly walks with no shoes at home and shuffles his feet when walking due to Parkinson's.  He was seen recently by his neurologist who recommended he see a wound care provider because he feels this wound is healable. Pt has history of partial amputations of several other toes both feet due to wounds where he subsequently developed osteomyelitis.  Most recently was a left second toe amputation in February, this is healed.  Pt has neuropathy, not certain of cause though does have Parkinson's as well as a history of back issues including cervical stenosis and cord compression myelopathy.    Other co-morbidities including anxiety and depression, ischemic cardiomyopathy, heart failure.    Personal/social history: Lives at home with spouse who assists him.  Prior homecare, this was discontinued    Objective:   Physical appearance: appears stated age  Current treatment plan: IoPlex, Mepilex foam secured with Medipore tape changed daily  Last changed: this morning    Wound #1 right plantar 1st MTP, bunion  1st and 2nd photos are from prev/post debridement initial visit          Date on photo with birth date taken [birth year]      Stage/tissue depth: full thickness  0.5 cm L x 0.8 cm W x 0.1-0.2 cm  Tunneling: no  Undermining: no  Wound bed type/amount: pink/red, clean but doesn't appear granular  Wound Edges: attached to new epithelium immediate to wound   Periwound: intact  Drainage: moderate serosanguineous; Ioplex is fully white  Odor: no  Pain: pt denies    1st toe has shifted laterally due to extensive bunion and second toe is turned upward at distal joint, 3rd and 4th toes have been amputated at distal joint.  Some blanchable erythema noted on 2nd toe from pressure of 1st toe.     Dorsalis Pedal Pulse: palpable: yes doppler:  monophasic  Posterior Tibial Pulse: palpable: no doppler: biphasic  Hair growth:   Capillary Refill: <2 seconds  Feet/toes color: pink  Nails: fungal great toes; did trim 2nd toe on left foot    Mobility: with 3 wheeled walker, shuffling gait  Current offloading/footwear: diabetic shoes and custom inserts, he is not diabetic so did pay out of pocket for these.    Sensation: has some neuropathy  HgbA1C: n/a   Other callousing/areas of concern: spouse Luz Marina asked for an assessment of the patient's L great toe. He recently saw Dr. Almodovar with [facility] who trimmed the medial corner of the nail. Today peeling skin noted and area with darker pink coloring, area measuring 0.5 cm x 1 cm. No drainage, no warmth noted. Patient also with an intact callus with no evidence of wound on plantar surface of this toe.     Diet: not assessed this visit  Smoking: not assessed this visit    Discussed: Although not healing quickly, improvement is noted from last visit. (prior visit) Patient saw foot  earlier this week who provided them with a felt pad to help with offloading. Spouse states this didn't stay in place. She's planning to order padded socks as patient still isn't wearing shoes as much as he should be. She's hoping the padding will help to offload some. Educated patient that staying off foot AND wearing his orthopedic shoes as much as possible with ambulating will result in faster healing time as pressure/friction combined with bony prominence are causative factors of wound.    Assessment:  Right plantar foot neuropathic ulceration, full thickness, with bony prominence caused by extensive bunion - continuing to slowly heal    Pt's gait pattern and walking with no shoes at home contributing factor to non-healing - inconsistent adherence to wearing orthotic shoes when ambulating in the home    Trialed felt offloading pad but didn't stay in place.    Bandaging regimen is appropriate for wound    Pt  "now with podiatry involvement and xray confirming no evidence of osteomyelitis.    Plan:    Topical care: Continue IoPlex foam in wound for filler and antimicrobial properties, Mepilex foam for cover bandage, and Medipore tape for retention with his shuffling.    Offloading: Offloading pt is complicated at he is prone to falling so anything heavy/rigid or causing differing shoe heights may cause him to fall and knee walker would not be possible.  Recommendation continues to be that patient stay off foot as much as possible while wearing orthotic shoes at all times when ambulating. Despite this, patient voices not wearing shoes often when ambulating because he doesn't feel as \"safe\" when wearing them.     Other offloading options concerning with patient's gait instability/shuffling (such as DH Walker). As Luz Marina asked about this again, will consult with Odessa Boyer [facility], [facility] about the company that patients have order from previously.     They both voice understanding that patient's wound progress is impacted by his not consistently wearing orthotic shoes with all ambulation.    Additional recommendations:  Continuing to follow with podiatry at [facility] (did give name for foot and ankle surgeon who is now seeing patients at [facility])    Wound Care: Wound cleansed with Vashe cleanser and gauze then bandaged as noted above.    Discussed plan of care with patient and spouse. Teaching done with spouse for dressing changes; spouse is able and willing to perform.    The following discharge instructions were reviewed with and sent home with the patient (prior visit, no change):  R Foot Dressing Change  Wash your hands before and after the dressing change. You may wear gloves if you choose. Gloves are available over the counter at [facility], [facility], [facility], etc.   Use scissors at home that you can designate solely for wound care and cleanse well with each use, (rubbing alcohol or alcohol pads work well for " this).     Wash hands.  Remove old dressing.  Wash hands.  1.) Cleanse wound with wound cleanser and gauze. Pat wound and surrounding skin dry with gauze.   2.) Cut a piece of IoPlex like a plug and insert into wound.    3.) Cut a piece of Mepilex Foam (the white or gray foam) to cover wound  4.) Secure with Kerlix (rolled gauze) and tape.  Change dressing every 1-2 days depending on drainage.   Remove dressing for showers and then perform the above cares.       Wear orthopedic shoes daily even when in the home from when you get up until you go to bed.   Elevate leg and stay off of foot as much as you're able.     Follow up Thursday, September 28th at 2 pm.     Signs and symptoms of infection:  Bright green drainage  Foul odor  Increasing pain in area  New redness or swelling at the site of the wound or streaks up from wound  New warmth to touch around wound accompanied by redness and swelling  Fever     Need to be seen as soon as possible for infection concerns.     Please call with any questions or concerns.    The following supplies were sent home with the patient:   Remainder of Mepilex Ag 8x8 and 1 additional;  remainder of roll of Medipore tape    Return visit: 5 weeks    Verbal & demonstrative education provided.  Face to face time: approximately 60 minutes.    Virgie Smith RN, CWOCN   [phone]

## 2021-01-27 ENCOUNTER — COMMUNICATION - HEALTHEAST (OUTPATIENT)
Dept: ADMINISTRATIVE | Facility: CLINIC | Age: 71
End: 2021-01-27

## 2021-01-27 ENCOUNTER — COMMUNICATION - HEALTHEAST (OUTPATIENT)
Dept: NEUROLOGY | Facility: CLINIC | Age: 71
End: 2021-01-27

## 2021-01-29 ENCOUNTER — COMMUNICATION - HEALTHEAST (OUTPATIENT)
Dept: CARDIOLOGY | Facility: CLINIC | Age: 71
End: 2021-01-29

## 2021-02-17 NOTE — PROGRESS NOTES
Reason For Visit/Interval History: Silver Zamora, 71 year old male, seen as outpatient for follow-up for right plantar foot wound. Referred by Dr. Jackson/Dr. Washington. Patient presents with spouse today.    They are scheduled for follow up with Dr. Almodovar ([facility]) for next week. Had to cancel last follow up because of extreme cold/wind chill. Patient's spouse states Silver hasn't been walking as much, has been resting more in bed. They contacted the new foot and ankle surgeon for [facility] who is now seeing patients at [facility] but that provider isn't a podiatrist so can't see the patient (per spouse's report).    From previous visit: writer did have some communication with primary care provider Dr. Washington regarding possibility of doing x-ray and inflammatory markers ESR and CRP to screen for osteomyelitis and at this time he would like to defer that decision to [facility].  Pt still hopes to avoid surgery if possible so did not intend to follow-up with podiatry unless necessary.  Spouse states he has been wearing his orthotic shoes a bit more around the house during the day as recommended.   Spouse can hear his feet sliding across floor as he shuffles when walking, in only stocking feet.  Spouse notes that the Aquacel Ag is not staying in wound.  Bandages staying in place better with current routine.    Patient established care in October with Dr. Almodovar, podiatry at [facility] [place] location. Per the visit summary that the spouse brought with her to a prior appointment, he had 3 x-rays of the right foot which had no evidence of osteomyelitis. His note also stated that he assessed the inserts and feels they are appropriate for offloading the area of the wound. He doesn't recommend a CAM boot because he feels it would increase patient's falls risk even more. Patient's spouse states Dr. Almodovar didn't mention need for surgery. He's been following  monthly with Dr. Almodovar.    History from Initial Visit this Episode of Care: Wound has been present since around October of '19.  Sounds like may have started as a callus here.  He is followed by Dr. Markham in podiatry.  Reportedly Dr. Markham would like to do surgery to offload the area however pt does not want to do that due to concerns over COVID 19 pandemic.  They have been using Medihoney and a bandaide at home.  Pt has diabetic shoes and custom inserts that he was fitted for at  [facility] in the past 6 months but is wearing some older diabetic shoes and inserts he get on [place].  Reportedly walks with no shoes at home and shuffles his feet when walking due to Parkinson's.  He was seen recently by his neurologist who recommended he see a wound care provider because he feels this wound is healable. Pt has history of partial amputations of several other toes both feet due to wounds where he subsequently developed osteomyelitis.  Most recently was a left second toe amputation in February, this is healed.  Pt has neuropathy, not certain of cause though does have Parkinson's as well as a history of back issues including cervical stenosis and cord compression myelopathy.    Other co-morbidities including anxiety and depression, ischemic cardiomyopathy, heart failure.    Personal/social history: Lives at home with spouse who assists him.  Prior homecare, this was discontinued    Objective:   Physical appearance: appears stated age  Current treatment plan: IoPlex, Mepilex foam secured with Medipore tape changed every other day  Last changed: this morning    Wound #1 right plantar 1st MTP, bunion  1st and 2nd photos are from prev/post debridement initial visit          Date on photo with birth date taken [birth year]      Stage/tissue depth: full thickness  0.4 cm L x 0.5 cm W x 0.1 to 0.5 cm (depth of 0.5 cm to callus surface; wound bed itself compared to immediate periwound is 0.1 cm)  Tunneling: no  Undermining: no  Wound  bed type/amount: red, clean but doesn't appear granular  Wound Edges: attached to new epithelium immediate to wound   Periwound: intact, small amount of callus  Drainage: moderate serosanguineous; Ioplex is fully white  Odor: no  Pain: pt denies    1st toe has shifted laterally due to extensive bunion and second toe is turned upward at distal joint, 3rd and 4th toes have been amputated at distal joint.  Some blanchable erythema noted on 2nd toe from pressure of 1st toe. They have been using a silicone pad over this area but concern is that it's adding pressure vs relieving it. Erythema measures 1 cm x 2 cm. Central area measuring 0.1 cm x 0.2 cm of brown which appears to be just below the surface.    Dorsalis Pedal Pulse: palpable: yes doppler: monophasic  Posterior Tibial Pulse: palpable: no doppler: biphasic  Hair growth:   Capillary Refill: <2 seconds  Feet/toes color: pink  Nails: fungal great toes; did trim 2nd toe on left foot    Mobility: with 3 wheeled walker, shuffling gait  Current offloading/footwear: diabetic shoes and custom inserts, he is not diabetic so did pay out of pocket for these.    Sensation: has some neuropathy  HgbA1C: n/a   Other callousing/areas of concern: (Prior visit) spouse Luz Marina asked for an assessment of the patient's L great toe. He recently saw Dr. Almodovar with [facility] who trimmed the medial corner of the nail. Today peeling skin noted and area with darker pink coloring, area measuring 0.5 cm x 1 cm. No drainage, no warmth noted. Patient also with an intact callus with no evidence of wound on plantar surface of this toe.     Diet: not assessed this visit  Smoking: not assessed this visit    Discussed: plan of care with rationale (prior visit) Patient saw foot  earlier this week who provided them with a felt pad to help with offloading. Spouse states this didn't stay in place. She's planning to order padded socks as patient still isn't wearing shoes as  "much as he should be. She's hoping the padding will help to offload some. Educated patient that staying off foot AND wearing his orthopedic shoes as much as possible with ambulating will result in faster healing time as pressure/friction combined with bony prominence are causative factors of wound.    Education:  That decrease in ambulation recently is most likely reason for improvement in wound over last 5 weeks; (prior visit) Educated patient that staying off foot AND wearing his orthopedic shoes as much as possible with ambulating will result in faster healing time as pressure/friction combined with bony prominence are causative factors of wound.    Assessment:  Right plantar foot neuropathic ulceration, full thickness, with bony prominence caused by extensive bunion - continuing to slowly heal    Pt's gait pattern and walking with no shoes at home contributing factor to non-healing - inconsistent adherence to wearing orthotic shoes when ambulating in the home. With uneven gait, not recommending offloading shoe such as DH Walker as concern about patient falling. Patient has been ambulating less recently.    Trialed felt offloading pad but didn't stay in place.    Bandaging regimen is appropriate for wound    Pt now with podiatry involvement and xray confirming no evidence of osteomyelitis.    Plan:    Topical care: Continue IoPlex foam in wound for filler and antimicrobial properties, Mepilex foam for cover bandage, and Medipore tape for retention with his shuffling.    Offloading: Offloading pt is complicated at he is prone to falling so anything heavy/rigid or causing differing shoe heights may cause him to fall and knee walker would not be possible.  Recommendation continues to be that patient stay off foot as much as possible while wearing orthotic shoes at all times when ambulating. Despite this, patient voices not wearing shoes often when ambulating because he doesn't feel as \"safe\" when wearing them. "     (prior visit) They both voice understanding that patient's wound progress is impacted by his not consistently wearing orthotic shoes with all ambulation.    Additional recommendations:  Continuing to follow with podiatry at [facility]    Wound Care: Wound cleansed with Vashe cleanser and gauze then bandaged as noted above.    Discussed plan of care with patient and spouse. Teaching done with spouse for dressing changes; spouse is able and willing to perform.    The following discharge instructions were reviewed with and sent home with the patient (prior visit, no change):  R Foot Dressing Change  Wash your hands before and after the dressing change. You may wear gloves if you choose. Gloves are available over the counter at [facility], [facility], [facility], etc.   Use scissors at home that you can designate solely for wound care and cleanse well with each use, (rubbing alcohol or alcohol pads work well for this).     Wash hands.  Remove old dressing.  Wash hands.  1.) Cleanse wound with wound cleanser and gauze. Pat wound and surrounding skin dry with gauze.   2.) Cut a piece of IoPlex like a plug and insert into wound.    3.) Cut a piece of Mepilex Foam (the white or gray foam) to cover wound  4.) Secure with Kerlix (rolled gauze) and tape.  Change dressing every 1-2 days depending on drainage.   Remove dressing for showers and then perform the above cares.       Wear orthopedic shoes daily even when in the home from when you get up until you go to bed.   Elevate leg and stay off of foot as much as you're able.     Follow up Thursday, September 28th at 2 pm.     Signs and symptoms of infection:  Bright green drainage  Foul odor  Increasing pain in area  New redness or swelling at the site of the wound or streaks up from wound  New warmth to touch around wound accompanied by redness and swelling  Fever     Need to be seen as soon as possible for infection concerns.     Please call with any questions or  concerns.    The following supplies were sent home with the patient:  Remainder of Mepilex Ag 8x8;  remainder of roll of Medipore tape; remainder of Ioplex    Will order tape through [facility]    Return visit: 6 weeks    Verbal, written (prior visit) & demonstrative education provided.  Face to face time: approximately 30 minutes.    Virgie Smith RN, CWOCN   [phone]

## 2021-02-23 NOTE — PATIENT INSTRUCTIONS
Keflex sent to the pharmacy for symptoms    Follow up with ortho as planned tomorrow    Follow up if symptoms do not improve or worsen.

## 2021-02-23 NOTE — PROGRESS NOTES
"    Assessment & Plan     Cellulitis of toe of right foot  No acute distress.  With significant symptoms will do 1 g of Rocephin and start Keflex.  Has follow up with ortho scheduled tomorrow for follow up of chronic non-healing ulcer, follow up sooner with any worsening symptoms.   - cefTRIAXone (ROCEPHIN) injection 1 g  - cephALEXin (KEFLEX) 500 MG capsule; Take 1 capsule (500 mg) by mouth 4 times daily for 7 days      Return in about 1 week (around 3/2/2021), or if symptoms worsen or fail to improve.    KASHIF Mathew CNP  [facility]    Jessica Sheppard is a 71 year old who presents for the following health issues     HPI     Concern - ulcer   Onset: 2/17/2021  Description: redness and swelling right 1st toe   Intensity: moderate  Progression of Symptoms:  worsening  Accompanying Signs & Symptoms: states has sharp shooting pain were ulcer is   Previous history of similar problem: yes ulcer on bottom of right foot for 1 year   Precipitating factors:        Worsened by: touch   Alleviating factors:        Improved by: none   Therapies tried and outcome: iodine  Seeing wound care and ortho.     Stubbed right great toe on walker-started looking red after  Followed by [facility] at [facility] in podiatry    Review of Systems   Constitutional, HEENT, cardiovascular, pulmonary, gi and gu systems are negative, except as otherwise noted.      Objective    /62 (Cuff Size: Adult Regular)   Pulse 88   Temp 97.8  F (36.6  C) (Tympanic)   Resp 16   Ht 1.778 m (5' 10\")   Wt 92.1 kg (203 lb)   BMI 29.13 kg/m    Body mass index is 29.13 kg/m .  Physical Exam   GENERAL: healthy, alert and no distress  SKIN: significant erythema and swelling present right great toe  PSYCH: mentation appears normal, affect normal/bright          "

## 2021-04-01 NOTE — PROGRESS NOTES
Reason For Visit/Interval History: Silver Zamora, 71 year old male, seen as outpatient for follow-up for right plantar foot wound. Referred by Dr. Jackson/Dr. Washington. Patient presents with spouse today.    They continue to follow with Dr. Almodovar at [facility].    From previous visit: writer did have some communication with primary care provider Dr. Washington regarding possibility of doing x-ray and inflammatory markers ESR and CRP to screen for osteomyelitis and at this time he would like to defer that decision to [facility].  Pt still hopes to avoid surgery if possible so did not intend to follow-up with podiatry unless necessary.  Spouse states he has been wearing his orthotic shoes a bit more around the house during the day as recommended.   Spouse can hear his feet sliding across floor as he shuffles when walking, in only stocking feet.  Spouse notes that the Aquacel Ag is not staying in wound.  Bandages staying in place better with current routine.    Patient established care in October with Dr. Almodovar, podiatry at [facility]. Per the visit summary that the spouse brought with her to a prior appointment, he had 3 x-rays of the right foot which had no evidence of osteomyelitis. His note also stated that he assessed the inserts and feels they are appropriate for offloading the area of the wound. He doesn't recommend a CAM boot because he feels it would increase patient's falls risk even more. Patient's spouse states Dr. Almodovar didn't mention need for surgery. He's been following monthly with Dr. Almodovar.    History from Initial Visit this Episode of Care: Wound has been present since around October of '19.  Sounds like may have started as a callus here.  He is followed by Dr. Markham in podiatry.  Reportedly Dr. Markham would like to do surgery to offload the area however pt does not want to do that due to concerns over COVID 19 pandemic.  They have  been using Medihoney and a bandaide at home.  Pt has diabetic shoes and custom inserts that he was fitted for at  [facility] in the past 6 months but is wearing some older diabetic shoes and inserts he get on [place].  Reportedly walks with no shoes at home and shuffles his feet when walking due to Parkinson's.  He was seen recently by his neurologist who recommended he see a wound care provider because he feels this wound is healable. Pt has history of partial amputations of several other toes both feet due to wounds where he subsequently developed osteomyelitis.  Most recently was a left second toe amputation in February, this is healed.  Pt has neuropathy, not certain of cause though does have Parkinson's as well as a history of back issues including cervical stenosis and cord compression myelopathy.    Other co-morbidities including anxiety and depression, ischemic cardiomyopathy, heart failure.    Personal/social history: Lives at home with spouse who assists him.  Prior homecare, this was discontinued    Objective:   Physical appearance: appears stated age  Current treatment plan: IoPlex, Mepilex foam secured with Medipore tape changed every other day  Last changed: bandaid applied this morning since he was coming in for this appointment    Wound #1 right plantar 1st MTP, bunion  1st and 2nd photos are from prev/post debridement initial visit          Date on photo with birth date taken [birth year]      Stage/tissue depth: no wound noted; 100% intact epithelium with 1 area that has healed in a divot measuring 0.2 cm x 0.1-0.2 cm x 0.1 cm and another area medial to this that appears to have a very thin layer of epithelium over it (pink/red colored) measuring 0.4 cm x 0.4 cm x 0.1 cm  Tunneling: no  Undermining: no  Periwound: intact, small amount of callus  Drainage: none  Odor: no  Pain: pt denies    1st toe has shifted laterally due to extensive bunion and second toe is turned upward at distal joint, 3rd and 4th  "toes have been amputated at distal joint.    Spouse asked this writer to assess left great toe which appears \"red\" (blanchable); patient stated he stubbed it this morning; they will assess it to make sure it improves    Dorsalis Pedal Pulse: palpable: yes doppler: monophasic  Posterior Tibial Pulse: palpable: no doppler: biphasic  Hair growth:   Capillary Refill: <2 seconds  Feet/toes color: pink  Nails: fungal great toes; has them trimmed every 6 weeks    Mobility: with 3 wheeled walker, shuffling gait  Current offloading/footwear: diabetic shoes and custom inserts, he is not diabetic so did pay out of pocket for these.    Sensation: has some neuropathy  HgbA1C: n/a   Other callousing/areas of concern: (Prior visit) spouse Luz Marina asked for an assessment of the patient's L great toe. He recently saw Dr. Almodovar with [facility] who trimmed the medial corner of the nail. Today peeling skin noted and area with darker pink coloring, area measuring 0.5 cm x 1 cm. No drainage, no warmth noted. Patient also with an intact callus with no evidence of wound on plantar surface of this toe.     Diet: not assessed this visit  Smoking: not assessed this visit    Discussed: plan of care with rationale (prior visit) Patient saw foot  earlier this week who provided them with a felt pad to help with offloading. Spouse states this didn't stay in place. She's planning to order padded socks as patient still isn't wearing shoes as much as he should be. She's hoping the padding will help to offload some. Educated patient that staying off foot AND wearing his orthopedic shoes as much as possible with ambulating will result in faster healing time as pressure/friction combined with bony prominence are causative factors of wound.    Education:  That decrease in ambulation recently is most likely reason for wound healing; (prior visit) Educated patient that staying off foot AND wearing his orthopedic shoes as much as " possible with ambulating will result in faster healing time as pressure/friction combined with bony prominence are causative factors of wound.    Assessment:  Right plantar foot neuropathic ulceration, healed with 1 area that appears very newly epithelialized    Plan:    Topical care: To keep area protected while epithelial layers thicken, will apply Aquaphor to area followed by Mepilex Ag secured with Medipore tape. To perform these cares every 2-3 days.    Additional recommendations:  Continuing to follow with podiatry at [facility]  Follow up with orthotist as shoes/inserts are now >1 year    Wound Care: Area cleansed with Vashe cleanser and gauze then bandaged as noted above.    Discussed plan of care with patient and spouse. Teaching done with spouse for dressing changes; spouse is able and willing to perform.    The following discharge instructions were reviewed with and sent home with the patient (prior visit):  See prior visit AVS    The following supplies were sent home with the patient:  Remainder of Mepilex Ag 8x8;  remainder of roll of Medipore tape; remainder of tube of Aquaphor    Return visit: not planned    Verbal, written (prior visit) & demonstrative education provided.  Face to face time: approximately 30 minutes.    Virgie Smith RN, CWOCN   [phone]

## 2021-04-29 NOTE — TELEPHONE ENCOUNTER
We received an order today for Rytary 61. #240 with directions 'take 1 capsule by mouth two times a day and 2 capsules two times a day.' Can we get clarification on these directions either by phone [phone] or you can send a new order to [facility]. Thanks, Susy

## 2021-05-04 NOTE — TELEPHONE ENCOUNTER
Hello,     I spoke with Candis from the clinic regarding clarification on the directions for Rytary 61.25-245mg capsule rx sent on 4-29-21. We discussed SIG should read take 1 cap PO BID AND take 2 caps PO BID, 180 caps with 6 refills. After we hung up, I realized patient most recently filled a different strength of 23.75-95mg on 4-29-21 at 1 cap PO BID. Is he to be taking BOTH strengths of medications using the above directions?     Thank you,  Gricelda Boyer, Pharm.D.  [place] Pharmacist [facility] [phone]  [facility]

## 2021-05-26 NOTE — PATIENT INSTRUCTIONS - HE
Silver Zamora,    It was a pleasure to see you today at the [facility].     My recommendations after this visit include:  - Please follow up with Dr Barraza April 23   - Please follow up with Kelli Webb in 3 months  - No changes to your medications    Kelli Webb, CNP

## 2021-05-27 NOTE — TELEPHONE ENCOUNTER
This RN spoke with Luz Marina. The Tiaraori came back approved from medicare today. She is still unsure if she wants him to try this now. She will speak with Silver and then update us.

## 2021-05-27 NOTE — TELEPHONE ENCOUNTER
Silver called today to give an update after 3 days on the Gocvori. He reports his off times are better and shorter. He has been moving well and feeling better. He does notice some blurry vision and feeling a little off balance mainly upon sitting to standing. Advised Silver to pause for a few minutes before going from lying, sitting, standing. He will also take his blood pressure tonight before dinner, at bedtime, and tomorrow morning. Advised him to make sure he is drinking enough fluids and to call us with an update tomorrow of the records of blood pressure.

## 2021-05-27 NOTE — PROGRESS NOTES
"Stephie KIRAN [facility] Care Coordination (CCC) Care Guide (CG), consulted with [facility] RN regarding transfer of patient's clinical goals to ownership by RN. Goal has been updated as \"RN GOAL.\" CG will no longer discuss clinical goals with patient at outreach and [facility] RN has been made aware of next recommended outreach for this patient's clinical goals.    Next recommended RN outreach: as soon as able  Recommended follow up items: CHF coaching with wife Morales (only remaining goal)    Stephie KIRAN [facility] Care Coordination (CCC) Care Guide (CG), spoke briefly with morales who was driving. Cg offered to leave  with information. CG updated Morales that at this time [facility] has provided all respite/day program resources available and that this goal will be considered complete. Patient's only remaining goal is health coaching on CHF. CG updated morales that going forward RN will conduct outreach on this and that CG will connect back when patient is ready for graduation.     CG Next outreach: By delegation only                            "
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
## 2021-05-27 NOTE — TELEPHONE ENCOUNTER
Luz Marina called to give an update. She reports Silver's BP on Friday night was 132/74 while sitting, standing 109/69. He did fall that night while putting his shoes and jacket on at their other house so she was unable to get the BP then. No injury noted. Saturday BP's were good, no complaints. Sunday he fell again. She was able to get a BP after the fall, 105/55. No injury noted. Sunday night BP [id number]. Per Kelli Walker FNP,advised to continue with the one capsule of Gocovri, push fluids, and wear compression socks. She will continue to monitor his BP and call with any concerns. They do have an appt on 4/22/19.

## 2021-05-27 NOTE — TELEPHONE ENCOUNTER
Luz Marina called today to inform they received the Gocovri and will start it tonight. She also requested the PT order be faxed to [facility]. Faxed order to [facility] and advised Luz Marina to give us a call with any concerns/issues with Gocovri. FYI only.

## 2021-05-27 NOTE — TELEPHONE ENCOUNTER
Silver and Luz Marina called regarding his blood pressures. He said last night it was 134/77 and standing 115/72. Today he was 132/74 sitting and standing 109/69 and 111/66

## 2021-05-28 NOTE — TELEPHONE ENCOUNTER
Wife called stating that since starting the GoCoveri he has not been doing very well.  He's been having increased restless leg syndrome, the last 2-3 weeks has slowed down, freezing.  Wife would like a call back on what they should do about medication and if he should stay on it.

## 2021-05-28 NOTE — PROGRESS NOTES
"Assessment:     1. Parkinson's disease  2. Heart failure with reduced EF/CAD  3. Anxiety  4. Back pain      Plan:       Plan:         8 am   12  pm   4 pm   8 pm    HS               Sinemet ER    25/100          1 tab    Nuplazid   34 mg          1 tab    Rytary      4 caps    6 cap   6 cap    4 cap          Gocovery               2 tabs                           Patient returns to the concussion clinic for follow-up appointment, he was last seen by me on 3/12/19, where I adjusted the patient's Rytary.  Patient looks really good in office today he reports that he is no longer having \"frozen feet\" the patient has been experiencing some dizziness since starting the medication.  He has been monitoring blood pressures and that they seem slightly high.  Emotionally he reports he is \"not that bad\", apparently they are having a lot of family issues and stress with her grandson, their daughter and 2 children have moved in with his wife.  The patient is reporting increased constipation, when patient is constipated it makes him need to urinate more often.  I made some suggestions to help with constipation.  Overall I would really want patient to be moving, he starts big and loud next week.  Patient longer has facial masking, no rigidity or stiffness noted.  Patient's cognition continues to be incredible.  We will continue with current therapies, since stress does affect Parkinson's, I would like to see how the patient does after stress is reduced.  He continues to take only 1 tablet of the Gocovri, from Parkinson's standpoint the patient looks really good today.  Patient also will be seen by cardiologist to make sure lightheadedness is not due to an heart issue.    Subjective:        Parkinson's history: Patient is right-handed.  He was diagnosed with Parkinson's disease in 2004 at the age of about 54.  He feels that the symptoms may have started in about 1994.  Symptoms started with stiffness, slowness, soft speech, walking " difficulties, and left hand tremor which spread to the right and about 2015.  Has seen Dr. Suazo in the past.  He has some bruising and tremors occasionally but there is asymmetry.  He tried Sinemet CR during the daytime and at night which did not work well.  He is been off the pramipexole since 12/2015 due to memory and behavioral concerns.  December 2010 he was taking Sinemet IR 25/100 and increased to 25/250 with a good response which also improved his back pain.  He was able to be more active.  In 2011 he was evaluated at the [facility] by , he was noted to have U PDR S score of 41 after all Parkinson's medications.  It was suggested that he take carbidopa levodopa every 3 hours.  Amantadine was discontinued because he did not have dyskinesia.  It was suggested that he switch his selegiline to Azilect or discontinue all MAO inhibitors.  It is  felt that his increased Sinemet may have exasperated his restless leg syndrome.  Gabapentin was started to control his restless leg symptoms.  He was followed by Dr. Suazo from 2002 - 2011.  He also saw Dr. Zuniga and a previous nurse practitioner at this clinic.  He did transfer care to  in 2013.  He has tried amantadine and selegiline in the past.  He is not able to tolerate Requip, which he tried for several years at a relatively high dose but discontinued due to edema in lower extremities, he also tried Mirapex in 2015 but this caused more freezing and increased tremor.  CAT scan in July 2013 showed absent radiotracer in the putamen with diminished accumulation in the caudate, most consistent with Parkinson's disease.  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     Patient Active Problem List    Diagnosis Date Noted     Cervical stenosis of spinal canal 02/07/2019     Ischemic cardiomyopathy 12/14/2018     Heart failure with reduced ejection fraction (H) 12/14/2018     Coronary artery disease involving  native coronary artery of native heart with angina pectoris (H)      Abnormal nuclear stress test 10/25/2018     Abnormal echocardiogram 05/29/2018     Leukocytosis, unspecified type      Cholecystitis 05/23/2018     Choledocholithiasis 05/23/2018     Calculus of gallbladder with biliary obstruction but without cholecystitis      Weakness      Low vitamin D level 10/11/2017     RBD (REM behavioral disorder) 04/03/2017     Pancreatitis 12/31/2015     Parkinson disease (H) 07/30/2014     Back pain 07/30/2014     Depression 07/30/2014     Anxiety state, unspecified 07/30/2014     Hypothyroidism 07/30/2014     Past Medical History:   Diagnosis Date     Anxiety      Callus      Cardiomyopathy (H)      Chronic back pain      Delirium      Depression      Fall      GERD (gastroesophageal reflux disease)      Hypothyroidism      Pancreatitis 12/31/2015     PD (Parkinson's disease) (H)      RBD (REM behavioral disorder) 4/3/2017     Shingles     hx     Past Surgical History:   Procedure Laterality Date     CV CORONARY ANGIOGRAM N/A 10/31/2018    Procedure: Coronary Angiogram;  Surgeon: Jose Meyer MD;  Location: [facility];  Service:      CV LEFT HEART CATHETERIZATION WO LEFT VETRICULOGRAM Left 10/31/2018    Procedure: Left Heart Catheterization Without Left Ventriculogram;  Surgeon: Jose Meyer MD;  Location: [facility];  Service:      INGUINAL HERNIA REPAIR Bilateral 10/2013     LAMINECTOMY  10/2013     MA ERCP REMOVE CALCULI/DEBRIS BILIARY/PANCREAS DUCT N/A 5/25/2018    Procedure: ENDOSCOPIC RETROGRADE CHOLANGIOPANCREATOGRAPHY SPINCTEROTOMY BILIARY STONE EXTRACTION;  Surgeon: Curtis Mcclain MD;  Location: [facility] OR;  Service: Gastroenterology     MA LAP,CHOLECYSTECTOMY/GRAPH N/A 5/24/2018    Procedure: LAPAROSCOPIC CHOLECYSTECTOMY;  Surgeon: Joyce Melissa MD;  Location: [facility] OR;  Service: General     Family History   Problem Relation Age of Onset     Kidney failure  Mother      Heart disease Father 82     Tremor Father      Parkinsonism Paternal Grandfather      Tremor Sister      Leukemia Maternal Grandmother      Current Outpatient Medications   Medication Sig Dispense Refill     acetaminophen (TYLENOL) 325 MG tablet Take 2 tablets (650 mg total) by mouth every 4 (four) hours as needed.  0     amantadine HCl 137 mg Cp24 Take 2 capsules by mouth at bedtime. 60 capsule 6     ammonium lactate (AMLACTIN) 12 % cream Apply two times a day 385 g 2     aspirin 81 mg chewable tablet Chew 1 tablet (81 mg total) daily.  0     atorvastatin (LIPITOR) 80 MG tablet Take 1 tablet (80 mg total) by mouth daily. 90 tablet 3     bisacodyl (DULCOLAX) 10 mg suppository One supp IA qday prn constipation.  Give if no BM in prior 3 days.  0     carbidopa-levodopa (PARCOPA)  mg per disintegrating tablet Take 1 tablet by mouth as needed.       carbidopa-levodopa (RYTARY) 23.75-95 mg CpER ER capsule Take 4 capsules by mouth daily. Take 4 caps at 8am, 6 caps at 12pm, and 6 caps at 4pm and 4 caps at 8pm 600 capsule 3     carbidopa-levodopa (SINEMET CR)  mg ER tablet Take 1 tablet by mouth 2 (two) times a day. In am and 2pm (Patient taking differently: Take 1 tablet by mouth at bedtime. In am and 2pm      ) 60 tablet 3     cholecalciferol, vitamin D3, (VITAMIN D3) 2,000 unit Tab Take 1 tablet (2,000 Units total) by mouth daily.  0     gabapentin (NEURONTIN) 300 MG capsule Take 600 mg by mouth at bedtime.             gabapentin (NEURONTIN) 300 MG capsule Take 300 mg by mouth daily.              levothyroxine (SYNTHROID, LEVOTHROID) 137 MCG tablet Take 1 tablet (137 mcg total) by mouth at bedtime.       metoprolol succinate (TOPROL-XL) 25 MG Take 12.5 mg by mouth at bedtime.       nitroglycerin (NITROSTAT) 0.4 MG SL tablet Place 1 tablet (0.4 mg total) under the tongue every 5 (five) minutes as needed for chest pain. 15 tablet 1     omeprazole (PRILOSEC) 20 MG capsule Take 1 capsule (20 mg total)  by mouth daily. (Patient taking differently: Take 20 mg by mouth daily as needed .      ) 90 capsule 3     pimavanserin (NUPLAZID) 34 mg cap capsule Take 1 capsule (34 mg total) by mouth at bedtime. 30 capsule 5     polyethylene glycol (MIRALAX) 17 gram packet Take 17 g by mouth daily.             senna-docusate (SENNOSIDES-DOCUSATE SODIUM) 8.6-50 mg tablet Take 1 tablet by mouth 2 (two) times a day.             ticagrelor (BRILINTA) 90 mg Tab Take 1 tablet (90 mg total) by mouth 2 (two) times a day. 60 tablet 11     venlafaxine (EFFEXOR-XR) 150 MG 24 hr capsule Take 1 capsule (150 mg total) by mouth daily. 75 mg + 150 mg  = 225 mg 90 capsule 2     venlafaxine (EFFEXOR-XR) 75 MG 24 hr capsule Take 1 capsule (75 mg total) by mouth daily. 75 mg + 150 mg = 225 mg 90 capsule 2     No current facility-administered medications for this encounter.        Allergies   Allergen Reactions     Clonazepam      Too drowsy     Social History     Socioeconomic History     Marital status:      Spouse name: Not on file     Number of children: Not on file     Years of education: Not on file     Highest education level: Not on file   Occupational History     Not on file   Social Needs     Financial resource strain: Not on file     Food insecurity:     Worry: Not on file     Inability: Not on file     Transportation needs:     Medical: Not on file     Non-medical: Not on file   Tobacco Use     Smoking status: Former Smoker     Types: Cigarettes     Last attempt to quit: 1980     Years since quittin.3     Smokeless tobacco: Never Used   Substance and Sexual Activity     Alcohol use: No     Comment: one drink a week     Drug use: No     Sexual activity: Not on file   Lifestyle     Physical activity:     Days per week: Not on file     Minutes per session: Not on file     Stress: Not on file   Relationships     Social connections:     Talks on phone: Not on file     Gets together: Not on file     Attends Faith service:  Not on file     Active member of club or organization: Not on file     Attends meetings of clubs or organizations: Not on file     Relationship status: Not on file     Intimate partner violence:     Fear of current or ex partner: Not on file     Emotionally abused: Not on file     Physically abused: Not on file     Forced sexual activity: Not on file   Other Topics Concern     Not on file   Social History Narrative     Not on file       The following portions of the patient's history were reviewed and updated as appropriate: allergies, current medications, past family history, past medical history, past social history, past surgical history and problem list.    Review of Systems        PD:   see above  RLS-   on Neurontin 300 mg BID   ADL - Independent with all cares, reports only needs assistance when in a hurry, lives at home with wife. Independent with eating, dressing, and medication set up. Wife is currently doing daily finances, he does handle all long term finances.   Driving- has given up driving, wife now does most of the driving  Exercise - reports he really doesn't exercise much  Falls -  Two falls since last visit  Medication Side Effects - Clonazepam - not able to tolerate  Chronic lower back pain - no focal weekness in left LE. 10/2013 had lower back surgery, which has reduced pain. Does have stenosis, is starting to have incontinent issues, surgery will not be performed until heart issues are stabilized and patient would be able to be off of blood thinners  Osteomyelitis-third and fourth toe on right foot has been amputated does have a wound on left foot that is not healing well, being monitored by podiatrist  Memory -Completed neuro psych testing in 4/2013 and 5/2015, with mild progression in cognitive impairment. Repeat test in 5/2016 showing no significant changes. MOCA 21/30 pm 4/5/2017. Cognition has improved will repeat neuropsych  Sleep-  no johnny to change his sleep habits, he was an over the  road . He goes to sleep[ late and is not able to function in the am. He has had 3 sleep studies and has seen Dr. Jayesh Michaels and other sleep specialists. He has tried clonazepam and was not able to tolerate. Tested other medications without benefit. Reports also has REM sleep disorder and restless leg syndrome. Sleeps/takes multiple naps throughout the day  Daytime sleepiness- considered trying Provigil, was not able to tolerate Ritalin, which was tried in 2017.    Psych/Depression-Reports having some anxiety, has had some hallucinations, will sometimes sees mouse running and sees other people in the house. Reports some depression but wife feels this maybe d/t lack of motivation. Hs seen Dr. Hurley and Belkis in the past. Reports he really doesn't believe in psychotherapies  Autonomic: sone light headedness, not particularly with position changes, no orthostatic B/P  Dyskinesia- none noted  [place] - but does not wear hearing aids  Tremor -bilateral hand tremor that is post resting type  slightly worse on the right.    Posture: stooped shoulders  Postural Stability: decreased  Surgery - discussed  Drooling - sometimes at night  Bradykinesia and Hypokinesia -  slight      Objective:     Vitals:    04/22/19 1142   BP: 115/63   Pulse: 71       Imaging: CT HEAD 12/7/2018 FINDINGS: INTRACRANIAL CONTENTS: No intracranial hemorrhage, extraaxial collection, or mass effect.  No CT evidence of acute infarct. There is minimal scattered low-attenuation within the periventricular and subcortical white matter consistent with minimal small vessel ischemic disease. The ventricles and sulci are normal for age. VISUALIZED ORBITS/SINUSES/MASTOIDS: No significant orbital abnormality. There is almost complete opacification again noted involving the right frontal sinus along with the maxillary sinuses bilaterally. No significant middle ear or mastoid effusion.OSSEOUS STRUCTURES/SOFT TISSUES: No significant abnormality. CONCLUSION:  1.  No CT finding of a mass, acute infarct or hemorrhage. 2.  Prominent sinus disease as described above.    Neuropsychological Assessment 5/23/16 - Mild Neurocognitive Disorder d/t Parkinson's Disease    Review of Systems    Alert, cooperative, no distress Sitting, appears stated age, normocephalic, atraumatic without cyanosis, edema or skin rashes. Normal mood.There are dystrophic skin changes below knee. Thick calluses on right foot      Cranial nerves: . Significant for hard of hearing (worse on right), staring and decreased eye blinking, mask face  Sensory -no focal sensory difficulties  Motor strength - normal  Language - slightly soft speech  Muscle Bulk - normal   Tone - increased  Rigidity -mild  Coordination - decreased  Balance - decreased   Tremors: Upper extremity worse on right  Dyskinesia:  none  Getting up from chair. Pushes up from chair     I asked him to call with any questions or concerns and will see him again in clinic in about 4 week. We discussed the risks and benefits of the medication including risk of worsening depression with medication adjustments and even the possibility of emergence of suicidality      Total time spent with the patient today was 50 minutes with greater than 50% of the time spent in counseling and care coordination.

## 2021-05-28 NOTE — PROGRESS NOTES
My Clinic Care Coordination Wellness Plan    [facility]  [address]  [id number]  [phone]      My Preferred Method of Contact:  Phone: [phone]    My Primary/Preferred Language:  English    Preferred Learning Style:  Reading information, Face to face discussion, Pictures/Diagrams and Hands on teaching    Emergency Contact: Extended Emergency Contact Information  Primary Emergency Contact: Luz Marina Zamora  Address: [address] [place] [address] [address] [place] [address] [facility]  Home Phone: [phone]  Mobile Phone: [phone]  Relation: Spouse  Secondary Emergency Contact: Cookie Wiseman   [facility]  Home Phone: [phone]  Mobile Phone: [phone]  Relation: Child     PCP:  Valentín Washington MD  Specialists:    Care Team            Valentín Washington MD   [phone] PCP - General, Family Medicine    Luz Marina Amador RN Registered Nurse    Stephie Pineda [facility] Care Coordination Care Guide, Primary Care - CC    MWF: [phone], T/TH: [phone]    Elyssa Dolan PA-C   [phone] Physician Assistant, Orthopedic Surgery    Elyssa Gomez MD   [phone] Physician, Neurology    Leif Washington, DPYASH   [phone] Physician, Podiatry    Naresh Nails, PharmD   [phone] Pharmacist, Pharmacist        Accomplishments:  Goals        Patient Stated      COMPLETED: CG GOAL: (Wife stated) I want resources to support Silver's care needs in the next three weeks. (pt-stated)      Action steps to achieve this goal  1.  I will continue to contact the resources provided by the Clinic Care Coordination Tiffanie, like [facility] [place] and [facility] to arrange in home care.  2.  I will provide financial information to Stephie the Care Vanessa, so that the Clinic Care Coordination , Tiffanie VEGA Can refer us to [facility] for a waiver assessment (DONE: 01/18/19).  3.  I will follow up with the [facility] nurse who  called me today to schedule a waiver assessment time (DONE: 01/18/19). I will continue to follow up and let Stephie know what Silver's eligibility is (Continuous: 03/11/19).  4. I will consider the respite care resources that Stephie, the Care Guide, has sent to me in the mail (DONE: 11/26/18). I have accessed the [facility] , Luz Marina Chaves, and have received even more information on respite care and therapy Silver and I can attend together (DONE: 01/18/19).  5. I will consider the health alert device recommendations made by Stephie, the Care Guide (Continuous: 11/26/18).  6. I will consider taking Silver to the senior center in [place] and utilizing their respite care services (Continuous: 03/11/19).    Completed: 04/18/19  Date goal set:  11/09/18        COMPLETED: I want my wife to have a better understanding of the financial resources available to manage my health care. (pt-stated)      Action steps to achieve this goal  1. My wife Luz Marina attended a telephone appointment with the Clinic Care Coordination , Tiffanie, on 07/16/18 8am to discuss financial considerations including insurance spendowns (DONE: 07/11/18).  2. My wife will fill out the necessary paperwork, if needed, to assist with our finances.     Updated: 08/07/18  Date goal set:  07/03/18        COMPLETED: I would like information on resources for in home assistance. (pt-stated)      Action steps to achieve this goal  1.  My wife and I  Have already been given resources including [facility], [employer], and [facility] [place] by [facility] that we've begun looking into (DONE: 07/11/18). We'll report our findings to my Care Guide, Stephie, when she calls for standard Clinic Care Coordination check in.  2.  My wife and I will shared our financial information with the Clinic Care Coordination , Tiffanie, during my wife's 07/16/18 8am telephone visit with her if we want to learn more about financing/obtaining in home  resources (DONE: 07/16/18).  3. My wife and I know that if I need to consider moving into a  Residential facility at any time, we may ask Tiffanie the  for a referral to be place to the [facility] to begin the process.     Updated: 08/07/18  Date goal set:  07/03/18        COMPLETED: RN Goal:  I have ongoing health resources and information for all of my physicans who are involved in my Care Team. (pt-stated)      Action steps to achieve this goal  1.  Silver will attend cardiac rehab two times each week and will start today (Continuous: 01/18/19).  2.  I will speak with Stephie, the Care Guide, when she calls to follow up on heart failure symptom reported (Continuous; 01/18/19).  3. I will encourage Silver to take his weight and record it each day (Continuous: 01/18/19).  4. I will continue to support diet change to low sodium for Silver (Continuous: 01/18/19).  5. I will look for and post the Ajm-Cr-Fpwe-When  Emergency phone number sheet (Continuous: 01/18/19).      Updated: 01/18/19  Date goal set:  11/09/18         Other      COMPLETED: My wife would like better understanding of planning for the future including reconciling my healthcare directive, POLST, and knowing when to contact the Elder Law  to discsuss POA.      Action steps to achieve this goal  1.  We attended our appointment with Dr. Washington to discuss revising healthcare directive and POLST to ensure they are complimentary. I know we can meet with my Care Guide, Stephie, for facilitated advanced care planning of my healthcare directive, if we need assistance (DONE: )8/02/18).  2. My Care Guide, Stephie, scheduled Luz Marina to speak with the Clinic Care Coordination , Tiffanie, on 07/16/18 8am to discuss the will/estate planning/power of  and when to meet with the elder law  (DONE: 07/11/18).  3. My Care Guide, Stephie, provided Luz Marina with the links to Elder Law resources through the [email or web address] website, as provided by  Tiffanie the [facility] Care Coordination  (DONE: 08/07/18).    Updated: 08/07/18  Date goal set:  07/03/18            Advanced Directive/Living Will: On file with the [facility]    Clinical Emergency Plan  Preventing Falls in the Home  An adult or child can fall for many reasons. If you are an older adult, you may fall because your reaction time slows down. Your muscles and joints may get stiff, weak, or less flexible because of illness, medicines, or a physical condition. These things can also make a child more likely to fall or be injured in a fall.  Other health problems that make falls more likely include:    Arthritis    Dizziness or lightheadedness when you get out of bed (orthostatic hypotension)    History of a stroke    Dizziness    Anemia    Certain medicines taken for mental illness    Problems with balance or gait    History of falls with or without an injury    Changes in vision (vision impairment)    Changes in thinking skills and memory (cognitive impairment)  Injuries from a fall can include broken bones, dislocated joints, and cuts. When these injuries are serious enough, they can make it impossible for you or a child who is injured in a fall to live on his or her own.  Prevention tips  To help prevent falls and fall-related injuries, follow the tips below.   Floors  Make floors safer by doing the following:     Put nonskid pads under area rugs.    Remove throw rugs.    Replace worn floor coverings.    Tack carpets firmly to each step on carpeted stairs. Put nonskid strips on the edges of uncarpeted stairs.    Keep floors and stairs free of clutter and cords.    Arrange furniture so there are clear pathways.    Clean up any spills right away.    Wear shoes that fit.  Bathrooms  Make bathrooms safer by doing the following:     Install grab bars in the tub or shower.    Apply nonskid strips or put a nonskid rubber mat in the tub or shower.    Sit on a bath chair to bathe.    Use bathmats with  nonskid backing.  Lighting and the environment  Improve lighting in your home by doing the following:     Keep a flashlight in each room. Or put a lamp next to the bed within easy reach.    Put nightlights in the bedrooms, hallways, kitchen, and bathrooms.    Make sure all stairways have good lighting.    Take your time when going up and down stairs.    Put handrails on both sides of stairs and in walkways for more support. To prevent injury to your wrist or arm, don t use handrails to pull yourself up.    Install grab bars to pull yourself up.    Move or rearrange items that you use often. This will make them easier to find or reach.    Look at your home to find any safety hazards. Especially look at doorways, walkways, and the driveway. Remove or repair any safety problems that you find.  Preventing Falls    Having a health problem can make you more likely to fall. Taking certain kinds of medicines may also increase your risk of falls. So, improving your health can help you avoid a fall. Work with your healthcare provider to manage health problems and to review your medicines. If you have your health under control, your risk of falling is lessened.     When to call your healthcare provider  Be sure to call your healthcare provider if you fall. Also call if you have any of these signs or symptoms (someone else may need to point them out to you):    Feeling lightheaded or dizzy more than once a day    Losing your balance often or feeling unsteady on your feet    Feeling numbness in your legs or feet, or noticing a change in the way you walk    Having a steady decline in your memory or mental sharpness       2719-3311 [employer]. [address] [place] [address], [place] [address]. All rights reserved. This information is not intended as a substitute for professional medical care. Always follow your healthcare professional's instructions.   Depression  Everyone feels down at times. The blues are a natural part  of life. But an unhappy period that s intense or lasts for more than a couple of weeks can be a sign of depression. Depression is a serious illness. It can disrupt the lives of family and friends. If you know someone you think may be depressed, find out what you can do to help.     Know the serious signals  Warning signals for suicide include:    Threats or talk of suicide    Statements such as  I won t be a problem much longer  or  Nothing matters     Giving away possessions or making a will or  arrangements    Buying a gun or other weapon    Sudden, unexplained cheerfulness or calm after a period of depression  If you notice any of these signs, get help right away. Call a health care professional, mental health clinic, or suicide hotline and ask what action to take. In an emergency, don t hesitate to call the police.     [employer] Mental Health Crisis Lines:  [facility] [phone]  [facility] [phone]  [facility] [phone]  [facility] [phone]  [facility], Adults [phone]  [facility], Children [phone]  [facility], Adults [phone]  [facility], Children [phone]     Parkinson Disease: Home Safety  As Parkinson disease progresses, home safety will be an increasing concern. This page includes tips that can help make your daily life safer and easier. Your doctor may also recommend a therapist to advise you on the best ways to set up your home.  Setting up living spaces  Get help from family and friends to make these changes:    Keep walkways open and free of clutter.    Move phone and electrical cords out of the way.    Remove throw rugs to prevent trips.    Get a cordless or speakerphone. Program numbers for family and emergency services.    Make sure rooms are well lit. Install nightlights along walkways.    If  freezing  at doorways is a problem, consider placing lines of tape on the floor between rooms. Stepping over the tape may prompt  you to keep moving.  Setting up the bathroom  Use the tips below to make changes to your bathroom. Medicare or insurance may help cover the costs of some of these items, depending on your particular needs and plan.    Have grab bars put in the shower or tub for support getting in and out.    Install a hand-held showerhead for easier bathing.    Raise the height of the toilet with a commode chair or elevated toilet seat.    Use a rubber-backed bath mat to help prevent slips and falls.    Buy a shower seat to make bathing safer and less tiring.     All [facility] clinic patients have access to a Nurse 24 hours a day, 7 days a week.  If you have questions or want advice from a Nurse, please know [facility] is here for you.  You can call your clinic and they will connect you or you can call Care Connection at [phone].  [facility] also has Walk In Care clinics in multiple locations.  Call the number listed above for more information about our Walk In Care clinics or visit the [facility] website at [email or web address].

## 2021-05-28 NOTE — PROGRESS NOTES
How have you been doing since we last saw you? Most important neurological symptom or concern for this visit (Medication wearing off, hallucinations, freezing, pain, sleep difficulty, constipation etc.) High/low BP since medication change, in bed and to the bathroom the last 4 days from constipation, moving around okay.     How many falls has the pt. Had over the last year?(if pt. Falls frequently, daily, weekly, monthly?) 1 fall.     Best explanation of falls (poor balance, fail/recognizing/judgement, trip, dizzy, moving too quickly, carrying too much, poor lighting, any loss of consciousness, confusion, incontinence, tongue biting, or any unusual features of events)? Dizziness.

## 2021-05-28 NOTE — PROGRESS NOTES
Stephie KIRAN, Clinic Care Coordination (CCC) Care Guide (CG), received emergency wellness plan from [facility] RN following [facility] RN outreach to Silver's wife Luz Marina. CG has composed and mailed maintenance wellness plan and will reach out in three months.    Next outreach: 08/02/19    Plan:  -Maintenance outreach

## 2021-05-28 NOTE — PROGRESS NOTES
S= Situation-Received note from [facility] Care Guide that patient had only RN Goal remaining.  B= Background- Patient recently moved to [facility].  Has started PT 2x's a wk.  Attending Neurology for Parkinsons, checking orthostatic BP's daily.  Cardiology quarterly and keeping a daily weight log.  Wife stating she will locate a podiatrist in the [facility] and will not need to return to vascular.  Offered the assistance of CCC and wife stating she felt she could locate a provider independently.  A= Action-Chart Review completed.  Patient okayed for Maintenance as No New Goals Identified.  All Goals Met.   R= Recommendation-Discussed Maintenance with Wife as all goals met and No New Goals identified.  Wife agreeable to Plan.  Will notify PCP.  [facility] CG to reach out again in 3 months for Graduation.      Preventing Falls in the Home  An adult or child can fall for many reasons. If you are an older adult, you may fall because your reaction time slows down. Your muscles and joints may get stiff, weak, or less flexible because of illness, medicines, or a physical condition. These things can also make a child more likely to fall or be injured in a fall.  Other health problems that make falls more likely include:  Arthritis  Dizziness or lightheadedness when you get out of bed (orthostatic hypotension)  History of a stroke  Dizziness  Anemia  Certain medicines taken for mental illness  Problems with balance or gait  History of falls with or without an injury  Changes in vision (vision impairment)  Changes in thinking skills and memory (cognitive impairment)  Injuries from a fall can include broken bones, dislocated joints, and cuts. When these injuries are serious enough, they can make it impossible for you or a child who is injured in a fall to live on his or her own.  Prevention tips  To help prevent falls and fall-related injuries, follow the tips below.   Floors  Make floors safer by doing the following:   Put  nonskid pads under area rugs.  Remove throw rugs.  Replace worn floor coverings.  Tack carpets firmly to each step on carpeted stairs. Put nonskid strips on the edges of uncarpeted stairs.  Keep floors and stairs free of clutter and cords.  Arrange furniture so there are clear pathways.  Clean up any spills right away.  Wear shoes that fit.  Bathrooms  Make bathrooms safer by doing the following:   Install grab bars in the tub or shower.  Apply nonskid strips or put a nonskid rubber mat in the tub or shower.  Sit on a bath chair to bathe.  Use bathmats with nonskid backing.  Lighting and the environment  Improve lighting in your home by doing the following:   Keep a flashlight in each room. Or put a lamp next to the bed within easy reach.  Put nightlights in the bedrooms, hallways, kitchen, and bathrooms.  Make sure all stairways have good lighting.  Take your time when going up and down stairs.  Put handrails on both sides of stairs and in walkways for more support. To prevent injury to your wrist or arm, don t use handrails to pull yourself up.  Install grab bars to pull yourself up.  Move or rearrange items that you use often. This will make them easier to find or reach.  Look at your home to find any safety hazards. Especially look at doorways, walkways, and the driveway. Remove or repair any safety problems that you find.  Preventing Falls    Having a health problem can make you more likely to fall. Taking certain kinds of medicines may also increase your risk of falls. So, improving your health can help you avoid a fall. Work with your healthcare provider to manage health problems and to review your medicines. If you have your health under control, your risk of falling is lessened.    When to call your healthcare provider  Be sure to call your healthcare provider if you fall. Also call if you have any of these signs or symptoms (someone else may need to point them out to you):    Feeling lightheaded or dizzy  more than once a day    Losing your balance often or feeling unsteady on your feet    Feeling numbness in your legs or feet, or noticing a change in the way you walk    Having a steady decline in your memory or mental sharpness      8888-7566 [employer]. [address] [place] [address]. All rights reserved. This information is not intended as a substitute for professional medical care. Always follow your healthcare professional's instructions.   Depression  Everyone feels down at times. The blues are a natural part of life. But an unhappy period that s intense or lasts for more than a couple of weeks can be a sign of depression. Depression is a serious illness. It can disrupt the lives of family and friends. If you know someone you think may be depressed, find out what you can do to help.    Know the serious signals  Warning signals for suicide include:    Threats or talk of suicide    Statements such as  I won t be a problem much longer  or  Nothing matters     Giving away possessions or making a will or  arrangements    Buying a gun or other weapon    Sudden, unexplained cheerfulness or calm after a period of depression  If you notice any of these signs, get help right away. Call a health care professional, mental health clinic, or suicide hotline and ask what action to take. In an emergency, don t hesitate to call the police.    [facility] Mental Health Crisis Lines:  [facility] [phone]  [facility] [phone]  [facility] [phone]  [facility] [phone]  [facility], Adults [phone]  [facility], Children [phone]  [facility], Adults [phone]  [facility], Children [phone]    Parkinson Disease: Home Safety  As Parkinson disease progresses, home safety will be an increasing concern. This page includes tips that can help make your daily life safer and easier. Your doctor may also recommend a therapist to advise you on  the best ways to set up your home.  Setting up living spaces  Get help from family and friends to make these changes:  Keep walkways open and free of clutter.  Move phone and electrical cords out of the way.  Remove throw rugs to prevent trips.  Get a cordless or speakerphone. Program numbers for family and emergency services.  Make sure rooms are well lit. Install nightlights along walkways.  If  freezing  at doorways is a problem, consider placing lines of tape on the floor between rooms. Stepping over the tape may prompt you to keep moving.  Setting up the bathroom  Use the tips below to make changes to your bathroom. Medicare or insurance may help cover the costs of some of these items, depending on your particular needs and plan.  Have grab bars put in the shower or tub for support getting in and out.  Install a hand-held showerhead for easier bathing.  Raise the height of the toilet with a commode chair or elevated toilet seat.  Use a rubber-backed bath mat to help prevent slips and falls.  Buy a shower seat to make bathing safer and less tiring.

## 2021-05-28 NOTE — TELEPHONE ENCOUNTER
Talked with wife, patient feels like he did better without medication. He will stop medication and call with an update on Thursday,. Talked with wife for 15 minutes all questions answered.

## 2021-05-29 NOTE — PROGRESS NOTES
Pt being seen for a PKC f/u apt.  05/14/2019 pt slipped while in th shower causing a fall while trying to grab a showerhead.  Sleeping in 2-3 hour increments still having restless leg syndrome.  Since being off of the gocoveri pt is still experiencing some of the restless leg syndrome.

## 2021-05-29 NOTE — PROGRESS NOTES
"I spoke with wife today, wife reports that had to call the police a couple times in order to get the patient off the ground.  The wife reports that he does not fall he will just \"try to get out of bed and ended up lowering himself to the ground.  Wife reports that he is freezing throughout the day.  She also reports that he is sleeping a lot during the day.  I will try some medication changes to see if we can help we will change the Rytary to 5 caps at 8 AM, 7 caps at noon and 4 PM, 2 caps at 8 PM and 10 PM.  The patient will also switch Nuplazid to taking it at 8 PM.  Patient will continue the use Parcopa as needed and Sinemet CR at 8 PM  "
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
## 2021-05-30 NOTE — PROGRESS NOTES
[facility]    Open Encounter today.  Episodes created, care management status validated and encounters linked.    (07/10) Silver's wife Luz Marina LMTCB for Stephie, the Clinic Care Coordination Community Health Worker.    (07/15) Attempt 1: Stephie KIRAN, Clinic Care Coordination (CCC) Community Health Worker (CHW), called patient for CCC outreach.  If this patient is returning my call, please transfer to Stephie at ext [phone].    Outreach interval: maint, transition to 2 mo  Next outreach: 07/22/19  Plan:  maintenance outreach

## 2021-05-30 NOTE — TELEPHONE ENCOUNTER
Who is calling:  [facility]     Reason for Call: Calling to state : Patient will need a face to  Face and referral addendum  to start Palliative Home care per PCP orders . At this time no OV's Support Palliative Home Care . [facility] will reach out to Neurosurgeons office to obtain Referral or contact clinic for patient to Make follow up and have face-to face done for these services .   FYI   Date of last appointment with primary care: NA     Okay to leave a detailed message: Yes

## 2021-05-30 NOTE — PROGRESS NOTES
"Assessment:     1. Parkinson's disease  2. Heart failure with reduced EF/CAD  3. Anxiety  4. Back pain      Plan:       Plan:         8 am   12  pm   4 pm   8 pm    HS               Sinemet ER    25/100          1 tab    Nuplazid   34 mg      2 tab      1 tab    Rytary      5 caps    6 cap   6 cap    4 cap          Gocovery    stopped           2 tabs                           Patient returns to the concussion clinic for follow-up appointment, he was last seen by me on 5/22/19, where patient changes were made.  Wife reports that the patient really does not \"do much during the day but laying in bed\".  A long talk was held with the patient about how important it is for him to do physical activity.  The patient reports \"giving up\", after talking with patient he verbally agreed to do more physical activity.  Wife reports that she will exercise with the patient.  I also explained that if he is laying in bed all day this is also going to affect his sleeping at night, patient verbalized understanding.  At this moment we will not make any changes, I did explain to the patient that physical activity is his best.    Subjective:        Parkinson's history: Patient is right-handed.  He was diagnosed with Parkinson's disease in 2004 at the age of about 54.  He feels that the symptoms may have started in about 1994.  Symptoms started with stiffness, slowness, soft speech, walking difficulties, and left hand tremor which spread to the right and about 2015.  Has seen Dr. Suazo in the past.  He has some bruising and tremors occasionally but there is asymmetry.  He tried Sinemet CR during the daytime and at night which did not work well.  He is been off the pramipexole since 12/2015 due to memory and behavioral concerns.  December 2010 he was taking Sinemet IR 25/100 and increased to 25/250 with a good response which also improved his back pain.  He was able to be more active.  In 2011 he was evaluated at the [facility] by " , he was noted to have U PDR S score of 41 after all Parkinson's medications.  It was suggested that he take carbidopa levodopa every 3 hours.  Amantadine was discontinued because he did not have dyskinesia.  It was suggested that he switch his selegiline to Azilect or discontinue all MAO inhibitors.  It is  felt that his increased Sinemet may have exasperated his restless leg syndrome.  Gabapentin was started to control his restless leg symptoms.  He was followed by Dr. Suazo from 2002 - 2011.  He also saw Dr. Zuniga and a previous nurse practitioner at this clinic.  He did transfer care to  in 2013.  He has tried amantadine and selegiline in the past.  He is not able to tolerate Requip, which he tried for several years at a relatively high dose but discontinued due to edema in lower extremities, he also tried Mirapex in 2015 but this caused more freezing and increased tremor.  CAT scan in July 2013 showed absent radiotracer in the putamen with diminished accumulation in the caudate, most consistent with Parkinson's disease.  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     Patient Active Problem List    Diagnosis Date Noted     Cervical stenosis of spinal canal 02/07/2019     Ischemic cardiomyopathy 12/14/2018     Heart failure with reduced ejection fraction (H) 12/14/2018     Coronary artery disease involving native coronary artery of native heart with angina pectoris (H)      Abnormal nuclear stress test 10/25/2018     Abnormal echocardiogram 05/29/2018     Leukocytosis, unspecified type      Cholecystitis 05/23/2018     Choledocholithiasis 05/23/2018     Calculus of gallbladder with biliary obstruction but without cholecystitis      Weakness      Low vitamin D level 10/11/2017     RBD (REM behavioral disorder) 04/03/2017     Pancreatitis 12/31/2015     Parkinson disease (H) 07/30/2014     Back pain 07/30/2014     Depression 07/30/2014     Anxiety state,  unspecified 07/30/2014     Hypothyroidism 07/30/2014     Past Medical History:   Diagnosis Date     Anxiety      Callus      Cardiomyopathy (H)      Chronic back pain      Delirium      Depression      Fall      GERD (gastroesophageal reflux disease)      Hypothyroidism      Pancreatitis 12/31/2015     PD (Parkinson's disease) (H)      RBD (REM behavioral disorder) 4/3/2017     Shingles     hx     Past Surgical History:   Procedure Laterality Date     CV CORONARY ANGIOGRAM N/A 10/31/2018    Procedure: Coronary Angiogram;  Surgeon: Jose Meyer MD;  Location: [facility];  Service:      CV LEFT HEART CATHETERIZATION WO LEFT VETRICULOGRAM Left 10/31/2018    Procedure: Left Heart Catheterization Without Left Ventriculogram;  Surgeon: Jose Meyer MD;  Location: [facility];  Service:      INGUINAL HERNIA REPAIR Bilateral 10/2013     LAMINECTOMY  10/2013     NV ERCP REMOVE CALCULI/DEBRIS BILIARY/PANCREAS DUCT N/A 5/25/2018    Procedure: ENDOSCOPIC RETROGRADE CHOLANGIOPANCREATOGRAPHY SPINCTEROTOMY BILIARY STONE EXTRACTION;  Surgeon: Curtis Mcclain MD;  Location: [facility];  Service: Gastroenterology     NV LAP,CHOLECYSTECTOMY/GRAPH N/A 5/24/2018    Procedure: LAPAROSCOPIC CHOLECYSTECTOMY;  Surgeon: Joyce Melissa MD;  Location: [facility];  Service: General     Family History   Problem Relation Age of Onset     Kidney failure Mother      Heart disease Father 82     Tremor Father      Parkinsonism Paternal Grandfather      Tremor Sister      Leukemia Maternal Grandmother      Current Outpatient Medications   Medication Sig Dispense Refill     acetaminophen (TYLENOL) 325 MG tablet Take 2 tablets (650 mg total) by mouth every 4 (four) hours as needed.  0     ammonium lactate (AMLACTIN) 12 % cream Apply two times a day 385 g 2     aspirin 81 mg chewable tablet Chew 1 tablet (81 mg total) daily.  0     atorvastatin (LIPITOR) 80 MG tablet Take 1 tablet (80 mg total) by mouth daily.  90 tablet 3     bisacodyl (DULCOLAX) 10 mg suppository One supp LA qday prn constipation.  Give if no BM in prior 3 days.  0     carbidopa-levodopa (PARCOPA)  mg per disintegrating tablet Take 1 tablet by mouth as needed. 90 tablet 3     carbidopa-levodopa (RYTARY) 23.75-95 mg CpER ER capsule Take 4 capsules by mouth daily. Take 5 caps at 8am, 7 caps at 12pm and 4pm and 4 caps at 8pm (Patient taking differently: Take 4 capsules by mouth daily. Take 5 caps at 8am, 6 caps at 12pm and 4pm and 4 caps at 8pm      ) 690 capsule 3     carbidopa-levodopa (SINEMET CR)  mg ER tablet Take 1 tablet by mouth 2 (two) times a day. In am and 2pm (Patient taking differently: Take 1 tablet by mouth at bedtime. In am and 2pm      ) 60 tablet 3     cholecalciferol, vitamin D3, (VITAMIN D3) 2,000 unit Tab Take 1 tablet (2,000 Units total) by mouth daily.  0     gabapentin (NEURONTIN) 300 MG capsule Take 600 mg by mouth at bedtime.             gabapentin (NEURONTIN) 300 MG capsule Take 300 mg by mouth daily.              levothyroxine (SYNTHROID, LEVOTHROID) 137 MCG tablet Take 1 tablet (137 mcg total) by mouth at bedtime.       metoprolol succinate (TOPROL-XL) 25 MG Take 12.5 mg by mouth at bedtime.       nitroglycerin (NITROSTAT) 0.4 MG SL tablet Place 1 tablet (0.4 mg total) under the tongue every 5 (five) minutes as needed for chest pain. 15 tablet 1     omeprazole (PRILOSEC) 20 MG capsule Take 1 capsule (20 mg total) by mouth daily. (Patient taking differently: Take 20 mg by mouth daily as needed .      ) 90 capsule 3     pimavanserin (NUPLAZID) 34 mg cap capsule Take 1 capsule (34 mg total) by mouth at bedtime. 30 capsule 5     polyethylene glycol (MIRALAX) 17 gram packet Take 17 g by mouth daily.             senna-docusate (SENNOSIDES-DOCUSATE SODIUM) 8.6-50 mg tablet Take 1 tablet by mouth 2 (two) times a day.             ticagrelor (BRILINTA) 90 mg Tab Take 1 tablet (90 mg total) by mouth 2 (two) times a day. 60  tablet 11     venlafaxine (EFFEXOR-XR) 150 MG 24 hr capsule Take 1 capsule (150 mg total) by mouth daily. 75 mg + 150 mg  = 225 mg 90 capsule 2     venlafaxine (EFFEXOR-XR) 75 MG 24 hr capsule Take 1 capsule (75 mg total) by mouth daily. 75 mg + 150 mg = 225 mg 90 capsule 2     No current facility-administered medications for this encounter.        Allergies   Allergen Reactions     Clonazepam      Too drowsy     Social History     Socioeconomic History     Marital status:      Spouse name: Not on file     Number of children: Not on file     Years of education: Not on file     Highest education level: Not on file   Occupational History     Not on file   Social Needs     Financial resource strain: Not on file     Food insecurity:     Worry: Not on file     Inability: Not on file     Transportation needs:     Medical: Not on file     Non-medical: Not on file   Tobacco Use     Smoking status: Former Smoker     Types: Cigarettes     Last attempt to quit: 1980     Years since quittin.5     Smokeless tobacco: Never Used   Substance and Sexual Activity     Alcohol use: No     Comment: one drink a week     Drug use: No     Sexual activity: Not on file   Lifestyle     Physical activity:     Days per week: Not on file     Minutes per session: Not on file     Stress: Not on file   Relationships     Social connections:     Talks on phone: Not on file     Gets together: Not on file     Attends [employer] service: Not on file     Active member of club or organization: Not on file     Attends meetings of clubs or organizations: Not on file     Relationship status: Not on file     Intimate partner violence:     Fear of current or ex partner: Not on file     Emotionally abused: Not on file     Physically abused: Not on file     Forced sexual activity: Not on file   Other Topics Concern     Not on file   Social History Narrative     Not on file       The following portions of the patient's history were reviewed and  updated as appropriate: allergies, current medications, past family history, past medical history, past social history, past surgical history and problem list.    Review of Systems        PD:   see above  RLS-  no complaints  ADL - Independent with all cares, reports only needs assistance when in a hurry, lives at home with wife. Independent with eating, dressing, and medication set up. Wife is currently doing daily finances, he does handle all long term finances.   Driving- has given up driving, wife now does most of the driving  Exercise - reports he really doesn't exercise much  Falls -  No falls since last visit  Medication Side Effects - Clonazepam - not able to tolerate  Chronic lower back pain - no focal weakness in left LE. 10/2013 had lower back surgery, which has reduced pain. Does have stenosis, is starting to have incontinent issues, surgery will not be performed until heart issues are stabilized and patient would be able to be off of blood thinners  Osteomyelitis-third and fourth toe on right foot has been amputated does have a wound on left foot that is not healing well, being monitored by podiatrist  Memory -Completed neuro psych testing in 4/2013 and 5/2015, with mild progression in cognitive impairment. Repeat test in 5/2016 showing no significant changes. MOCA 21/30 pm 4/5/2017. Cognition has improved will repeat neuro psych  Sleep-  no johnny to change his sleep habits, he was an over the road . He goes to sleep[ late and is not able to function in the am. He has had 3 sleep studies and has seen Dr. Jayesh Michaels and other sleep specialists. He has tried clonazepam and was not able to tolerate. Tested other medications without benefit. Reports also has REM sleep disorder and restless leg syndrome. Sleeps/takes multiple naps throughout the day  Daytime sleepiness-  was not able to tolerate Ritalin, which was tried in 2017.    Psych/Depression-Reports having some anxiety, has had some  hallucinations, will sometimes sees mouse running and sees other people in the house. Reports some depression but wife feels this maybe d/t lack of motivation. Hs seen Dr. Hurley and Belkis in the past. Reports he really doesn't believe in psychotherapies. Nuplazid has helped with hallucinations  Autonomic: sonmarcela light headedness, not particularly with position changes, no orthostatic B/P  Dyskinesia- none noted  [place] - but does not wear hearing aids  Tremor -bilateral hand tremor that is post resting type  slightly worse on the right.    Posture: stooped shoulders  Postural Stability: decreased  Surgery - discussed  Drooling - sometimes at night  Bradykinesia and Hypokinesia -  slight      Objective:     There were no vitals filed for this visit.    Imaging: CT HEAD 12/7/2018 FINDINGS: INTRACRANIAL CONTENTS: No intracranial hemorrhage, extraaxial collection, or mass effect.  No CT evidence of acute infarct. There is minimal scattered low-attenuation within the periventricular and subcortical white matter consistent with minimal small vessel ischemic disease. The ventricles and sulci are normal for age. VISUALIZED ORBITS/SINUSES/MASTOIDS: No significant orbital abnormality. There is almost complete opacification again noted involving the right frontal sinus along with the maxillary sinuses bilaterally. No significant middle ear or mastoid effusion.OSSEOUS STRUCTURES/SOFT TISSUES: No significant abnormality. CONCLUSION: 1.  No CT finding of a mass, acute infarct or hemorrhage. 2.  Prominent sinus disease as described above.    Neuropsychological Assessment 5/23/16 - Mild Neurocognitive Disorder d/t Parkinson's Disease    Review of Systems    Alert, cooperative, no distress Sitting, appears stated age, normocephalic, atraumatic without cyanosis, edema or skin rashes. Normal mood.There are dystrophic skin changes below knee. Thick calluses on right foot      Cranial nerves: . Significant for hard of hearing (worse on  right), staring and decreased eye blinking, mask face  Sensory -no focal sensory difficulties  Motor strength - normal  Language - slightly soft speech  Muscle Bulk - normal   Tone - increased  Rigidity -mild  Coordination - decreased  Balance - decreased   Tremors: Upper extremity worse on right  Dyskinesia:  none  Getting up from chair. Pushes up from chair     I asked him to call with any questions or concerns and will see him again in clinic in about 6 week. We discussed the risks and benefits of the medication including risk of worsening depression with medication adjustments and even the possibility of emergence of suicidality      Total time spent with the patient today was 60 minutes with greater than 50% of the time spent in counseling and care coordination..

## 2021-05-30 NOTE — PROGRESS NOTES
(07/15) Silver's spouse Luz Marina returned call to Stephie, the Clinic Care Coordination Community Health Worker, NATYTCB.    (07/18) CHW returned call to Luz Marina. Luz Marina reported that respite services in the home had recommended she consider palliative care for Silver; Luz Marina wanted CHW's advice on this. CHW recommended scheduling phone call with CCC RN to learn about palliative and also clarified that typically PCP makes this recommendation.  CHW will follow up with CCC RN on this scheduled appointment 07/25/19 9am phone.    Outreach interval: 2 mo  Next outreach: 09/18/19  Plan:  -Delegations f/u

## 2021-05-30 NOTE — PROGRESS NOTES
Clinic Care Coordination Contact  Care Team Conversations     Received message from [facility] CHW that wife wanted for patient to have a Palliative referral to [facility].  Writer called and spoke with intake.  Was informed that [facility] hospice did not have Palliative services.    Writer called and spoke with wife Luz Marina to notify.  Informed her she could have [facility].  [facility] information in the future if wife and patient were interested.  Wife agreeable to all items.     Writer will notify PCP and CHW for order needed for [facility] for SN/PT/OT and HHA as discussed with wife.  CHW will also email referral note to [facility] as instructed when writer called Palliative department.

## 2021-05-30 NOTE — PROGRESS NOTES
Silver's spouse Luz Marina left  for Stephie, the Clinic Care Coordination Community Health Worker, updating that their preferred service provider for palliative care is [facility]. CHW has relayed this to CCC RN for RN to follow up with agency on Medicare coverage and referral method.     Patient has been on maintenance and is being transitioned back to active due to new CCC goal.    Outreach interval: 1 mo  Next outreach: 08/26/19  Plan:  -Follow up on establishing with palliative care

## 2021-05-30 NOTE — PROGRESS NOTES
Clinic Care Coordination Contact    Spoke with wife Luz Marina via telephone as she was requesting information about Palliative Care.  Luz Marina stated her [facility]'s worker had recommended Palliative Care for Silver.  Silver is currently involved with the [facility].  He had been recommended to participate with [facility] [place] physical therapy [facility].  The drive was too far for them.  Silver instead completed PT in [facility].  Luz Marina has a list of 3 Palliative Care facilities she is interested in already.  She is currently not at home and does not have the name and number of the facility with her.  Writer will call and inquire about the Palliative Care company to inquire if they are a Medicare provider and how to place a referral.  Awaiting to hear from spouse.

## 2021-05-30 NOTE — TELEPHONE ENCOUNTER
CCC spoke with wife Luz Marina yesterday and today.  She is asking for a referral to [facility].  Please place a referral in either [facility] or Care everywhere for:    1. Palliative  2.  SN, PT, OT, HHA, SW    Once completed; [facility] Community Health Careworker will e-mail [facility] to notify of the pending referral.    Thank you,  Jena Cazares RN

## 2021-05-31 NOTE — TELEPHONE ENCOUNTER
Left detailed message for Tiffanie, from home care, relaying verbal orders as requested below per Dr. Washington.

## 2021-05-31 NOTE — TELEPHONE ENCOUNTER
Orders being requested: One follow up PT visit on 9/1/19  Reason service is needed/diagnosis: Parkinson's Disease  When are orders needed by: Within 48 hours  Where to send Orders: Verbal order is ok.  Please leave detailed message with caller's name and title.  Okay to leave detailed message?  Yes

## 2021-05-31 NOTE — TELEPHONE ENCOUNTER
Refill Approved    Rx renewed per Medication Renewal Policy. Medication was last renewed on 11/1/18.    Marina Landry, [facility] Triage/Med Refill 8/26/2019     Requested Prescriptions   Pending Prescriptions Disp Refills     levothyroxine (SYNTHROID, LEVOTHROID) 137 MCG tablet [Pharmacy Med Name: LEVOTHYROXINE SODIUM 137MCG TABS] 90 tablet 0     Sig: TAKE ONE TABLET BY MOUTH ONCE DAILY AT 6 A.M.       Thyroid Hormones Protocol Passed - 8/26/2019  8:57 AM        Passed - Provider visit in past 12 months or next 3 months     Last office visit with prescriber/PCP: 2/7/2019 Valentín Washington MD OR same dept: 2/7/2019 Valentín Washington MD OR same specialty: 2/7/2019 Valentín Washington MD  Last physical: 5/8/2017 Last MTM visit: Visit date not found   Next visit within 3 mo: Visit date not found  Next physical within 3 mo: Visit date not found  Prescriber OR PCP: Valentín Washington MD  Last diagnosis associated with med order: 1. Hypothyroidism  - levothyroxine (SYNTHROID, LEVOTHROID) 137 MCG tablet [Pharmacy Med Name: LEVOTHYROXINE SODIUM 137MCG TABS]; TAKE ONE TABLET BY MOUTH ONCE DAILY AT 6 A.M.  Dispense: 90 tablet; Refill: 0    If protocol passes may refill for 12 months if within 3 months of last provider visit (or a total of 15 months).             Passed - TSH on file in past 12 months for patient age 12 & older     TSH   Date Value Ref Range Status   12/07/2018 1.97 0.30 - 5.00 uIU/mL Final

## 2021-05-31 NOTE — TELEPHONE ENCOUNTER
Orders being requested: Home care   Extension on occupational therapy and physical therapy services   Reason service is needed/diagnosis: Patient is requesting these for next week/  When are orders needed by: ASAP  Where to send Orders: verbal orders to caller  Okay to leave detailed message?  Yes

## 2021-05-31 NOTE — PROGRESS NOTES
Stephie, the Clinic Care Coordination Community Health Worker, called Silver's spouse Luz Marina to confirm that they had established with [place] [facility] palliative care; Luz Marina reported they have and that  was out this week, palliative care nurse will come out to the home next week Tuesday 08/06/19. Luz Marina also reported luis daniel now has hearing aides.    Outreach Interval: 2 mo  Next outreach: 09/27/19    Plan:  -inquire about follow through with palliative, in home services

## 2021-05-31 NOTE — PROGRESS NOTES
(08/28/19) Silver's wife Luz Marina LMTCB for Stephie, [facility] Community Health Worker, stating she had a question.    (08/29/19) CHW returned call to patient's spouse. Patient's spouse had question regarding: finding new podiatrist, coverage of past podiatrist procedures, question of whether patient may be diabetic. Luz Marina explained that Silver had been referred for care for an ulcer on his toe last December and that he had seen [facility] but that they no longer do the wound care procedure he needs. She explained this visit was covered by insurance. She went on to explain that he had been seen by podiatry in [place] for the wound care procedure but the bill was high, as if it hadn't been covered by insurance, and that they weren't sure the procedure was done properly. She reported that she had called their insurance and [facility] billing, and that they suspected it was coded incorrectly. Luz Marina explained that at this point she is trying to determine which podiatrist to go that will be covered by insurance and that will complete the procedure correctly. She is also concerned that Silver may have diabetes. CHW encouraged Luz Marina to bring up concern for diabetes at 09/06/19 OV with PCP and to discuss alternative podiatrist. Luz Marina also reports that they have an RN at the [facility] who does nail care. CHW advised to share this with PCP as he may have alternative recommendations if they are concerned for diabetes.    Outreach interval: 10/24/19  Plan:  -Possible RECAM or continued maintenance

## 2021-05-31 NOTE — TELEPHONE ENCOUNTER
Orders being requested: Skilled nursing visit 1 time week next 60 days, then 2 prn if needed.  OT/PT Evaluation.  Home health aide 2-3 times weekly for personal hygiene.  Reason service is needed/diagnosis: Parkinsons  When are orders needed by: ASAP  Where to send Orders: Phone:  [phone]  Okay to leave detailed message?  Yes

## 2021-05-31 NOTE — TELEPHONE ENCOUNTER
RN triage   Call from Angela -- [facility] --   With pt now  Pt c/o urinary urgency and burning and odor  No blood no fever   No flank pain   Per Angela - pt takes probiotics   Per protocol = should be seen   Angela is requesting a call back -- a verbal order for urine test   Also Angela requesting order for [facility] physical therapy eval   Please advise   Marielos Schuster RN [facility] RN triage      Reason for Disposition    All other males with painful urination, or patient wants to be seen    Protocols used: URINATION PAIN - MALE-A-OH

## 2021-05-31 NOTE — TELEPHONE ENCOUNTER
Given the circumstances and difficulty with him ambulating OK for a urine test. However, it will need to be called to the on call doctor.  Depending on results they may make other recommendations.  If there are concerns regarding a fever, chills, or weakness then he should be evaluated in the emergency department.  Finally, okay for the therapy request. Thank you for calling on this patient. Staff are not currently available.

## 2021-05-31 NOTE — TELEPHONE ENCOUNTER
RN Triage:     Please see encounter with today's date.     Shruti Davey RN, BSN [facility] Triage Nurse

## 2021-05-31 NOTE — PROGRESS NOTES
I met with Silver Zamora at the request of Dentist to recheck his blood pressure.  Blood pressure medications on the MAR were reviewed with patient.    Patient has taken all medications as per usual regimen: Yes  Patient reports tolerating them without any issues or concerns: Yes    Vitals:    08/05/19 1340   BP: 128/66       Blood pressure was taken, previous encounter was reviewed, recorded blood pressure below 140/90.  Patient was discharged and the note will be sent to the provider for final review.

## 2021-05-31 NOTE — TELEPHONE ENCOUNTER
Please call if he has not been notified as they were quite concerned. His urine test is completely normal. Thank you

## 2021-05-31 NOTE — TELEPHONE ENCOUNTER
Spoke to Janette, from home care, relayed verbal orders as requested below per Dr. Washington.   yes

## 2021-05-31 NOTE — TELEPHONE ENCOUNTER
Message left to call back.    Keena Dos Santos, RN   [facility] Refill and Triage Nurse  9:30 AM  8/24/2019

## 2021-05-31 NOTE — TELEPHONE ENCOUNTER
Wife called about 's dysuria. See 8/23/19 triage encounter. This issue while other RN was discussing issue with patient's home care nurse.  RN informed her the homecare nurse will be calling her to help her arrange this.  Elyssa Mulligan RN, [facility] Refill/Triage, 8/24/2019 10:05 AM

## 2021-05-31 NOTE — TELEPHONE ENCOUNTER
Orders being requested: Home Occuational Therapy home visits two times a week for four weeks to start next week.  Reason service is needed/diagnosis: Parkinson  Working on getting equitment to promote IADL's  When are orders needed by: ASAP  Where to send Orders: Phone:  [facility] [phone]  Okay to leave detailed message?  Yes

## 2021-06-01 NOTE — PROGRESS NOTES
Assessment:     1. Parkinson's disease  2. Heart failure with reduced EF/CAD  3. Anxiety  4. Back pain      Plan:       Plan:         8 am   12  pm   4 pm   8 pm    HS               Sinemet ER    25/100          1 tab    Nuplazid   34 mg      2 tab      1 tab    Rytary      5 caps    6 cap   6 cap    4 cap          Gocovery    stopped           2 tabs                     Patient returns to the Parkinson's clinic for follow-up appointment, he was last seen by me on 9/5/19, where no medication changes were made.  Patient continues to stay up really late and then nap throughout the day.  He does have physical therapy and the wife reports that one time he will do extremely well and next time extremely bad.  The patient also does not want to take the Nuplazid because it makes him tired and he does not want to go to bed.  A long discussion was held with the patient about Parkinson's disease and treatment plans.  Before I altered the medication the patient promised me he would do more physical activity.  I discussed with the patient how important physical activity is for Parkinson's.  The patient actually looks really well today, he reports that people comment about how he does not look like he has Parkinson's.  He is also had family visiting, and did really well.  The patient's cognition continues to improve.  I did discuss with the patient how fast he will decline if he continues to sleep all day and watch TV most nights.  Patient's cognition has not improved greatly after he started sleeping better, the patient also has not experienced hallucinations or delusions since starting Nuplazid.  Patient agreed to increasing exercise when he can, he continues to work with therapies on balance and strengthening.  No medication changes at this time.  The patient was also seen by a wound nurse, it is probable that the patient's wound on the side of his foot could be a pressure ulcer, he will be seen by the wound nurse in a couple  weeks.    Subjective:        Parkinson's history: Patient is right-handed.  He was diagnosed with Parkinson's disease in 2004 at the age of about 54.  He feels that the symptoms may have started in about 1994.  Symptoms started with stiffness, slowness, soft speech, walking difficulties, and left hand tremor which spread to the right and about 2015.  Has seen Dr. Suazo in the past.  He has some bruising and tremors occasionally but there is asymmetry.  He tried Sinemet CR during the daytime and at night which did not work well.  He is been off the pramipexole since 12/2015 due to memory and behavioral concerns.  December 2010 he was taking Sinemet IR 25/100 and increased to 25/250 with a good response which also improved his back pain.  He was able to be more active.  In 2011 he was evaluated at the [facility] by , he was noted to have U PDR S score of 41 after all Parkinson's medications.  It was suggested that he take carbidopa levodopa every 3 hours.  Amantadine was discontinued because he did not have dyskinesia.  It was suggested that he switch his selegiline to Azilect or discontinue all MAO inhibitors.  It is  felt that his increased Sinemet may have exasperated his restless leg syndrome.  Gabapentin was started to control his restless leg symptoms.  He was followed by Dr. Suazo from 2002 - 2011.  He also saw Dr. Zuniga and a previous nurse practitioner at this clinic.  He did transfer care to  in 2013.  He has tried amantadine and selegiline in the past.  He is not able to tolerate Requip, which he tried for several years at a relatively high dose but discontinued due to edema in lower extremities, he also tried Mirapex in 2015 but this caused more freezing and increased tremor.  CAT scan in July 2013 showed absent radiotracer in the putamen with diminished accumulation in the caudate, most consistent with Parkinson's disease.  EMG study in July 2013 showed mild demyelination  neuropathy in lower extremities.     Patient Active Problem List    Diagnosis Date Noted     Cervical stenosis of spinal canal 02/07/2019     Ischemic cardiomyopathy 12/14/2018     Heart failure with reduced ejection fraction (H) 12/14/2018     Coronary artery disease involving native coronary artery of native heart with angina pectoris (H)      Abnormal nuclear stress test 10/25/2018     Abnormal echocardiogram 05/29/2018     Leukocytosis, unspecified type      Cholecystitis 05/23/2018     Choledocholithiasis 05/23/2018     Calculus of gallbladder with biliary obstruction but without cholecystitis      Weakness      Low vitamin D level 10/11/2017     RBD (REM behavioral disorder) 04/03/2017     Pancreatitis 12/31/2015     Parkinson disease (H) 07/30/2014     Back pain 07/30/2014     Depression 07/30/2014     Anxiety state, unspecified 07/30/2014     Hypothyroidism 07/30/2014     Past Medical History:   Diagnosis Date     Anxiety      Callus      Cardiomyopathy (H)      Chronic back pain      Delirium      Depression      Fall      GERD (gastroesophageal reflux disease)      Hypothyroidism      Pancreatitis 12/31/2015     PD (Parkinson's disease) (H)      RBD (REM behavioral disorder) 4/3/2017     Shingles     hx     Past Surgical History:   Procedure Laterality Date     CV CORONARY ANGIOGRAM N/A 10/31/2018    Procedure: Coronary Angiogram;  Surgeon: Jose Meyer MD;  Location: [facility] Cath Lab;  Service:      CV LEFT HEART CATHETERIZATION WO LEFT VETRICULOGRAM Left 10/31/2018    Procedure: Left Heart Catheterization Without Left Ventriculogram;  Surgeon: Jose Meyer MD;  Location: [facility] Cath Lab;  Service:      INGUINAL HERNIA REPAIR Bilateral 10/2013     LAMINECTOMY  10/2013     KS ERCP REMOVE CALCULI/DEBRIS BILIARY/PANCREAS DUCT N/A 5/25/2018    Procedure: ENDOSCOPIC RETROGRADE CHOLANGIOPANCREATOGRAPHY SPINCTEROTOMY BILIARY STONE EXTRACTION;  Surgeon: Curtis Mcclain MD;  Location: [facility]  Main OR;  Service: Gastroenterology     NM LAP,CHOLECYSTECTOMY/GRAPH N/A 5/24/2018    Procedure: LAPAROSCOPIC CHOLECYSTECTOMY;  Surgeon: Joyce Melissa MD;  Location: [facility] Main OR;  Service: General     Family History   Problem Relation Age of Onset     Kidney failure Mother      Heart disease Father 82     Tremor Father      Parkinsonism Paternal Grandfather      Tremor Sister      Leukemia Maternal Grandmother      Current Outpatient Medications   Medication Sig Dispense Refill     acetaminophen (TYLENOL) 325 MG tablet Take 2 tablets (650 mg total) by mouth every 4 (four) hours as needed.  0     ammonium lactate (AMLACTIN) 12 % cream Apply two times a day 385 g 2     aspirin 81 mg chewable tablet Chew 1 tablet (81 mg total) daily.  0     atorvastatin (LIPITOR) 80 MG tablet Take 1 tablet (80 mg total) by mouth daily. 90 tablet 3     bisacodyl (DULCOLAX) 10 mg suppository One supp NM qday prn constipation.  Give if no BM in prior 3 days.  0     carbidopa-levodopa (PARCOPA)  mg per disintegrating tablet Take 1 tablet by mouth as needed. 90 tablet 3     carbidopa-levodopa (RYTARY) 23.75-95 mg CpER ER capsule Take 4 capsules by mouth daily. Take 5 caps at 8am, 7 caps at 12pm and 4pm and 4 caps at 8pm (Patient taking differently: Take 4 capsules by mouth daily. Take 5 caps at 8am, 6 caps at 12pm and 4pm and 4 caps at 8pm      ) 690 capsule 3     carbidopa-levodopa (SINEMET CR)  mg ER tablet Take 1 tablet by mouth 2 (two) times a day. In am and 2pm (Patient taking differently: Take 1 tablet by mouth at bedtime. One in the am and at bedtime      ) 60 tablet 3     cholecalciferol, vitamin D3, (VITAMIN D3) 2,000 unit Tab Take 1 tablet (2,000 Units total) by mouth daily.  0     levothyroxine (SYNTHROID, LEVOTHROID) 137 MCG tablet TAKE ONE TABLET BY MOUTH ONCE DAILY AT 6 A.M. 90 tablet 0     metoprolol succinate (TOPROL-XL) 25 MG Take 0.5 tablets (12.5 mg total) by mouth at bedtime. 45 tablet 2      nitroglycerin (NITROSTAT) 0.4 MG SL tablet Place 1 tablet (0.4 mg total) under the tongue every 5 (five) minutes as needed for chest pain. 15 tablet 1     omeprazole (PRILOSEC) 20 MG capsule Take 1 capsule (20 mg total) by mouth daily. (Patient taking differently: Take 20 mg by mouth daily as needed .      ) 90 capsule 3     pimavanserin (NUPLAZID) 34 mg cap capsule Take 1 capsule (34 mg total) by mouth at bedtime. 30 capsule 5     polyethylene glycol (MIRALAX) 17 gram packet Take 17 g by mouth daily.             senna-docusate (SENNOSIDES-DOCUSATE SODIUM) 8.6-50 mg tablet Take 1 tablet by mouth 2 (two) times a day.             ticagrelor (BRILINTA) 90 mg Tab Take 1 tablet (90 mg total) by mouth 2 (two) times a day. 60 tablet 11     venlafaxine (EFFEXOR-XR) 150 MG 24 hr capsule Take 1 capsule (150 mg total) by mouth daily. 75 mg + 150 mg  = 225 mg 90 capsule 2     venlafaxine (EFFEXOR-XR) 75 MG 24 hr capsule Take 1 capsule (75 mg total) by mouth daily. 75 mg + 150 mg = 225 mg 90 capsule 2     No current facility-administered medications for this encounter.        Allergies   Allergen Reactions     Clonazepam      Too drowsy     Social History     Socioeconomic History     Marital status:      Spouse name: Not on file     Number of children: Not on file     Years of education: Not on file     Highest education level: Not on file   Occupational History     Not on file   Social Needs     Financial resource strain: Not on file     Food insecurity:     Worry: Not on file     Inability: Not on file     Transportation needs:     Medical: Not on file     Non-medical: Not on file   Tobacco Use     Smoking status: Former Smoker     Types: Cigarettes     Last attempt to quit: 1980     Years since quittin.7     Smokeless tobacco: Never Used   Substance and Sexual Activity     Alcohol use: No     Comment: one drink a week     Drug use: No     Sexual activity: Not on file   Lifestyle     Physical activity:      Days per week: Not on file     Minutes per session: Not on file     Stress: Not on file   Relationships     Social connections:     Talks on phone: Not on file     Gets together: Not on file     Attends Mu-ism service: Not on file     Active member of club or organization: Not on file     Attends meetings of clubs or organizations: Not on file     Relationship status: Not on file     Intimate partner violence:     Fear of current or ex partner: Not on file     Emotionally abused: Not on file     Physically abused: Not on file     Forced sexual activity: Not on file   Other Topics Concern     Not on file   Social History Narrative     Not on file       The following portions of the patient's history were reviewed and updated as appropriate: allergies, current medications, past family history, past medical history, past social history, past surgical history and problem list.    Review of Systems        PD:   see above  RLS-  no complaints  ADL - Independent with all cares, reports only needs assistance when in a hurry, lives at home with wife. Independent with eating, dressing, and medication set up. Wife is currently doing daily finances, he does handle all long term finances.   Driving- has given up driving, wife now does most of the driving  Exercise - reports he really doesn't exercise much  Falls -  No falls since last visit  Medication Side Effects - Clonazepam - not able to tolerate  Chronic lower back pain - no focal weakness in left LE. 10/2013 had lower back surgery, which has reduced pain. Does have stenosis, is starting to have incontinent issues, surgery will not be performed until heart issues are stabilized and patient would be able to be off of blood thinners  Osteomyelitis-third and fourth toe on right foot has been amputated does have a wound on left foot that is not healing well, being monitored by podiatrist  Memory -Completed neuro psych testing in 4/2013 and 5/2015, with mild progression in  cognitive impairment. Repeat test in 5/2016 showing no significant changes. MOCA 21/30 pm 4/5/2017. Cognition has improved will repeat neuro psych  Sleep-  no johnny to change his sleep habits, he was an over the road . He goes to sleep[ late and is not able to function in the am. He has had 3 sleep studies and has seen Dr. Jayesh Michaels and other sleep specialists. He has tried clonazepam and was not able to tolerate. Tested other medications without benefit. Reports also has REM sleep disorder and restless leg syndrome. Sleeps/takes multiple naps throughout the day  Daytime sleepiness-  was not able to tolerate Ritalin, which was tried in 2017.    Psych/Depression-Reports having some anxiety, has had some hallucinations, will sometimes sees mouse running and sees other people in the house. Reports some depression but wife feels this maybe d/t lack of motivation. Hs seen Dr. Hurley and Belkis in the past. Reports he really doesn't believe in psychotherapies. Nuplazid has helped with hallucinations  Autonomic: sone light headedness, not particularly with position changes, no orthostatic B/P  Dyskinesia- none noted  [place] - but does not wear hearing aids  Tremor -mild bilateral hand tremor that is post resting type  slightly worse on the right.    Posture: stooped shoulders  Postural Stability: decreased  Surgery - discussed  Drooling - sometimes at night  Bradykinesia and Hypokinesia -  slight      Objective:     There were no vitals filed for this visit.    Imaging: CT HEAD 12/7/2018 FINDINGS: INTRACRANIAL CONTENTS: No intracranial hemorrhage, extraaxial collection, or mass effect.  No CT evidence of acute infarct. There is minimal scattered low-attenuation within the periventricular and subcortical white matter consistent with minimal small vessel ischemic disease. The ventricles and sulci are normal for age. VISUALIZED ORBITS/SINUSES/MASTOIDS: No significant orbital abnormality. There is almost complete  opacification again noted involving the right frontal sinus along with the maxillary sinuses bilaterally. No significant middle ear or mastoid effusion.OSSEOUS STRUCTURES/SOFT TISSUES: No significant abnormality. CONCLUSION: 1.  No CT finding of a mass, acute infarct or hemorrhage. 2.  Prominent sinus disease as described above.    Neuropsychological Assessment 5/23/16 - Mild Neurocognitive Disorder d/t Parkinson's Disease    Review of Systems    Alert, cooperative, no distress Sitting, appears stated age, normocephalic, atraumatic without cyanosis, edema or skin rashes. Normal mood.There are dystrophic skin changes below knee. Thick calluses on right foot      Cranial nerves: . Significant for hard of hearing (worse on right), staring and decreased eye blinking, mask face  Sensory -no focal sensory difficulties  Motor strength - normal  Language - slightly soft speech  Muscle Bulk - normal   Tone - increased  Rigidity -mild  Coordination - decreased  Balance - decreased   Tremors: Upper extremity worse on right  Dyskinesia:  none  Getting up from chair. Pushes up from chair     I asked him to call with any questions or concerns and will see him again in clinic in about 6 week. We discussed the risks and benefits of the medication including risk of worsening depression with medication adjustments and even the possibility of emergence of suicidality      Total time spent with the patient today was 60 minutes with greater than 50% of the time spent in counseling and care coordination..

## 2021-06-01 NOTE — TELEPHONE ENCOUNTER
FYI - Status Update  Who is Calling: [facility]  Update: [facility] states the patient missed his evening dose on 9/12/2019 of the medication below.    pimavanserin (NUPLAZID) 34 mg cap capsule 30 capsule 5 3/14/2019     Sig - Route: Take 1 capsule (34 mg total) by mouth at bedtime. - Oral    Sent to pharmacy as: pimavanserin (NUPLAZID) 34 mg cap capsule    E-Prescribing Status: Receipt confirmed by pharmacy (3/14/2019 12:32 PM CDT)      Okay to leave a detailed message?:  No return call needed

## 2021-06-01 NOTE — PROGRESS NOTES
How have you been doing since we last saw you? Most important neurological symptom or concern for this visit (Medication wearing off, hallucinations, freezing, pain, sleep difficulty, constipation etc.) patient wants to talk about nuplazid. Patient states he has pain at night and in the day until he falls asleep.     How many falls has the pt. Had over the last year?(if pt. Falls frequently, daily, weekly, monthly?) 8/7/19 patient states that he would have roughly 6 falls in a 12 month period.    Best explanation of falls (poor balance, fail/recognizing/judgement, trip, dizzy, moving too quickly, carrying too much, poor lighting, any loss of consciousness, confusion, incontinence, tongue biting, or any unusual features of events)? Loss of balance, freeze sensation sometimes

## 2021-06-01 NOTE — TELEPHONE ENCOUNTER
Left detailed message for Virgie, from home care, relaying verbal order as requested below per Dr. Washington.   - - -

## 2021-06-01 NOTE — TELEPHONE ENCOUNTER
Orders being requested:  Ok to apply to betadine to right foot callus once daily.  Reason service is needed/diagnosis: Right foot callus  When are orders needed by: Today  Where to send Orders: Verbal order is ok  Okay to leave detailed message?  Yes

## 2021-06-01 NOTE — PROGRESS NOTES
Assessment and Plan:       1. Medicare annual wellness visit, subsequent    Reviewed the annual wellness visit health risk assessment form  Mini cog score is 5/5  Reviewed falls risk.  Given his history of Parkinson's disease he has had multiple falls  Have reviewed safety concerns  PHQ-2 score is 4 PHQ-9 score is 9    Patient is willing to do the Cologuard test  - Cologuard     Influenza vaccine given    2. Parkinson disease (H)  Continue plan per neurology  Continue Sinemet and Rytary as prescribed      3. Hypothyroidism, unspecified type    Continue levothyroxine  - Thyroid [place]    4. Coronary artery disease involving native coronary artery of native heart with angina pectoris (H)  5. Ischemic cardiomyopathy  6. Chronic systolic heart failure (H)  S/P stent in the right coronary arterty    Continue aspirin and Brilinta  Patient understands that he will not discontinue these medications until cardiology says it is okay  Continue atorvastatin  Continue metoprolol    - Basic Metabolic Panel  - Glycosylated Hemoglobin A1c  - Hepatic Profile  - HM2(CBC w/o Differential)  - Lipid Cascade      7. Cervical stenosis of spinal canal  Cervical myelopathy    He had a recent MRI of the cervical spine which was significantly abnormal  This showed severe spinal stenosis at multiple levels as well as neural foraminal narrowing    Continue to follow-up with the spine clinic      8. Screening for prostate cancer    Check a PSA  - PSA (Prostatic-Specific Antigen), Annual Screen    9. Abnormal finding of blood chemistry     Monitor glucose and hgbA1c  - Glycosylated Hemoglobin A1c    10. Right foot ulcer    Refer to the wound care clinic  Reviewed the importance of wound care    11.  Moderate depression   His health concerns including advanced Parkinson's disease is likely contributing to his symptoms    PHQ-9 score is 11  Continue Effexor  Recommend follow-up as needed        The patient's current medical problems were  "reviewed.    I have had an Advance Directives discussion with the patient.  The following health maintenance schedule was reviewed with the patient and provided in printed form in the after visit summary:   Health Maintenance   Topic Date Due     DEPRESSION FOLLOW UP  1950     HEPATITIS C SCREENING  1950     ADVANCE CARE PLANNING  02/16/1968     COLONOSCOPY  02/16/2000     ZOSTER VACCINES (1 of 2) 02/16/2000     MEDICARE ANNUAL WELLNESS VISIT  05/08/2018     FALL RISK ASSESSMENT  06/25/2019     INFLUENZA VACCINE RULE BASED (1) 08/01/2019     TD 18+ HE  02/09/2026     PNEUMOCOCCAL POLYSACCHARIDE VACCINE AGE 65 AND OVER  Completed     PNEUMOCOCCAL CONJUGATE VACCINE FOR ADULTS (PCV13 OR PREVNAR)  Completed        Subjective:   Chief Complaint: Silver Zamora is an 69 y.o. male here for an Annual Wellness visit.   HPI: This is a 69-year-old male who presents to the clinic for an annual wellness visit.  His wife Luz Marina is present today.  As noted, he has a complex medical history including advanced Parkinson's  disease. He has a known history of hypothyroidism, gastroesophageal reflux disease, coronary artery disease, as well as ischemic cardiomyopathy.    He continues to follow-up with neurology regarding Parkinson's disease which is quite advanced.  He has experienced \"freezing \"episodes and sustained multiple falls.  At one point he had a hospitalization following a fall and had a CT scan of the cervical spine which showed severe spinal canal stenosis at multiple levels.  There is neural foraminal narrowing as well as prominent degenerative changes.  Given his history of heart disease and medications he was deemed not to be a candidate for spinal surgery at this time.    Regarding his cardiac history he does have a known history of ischemic cardiomyopathy.  He had a coronary stent placed in the right coronary artery in October 2018 and has required dual antiplatelet therapy.  He has been treated with aspirin " as well as Brilinta.  An echocardiogram showed an ejection fraction 44%.  He continues to follow-up with cardiology. He has been compliant with his medications.     As noted, he continues to follow-up with neurology and has been treated with carbidopa levodopa.  His regimen has been adjusted over time.  As noted, he has had freezing episodes and has had multiple falls as well.    Review of systems is notable for a wound involving his right foot.  He has followed up with podiatry would like a referral to a wound care clinic.  He has required debridement. Additionally, he wears hearing aids.  Review of systems otherwise notable for constipation.    He does admit to anxiety with feeling nervous and on edge.  He does have little interest or pleasure in doing things and can have some difficulty with sleep.  He has no thoughts of self-harm.    He rates his health as fair.  He exercises 0-2 times per week.  This is difficult given his Parkinson's.  He requires assistance with getting dressed, grooming, bathing, and walking.    He denies recent chest pain, shortness of breath, or palpitations.      Essentially, neurology has changed him to an extended release carbidopa levodopa and they discussed the use of Nuplazad given concern for some hallucinations previously.  He also has participated in the [employer] program for patients with Parkinson's.  He feels like there has been an improvement in his movement though he still suffers from advanced Parkinson's disease.     As noted, he had ongoing pain in the right wrist and right shoulder and has since followed up with orthopedics.  Symptoms have been improving over time.     Additionally, he has followed up with cardiology as he does have a cardiomyopathy.  He had a cardiac stent placed in the right coronary artery in October 2018 is on dual antiplatelet therapy which should not be discontinued.  He has been treated with aspirin as well as Brilinta.  An echocardiogram showed  an ejection fraction of 44%.  He has followed up with cardiology and has had cardiac rehabilitation which he has tolerated.  He denies current chest pain or shortness of breath.    Review of Systems:    Please see above.  The rest of the review of systems are negative for all systems.    Patient Care Team:  Valentín Washington MD as PCP - General (Family Medicine)  Elyssa Dolan PA-C as Physician Assistant (Orthopedic Surgery)  Elyssa Gomez MD as Physician (Neurology)  Leif Washington DPM as Physician (Podiatry)  Naresh Nails PharmD as Pharmacist (Pharmacist)  Stephie Pineda CHW as Community Health Worker (Primary Care - CC)  Valentín Washington MD as Assigned PCP  Jena Cazares RN as Lead Care Coordinator (Primary Care - CC)     Patient Active Problem List   Diagnosis     Parkinson disease (H)     Back pain     Depression     Anxiety state, unspecified     Hypothyroidism     Pancreatitis     RBD (REM behavioral disorder)     Low vitamin D level     Calculus of gallbladder with biliary obstruction but without cholecystitis     Weakness     Cholecystitis     Leukocytosis, unspecified type     Choledocholithiasis     Abnormal echocardiogram     Abnormal nuclear stress test     Coronary artery disease involving native coronary artery of native heart with angina pectoris (H)     Ischemic cardiomyopathy     Heart failure with reduced ejection fraction (H)     Cervical stenosis of spinal canal     Past Medical History:   Diagnosis Date     Anxiety      Callus      Cardiomyopathy (H)      Chronic back pain      Delirium      Depression      Fall      GERD (gastroesophageal reflux disease)      Hypothyroidism      Pancreatitis 12/31/2015     PD (Parkinson's disease) (H)      RBD (REM behavioral disorder) 4/3/2017     Shingles     hx      Past Surgical History:   Procedure Laterality Date     CV CORONARY ANGIOGRAM N/A 10/31/2018    Procedure: Coronary Angiogram;  Surgeon: Jose Meyer MD;   Location: [facility] Lab;  Service:      CV LEFT HEART CATHETERIZATION WO LEFT VETRICULOGRAM Left 10/31/2018    Procedure: Left Heart Catheterization Without Left Ventriculogram;  Surgeon: Jose Meyer MD;  Location: [facility] Lab;  Service:      INGUINAL HERNIA REPAIR Bilateral 10/2013     LAMINECTOMY  10/2013     NC ERCP REMOVE CALCULI/DEBRIS BILIARY/PANCREAS DUCT N/A 2018    Procedure: ENDOSCOPIC RETROGRADE CHOLANGIOPANCREATOGRAPHY SPINCTEROTOMY BILIARY STONE EXTRACTION;  Surgeon: Curtis Mcclain MD;  Location: [facility];  Service: Gastroenterology     NC LAP,CHOLECYSTECTOMY/GRAPH N/A 2018    Procedure: LAPAROSCOPIC CHOLECYSTECTOMY;  Surgeon: Joyce Melissa MD;  Location: [facility];  Service: General      Family History   Problem Relation Age of Onset     Kidney failure Mother      Heart disease Father 82     Tremor Father      Parkinsonism Paternal Grandfather      Tremor Sister      Leukemia Maternal Grandmother       Social History     Socioeconomic History     Marital status:      Spouse name: Not on file     Number of children: Not on file     Years of education: Not on file     Highest education level: Not on file   Occupational History     Not on file   Social Needs     Financial resource strain: Not on file     Food insecurity:     Worry: Not on file     Inability: Not on file     Transportation needs:     Medical: Not on file     Non-medical: Not on file   Tobacco Use     Smoking status: Former Smoker     Types: Cigarettes     Last attempt to quit: 1980     Years since quittin.7     Smokeless tobacco: Never Used   Substance and Sexual Activity     Alcohol use: No     Comment: one drink a week     Drug use: No     Sexual activity: Not on file   Lifestyle     Physical activity:     Days per week: Not on file     Minutes per session: Not on file     Stress: Not on file   Relationships     Social connections:     Talks on phone: Not on file      Gets together: Not on file     Attends Zoroastrianism service: Not on file     Active member of club or organization: Not on file     Attends meetings of clubs or organizations: Not on file     Relationship status: Not on file     Intimate partner violence:     Fear of current or ex partner: Not on file     Emotionally abused: Not on file     Physically abused: Not on file     Forced sexual activity: Not on file   Other Topics Concern     Not on file   Social History Narrative     Not on file      Current Outpatient Medications   Medication Sig Dispense Refill     acetaminophen (TYLENOL) 325 MG tablet Take 2 tablets (650 mg total) by mouth every 4 (four) hours as needed.  0     ammonium lactate (AMLACTIN) 12 % cream Apply two times a day 385 g 2     aspirin 81 mg chewable tablet Chew 1 tablet (81 mg total) daily.  0     atorvastatin (LIPITOR) 80 MG tablet Take 1 tablet (80 mg total) by mouth daily. 90 tablet 3     bisacodyl (DULCOLAX) 10 mg suppository One supp NC qday prn constipation.  Give if no BM in prior 3 days.  0     carbidopa-levodopa (PARCOPA)  mg per disintegrating tablet Take 1 tablet by mouth as needed. 90 tablet 3     carbidopa-levodopa (RYTARY) 23.75-95 mg CpER ER capsule Take 4 capsules by mouth daily. Take 5 caps at 8am, 7 caps at 12pm and 4pm and 4 caps at 8pm (Patient taking differently: Take 4 capsules by mouth daily. Take 5 caps at 8am, 6 caps at 12pm and 4pm and 4 caps at 8pm      ) 690 capsule 3     carbidopa-levodopa (SINEMET CR)  mg ER tablet Take 1 tablet by mouth 2 (two) times a day. In am and 2pm (Patient taking differently: Take 1 tablet by mouth at bedtime. One in the am and at bedtime      ) 60 tablet 3     cholecalciferol, vitamin D3, (VITAMIN D3) 2,000 unit Tab Take 1 tablet (2,000 Units total) by mouth daily.  0     levothyroxine (SYNTHROID, LEVOTHROID) 137 MCG tablet TAKE ONE TABLET BY MOUTH ONCE DAILY AT 6 A.M. 90 tablet 0     metoprolol succinate (TOPROL-XL) 25 MG Take 0.5  "tablets (12.5 mg total) by mouth at bedtime. 45 tablet 2     nitroglycerin (NITROSTAT) 0.4 MG SL tablet Place 1 tablet (0.4 mg total) under the tongue every 5 (five) minutes as needed for chest pain. 15 tablet 1     omeprazole (PRILOSEC) 20 MG capsule Take 1 capsule (20 mg total) by mouth daily. (Patient taking differently: Take 20 mg by mouth daily as needed .      ) 90 capsule 3     pimavanserin (NUPLAZID) 34 mg cap capsule Take 1 capsule (34 mg total) by mouth at bedtime. 30 capsule 5     polyethylene glycol (MIRALAX) 17 gram packet Take 17 g by mouth daily.             senna-docusate (SENNOSIDES-DOCUSATE SODIUM) 8.6-50 mg tablet Take 1 tablet by mouth 2 (two) times a day.             ticagrelor (BRILINTA) 90 mg Tab Take 1 tablet (90 mg total) by mouth 2 (two) times a day. 60 tablet 11     venlafaxine (EFFEXOR-XR) 150 MG 24 hr capsule Take 1 capsule (150 mg total) by mouth daily. 75 mg + 150 mg  = 225 mg 90 capsule 2     venlafaxine (EFFEXOR-XR) 75 MG 24 hr capsule Take 1 capsule (75 mg total) by mouth daily. 75 mg + 150 mg = 225 mg 90 capsule 2     No current facility-administered medications for this visit.       Objective:   Vital Signs:   Visit Vitals  /64   Pulse 60   Temp 98.1  F (36.7  C) (Oral)   Resp 12   Ht 5' 7.25\" (1.708 m)   Wt 214 lb (97.1 kg)   BMI 33.27 kg/m         VisionScreening:  No exam data present     PHYSICAL EXAM  General appearance: alert, appears stated age and cooperative  Head: Normocephalic, without obvious abnormality, atraumatic  Eyes: conjunctivae/corneas clear. PERRL, EOM's intact.   Ears: normal TM's and external ear canals both ears  Nose: Nares normal. Septum midline. Mucosa normal. No drainage or sinus tenderness.  Throat: lips, mucosa, and tongue normal; teeth and gums normal  Neck: no adenopathy, supple, symmetrical, trachea midline   Back: symmetric, no curvature. ROM normal. No CVA tenderness.  Lungs: clear to auscultation bilaterally  Heart: regular rate and " rhythm, S1, S2 normal, no murmur, click, rub or gallop  Abdomen: soft, non-tender; bowel sounds normal; no masses,  no organomegaly  Extremities: extremities normal, atraumatic, no cyanosis or edema  He has a callus with an underlying wound overlying the plantar aspect of the right 1st MTP joint  Skin: Skin color, texture, turgor normal. No rashes or lesions  Lymph nodes: Cervical nodes normal.  Neurologic: Alert and oriented X 3  There is generalized slowness of movement  He moves with an unsteady and somewhat shuffling gait  He has masked facies      Assessment Results 9/6/2019   Activities of Daily Living 2-4 - Moderate impairment   Instrumental Activities of Daily Living 5-6 - Severe functional impairment   Mini Cog Total Score 5   Some recent data might be hidden     A Mini-Cog score of 0-2 suggests the possibility of dementia, score of 3-5 suggests no dementia    Identified Health Risks:     The patient was provided with suggestions to help him develop a healthy physical lifestyle.   He is at risk for lack of exercise and has been provided with information to increase physical activity for the benefit of his well-being.  The patient reports that he has difficulty with activities of daily living. I have asked that the patient make a follow up appointment in 12 weeks where this issue will be further evaluated and addressed.  The patient reports that he has difficulty with instrumental activities of daily living.  He was provided with a list of local organizations that provide support services and advised to make a follow up appointment in 12 weeks to address this further.   The patient was provided with written information regarding signs of hearing loss.  Information on urinary incontinence and treatment options given to patient.  The patient was provided with suggestions to help him develop a healthy emotional lifestyle.   The patient s PHQ-9 score is consistent with moderate depression.  He was provided with  information regarding depression and was advised to schedule a follow up appointment in 12 weeks to further address this issue.  He is at risk for falling and has been provided with information to reduce the risk of falling at home.  Patient's advanced directive was discussed and I am comfortable with the patient's wishes.

## 2021-06-01 NOTE — TELEPHONE ENCOUNTER
Silver is getting palliative care they suggested him getting a miralax prescription.     Trouble with right foot and had a wound nurse out to their house and has been going to the podiatrist many times, sees nurse nerve pain in left foot.  Try CBD oil?  if you say yes and how much would she give him?    Gabapentin- would one at night help him with his nerve issues in left foot?      Palliative Care has extended care for another 6 weeks because of constipation and his foot.

## 2021-06-01 NOTE — TELEPHONE ENCOUNTER
1) Orders being requested: Physical therapy evaluate and treat  Reason service is needed/diagnosis: Parkinson's disease, spinal stenosis, anxiety, right foot deformity, posture    2) Orders being requested: Home Health Aid for two times a week for 9 weeks  Reason service is needed/diagnosis: To assist with personal care and showering    3) Orders being requested: Skilled nursing for one time a week for 9 weeks and 4 visits as needed  Reason service is needed/diagnosis: Parkinson's, anxiety, depression, constipation - education on bowels    When are orders needed by: as soon as possible   Where to send Orders: Phone:  [phone]  Okay to leave detailed message?  Yes

## 2021-06-01 NOTE — TELEPHONE ENCOUNTER
Left detailed message for Nora, from home care, relaying verbal orders as requested below per Dr. Washington.

## 2021-06-01 NOTE — TELEPHONE ENCOUNTER
FYI - Status Update  Who is Calling: Home Care  Update: Caller is calling to report that patient had 2 separate falls at home, 1 on 9/6, and the other on  9/7/19.  Wife believes patient was carrying a large book and that he lost his balance. On exam, patient has bruising on his right side,torso, and an abrasion on his left knee.  Patient otherwise has no complaints.  Okay to leave a detailed message?:  No return call needed

## 2021-06-01 NOTE — TELEPHONE ENCOUNTER
Referral Request  Type of referral:Foot surgeon  Who s requesting: Homecare: and wife  Why the request: Right foot callus  Have you been seen for this request: N/A  Does patient have a preference on a group/provider? Dr. Markham at [facility] in [place].   Okay to leave a detailed message?  Yes     Call patient's wife with referral.  Also does patient need to have any tests before see foot doctor?

## 2021-06-01 NOTE — TELEPHONE ENCOUNTER
Orders being requested: PT 2 times a week for 3 weeks, possible U-Step walker, the patient will be trained with one on 10/8/19 to see if will be able to use.   Reason service is needed/diagnosis: Parkinson's  When are orders needed by: ASAP  Where to send Orders: Phone:  [phone]  Okay to leave detailed message?  Yes

## 2021-06-02 NOTE — TELEPHONE ENCOUNTER
Orders being requested: Needs a verbal order 2 x a week for 3 weeks, for cognition and ADL equipment, home safety and fall prevention for OT only   Reason service is needed/diagnosis: See above   When are orders needed by: Start on 10/14 Monday    Where to send Orders: Per protocol, needs verbal   Okay to leave detailed message? Yes.

## 2021-06-02 NOTE — TELEPHONE ENCOUNTER
Orders being requested: medical social worker   Reason service is needed/diagnosis: evaluation for community resources, long-term care solutions, family support, and help with disease process.   When are orders needed by: ASAPEARL  Where to send Orders: Phone:  [phone]  Okay to leave detailed message?  Yes

## 2021-06-03 NOTE — TELEPHONE ENCOUNTER
He can try mucinex to see if that can thin secretions and promote drainage.  Humidified air can be helpful.

## 2021-06-03 NOTE — TELEPHONE ENCOUNTER
FYI - Status Update  Who is Calling: Home Care  Update: Caller stated the patient fell twice last week and has bruising on his left rib area and redness on his left knee. Caller stated patient reported no new pain and that pain is better today.   Okay to leave a detailed message?:  Yes   [phone]

## 2021-06-03 NOTE — TELEPHONE ENCOUNTER
Orders being requested: occupational therapy to evaluate and treat  Reason service is needed/diagnosis: to help with dressing the patient, wife is concerned with the patient's risk of falls  When are orders needed by: as soon as possible   Where to send Orders: Phone:  [phone]  Okay to leave detailed message?  Yes

## 2021-06-03 NOTE — TELEPHONE ENCOUNTER
Describe your symptoms: Home care calling to report that the patient complained of sinus congestion, coughing, headache on/off (not new though), and blowing his nose consistently. Caller stated patient informed her the nasal discharge is yellow to green in the morning but clear by the afternoon. Caller did not provide much information on the patient's symptoms as she was not with him. Caller is questioning what the patient should take for over the counter medications.  Any pain: no  New/Ongoing: New  How long have you been having symptoms: 1  month(s)  Have you been seen for this:  No.  Appointment offered? home care was not with the patient  Triage offered?: home care was not with the patient  Home remedies tried: none   Pharmacy Name and Location: home care nurse called in  Okay to leave a detailed message? No   Please call the patient back with questions.

## 2021-06-03 NOTE — PATIENT INSTRUCTIONS - HE
1. Continue current medications  2. Arrange nuclear stress to follow up on CAD prior to potential neurosurgery. We will call with results and recommendations regarding Virita.

## 2021-06-03 NOTE — TELEPHONE ENCOUNTER
Refill Approved    Rx renewed per Medication Renewal Policy. Medication was last renewed on 8/26/19.    Yahaira Torrez, [facility] Triage/Med Refill 11/29/2019     Requested Prescriptions   Pending Prescriptions Disp Refills     levothyroxine (SYNTHROID, LEVOTHROID) 137 MCG tablet [Pharmacy Med Name: LEVOTHYROXINE SODIUM 137MCG TABS] 90 tablet 0     Sig: TAKE ONE TABLET BY MOUTH ONCE DAILY AT 6 A.M.       Thyroid Hormones Protocol Passed - 11/27/2019  1:50 PM        Passed - Provider visit in past 12 months or next 3 months     Last office visit with prescriber/PCP: 2/7/2019 Valentín Washington MD OR same dept: 2/7/2019 Valentín Washington MD OR same specialty: 2/7/2019 Valentín Washington MD  Last physical: 9/6/2019 Last MTM visit: Visit date not found   Next visit within 3 mo: Visit date not found  Next physical within 3 mo: Visit date not found  Prescriber OR PCP: Valentín Washington MD  Last diagnosis associated with med order: 1. Hypothyroidism  - levothyroxine (SYNTHROID, LEVOTHROID) 137 MCG tablet [Pharmacy Med Name: LEVOTHYROXINE SODIUM 137MCG TABS]; TAKE ONE TABLET BY MOUTH ONCE DAILY AT 6 A.M.  Dispense: 90 tablet; Refill: 0    If protocol passes may refill for 12 months if within 3 months of last provider visit (or a total of 15 months).             Passed - TSH on file in past 12 months for patient age 12 & older     TSH   Date Value Ref Range Status   09/06/2019 2.91 0.30 - 5.00 uIU/mL Final

## 2021-06-04 NOTE — PROGRESS NOTES
Assessment:     1. Parkinson's disease  2. Heart failure with reduced EF/CAD  3. Anxiety  4. Back pain      Plan:       Plan:         8 am   12  pm   4 pm   8 pm    HS               Sinemet ER    25/100   1 tab         1 tab    Rytary      2 caps    2 cap   2 cap    2 cap          Gocovery    stopped                              Patient returns to the Parkinson's clinic for follow-up appointment, he was last seen by me on 9/5/19, where no medication changes were made.  Wife is very concerned about a wound the patient has on his foot.  The patient did receive palliative care which included a nurse to help care for this wound, the wound was almost fully healed once palliative care was discontinued the wound did return.  Considering the fact Parkinson's has on his autonomic system, decrease circulation and neuropathy I am very concerned that this wound could turn into something worse.  Palliative care has only stopped 2 weeks ago and the wound already is concerning.  Given that the patient does have Parkinson's and the best medication/treatment for the patient is physical activity.  Patient already has 2 toes that need to be amputated, given the patient's mild cognitive impairment and having Parkinson's disease really concerns me that he may potentially lose his foot.  This would make ambulation very complicated for the patient thus making him more sedentary.  An infection also could be very traumatic for the patient, maybe even causing psychotic behavior.  Patient also reports not liking taking so many Rytary capsules, I will reduce this to the larger tablets, the patient will now take 2 capsules 4 times a day, he will continue with other therapies.  Patient is also having problems with sleep, he has problems with his restless leg, wife did discontinue gabapentin which could have affected the restless sleep thus affecting his sleep.  I will have the patient take 1 tablet at bedtime.  Patient and wife will monitor  cognition.  Overall the patient's Parkinson's appears under good control, patient is also been taken off blood thinners I will refer the patient over to the spine center and they will assess if the patient is a candidate for surgery.    Subjective:        Parkinson's history: Patient is right-handed.  He was diagnosed with Parkinson's disease in 2004 at the age of about 54.  He feels that the symptoms may have started in about 1994.  Symptoms started with stiffness, slowness, soft speech, walking difficulties, and left hand tremor which spread to the right and about 2015.  Has seen Dr. Suazo in the past.  He has some bruising and tremors occasionally but there is asymmetry.  He tried Sinemet CR during the daytime and at night which did not work well.  He is been off the pramipexole since 12/2015 due to memory and behavioral concerns.  December 2010 he was taking Sinemet IR 25/100 and increased to 25/250 with a good response which also improved his back pain.  He was able to be more active.  In 2011 he was evaluated at the [facility] by , he was noted to have U PDR S score of 41 after all Parkinson's medications.  It was suggested that he take carbidopa levodopa every 3 hours.  Amantadine was discontinued because he did not have dyskinesia.  It was suggested that he switch his selegiline to Azilect or discontinue all MAO inhibitors.  It is  felt that his increased Sinemet may have exasperated his restless leg syndrome.  Gabapentin was started to control his restless leg symptoms.  He was followed by Dr. Suazo from 2002 - 2011.  He also saw Dr. Zuniga and a previous nurse practitioner at this clinic.  He did transfer care to  in 2013.  He has tried amantadine and selegiline in the past.  He is not able to tolerate Requip, which he tried for several years at a relatively high dose but discontinued due to edema in lower extremities, he also tried Mirapex in 2015 but this caused more freezing  and increased tremor.  CAT scan in July 2013 showed absent radiotracer in the putamen with diminished accumulation in the caudate, most consistent with Parkinson's disease.  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     Patient Active Problem List    Diagnosis Date Noted     Ulcer of right foot, unspecified ulcer stage (H) 09/16/2019     Cord compression myelopathy (H) 09/16/2019     Moderate major depression (H) 09/16/2019     Cervical stenosis of spinal canal 02/07/2019     Ischemic cardiomyopathy 12/14/2018     Heart failure with reduced ejection fraction (H) 12/14/2018     Coronary artery disease involving native coronary artery of native heart with angina pectoris (H)      Abnormal nuclear stress test 10/25/2018     Abnormal echocardiogram 05/29/2018     Leukocytosis, unspecified type      Cholecystitis 05/23/2018     Choledocholithiasis 05/23/2018     Calculus of gallbladder with biliary obstruction but without cholecystitis      Weakness      Low vitamin D level 10/11/2017     RBD (REM behavioral disorder) 04/03/2017     Pancreatitis 12/31/2015     Parkinson disease (H) 07/30/2014     Back pain 07/30/2014     Depression 07/30/2014     Anxiety state, unspecified 07/30/2014     Hypothyroidism 07/30/2014     Past Medical History:   Diagnosis Date     Anxiety      Callus      Cardiomyopathy (H)      Chronic back pain      Delirium      Depression      Fall      GERD (gastroesophageal reflux disease)      Hypothyroidism      Pancreatitis 12/31/2015     PD (Parkinson's disease) (H)      RBD (REM behavioral disorder) 4/3/2017     Shingles     hx     Past Surgical History:   Procedure Laterality Date     CV CORONARY ANGIOGRAM N/A 10/31/2018    Procedure: Coronary Angiogram;  Surgeon: Jose Meyer MD;  Location: [facility];  Service:      CV LEFT HEART CATHETERIZATION WO LEFT VETRICULOGRAM Left 10/31/2018    Procedure: Left Heart Catheterization Without Left  Ventriculogram;  Surgeon: Jose Meyer MD;  Location: [facility] Cath Lab;  Service:      INGUINAL HERNIA REPAIR Bilateral 10/2013     LAMINECTOMY  10/2013     DE ERCP REMOVE CALCULI/DEBRIS BILIARY/PANCREAS DUCT N/A 5/25/2018    Procedure: ENDOSCOPIC RETROGRADE CHOLANGIOPANCREATOGRAPHY SPINCTEROTOMY BILIARY STONE EXTRACTION;  Surgeon: Curtis Mcclain MD;  Location: [facility];  Service: Gastroenterology     DE LAP,CHOLECYSTECTOMY/GRAPH N/A 5/24/2018    Procedure: LAPAROSCOPIC CHOLECYSTECTOMY;  Surgeon: Joyce Melissa MD;  Location: [facility];  Service: General     Family History   Problem Relation Age of Onset     Kidney failure Mother      Heart disease Father 82     Tremor Father      Parkinsonism Paternal Grandfather      Tremor Sister      Leukemia Maternal Grandmother      Current Outpatient Medications   Medication Sig Dispense Refill     acetaminophen (TYLENOL) 325 MG tablet Take 2 tablets (650 mg total) by mouth every 4 (four) hours as needed.  0     ammonium lactate (AMLACTIN) 12 % cream Apply two times a day 385 g 2     aspirin 81 mg chewable tablet Chew 1 tablet (81 mg total) daily.  0     atorvastatin (LIPITOR) 80 MG tablet Take 1 tablet (80 mg total) by mouth daily. 90 tablet 3     bisacodyl (DULCOLAX) 10 mg suppository One supp DE qday prn constipation.  Give if no BM in prior 3 days.  0     carbidopa-levodopa (PARCOPA)  mg per disintegrating tablet Take 1 tablet by mouth as needed. 90 tablet 3     carbidopa-levodopa (RYTARY) 61. mg CpER ER capsule Take 2 capsules by mouth 4 (four) times a day. 250 capsule 2     carbidopa-levodopa (SINEMET CR)  mg ER tablet Take 1 tablet by mouth 2 (two) times a day. In am and 2pm (Patient taking differently: Take 1 tablet by mouth at bedtime. One in the am and at bedtime      ) 60 tablet 3     cholecalciferol, vitamin D3, (VITAMIN D3) 2,000 unit Tab Take 1 tablet (2,000 Units total) by mouth daily.  0     gabapentin (NEURONTIN)  100 MG capsule Take 100 mg by mouth 2 (two) times a day. 60 capsule 2     levothyroxine (SYNTHROID, LEVOTHROID) 137 MCG tablet TAKE ONE TABLET BY MOUTH ONCE DAILY AT 6 A.M. 90 tablet 3     metoprolol succinate (TOPROL-XL) 25 MG Take 0.5 tablets (12.5 mg total) by mouth at bedtime. 45 tablet 2     nitroglycerin (NITROSTAT) 0.4 MG SL tablet Place 1 tablet (0.4 mg total) under the tongue every 5 (five) minutes as needed for chest pain. 15 tablet 1     NUPLAZID 34 mg cap capsule TAKE 1 CAPSULE BY MOUTH AT BEDTIME. 30 capsule 4     polyethylene glycol (GLYCOLAX) 17 gram/dose powder Take 17 g by mouth daily. 1530 g 6     senna-docusate (SENNOSIDES-DOCUSATE SODIUM) 8.6-50 mg tablet Take 1 tablet by mouth 2 (two) times a day.             triamcinolone (KENALOG) 0.025 % ointment Apply topically 2 (two) times a day. 15 g 3     venlafaxine (EFFEXOR-XR) 150 MG 24 hr capsule Take 1 capsule (150 mg total) by mouth daily. 75 mg + 150 mg  = 225 mg 90 capsule 2     venlafaxine (EFFEXOR-XR) 75 MG 24 hr capsule Take 1 capsule (75 mg total) by mouth daily. 75 mg + 150 mg = 225 mg 90 capsule 2     No current facility-administered medications for this encounter.        Allergies   Allergen Reactions     Clonazepam      Too drowsy     Social History     Socioeconomic History     Marital status:      Spouse name: Not on file     Number of children: Not on file     Years of education: Not on file     Highest education level: Not on file   Occupational History     Not on file   Social Needs     Financial resource strain: Not on file     Food insecurity:     Worry: Not on file     Inability: Not on file     Transportation needs:     Medical: Not on file     Non-medical: Not on file   Tobacco Use     Smoking status: Former Smoker     Types: Cigarettes     Last attempt to quit: 1980     Years since quittin.9     Smokeless tobacco: Never Used   Substance and Sexual Activity     Alcohol use: No     Comment: one drink a week      Drug use: No     Sexual activity: Not on file   Lifestyle     Physical activity:     Days per week: Not on file     Minutes per session: Not on file     Stress: Not on file   Relationships     Social connections:     Talks on phone: Not on file     Gets together: Not on file     Attends Spiritism service: Not on file     Active member of club or organization: Not on file     Attends meetings of clubs or organizations: Not on file     Relationship status: Not on file     Intimate partner violence:     Fear of current or ex partner: Not on file     Emotionally abused: Not on file     Physically abused: Not on file     Forced sexual activity: Not on file   Other Topics Concern     Not on file   Social History Narrative     Not on file       The following portions of the patient's history were reviewed and updated as appropriate: allergies, current medications, past family history, past medical history, past social history, past surgical history and problem list.    Review of Systems        PD:   see above  RLS-  no complaints  ADL - Independent with all cares, reports only needs assistance when in a hurry, lives at home with wife. Independent with eating, dressing, and medication set up. Wife is currently doing daily finances, he does handle all long term finances.   Driving- has given up driving, wife now does most of the driving  Exercise - reports he really doesn't exercise much, was having physical therapy while on palliative care  Falls -  No falls since last visit  Medication Side Effects - Clonazepam - not able to tolerate  Chronic lower back pain - no focal weakness in left LE. 10/2013 had lower back surgery, which has reduced pain. Does have stenosis, is starting to have incontinent issues, surgery will not be performed until heart issues are stabilized and patient would be able to be off of blood thinners  Osteomyelitis-third and fourth toe on right foot has been amputated does have a wound on left foot that  is not healing well, being monitored by podiatrist  Memory -Completed neuro psych testing in 4/2013 and 5/2015, with mild progression in cognitive impairment. Repeat test in 5/2016 showing no significant changes. MOCA 21/30 pm 4/5/2017. Cognition has improved will repeat neuro psych  Sleep-  no johnny to change his sleep habits, he was an over the road . He goes to sleep[ late and is not able to function in the am. He has had 3 sleep studies and has seen Dr. Jayesh Michaels and other sleep specialists. He has tried clonazepam and was not able to tolerate. Tested other medications without benefit. Reports also has REM sleep disorder and restless leg syndrome. Sleeps/takes multiple naps throughout the day  Daytime sleepiness-  was not able to tolerate Ritalin, which was tried in 2017.    Psych/Depression-Reports having some anxiety, has had some hallucinations, will sometimes sees mouse running and sees other people in the house. Reports some depression but wife feels this maybe d/t lack of motivation. Hs seen Dr. Hurley and Belkis in the past. Reports he really doesn't believe in psychotherapies. Nuplazid has helped with hallucinations  Autonomic: sone light headedness, not particularly with position changes, no orthostatic B/P  Dyskinesia- none noted  [place] - but does not wear hearing aids  Tremor -mild bilateral hand tremor that is post resting type  slightly worse on the right.    Posture: stooped shoulders  Postural Stability: decreased  Surgery - discussed  Drooling - sometimes at night  Bradykinesia and Hypokinesia -  slight      Objective:     There were no vitals filed for this visit.    Imaging: CT HEAD 12/7/2018 FINDINGS: INTRACRANIAL CONTENTS: No intracranial hemorrhage, extraaxial collection, or mass effect.  No CT evidence of acute infarct. There is minimal scattered low-attenuation within the periventricular and subcortical white matter consistent with minimal small vessel ischemic disease. The  ventricles and sulci are normal for age. VISUALIZED ORBITS/SINUSES/MASTOIDS: No significant orbital abnormality. There is almost complete opacification again noted involving the right frontal sinus along with the maxillary sinuses bilaterally. No significant middle ear or mastoid effusion.OSSEOUS STRUCTURES/SOFT TISSUES: No significant abnormality. CONCLUSION: 1.  No CT finding of a mass, acute infarct or hemorrhage. 2.  Prominent sinus disease as described above.    Neuropsychological Assessment 5/23/16 - Mild Neurocognitive Disorder d/t Parkinson's Disease    Review of Systems    Alert, cooperative, no distress Sitting, appears stated age, normocephalic, atraumatic without cyanosis, edema or skin rashes. Normal mood.There are dystrophic skin changes below knee. Thick calluses on right foot      Cranial nerves: . Significant for hard of hearing (worse on right), staring and decreased eye blinking, mask face  Sensory -no focal sensory difficulties  Motor strength - normal  Language - slightly soft speech  Muscle Bulk - normal   Tone - increased  Rigidity -mild  Coordination - decreased  Balance - decreased   Tremors: Upper extremity worse on right  Dyskinesia:  none  Getting up from chair. Pushes up from chair     I asked him to call with any questions or concerns and will see him again in clinic in about 4 months. We discussed the risks and benefits of the medication including risk of worsening depression with medication adjustments and even the possibility of emergence of suicidality      Total time spent with the patient today was 60 minutes with greater than 50% of the time spent in counseling and care coordination..

## 2021-06-04 NOTE — TELEPHONE ENCOUNTER
Patient's wife Luz Marina called with some medication clarifications on new medication changes.  They plan to change to the new Rytary 61.25 mg this upcoming Tuesday, 12/17/2019.  She is wondering if he is supposed to stop the sinemet CR  MG tablets which he is currently taking 1 tab in the AM and 1 tab in the PM or should he still be taking those along with the increase of Rytary.  Is there anything else that she should know or change with medications when she does the switch in a few days?

## 2021-06-04 NOTE — TELEPHONE ENCOUNTER
Upcoming Appointment Question  When is the appointment: 12/11/19   What is your appointment for?: Ulcer on right foot   Who is your appointment scheduled with?: Dr. Oneill  What is your question/concern?: Patients wife, Luz Marina would like Dr. Washington's recommendation. Silver currently has an appointment scheduled 12/16/19 with his neurologist , however the neurologist thinks he needs to be sooner based on his wound. Luz Marina stated the wound is not open and its the same wound from two months ago. She just wants to make sure that she needs to bring him in to be seen right away or can she wait until 12/16/19.   Okay to leave a detailed message?: Yes

## 2021-06-04 NOTE — TELEPHONE ENCOUNTER
Who is calling:  Wife  Reason for Call:  is calling to request the care coordinator information that is covering their prior one Stephie. Please return call with this information.   Caller is also concerned about an ulcer on foot that they are scheduled with ortho for but that is a ways away and the neurologist is concerned regarding his parkinson's and loosing the foot.  Patient has a hx of loosing a couple toes already on this foot.    Wife was transferred to scheduling to see Valentín Washington MD to discuss this and some other issues that were discussed with the neurologist.    Date of last appointment with primary care: 11/20/19  Okay to leave a detailed message: Yes

## 2021-06-04 NOTE — TELEPHONE ENCOUNTER
Central PA team  [phone]  Pool: HE PA MED ([id number])          PA has been initiated.       PA form completed and faxed insurance via Cover My Meds     Key:   [id number]     Medication:  Nuplazid 34MG capsules    Insurance:  [employer]        Response will be received via fax and may take up to 5-10 business days depending on plan

## 2021-06-04 NOTE — TELEPHONE ENCOUNTER
I entered an order.  Please expedite this.  Also, he can be referred to the vascular clinic as another option.  I do want a specialist in wound care to evaluate him.

## 2021-06-04 NOTE — TELEPHONE ENCOUNTER
I called and left the message.  I do recommend referring him to the wound care clinic to have them definitively treat his wound.  I can assist with a referral.

## 2021-06-04 NOTE — PROGRESS NOTES
Chief Complaint   Patient presents with     foot wound         HPI:   Silver Zamora is a 69 y.o. male with wife has had ulcer on medial sign of great toe of right foot for over 2 months. Had been almost healed and about two weeks ago started getting worse.  Has had a little serosanguinous fluid.  No fever. No pain.  Sees podiatry next week.  Has parkinson's and foot neuropathy.  He doesn't wear shoes in the house and has a shuffling gait. It is presumed that he creates a shear force that has contributed to the ulcer.  He also has a thickened area on his second toe and fourth toe on his left foot that he would like looked at.  Finally he has a rash between his eyebrows that is flakey and itchy.  Using lotion on     ROS:  A 10 point comprehensive review of systems was negative except as noted.     Medications:  Current Outpatient Medications on File Prior to Visit   Medication Sig Dispense Refill     acetaminophen (TYLENOL) 325 MG tablet Take 2 tablets (650 mg total) by mouth every 4 (four) hours as needed.  0     ammonium lactate (AMLACTIN) 12 % cream Apply two times a day 385 g 2     aspirin 81 mg chewable tablet Chew 1 tablet (81 mg total) daily.  0     atorvastatin (LIPITOR) 80 MG tablet Take 1 tablet (80 mg total) by mouth daily. 90 tablet 3     bisacodyl (DULCOLAX) 10 mg suppository One supp OK qday prn constipation.  Give if no BM in prior 3 days.  0     carbidopa-levodopa (PARCOPA)  mg per disintegrating tablet Take 1 tablet by mouth as needed. 90 tablet 3     carbidopa-levodopa (RYTARY) 23.75-95 mg CpER ER capsule Take 6 capsules by mouth 4 (four) times a day. 690 capsule 3     carbidopa-levodopa (RYTARY) 61. mg CpER ER capsule Take 2 capsules by mouth 4 (four) times a day. 250 capsule 2     carbidopa-levodopa (SINEMET CR)  mg ER tablet Take 1 tablet by mouth 2 (two) times a day. In am and 2pm (Patient taking differently: Take 1 tablet by mouth at bedtime. One in the am and at  bedtime      ) 60 tablet 3     cholecalciferol, vitamin D3, (VITAMIN D3) 2,000 unit Tab Take 1 tablet (2,000 Units total) by mouth daily.  0     gabapentin (NEURONTIN) 100 MG capsule Take 100 mg by mouth 2 (two) times a day. 60 capsule 2     levothyroxine (SYNTHROID, LEVOTHROID) 137 MCG tablet TAKE ONE TABLET BY MOUTH ONCE DAILY AT 6 A.M. 90 tablet 3     metoprolol succinate (TOPROL-XL) 25 MG Take 0.5 tablets (12.5 mg total) by mouth at bedtime. 45 tablet 2     nitroglycerin (NITROSTAT) 0.4 MG SL tablet Place 1 tablet (0.4 mg total) under the tongue every 5 (five) minutes as needed for chest pain. 15 tablet 1     NUPLAZID 34 mg cap capsule TAKE 1 CAPSULE BY MOUTH AT BEDTIME. 30 capsule 4     omeprazole (PRILOSEC) 20 MG capsule Take 1 capsule (20 mg total) by mouth daily. (Patient taking differently: Take 20 mg by mouth daily as needed .      ) 90 capsule 3     polyethylene glycol (GLYCOLAX) 17 gram/dose powder Take 17 g by mouth daily. 1530 g 6     senna-docusate (SENNOSIDES-DOCUSATE SODIUM) 8.6-50 mg tablet Take 1 tablet by mouth 2 (two) times a day.             venlafaxine (EFFEXOR-XR) 150 MG 24 hr capsule Take 1 capsule (150 mg total) by mouth daily. 75 mg + 150 mg  = 225 mg 90 capsule 2     venlafaxine (EFFEXOR-XR) 75 MG 24 hr capsule Take 1 capsule (75 mg total) by mouth daily. 75 mg + 150 mg = 225 mg 90 capsule 2     No current facility-administered medications on file prior to visit.          Social History:  Social History     Tobacco Use     Smoking status: Former Smoker     Types: Cigarettes     Last attempt to quit: 1980     Years since quittin.9     Smokeless tobacco: Never Used   Substance Use Topics     Alcohol use: No     Comment: one drink a week         Physical Exam:   Vitals:    19 1139   BP: 122/70   Pulse: 64   Resp: 20   Temp: 98.3  F (36.8  C)   TempSrc: Oral   Weight: 220 lb (99.8 kg)       GEN:  NAD  SKIN:  Between eye brows flaky greasy appearing on erythematous base.  Right  foot: thick dark colored callous on medial side of 1st metatarsal.  Non fluctuant. Non painful.  Left foot: callous on end of second toe.  Corn on tip of 4th toe.        Assessment/Plan:  1. Ulcer of right foot, unspecified ulcer stage (H)  Has appointment with podiatry to debride.  Has home health set up to follow    2. Callus of foot  To be evaluated by podiatry.    Discussed wearing shoes in the house so there is not so much shear force on his foot.  Also to discuss foot wear with podiatry.    3. Seborrheic dermatitis  Trial of triamcinolone.  May add ketoconazole if not enough improvement.  - triamcinolone (KENALOG) 0.025 % ointment; Apply topically 2 (two) times a day.  Dispense: 15 g; Refill: 3           oYnis Oneill MD      12/11/2019    The following portions of the patient's history were reviewed and updated as appropriate: allergies, current medications, past family history, past medical history, past social history, past surgical history and problem list.

## 2021-06-04 NOTE — PROGRESS NOTES
How have you been doing since we last saw you? Most important neurological symptom or concern for this visit (Medication wearing off, hallucinations, freezing, pain, sleep difficulty, constipation etc.)   A little freezing has been happening, new walker recently, sleeping the last week and a half it has been hit/miss, Changed timing of medications Nuplazid an hour before sleeping he falls asleep. Wife feels that patient is sleeping more at night. He has been experiencing restless leg syndrome that starts around bedtime.    How many falls has the pt. Had over the last year?(if pt. Falls frequently, daily, weekly, monthly?) Pt had a fall 2 days ago from being tired.       Notes:  Palliative care came and did cognitive testing and possibly would like one done here  Ready to go to 1 pill for Rytary   Question about if when he gets up in pain, and if he hasn't had medication since previous PM   Right foot wound, palliative care was helping with the wound, its turning purple/Appointment on 16 th/ does that wound require skilled nursing.   Stress test and they took him off of a medication.   Surgery questions.

## 2021-06-04 NOTE — TELEPHONE ENCOUNTER
Orders being requested: The neurologist is asking if primary will order restart of home care skilled nursing pallative care in home care due to deep tissue issue on heel.  Please advise.   Reason service is needed/diagnosis: pallative care When are orders needed by: ASAP  Where to send Orders: Phone:  [phone]  Okay to leave detailed message?  Yes

## 2021-06-04 NOTE — TELEPHONE ENCOUNTER
Prior Authorization Request  Who s requesting:  Pharmacy  Pharmacy Name and Location: [facility]  Medication Name: NUPLAZID 34MG CAPSULES   Insurance Plan: [place]   Insurance Member ID Number:  [id number]  Informed patient that prior authorizations can take up to 10 business days for response:   No  Okay to leave a detailed message: Yes

## 2021-06-04 NOTE — TELEPHONE ENCOUNTER
1. Orders being requested: Skilled nursing 2 times a week for 4 weeks  Reason service is needed/diagnosis: Wound care  When are orders needed by: as soon as possible   Where to send Orders: Phone:  [phone]  Okay to leave detailed message?  Yes      2. Orders being requested: Home Health Aide 2 times a week for 4 weeks  Reason service is needed/diagnosis: Bathing  When are orders needed by: as soon as possible   Where to send Orders: Phone:  [phone]  Okay to leave detailed message?  Yes

## 2021-06-04 NOTE — PATIENT INSTRUCTIONS - HE
Take the doxycycline for 10 days  You may use flonase nasal spray as well (this may take a while to be effective)  Silver may take tylenol either 325 or 500 mg (Give two twice a day) - Scheduled  The maximum dose of tylenol is 4000 mg a day  Continue gabapentin  Please provide an update if not improving  We can consider switching effexor to cymbalta (duloxetine). We would have to wean effexor first  Follow-up with your foot specialist and wound care team

## 2021-06-05 NOTE — TELEPHONE ENCOUNTER
Orders being requested:   Medication Change    Requesting to change miralax directions to take 1-3 cap full daily as needed for constipation.    Reason service is needed/diagnosis: Constipation    When are orders needed by: ASAP    Where to send Orders:   Phone:  [phone]     Okay to leave detailed message?    Yes      Only verbal orders were given for wound care, medication change was not addressed via voicemail per RN.    Please call with verbal order for medication change.

## 2021-06-05 NOTE — TELEPHONE ENCOUNTER
Who is calling:  Angela RN with [facility], [phone]   Reason for Call:    Angela is requesting a Covering Provider address the Level 2 interaction between below two medications.  Please reach out and advise.  Date of last appointment with primary care: 1/2/2020  Okay to leave a detailed message: Yes

## 2021-06-05 NOTE — TELEPHONE ENCOUNTER
EMILY, no need to call.    I called and spoke with Angela.  He takes Tums on a limited basis and that can affect absorption.  I recommend that he try to avoid Tums while taking doxycycline.  If he needs to take this he should try to not take within several hours of taking doxycycline.

## 2021-06-05 NOTE — TELEPHONE ENCOUNTER
Orders being requested:   Wound cleaning with normal saline. Pat dry. Apply skin prep to skin around wound. Apply Iodoflex to wound bed. Cover with Gauze and tape or Primapore. Change dressing 2-3 times a week.  Patient's spouse can do the wound cares.  Virgie did wound cares today, 1/31/2020.    Reason service is needed/diagnosis:   Wound on Right Medial Great Toe  Wound on Left Distal Second Toe    When are orders needed by: Asap    Where to send Orders: Phone:  Verbal orders to Virgie with [facility], [phone]     Okay to leave detailed message?  Yes

## 2021-06-05 NOTE — TELEPHONE ENCOUNTER
Orders being requested:   Skilled Nursing  1. 2 x's a week x's 5 weeks  2. 4 PRN's  With daily dressing changes    Home Health Aide  1. 2 x's a week x's 5 weeks    Reason service is needed/diagnosis:  Wound Care  Personal Cares    When are orders needed by:   ASAP    Where to send Orders:   Phone:  [phone]     Okay to leave detailed message?    Yes    Please call with verbal orders, requesting orders be called today 02/07/2020 for services to start 02/10/2020.

## 2021-06-05 NOTE — TELEPHONE ENCOUNTER
Who is calling:  Virgie with [facility], [phone]  Reason for Call:    Virgie states patient had an episode this morning at approximately 4am this morning.  He was using his walker and he froze.  He knew he was going to fall so he slid down to his knees and hands and then pushed himself back up with his walker.  Patient had no injuries.  Date of last appointment with primary care: 1/2/2020  Okay to leave a detailed message: Yes

## 2021-06-05 NOTE — TELEPHONE ENCOUNTER
Who is calling:  KARRIE Miller from [facility]  Reason for Call:  Caller stated that there is a drug interaction with the calcium and doxycycline. Caller stated that she would like a call back regarding this to see if Valentín Washington MD wants to the patient to continue.  Date of last appointment with primary care: 1/2/2020  Okay to leave a detailed message: Yes  [phone]

## 2021-06-05 NOTE — TELEPHONE ENCOUNTER
FYI - Status Update  Who is Calling: Spouse  Update:  Caller stated that the patient went to [facility]. Caller stated that the patient had an ulcer and infection on his right foot and was put on antibiotic. Caller stated that there will be a nurse from [facility] coming to see him today as well.  Okay to leave a detailed message?:  Yes   [phone]

## 2021-06-05 NOTE — TELEPHONE ENCOUNTER
FYI - Status Update  Who is Calling: KARRIE Miller with [facility], [phone]  Update: Patient had a fall over the weekend.  No injuries.  Okay to leave a detailed message?:  No return call needed

## 2021-06-05 NOTE — TELEPHONE ENCOUNTER
Orders being requested: Home Health Aide  Reason service is needed/diagnosis: Parkinson's Disease  When are orders needed by: Today  Where to send Orders: Phone:  Verbal order is ok  Okay to leave detailed message?  Yes

## 2021-06-05 NOTE — TELEPHONE ENCOUNTER
Orders being requested: Treat callous on left foot, second toe for drainage of yellow discharge.  Clean area with cleaning solution, apply betadine with swab, and cover with guaze or Band-Aid.   Change miralaax directions to take 1-3 cap full daily as needed for constipation.  Reason service is needed/diagnosis: Wound care of toe. Constipation  When are orders needed by: asap  Where to send Orders: Phone:  [facility] at [phone]  Okay to leave detailed message?  Yes

## 2021-06-05 NOTE — TELEPHONE ENCOUNTER
Left detailed message for Virgie, home care nurse, relaying verbal orders as requested below per Dr. Washington.

## 2021-06-05 NOTE — TELEPHONE ENCOUNTER
Left detailed message for Angela from home care, relaying verbal orders as requested below per Dr. Washington.

## 2021-06-05 NOTE — TELEPHONE ENCOUNTER
Orders being requested:   Skilled Nursing  1. 2 x's a week x's 4 weeks  2. 3 PRN    Reason service is needed/diagnosis:   RT foot wound w/drainage    When are orders needed by:   ASAP    Where to send Orders:   Phone:  [phone]     Okay to leave detailed message?    Yes    Please call with verbal orders.

## 2021-06-06 NOTE — TELEPHONE ENCOUNTER
Orders being requested: Skilled Nursing   -Two Times a week for Three weeks   - One Time a week for one week   - Two As needed visits .  Reason service is needed/diagnosis: Wound care on both feet and monitoring Amputation toe on left foot .  When are orders needed by: As soon as possible   Where to send Orders: Phone:  [phone]  Okay to leave detailed message?  Yes            Orders being requested: Physical Therapy Ev[facility] and Treat   Reason service is needed/diagnosis: Amputation on left foot   When are orders needed by: As soon as possible   Where to send Orders: Phone:  [phone]  Okay to leave detailed message?  Yes              Orders being requested: Occupational Therapy [facility] and Treat   Reason service is needed/diagnosis: Amputation on left foot   When are orders needed by: As soon as possible   Where to send Orders: Phone:  [phone]  Okay to leave detailed message?  Yes            Orders being requested: Continue current wound care protacol treatment   Reason service is needed/diagnosis: amputation on left foot   When are orders needed by: As soon as possible   Where to send Orders: Phone:  [phone]  Okay to leave detailed message?  Yes

## 2021-06-06 NOTE — TELEPHONE ENCOUNTER
New Appointment Needed  What is the reason for the visit:    Pre-Op Appt Request  When is the surgery? :  02/27/20  Where is the surgery?:   [facility]   Who is the surgeon? :  Dr. Shahrzad eubanks/GI   What type of surgery is being done?: Left Foot 2nd toe - amputation   Provider Preference: PCP if possible - partner would be okay as well.   How soon do you need to be seen?: next week  Waitlist offered?: No  Okay to leave a detailed message:  Yes

## 2021-06-06 NOTE — TELEPHONE ENCOUNTER
I spoke to pt, relayed your message.  He and wife both on speaker phone and she is wondering if you heard anything back from Dr. Markham on whether he should hold the baby ASA?    Wife states they have been holding it, but you were going to check if Dr. Markham felt any different?

## 2021-06-06 NOTE — TELEPHONE ENCOUNTER
Please order Mood Light for patient to help with melatonin.  I will do some research on where they can have it delivered from.

## 2021-06-06 NOTE — PROGRESS NOTES
Preoperative Exam    Scheduled Procedure: Left foot 2nd toe amputation  Surgery Date:  2/27/2020  Surgery Location: [facility], Fax #: [phone]    Surgeon:  Dr. Markham    Assessment/Plan:     Silver Zamora is a 70 y.o. male presenting today for preop for toe amputation secondary to osteomyelitis.    1. Preop general physical exam  2. Osteomyelitis of left foot, unspecified type (H)  - Electrocardiogram Perform and Read  - HM2(CBC w/o Differential)  - Basic Metabolic Panel    3. Coronary artery disease involving native coronary artery of native heart with angina pectoris (H)  - nitroglycerin (NITROSTAT) 0.4 MG SL tablet; Place 1 tablet (0.4 mg total) under the tongue every 5 (five) minutes as needed for chest pain.  Dispense: 20 tablet; Refill: 1    Hasn't needed nitroglycerin. Medications are expiring and requests refill. Stress test in November 2019 states previous area of ischemia now appears to be an area of nontransmural infarction. The patient is at a low risk of future cardiac ischemic events    4. Chronic systolic heart failure (H)  Medically optimized.     5. Parkinson disease (H)  Following with neurology.      Surgical Procedure Risk: Intermediate (reported cardiac risk generally 1-5%)  Have you had prior anesthesia?: Yes  Have you or any family members had a previous anesthesia reaction:  No  Do you or any family members have a history of a clotting or bleeding disorder?: No  Cardiac Risk Assessment: increased risk for major cardiac complications based on  myocardial infarction history congestive heart failure    APPROVAL GIVEN to proceed with proposed procedure, without further diagnostic evaluation    Functional Status: Independent  Patient plans to recover at home with family.     Patient Instructions     Hold all supplements, aspirin and NSAIDs for 7 days prior to surgery.    Continue the rest of your medications as scheduled.   Follow your surgeon's direction on when to stop eating  and drinking prior to surgery.  Your surgeon will be managing your pain after your surgery.      Subjective:      Silver Zamora is a 70 y.o. male who presents for a preoperative consultation.  He was diagnosed with osteomyelitis by MRI of his second digit left foot and is scheduled for toe amputation. Currently on management with Augmentin. Denies any systemic symptoms.     History of CAD and NSTEMI s/p stent placement in October 2018. Symptoms have been well controlled. Denies need for nitroglycerin. Due to parkinson disease, does not exert self. Did have a stress test in November 2019 without acute changes.     No recent illnesses or infections.     PHQ9: 5/27    All other systems reviewed and are negative, other than those listed in the HPI.    Pertinent History  Do you have difficulty breathing or chest pain after walking up a flight of stairs: No  History of obstructive sleep apnea: No  Steroid use in the last 6 months: No  Frequent Aspirin/NSAID use: Yes: Baby aspirin daily  Prior Blood Transfusion: Yes  Prior Blood Transfusion Reaction: No  If for some reason prior to, during or after the procedure, if it is medically indicated, would you be willing to have a blood transfusion?:  There is no transfusion refusal.    Current Outpatient Medications   Medication Sig Dispense Refill     acetaminophen (TYLENOL) 325 MG tablet Take 2 tablets (650 mg total) by mouth every 4 (four) hours as needed.  0     ammonium lactate (AMLACTIN) 12 % cream Apply two times a day 385 g 2     amoxicillin-clavulanate (AUGMENTIN) 875-125 mg per tablet        aspirin 81 mg chewable tablet Chew 1 tablet (81 mg total) daily.  0     atorvastatin (LIPITOR) 80 MG tablet Take 1 tablet (80 mg total) by mouth daily. 90 tablet 1     bisacodyl (DULCOLAX) 10 mg suppository One supp VT qday prn constipation.  Give if no BM in prior 3 days.  0     carbidopa-levodopa (PARCOPA)  mg per disintegrating tablet Take 1 tablet by mouth as needed. 90  tablet 3     carbidopa-levodopa (RYTARY) 61. mg CpER ER capsule Take 2 capsules by mouth 4 (four) times a day. 250 capsule 2     carbidopa-levodopa (SINEMET CR)  mg ER tablet Take 1 tablet by mouth 2 (two) times a day. In am and 2pm (Patient taking differently: Take 1 tablet by mouth at bedtime. One in the am and at bedtime      ) 60 tablet 3     cholecalciferol, vitamin D3, (VITAMIN D3) 2,000 unit Tab Take 1 tablet (2,000 Units total) by mouth daily.  0     fluticasone propionate (FLONASE) 50 mcg/actuation nasal spray 2 sprays into each nostril daily. 16 g 3     gabapentin (NEURONTIN) 100 MG capsule Take 100 mg by mouth 2 (two) times a day. 60 capsule 2     levothyroxine (SYNTHROID, LEVOTHROID) 137 MCG tablet TAKE ONE TABLET BY MOUTH ONCE DAILY AT 6 A.M. 90 tablet 3     metoprolol succinate (TOPROL-XL) 25 MG Take 0.5 tablets (12.5 mg total) by mouth at bedtime. 45 tablet 2     nitroglycerin (NITROSTAT) 0.4 MG SL tablet Place 1 tablet (0.4 mg total) under the tongue every 5 (five) minutes as needed for chest pain. 15 tablet 1     NUPLAZID 34 mg cap capsule TAKE 1 CAPSULE BY MOUTH AT BEDTIME. 30 capsule 4     polyethylene glycol (GLYCOLAX) 17 gram/dose powder Take 17 g by mouth daily. 1530 g 6     senna-docusate (SENNOSIDES-DOCUSATE SODIUM) 8.6-50 mg tablet Take 1 tablet by mouth 2 (two) times a day.             triamcinolone (KENALOG) 0.025 % ointment Apply topically 2 (two) times a day. 15 g 3     venlafaxine (EFFEXOR-XR) 150 MG 24 hr capsule Take 1 capsule (150 mg total) by mouth daily. 75 mg + 150 mg  = 225 mg 90 capsule 2     venlafaxine (EFFEXOR-XR) 75 MG 24 hr capsule Take 1 capsule (75 mg total) by mouth daily. 75 mg + 150 mg = 225 mg 90 capsule 2     No current facility-administered medications for this visit.         Allergies   Allergen Reactions     Clonazepam      Too drowsy       Patient Active Problem List   Diagnosis     Parkinson disease (H)     Back pain     Depression     Anxiety state,  unspecified     Hypothyroidism     Pancreatitis     RBD (REM behavioral disorder)     Low vitamin D level     Calculus of gallbladder with biliary obstruction but without cholecystitis     Weakness     Cholecystitis     Leukocytosis, unspecified type     Choledocholithiasis     Abnormal echocardiogram     Abnormal nuclear stress test     Coronary artery disease involving native coronary artery of native heart with angina pectoris (H)     Ischemic cardiomyopathy     Heart failure with reduced ejection fraction (H)     Cervical stenosis of spinal canal     Ulcer of right foot, unspecified ulcer stage (H)     Cord compression myelopathy (H)     Moderate major depression (H)     Acquired absence of other right toe(s) (H)       Past Medical History:   Diagnosis Date     Anxiety      Callus      Cardiomyopathy (H)      Chronic back pain      Delirium      Depression      Fall      GERD (gastroesophageal reflux disease)      Hypothyroidism      Pancreatitis 12/31/2015     PD (Parkinson's disease) (H)      RBD (REM behavioral disorder) 4/3/2017     Shingles     hx       Past Surgical History:   Procedure Laterality Date     CV CORONARY ANGIOGRAM N/A 10/31/2018    Procedure: Coronary Angiogram;  Surgeon: Jose Meyer MD;  Location: [facility];  Service:      CV LEFT HEART CATHETERIZATION WO LEFT VETRICULOGRAM Left 10/31/2018    Procedure: Left Heart Catheterization Without Left Ventriculogram;  Surgeon: Jose Meyer MD;  Location: [facility];  Service:      INGUINAL HERNIA REPAIR Bilateral 10/2013     LAMINECTOMY  10/2013     OK ERCP REMOVE CALCULI/DEBRIS BILIARY/PANCREAS DUCT N/A 5/25/2018    Procedure: ENDOSCOPIC RETROGRADE CHOLANGIOPANCREATOGRAPHY SPINCTEROTOMY BILIARY STONE EXTRACTION;  Surgeon: Curtis Mcclain MD;  Location: [facility];  Service: Gastroenterology     OK LAP,CHOLECYSTECTOMY/GRAPH N/A 5/24/2018    Procedure: LAPAROSCOPIC CHOLECYSTECTOMY;  Surgeon: Joyce Melissa MD;   "Location: [facility] OR;  Service: General       Social History     Tobacco Use     Smoking status: Former Smoker     Types: Cigarettes     Last attempt to quit: 1980     Years since quittin.1     Smokeless tobacco: Never Used   Substance Use Topics     Alcohol use: No     Comment: one drink a week     Drug use: No       Patient Care Team:  Valentín Washington MD as PCP - General (Family Medicine)  Elyssa Dolan PA-C as Physician Assistant (Orthopedic Surgery)  Elyssa Gomez MD as Physician (Neurology)  Leif Washington DPM as Physician (Podiatry)  Valentín Washington MD as Assigned PCP      Objective:     Vitals:    20 1407   BP: 120/62   Pulse: 64   Resp: 20   Temp: 98  F (36.7  C)   TempSrc: Oral   Weight: (!) 223 lb 8 oz (101.4 kg)   Height: 5' 10\" (1.778 m)     Physical Exam:  Physical Exam  Constitutional:       Appearance: Normal appearance. He is obese. He is not ill-appearing.   HENT:      Head: Normocephalic and atraumatic.      Right Ear: External ear normal.      Left Ear: External ear normal.      Nose: Nose normal. No congestion or rhinorrhea.      Mouth/Throat:      Mouth: Mucous membranes are moist.      Pharynx: Oropharynx is clear. No posterior oropharyngeal erythema.   Eyes:      General: No scleral icterus.        Right eye: No discharge.         Left eye: No discharge.      Extraocular Movements: Extraocular movements intact.      Pupils: Pupils are equal, round, and reactive to light.   Neck:      Musculoskeletal: Normal range of motion and neck supple. No muscular tenderness.   Cardiovascular:      Rate and Rhythm: Normal rate and regular rhythm.      Pulses: Normal pulses.      Heart sounds: Normal heart sounds.   Pulmonary:      Effort: Pulmonary effort is normal.      Breath sounds: Normal breath sounds.   Abdominal:      General: Abdomen is flat.      Palpations: Abdomen is soft.      Tenderness: There is no abdominal tenderness. "   Musculoskeletal:      Comments: Left foot second digit wrapped. No spreading erythema, warmth, or induration proximal to digits.    Lymphadenopathy:      Cervical: No cervical adenopathy.   Skin:     General: Skin is warm and dry.      Capillary Refill: Capillary refill takes less than 2 seconds.   Neurological:      Mental Status: He is alert.   Psychiatric:         Mood and Affect: Mood normal.         Behavior: Behavior normal.         Thought Content: Thought content normal.         Judgment: Judgment normal.         EKG:  Rate 56 bpm, sinus bradycardia, normal axis, normal intervals.   Isolated Q wave in lead III. No ST changes. T wave flattening/inversion in lead 1 and aVL.   EKG is unchanged from prior on 1/18/19.   Awaiting cardiology review.     Labs:  Recent Results (from the past 48 hour(s))   Electrocardiogram Perform and Read    Collection Time: 02/25/20  2:56 PM   Result Value Ref Range    SYSTOLIC BLOOD PRESSURE      DIASTOLIC BLOOD PRESSURE      VENTRICULAR RATE 56 BPM    ATRIAL RATE 56 BPM    P-R INTERVAL 148 ms    QRS DURATION 96 ms    Q-T INTERVAL 460 ms    QTC CALCULATION (BEZET) 443 ms    P Axis 38 degrees    R AXIS 19 degrees    T AXIS 116 degrees    MUSE DIAGNOSIS       Sinus bradycardia  ST & T wave abnormality, consider lateral ischemia  Abnormal ECG  When compared with ECG of 18-JAN-2019 11:12,  No significant change was found     HM2(CBC w/o Differential)    Collection Time: 02/25/20  2:59 PM   Result Value Ref Range    WBC 5.7 4.0 - 11.0 thou/uL    RBC 4.87 4.40 - 6.20 mill/uL    Hemoglobin 14.3 14.0 - 18.0 g/dL    Hematocrit 43.2 40.0 - 54.0 %    MCV 89 80 - 100 fL    MCH 29.4 27.0 - 34.0 pg    MCHC 33.2 32.0 - 36.0 g/dL    RDW 12.8 11.0 - 14.5 %    Platelets 159 140 - 440 thou/uL    MPV 7.6 7.0 - 10.0 fL   Basic Metabolic Panel    Collection Time: 02/25/20  2:59 PM   Result Value Ref Range    Sodium 139 136 - 145 mmol/L    Potassium 4.1 3.5 - 5.0 mmol/L    Chloride 105 98 - 107 mmol/L     CO2 27 22 - 31 mmol/L    Anion Gap, Calculation 7 5 - 18 mmol/L    Glucose 109 70 - 125 mg/dL    Calcium 9.1 8.5 - 10.5 mg/dL    BUN 21 8 - 28 mg/dL    Creatinine 0.74 0.70 - 1.30 mg/dL    GFR MDRD Af Amer >60 >60 mL/min/1.73m2    GFR MDRD Non Af Amer >60 >60 mL/min/1.73m2        Immunization History   Administered Date(s) Administered     Influenza high dose,seasonal,PF, 65+ yrs 02/09/2016, 12/27/2016, 10/03/2017, 09/06/2019     Influenza,seasonal quad, PF, =/> 6months 10/20/2014     Pneumo Conj 13-V (2010&after) 02/09/2016     Pneumo Polysac 23-V 05/08/2017     Tdap 02/09/2016     Electronically signed by Ivonne Sapp DO 02/25/20 2:09 PM

## 2021-06-06 NOTE — TELEPHONE ENCOUNTER
Orders being requested:   1.OT Treatment > 1 xs 1 week , 2xs for 2 week    Reason service is needed/diagnosis: Parkinson's disease     When are orders needed by:   ASAP     Where to send Orders:   [phone]    Okay to leave detailed message?  No

## 2021-06-06 NOTE — TELEPHONE ENCOUNTER
I called and spoke with Angela.  She states these issues actually happened a few days ago and Silver has been back to normal since.  They are not that concerned about a UTI as he is no longer noticing a strong odor and is not delirious.  She will notify Luz Marina and let them know that if it gets worse again that they can see someone next week.

## 2021-06-06 NOTE — TELEPHONE ENCOUNTER
Please clarify.  Is this the home health nurse wanted to do a urine test at home?  My concern is that if he is having delusions he should be evaluated.  Is a recent bad nursing does not believe that he should be evaluated in person?  I can order a urinalysis but unfortunately will not be available to manage the result.  It would have to be addressed by the on-call doctor.  Please let me know there thoughts.  Thank you

## 2021-06-06 NOTE — TELEPHONE ENCOUNTER
Symptom  Describe your symptoms: Patient is having rash in groin with bad odor.  Nurse is asking for what provider thinks is best to treat eg: Nystatin. Please update nurse and call wife with plan.   Any pain: yes  New/Ongoing: New  How long have you been having symptoms: 3  day(s)  Have you been seen for this:  No  Appointment offered?: No  Triage offered?: Nurse asking advise from provider  Home remedies tried: None  Requested Pharmacy: [facility]  Okay to leave a detailed message? Yes Angela YOON [phone]

## 2021-06-06 NOTE — TELEPHONE ENCOUNTER
----- Message from Ivonne Sapp DO sent at 2/25/2020  8:41 PM CST -----  Please call patient. Labs are normal. We will fax over preop.   Ivonne Sapp DO

## 2021-06-06 NOTE — TELEPHONE ENCOUNTER
Spoke to Daniela from [facility] and informed of the message per JM. Completing task.

## 2021-06-06 NOTE — TELEPHONE ENCOUNTER
Spoke to Angela at [facility] and informed her of the message below per CAROLINE. With that said, Angela is wondering if an Rx for the Nystatin could be sent to the [facility].    CAROLINE please advise on Rx for the Nystatin. Thank you. Ok to complete task after, as pt's wife has been informed already.

## 2021-06-06 NOTE — TELEPHONE ENCOUNTER
Orders being requested: UA/UC  Reason service is needed/diagnosis: Patient is having some delusions and urine if very concentrated.  Please advise.   When are orders needed by:ASAPWhere to send Orders: Phone:  [phone]  Okay to leave detailed message?  Yes

## 2021-06-06 NOTE — TELEPHONE ENCOUNTER
Left message for pt/pt's wife, Luz Marina, relaying Dr. Sapp's message:    I have called Dr. Markham, left a message for his team.   Continue with plan to hold aspirin and we will reach out if alternative recommendations provided.   Ivonne Sapp, DO

## 2021-06-06 NOTE — PATIENT INSTRUCTIONS - HE
Hold all supplements, aspirin and NSAIDs for 7 days prior to surgery.    Continue the rest of your medications as scheduled.   Follow your surgeon's direction on when to stop eating and drinking prior to surgery.  Your surgeon will be managing your pain after your surgery.         Before Your Surgery       Call your surgeon if there is any change in your health. This includes signs of a cold or flu (such as a sore throat, runny nose, cough, rash or fever).     Do not smoke, drink alcohol or take over the counter medicine (unless your surgeon or primary care doctor tells you to) for the 24 hours before and after surgery.     If you take prescribed drugs: Follow your doctor s orders about which medicines to take and which to stop until after surgery.    Eating and drinking prior to surgery: follow the instructions from your surgeon.    Take a shower or bath the night before surgery. Use the soap your surgeon gave you to gently clean your skin. If you do not have soap from your surgeon, use your regular soap. Do not shave or scrub the surgery site. Wear clean pajamas and have clean sheets on your bed.

## 2021-06-06 NOTE — TELEPHONE ENCOUNTER
FYI - Status Update  Who is Calling: Home Care     Update: Calling to state. Patient had another fall , No injuries . Wife said he went down really Slow and patient was not behind his walker .     Okay to leave a detailed message?:  No return call needed

## 2021-06-07 NOTE — TELEPHONE ENCOUNTER
I talked with Silver's wife (caregiver) She said that she she will discontinue services because she doesn't want people in her house during this Covid-19 not by her choice though. She feel comfortable with virtual care from podiatry and Dr. Washington if needed. She will look at getting palliative care back when this situation is all over with

## 2021-06-07 NOTE — TELEPHONE ENCOUNTER
Prior Authorization Request  Who s requesting:  Pharmacy  Pharmacy Name and Location: [facility]  Medication Name:   RYTARY 61. mg CpER ER capsule   Insurance Plan: BLUE CROSS [facility]   Insurance Member ID Number:  [id number]  CoverMyMeds Key: N/A  Informed patient that prior authorizations can take up to 10 business days for response:   Yes  Okay to leave a detailed message: Yes

## 2021-06-07 NOTE — PROGRESS NOTES
"Due to Covid-19 pandemic Silver Zamora is a 70 y.o. male is being evaluated via a billable video visit.      The patient has been notified of following:     \"This video visit will be conducted via a video call between you and your physician/provider. We have found that certain health care needs can be provided without the need for a physical exam.  This service lets us provide the care you need with a short phone conversation.  If a prescription is necessary we can send it directly to your pharmacy.  If lab work is needed we can place an order for that and you can then stop by our lab to have the test done at a later time.    If during the course of the video call the physician/provider feels a video visit is not appropriate, you will not be charged for this service.\"     Patient has given verbal consent to a Video visit? Yes    Silver Zamora complains of    Chief Complaint   Patient presents with     Parkinson's Disease       I have reviewed and updated the patient's Past Medical History, Social History, Family History and Medication List.    ALLERGIES  Clonazepam    How have you been doing since we last saw you? Most important neurological symptom or concern for this visit (Medication wearing off, hallucinations, freezing, pain, sleep difficulty, constipation etc.) Patient has been hallucinating, wife is wondering if it could be because of the being put under for toe surgery.       How many falls has the pt. Had over the last year?(if pt. Falls frequently, daily, weekly, monthly?) 3 times last month.     Best explanation of falls (poor balance, fail/recognizing/judgement, trip, dizzy, moving too quickly, carrying too much, poor lighting, any loss of consciousness, confusion, incontinence, tongue biting, or any unusual features of events)? 1 because he switched from wood to carpet with walker using his older walker.     Consent was obtained for this service by one of our care team members    Telephone Visit " Details    Type of service: Telephone Visit    Phone Start Time: 1300    Phone End Time:  1355    Total time for phone call: 55 minutes    Originating Location: Patient's home    Distant Location:  [facility]    Mode of Communication: Telephone call    There were no vitals filed for this visit.    Outpatient Follow up Parkinson's Evaluation        Pertinent History:  Patient is right-handed.  He was diagnosed with Parkinson's disease in 2004 at the age of about 54.  He feels that the symptoms may have started in about 1994.  Symptoms started with stiffness, slowness, soft speech, walking difficulties, and left hand tremor which spread to the right and about 2015.  Has seen Dr. Suazo in the past.  He has some bruising and tremors occasionally but there is asymmetry.  He tried Sinemet CR during the daytime and at night which did not work well.  He is been off the pramipexole since 12/2015 due to memory and behavioral concerns.  December 2010 he was taking Sinemet IR 25/100 and increased to 25/250 with a good response which also improved his back pain.  He was able to be more active.  In 2011 he was evaluated at the [facility] by , he was noted to have U PDR S score of 41 after all Parkinson's medications.  It was suggested that he take carbidopa levodopa every 3 hours.  Amantadine was discontinued because he did not have dyskinesia.  It was suggested that he switch his selegiline to Azilect or discontinue all MAO inhibitors.  It is  felt that his increased Sinemet may have exasperated his restless leg syndrome.  Gabapentin was started to control his restless leg symptoms.  He was followed by Dr. Suazo from 2002 - 2011.  He also saw Dr. Zuniga and a previous nurse practitioner at this clinic.  He did transfer care to  in 2013.  He has tried amantadine and selegiline in the past.  He is not able to tolerate Requip, which he tried for several years at a  relatively high dose but discontinued due to edema in lower extremities, he also tried Mirapex in 2015 but this caused more freezing and increased tremor.  CAT scan in July 2013 showed absent radiotracer in the putamen with diminished accumulation in the caudate, most consistent with Parkinson's disease.  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      HPI:  Patient's wife reports that he has been hallucinating more. The patient did just have surgery and patient wanted to do a trial of only taking the Nuplazid every other night. Wife did call and I increased the Nuplazid back to every night. Wife reports that the patient's hallucinations have improved. The patient continues to not exercise. A long discussion was held with the patient about Parkinson's disease and treatment plans. Patient has agreed to do more physical activity. I will not change any medications at this time.        We discussed some treatment options and have elected to increase the patient's physical activity.      Current Medications: Please see chart. Medications personally reviewed.     Medication Compliance: Yes     Side Effects to Medications: No    Medications:          8 am   12  pm   4 pm   8 pm    HS                   Sinemet ER    25/100   1 tab           1 tab    Rytary       2 caps    2 cap   2 cap    2 cap          Gocovery     stopped                   Patient Active Problem List    Diagnosis Date Noted     Acquired absence of other right toe(s) (H) 01/05/2020     Ulcer of right foot, unspecified ulcer stage (H) 09/16/2019     Cord compression myelopathy (H) 09/16/2019     Moderate major depression (H) 09/16/2019     Cervical stenosis of spinal canal 02/07/2019     Ischemic cardiomyopathy 12/14/2018     Heart failure with reduced ejection fraction (H) 12/14/2018     Coronary artery disease involving native coronary artery of native heart with angina pectoris (H)      Abnormal nuclear stress test  10/25/2018     Abnormal echocardiogram 05/29/2018     Leukocytosis, unspecified type      Cholecystitis 05/23/2018     Choledocholithiasis 05/23/2018     Calculus of gallbladder with biliary obstruction but without cholecystitis      Weakness      Low vitamin D level 10/11/2017     RBD (REM behavioral disorder) 04/03/2017     Pancreatitis 12/31/2015     Parkinson disease (H) 07/30/2014     Back pain 07/30/2014     Depression 07/30/2014     Anxiety state, unspecified 07/30/2014     Hypothyroidism 07/30/2014     Past Medical History:   Diagnosis Date     Anxiety      Callus      Cardiomyopathy (H)      Chronic back pain      Delirium      Depression      Fall      GERD (gastroesophageal reflux disease)      Hypothyroidism      Pancreatitis 12/31/2015     PD (Parkinson's disease) (H)      RBD (REM behavioral disorder) 4/3/2017     Shingles     hx     Past Surgical History:   Procedure Laterality Date     CV CORONARY ANGIOGRAM N/A 10/31/2018    Procedure: Coronary Angiogram;  Surgeon: Jose Meyer MD;  Location: [facility];  Service:      CV LEFT HEART CATHETERIZATION WO LEFT VETRICULOGRAM Left 10/31/2018    Procedure: Left Heart Catheterization Without Left Ventriculogram;  Surgeon: Jose Meyer MD;  Location: [facility];  Service:      INGUINAL HERNIA REPAIR Bilateral 10/2013     LAMINECTOMY  10/2013     OH ERCP REMOVE CALCULI/DEBRIS BILIARY/PANCREAS DUCT N/A 5/25/2018    Procedure: ENDOSCOPIC RETROGRADE CHOLANGIOPANCREATOGRAPHY SPINCTEROTOMY BILIARY STONE EXTRACTION;  Surgeon: Curtis Mcclain MD;  Location: [facility];  Service: Gastroenterology     OH LAP,CHOLECYSTECTOMY/GRAPH N/A 5/24/2018    Procedure: LAPAROSCOPIC CHOLECYSTECTOMY;  Surgeon: Joyce Melissa MD;  Location: [facility] OR;  Service: General     Family History   Problem Relation Age of Onset     Kidney failure Mother      Heart disease Father 82     Tremor Father      Parkinsonism Paternal Grandfather       Tremor Sister      Leukemia Maternal Grandmother      Current Outpatient Medications   Medication Sig Dispense Refill     acetaminophen (TYLENOL) 325 MG tablet Take 2 tablets (650 mg total) by mouth every 4 (four) hours as needed.  0     ammonium lactate (AMLACTIN) 12 % cream Apply two times a day 385 g 2     amoxicillin-clavulanate (AUGMENTIN) 875-125 mg per tablet        aspirin 81 mg chewable tablet Chew 1 tablet (81 mg total) daily.  0     atorvastatin (LIPITOR) 80 MG tablet Take 1 tablet (80 mg total) by mouth daily. 90 tablet 1     bisacodyl (DULCOLAX) 10 mg suppository One supp LA qday prn constipation.  Give if no BM in prior 3 days.  0     carbidopa-levodopa (PARCOPA)  mg per disintegrating tablet Take 1 tablet by mouth as needed. 90 tablet 3     carbidopa-levodopa (SINEMET CR)  mg ER tablet Take 1 tablet by mouth 2 (two) times a day. One tab at 1200 and one bedtime 180 tablet 1     cholecalciferol, vitamin D3, (VITAMIN D3) 2,000 unit Tab Take 1 tablet (2,000 Units total) by mouth daily.  0     fluticasone propionate (FLONASE) 50 mcg/actuation nasal spray 2 sprays into each nostril daily. 16 g 3     gabapentin (NEURONTIN) 100 MG capsule TAKE ONE CAPSULE BY MOUTH TWICE A DAY 60 capsule 2     levothyroxine (SYNTHROID, LEVOTHROID) 137 MCG tablet TAKE ONE TABLET BY MOUTH ONCE DAILY AT 6 A.M. 90 tablet 3     metoprolol succinate (TOPROL-XL) 25 MG Take 0.5 tablets (12.5 mg total) by mouth at bedtime. 45 tablet 2     nitroglycerin (NITROSTAT) 0.4 MG SL tablet Place 1 tablet (0.4 mg total) under the tongue every 5 (five) minutes as needed for chest pain. 20 tablet 1     NUPLAZID 34 mg cap capsule TAKE 1 CAPSULE BY MOUTH ONE TIME A DAY AT BEDTIME 30 capsule 4     nystatin (MYCOSTATIN) cream Apply to affected area of groin three times a day x 10 days 30 g 1     polyethylene glycol (GLYCOLAX) 17 gram/dose powder Take 17 g by mouth daily. 1530 g 6     RYTARY 61. mg CpER ER capsule TAKE TWO CAPSULES BY  MOUTH FOUR TIMES A  capsule 2     senna-docusate (SENNOSIDES-DOCUSATE SODIUM) 8.6-50 mg tablet Take 1 tablet by mouth 2 (two) times a day.             triamcinolone (KENALOG) 0.025 % ointment Apply topically 2 (two) times a day. 15 g 3     venlafaxine (EFFEXOR-XR) 150 MG 24 hr capsule Take 1 capsule (150 mg total) by mouth daily. 75 mg + 150 mg  = 225 mg 90 capsule 2     venlafaxine (EFFEXOR-XR) 75 MG 24 hr capsule Take 1 capsule (75 mg total) by mouth daily. 75 mg + 150 mg = 225 mg 90 capsule 2     No current facility-administered medications for this encounter.        Allergies   Allergen Reactions     Clonazepam      Too drowsy     Social History     Socioeconomic History     Marital status:      Spouse name: Not on file     Number of children: Not on file     Years of education: Not on file     Highest education level: Not on file   Occupational History     Not on file   Social Needs     Financial resource strain: Not on file     Food insecurity     Worry: Not on file     Inability: Not on file     Transportation needs     Medical: Not on file     Non-medical: Not on file   Tobacco Use     Smoking status: Former Smoker     Types: Cigarettes     Last attempt to quit: 1980     Years since quittin.3     Smokeless tobacco: Never Used   Substance and Sexual Activity     Alcohol use: No     Comment: one drink a week     Drug use: No     Sexual activity: Not on file   Lifestyle     Physical activity     Days per week: Not on file     Minutes per session: Not on file     Stress: Not on file   Relationships     Social connections     Talks on phone: Not on file     Gets together: Not on file     Attends Yazdanism service: Not on file     Active member of club or organization: Not on file     Attends meetings of clubs or organizations: Not on file     Relationship status: Not on file     Intimate partner violence     Fear of current or ex partner: Not on file     Emotionally abused: Not on file      Physically abused: Not on file     Forced sexual activity: Not on file   Other Topics Concern     Not on file   Social History Narrative     Not on file       The following portions of the patient's history were reviewed and updated as appropriate: allergies, current medications, past family history, past medical history, past social history, past surgical history and problem list.    Review of Systems  A comprehensive review of systems was negative except for:what is noted above      Mental Status Exam:     Appearance: .Alert, no obvious dystress, appears stated age,   Behavior:   .cooperative and pleasant, no apparent agitation, no irritability, denies any recent behavioral difficulties  Speech: .slow and soft speech, able to participate in conversation  Mood/Affect:   ..flat, no obvious significant depression or anxiety  Thought Content: .no psychotic symptoms were evident, patient and wife do state that the patient did have some hallucinations    Suicidal or Homicidal Thoughts:   .no evidence of suicidal or homicidal ideations, patient denies any suicidal ideations  Thought Process/Formulation:  .adequate, able to process information, no evidence of racing thoughts  Associations: .adequate, processing information and participating in conversation  Fund of Knowledge:  .intact, following conversation, adequate depth of understanding  Attention/Concentration: .slightly distractible, concentration is poor  Insight: .slightly impaired  Judgement:  .slightly impaired  Memory:   . .slight impairment   Motor Status:  .no noted tremor or dyskinesia   Orientation: .oriented to person, place, time and situation .  .  Diagnosis managed and treated at today's visit :  Parkinson's disease  Anxiety d/t a medical condition  Chronic pain  Mild cognitive impairment  High risk for falls  Tremor       Plan:  Medication Adjustment:  None at this time    Other:   Patient will return to clinic in 4. They agree to call or return sooner  with any questions or concerns.  Risks and benefits were discussed.  Continue with his individual therapist.     Continue with the support of the clinic, reassurance, and redirection. Staff monitoring and ongoing assessments per team plan. Current psychotropic medication appears to represent the minimum effective dosage and appears medically necessary. We will continue to monitor and reassess. This team will utilize appropriate emergency services if necessary. I will make myself available if concerns or problems arise.

## 2021-06-07 NOTE — TELEPHONE ENCOUNTER
Who is calling:  Pt's wife  Reason for Call:  Pt had been utilizing Palliative care due to his Parkinson's.  They have not been having the nurses come out due to them entering everyone home.  Pt recently had a partial amputation of the toe due to an ulcer.  Wife has been taking care of the Pt.  Nurses would contact her and ask for Pt vitals, and discuss how the foot is doing.  Pt/Wife recently received a letter that Palliative care wants them to sign regarding discontinuing services and Medicare will no longer pay because wife is taking care of him, therefore he does not need skilled nursing because they are doing ok.  Wife is worried that something could happen to his foot because she is not trained professionally.  Wife states she's ok just not 100% sure.  Typically during this time the nurse would be re-evaluating foot and re-opening the case.  Pt also fell 3 times last month and once this month and typically Palliative care takes care of her concerns but without them what should she do? Call 911? Go to Urgent Care? Please advise.  Date of last appointment with primary care: N/A  Okay to leave a detailed message: Yes [phone]

## 2021-06-07 NOTE — TELEPHONE ENCOUNTER
Please call.  This is a challenging situation.  If there are recurrent falls and concerns about the infection she may want to consider staying enrolled with palliative care.  If palliative care is no longer coming out of the future then she will need to follow-up and may require an urgent care visit at that time.  That will depend on the COVID-19 surge.  Finally, vascular/wound care specialist may have a key ability of doing wound care visits in the future though that is not certain.

## 2021-06-08 NOTE — PROGRESS NOTES
"  Telephone Visit  Silver Zamora is a 70 y.o. male who is being evaluated via a billable telephone visit in light of the ongoing global health crisis (COVID-19) that requires us to abide by social distancing mandates in order to reduce the risk of COVID-19 exposure.       The patient has been notified of following:     \"This telephone visit will be conducted via a phone call between you and your physician/provider. We have found that certain health care needs can be provided without the need for a physical exam.  This service lets us provide the care you need with a short phone conversation.  If a prescription is necessary we can send it directly to your pharmacy.  If lab work is needed we can place an order for that and you can then stop by our lab to have the test done at a later time.    If during the course of the call the physician/provider feels a telephone visit is not appropriate, you will not be charged for this service.\"     Patient has given verbal consent to a Telephone visit? Yes    Silver Zamora chief complaint is Parkinson's Disease    ALLERGIES  Clonazepam    Neuropsychological assessment completed    Yes   Currently doing PT  Yes   Completed No  Currently doing OT  No   Completed Yes    Currently doing ST   No   Completed No   Psychology  No        Any new medication (other provider):   No   Meds started at last appointment  No   Meds increased at last appointment    Yes CARBIDOPA MOVED DOSAGE TO NOON Is patient still taking:  Yes  Results: INCREASE IN HALLUCINATIONS     Is patient on a controlled substance   No     Any concerns you would like to be addressed at this appointment?  Increase in hallucinations.  Wife feels like his off time was working better but hallucinating worsened. Last month pt has had 3 falls, this month he has had 3 falls.  When he falls it's because he is not behind his walker. She would like to discuss sleeping, nuplazid dosage time.                                              "            Phone Start Time: 11:15AM    Phone End Time:  11:30AM    Total time of phone conversation: 15 minutes    Candis Joshi CMA       There were no vitals filed for this visit.    Outpatient Follow up Parkinson's Dx Evaluation     Pertinent History:   Patient is right-handed.  He was diagnosed with Parkinson's disease in 2004 at the age of about 54.  He feels that the symptoms may have started in about 1994.  Symptoms started with stiffness, slowness, soft speech, walking difficulties, and left hand tremor which spread to the right and about 2015.  Has seen Dr. Suazo in the past.  He has some bruising and tremors occasionally but there is asymmetry.  He tried Sinemet CR during the daytime and at night which did not work well.  He is been off the pramipexole since 12/2015 due to memory and behavioral concerns.  December 2010 he was taking Sinemet IR 25/100 and increased to 25/250 with a good response which also improved his back pain.  He was able to be more active.  In 2011 he was evaluated at the [facility] by , he was noted to have U PDR S score of 41 after all Parkinson's medications.  It was suggested that he take carbidopa levodopa every 3 hours.  Amantadine was discontinued because he did not have dyskinesia.  It was suggested that he switch his selegiline to Azilect or discontinue all MAO inhibitors.  It is  felt that his increased Sinemet may have exasperated his restless leg syndrome.  Gabapentin was started to control his restless leg symptoms.  He was followed by Dr. Suazo from 2002 - 2011.  He also saw Dr. Zuniga and a previous nurse practitioner at this clinic.  He did transfer care to  in 2013.  He has tried amantadine and selegiline in the past.  He is not able to tolerate Requip, which he tried for several years at a relatively high dose but discontinued due to edema in lower extremities, he also tried Mirapex in 2015 but this caused more freezing and increased  "tremor.  CAT scan in July 2013 showed absent radiotracer in the putamen with diminished accumulation in the caudate, most consistent with Parkinson's disease.  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      HPI:  The patient returns to the concussion clinic for a follow up visit, he was last seen by me on 4/6/2020, where no medication changes were made. The patient continues to sit more on the couch. The patient and wife report he is \"not doing much\". The patient agrees to do more physical activity. We will also move the Sinemet to noon to see if we can help the patient feel better in the early afternoon.    We discussed some treatment options and have elected to move am Sinemet to noon, patient will also increase physical activity..      Current Medications: Please see chart. Medications personally reviewed.     Medication Compliance: Yes      Patient Active Problem List    Diagnosis Date Noted     Acquired absence of other right toe(s) (H) 01/05/2020     Ulcer of right foot, unspecified ulcer stage (H) 09/16/2019     Cord compression myelopathy (H) 09/16/2019     Moderate major depression (H) 09/16/2019     Cervical stenosis of spinal canal 02/07/2019     Ischemic cardiomyopathy 12/14/2018     Heart failure with reduced ejection fraction (H) 12/14/2018     Coronary artery disease involving native coronary artery of native heart with angina pectoris (H)      Abnormal nuclear stress test 10/25/2018     Abnormal echocardiogram 05/29/2018     Leukocytosis, unspecified type      Cholecystitis 05/23/2018     Choledocholithiasis 05/23/2018     Calculus of gallbladder with biliary obstruction but without cholecystitis      Weakness      Low vitamin D level 10/11/2017     RBD (REM behavioral disorder) 04/03/2017     Pancreatitis 12/31/2015     Parkinson disease (H) 07/30/2014     Back pain 07/30/2014     Depression 07/30/2014     Anxiety state, unspecified 07/30/2014     " Hypothyroidism 07/30/2014     Past Medical History:   Diagnosis Date     Anxiety      Callus      Cardiomyopathy (H)      Chronic back pain      Delirium      Depression      Fall      GERD (gastroesophageal reflux disease)      Hypothyroidism      Pancreatitis 12/31/2015     PD (Parkinson's disease) (H)      RBD (REM behavioral disorder) 4/3/2017     Shingles     hx     Past Surgical History:   Procedure Laterality Date     CV CORONARY ANGIOGRAM N/A 10/31/2018    Procedure: Coronary Angiogram;  Surgeon: Jose Meyer MD;  Location: [facility];  Service:      CV LEFT HEART CATHETERIZATION WO LEFT VETRICULOGRAM Left 10/31/2018    Procedure: Left Heart Catheterization Without Left Ventriculogram;  Surgeon: Jose Meyer MD;  Location: [facility];  Service:      INGUINAL HERNIA REPAIR Bilateral 10/2013     LAMINECTOMY  10/2013     OK ERCP REMOVE CALCULI/DEBRIS BILIARY/PANCREAS DUCT N/A 5/25/2018    Procedure: ENDOSCOPIC RETROGRADE CHOLANGIOPANCREATOGRAPHY SPINCTEROTOMY BILIARY STONE EXTRACTION;  Surgeon: Curtis Mcclain MD;  Location: [facility];  Service: Gastroenterology     OK LAP,CHOLECYSTECTOMY/GRAPH N/A 5/24/2018    Procedure: LAPAROSCOPIC CHOLECYSTECTOMY;  Surgeon: Joyce Melissa MD;  Location: [facility];  Service: General     Family History   Problem Relation Age of Onset     Kidney failure Mother      Heart disease Father 82     Tremor Father      Parkinsonism Paternal Grandfather      Tremor Sister      Leukemia Maternal Grandmother      Current Outpatient Medications   Medication Sig Dispense Refill     acetaminophen (TYLENOL) 325 MG tablet Take 2 tablets (650 mg total) by mouth every 4 (four) hours as needed.  0     ammonium lactate (AMLACTIN) 12 % cream Apply two times a day 385 g 2     amoxicillin-clavulanate (AUGMENTIN) 875-125 mg per tablet        aspirin 81 mg chewable tablet Chew 1 tablet (81 mg total) daily.  0     atorvastatin (LIPITOR) 80 MG tablet Take 1  tablet (80 mg total) by mouth daily. 90 tablet 1     bisacodyl (DULCOLAX) 10 mg suppository One supp VT qday prn constipation.  Give if no BM in prior 3 days.  0     carbidopa-levodopa (PARCOPA)  mg per disintegrating tablet Take 1 tablet by mouth as needed. 90 tablet 3     carbidopa-levodopa (SINEMET CR)  mg ER tablet Take 1 tablet by mouth 2 (two) times a day. One tab at 1200 and one bedtime 180 tablet 1     cholecalciferol, vitamin D3, (VITAMIN D3) 2,000 unit Tab Take 1 tablet (2,000 Units total) by mouth daily.  0     fluticasone propionate (FLONASE) 50 mcg/actuation nasal spray 2 sprays into each nostril daily. 16 g 3     gabapentin (NEURONTIN) 100 MG capsule TAKE ONE CAPSULE BY MOUTH TWICE A DAY 60 capsule 2     levothyroxine (SYNTHROID, LEVOTHROID) 137 MCG tablet TAKE ONE TABLET BY MOUTH ONCE DAILY AT 6 A.M. 90 tablet 3     metoprolol succinate (TOPROL-XL) 25 MG Take 0.5 tablets (12.5 mg total) by mouth at bedtime. 45 tablet 2     nitroglycerin (NITROSTAT) 0.4 MG SL tablet Place 1 tablet (0.4 mg total) under the tongue every 5 (five) minutes as needed for chest pain. 20 tablet 1     NUPLAZID 34 mg cap capsule TAKE 1 CAPSULE BY MOUTH ONE TIME A DAY AT BEDTIME 30 capsule 4     nystatin (MYCOSTATIN) cream Apply to affected area of groin three times a day x 10 days 30 g 1     polyethylene glycol (GLYCOLAX) 17 gram/dose powder Take 17 g by mouth daily. 1530 g 6     RYTARY 61. mg CpER ER capsule TAKE TWO CAPSULES BY MOUTH FOUR TIMES A  capsule 2     senna-docusate (SENNOSIDES-DOCUSATE SODIUM) 8.6-50 mg tablet Take 1 tablet by mouth 2 (two) times a day.             triamcinolone (KENALOG) 0.025 % ointment Apply topically 2 (two) times a day. 15 g 3     venlafaxine (EFFEXOR-XR) 150 MG 24 hr capsule Take 1 capsule (150 mg total) by mouth daily. 75 mg + 150 mg  = 225 mg 90 capsule 2     venlafaxine (EFFEXOR-XR) 75 MG 24 hr capsule Take 1 capsule (75 mg total) by mouth daily. 75 mg + 150 mg =  225 mg 90 capsule 2     No current facility-administered medications for this encounter.        Allergies   Allergen Reactions     Clonazepam      Too drowsy     Social History     Socioeconomic History     Marital status:      Spouse name: Not on file     Number of children: Not on file     Years of education: Not on file     Highest education level: Not on file   Occupational History     Not on file   Social Needs     Financial resource strain: Not on file     Food insecurity     Worry: Not on file     Inability: Not on file     Transportation needs     Medical: Not on file     Non-medical: Not on file   Tobacco Use     Smoking status: Former Smoker     Types: Cigarettes     Last attempt to quit: 1980     Years since quittin.3     Smokeless tobacco: Never Used   Substance and Sexual Activity     Alcohol use: No     Comment: one drink a week     Drug use: No     Sexual activity: Not on file   Lifestyle     Physical activity     Days per week: Not on file     Minutes per session: Not on file     Stress: Not on file   Relationships     Social connections     Talks on phone: Not on file     Gets together: Not on file     Attends Gnosticism service: Not on file     Active member of club or organization: Not on file     Attends meetings of clubs or organizations: Not on file     Relationship status: Not on file     Intimate partner violence     Fear of current or ex partner: Not on file     Emotionally abused: Not on file     Physically abused: Not on file     Forced sexual activity: Not on file   Other Topics Concern     Not on file   Social History Narrative     Not on file       Review of Systems  A comprehensive review of systems was negative except for:what is noted above      Mental Status Exam:   Appearance: .telephone appointment, I did not visualized the patient  Behavior:   .cooperative and pleasant, no apparent agitation, no irritability, denies any recent behavioral difficulties  Speech: .slow  and soft speech, able to participate in conversation  Mood/Affect: .flat, no obvious significant depression or anxiety  Thought Content: .no psychotic symptoms were evident, patient and wife do state that the patient did have some hallucinations    Suicidal or Homicidal Thoughts:   .no evidence of suicidal or homicidal ideations, patient denies any suicidal ideations  Thought Process/Formulation:  .adequate, able to process information, no evidence of racing thoughts  Associations: .adequate, processing information and participating in conversation  Fund of Knowledge:  .intact, following conversation, adequate depth of understanding  Attention/Concentration: .slightly distractible, concentration is poor  Insight: .slightly impaired  Judgement:  .slightly impaired  Memory:   . .slight impairment   Motor Status:  .no noted tremor or dyskinesia   Orientation: .oriented to person, place, time and situation .  .  Diagnosis managed and treated at today's visit   Parkinson's disease  Anxiety d/t a medical condition  Chronic pain  Mild cognitive impairment  High risk for falls  Tremor     Plan:  Medication Adjustment:  Move Sinemet ER dose to noon instead of in am    Other:   Patient will return to clinic in one week. They agree to call or return sooner with any questions or concerns.  Risks and benefits were discussed.  Continue with his individual therapist.     Continue with the support of the clinic, reassurance, and redirection. Staff monitoring and ongoing assessments per team plan. Current psychotropic medication appears to represent the minimum effective dosage and appears medically necessary. We will continue to monitor and reassess. This team will utilize appropriate emergency services if necessary. I will make myself available if concerns or problems arise.    Consent was obtained for this service by one of our care team members    Telephone Visit Details    Type of service: Telephone Visit    Phone Start Time:  1300    Phone End Time:  1335    Total time for phone call: 35 minutes    Originating Location: Patient's home    Distant Location:  [facility]    Mode of Communication: Telephone call

## 2021-06-08 NOTE — TELEPHONE ENCOUNTER
Forms Request  Name of form/paperwork: Other:  Medicare plan of action after safety review  Have you been seen for this request: N/A  Do we have the form: Yes- faxed  When is form needed by: ASAP  How would you like the form returned: Fax:  [phone]  Patient Notified form requests are processed in 3-5 business days: No    Okay to leave a detailed message? Yes

## 2021-06-08 NOTE — PROGRESS NOTES
Dear Dr. Joe Tamayo Md  [address]  [place] [address]    Thank you for the opportunity to participate in the care of  Silver Zamora.    He is a 66 y.o. y/o who comes to the clinic to establish care for his sleep problems.    He is having difficulties sleeping during the night. The patient worked night shifts for many years and used to sleep during the day. The patient used to sleep well during the day when he was working nights. Since he retired, he has been unable to adapt to a nighttime schedule.   He tries to go to bed around 10:30 pm and he falls asleep in 15 minutes but he does not sleep well during the night. He has 5 to 6 awakenings per night and then wakes up around 10 AM.    He was also diagnosed with obstructive sleep apnea in the past. He has 2 CPAP devices that he brought to clinic today but he has not used them for the last 5 to 6 years. He mentions that when he retired, he was feeling well and he did no feel like he needed to use CPAP any longer.    He acts his dreams at night every night. He talks and yell during sleep.     He was taking melatonin in the past but he stopped taking that medication because he does not believe in medications. He had an allergic reaction to Clonazepam and that is not an option.    Rooming 1/11/2017   Usual bedtime 1030p   Sleep Latency 15 min   Awakenings 5-6   Wake Up Time 10a   Weekends 10a   Energy Drinks 0   Coffee 0   Cola 0   Difficulty falling asleep Yes   Difficulty staying asleep Yes   Excessive daytime tiredness Yes   Excessive daytime sleepiness Yes   Dozing off while driving No   Shift Worker Yes   Sleep Walking? Yes   Sleep Talking? Yes   Kicking or punching? Yes   Restless legs symptoms Yes   Epworths Sleepiness Scale 1/11/2017   Sitting and reading 2   Watching TV 2   Sitting, inactive in a public place (e.g. a theatre or a meeting) 3   As a passenger in a car for an hour without a break 2   Lying down to rest in the afternoon when  circumstances permit 3   Sitting and talking to someone 2   Sitting quietly after a lunch without alcohol 2   In a car, while stopped for a few minutes in traffic 0   Total score 16   STOP BANG 1/11/2017   Do you snore loudly (louder than talking or loud enough to be heard through closed doors)? 0   Do you often feel tired, fatigued, or sleepy during daytime? 1   Has anyone observed you stop breathing in your sleep? 0   Do you have or are you being treated for high blood pressure? 0   BMI more than 35 kg/m2 0   Age over 50 years old? 1   Neck circumference greater than 16 inches? 1   Gender male? 1   Total Score 4       Past Medical History  Past Medical History   Diagnosis Date     Chronic back pain      Depression      GERD (gastroesophageal reflux disease)      Hypothyroidism      Pancreatitis 12/31/2015     PD (Parkinson's disease)      Shingles      hx        Past Surgical History  Past Surgical History   Procedure Laterality Date     Laminectomy  10/2013     Inguinal hernia repair Bilateral 10/2013        Meds  Current Outpatient Prescriptions   Medication Sig Dispense Refill     carbidopa-levodopa (SINEMET CR)  mg ER tablet Take 3 tablets by mouth bedtime. (Patient taking differently: Take 2 tablets by mouth bedtime. ) 90 tablet 0     carbidopa-levodopa (SINEMET)  mg per tablet Take 2 tablets by mouth 4 (four) times a day. At 0200, 0800, 1200, 1600, and then Sinement CR  mg @ 2100 (5 doses total doses per day)       carbidopa-levodopa (SINEMET)  mg per tablet Take 3 tablets by mouth 4 (four) times a day. 1080 tablet 1     entacapone (COMTAN) 200 mg tablet Take 200 mg by mouth 5 (five) times a day. with each Sinemet dose       gabapentin (NEURONTIN) 300 MG capsule Take 1 capsule (300 mg total) by mouth 4 (four) times a day. 120 capsule 2     levothyroxine (SYNTHROID, LEVOTHROID) 137 MCG tablet Take 137 mcg by mouth daily.       meloxicam (MOBIC) 7.5 MG tablet Take 7.5 mg by mouth as  needed for pain. Take 1-2 tablets daily PRN   Indications: Back pain       multivitamin (MULTIVITAMIN) per tablet Take 1 tablet by mouth daily.       neomycin-bacitracin-polymyxin (NEOSPORIN) ointment Apply 1 application topically 2 (two) times a day. apply small amount with qtip to wound bed and covered with bandaid.   Indications: open wound on bottom of left 2nd toe       omeprazole (PRILOSEC) 20 MG capsule Take 20 mg by mouth daily.       polyethylene glycol (MIRALAX) 17 gram packet Take 17 g by mouth daily.       TESTOSTERONE IM Inject 200 mg into the shoulder, thigh, or buttocks every 28 days.       venlafaxine (EFFEXOR-XR) 150 MG 24 hr capsule Take 1 capsule (150 mg total) by mouth daily. Take with 75mg cap (225mg) 90 capsule 3     venlafaxine (EFFEXOR-XR) 75 MG 24 hr capsule Take 1 capsule (75 mg total) by mouth daily. Take with 150mg cap 90 capsule 3     No current facility-administered medications for this visit.         Allergies  Clonazepam     Social History  Social History     Social History     Marital status:      Spouse name: N/A     Number of children: N/A     Years of education: N/A     Occupational History     Not on file.     Social History Main Topics     Smoking status: Former Smoker     Types: Cigarettes     Quit date: 1/1/1980     Smokeless tobacco: Never Used     Alcohol use Yes      Comment: one drink a week     Drug use: No     Sexual activity: Not on file     Other Topics Concern     Not on file     Social History Narrative        Family History  Family History   Problem Relation Age of Onset     Kidney failure Mother      Heart disease Father      Parkinsonism Paternal Grandfather            Review of Systems:  Constitutional: Negative except as noted in HPI.   Eyes: Negative except as noted in HPI.   ENT: Negative except as noted in HPI.   Cardiovascular: Negative except as noted in HPI.   Respiratory: Negative except as noted in HPI.   Gastrointestinal: Negative except as noted  "in HPI.   Genitourinary: Negative except as noted in HPI.   Musculoskeletal: Negative except as noted in HPI.   Integumentary: Negative except as noted in HPI.   Neurological: Negative except as noted in HPI.   Psychiatric: Negative except as noted in HPI.   Endocrine: Negative except as noted in HPI.   Hematologic/Lymphatic: Negative except as noted in HPI.      Physical Exam:  Visit Vitals     Pulse 80     Ht 5' 7\" (1.702 m)     Wt 222 lb (100.7 kg)     BMI 34.77 kg/m2     BMI:Body mass index is 34.77 kg/(m^2).   GEN: NAD, obese, there is a tremor.   Head: Normocephalic.  EYES: PERRLA, EOMI  ENT: Oropharynx is clear, * mallampatti class IV airway. Uvula is edematous  Nasal mucosa is pink  Neck : Thyroid is not palpable  CV: Regular rate and rhythm, S1 & S2 are normal. No murmurs  LUNGS: clear to auscultation bilaterally, no wheezes  ABDOMEN: positive bowel sounds, soft, no rebound or guarding  MUSCULOSKELETAL: no clubbing, cyanosis or edema  SKIN: warm, dry, no rashes  Neurological: Alert, oriented to time, place, and person.  Psych: normal mood, normal affect        Labs/Studies:     Lab Results   Component Value Date    WBC 7.7 12/31/2015    HGB 14.9 12/31/2015    HCT 46.7 12/31/2015    MCV 86 12/31/2015     12/31/2015         Chemistry        Component Value Date/Time     12/31/2015 1745    K 4.1 12/31/2015 1745     12/31/2015 1745    CO2 28 12/31/2015 1745    BUN 21 12/31/2015 1745    CREATININE 0.84 12/31/2015 1745     12/31/2015 1745        Component Value Date/Time    CALCIUM 8.8 12/31/2015 1745    ALKPHOS 68 12/31/2015 1745    AST 19 12/31/2015 1745    ALT 7 12/31/2015 1745    BILITOT 0.6 12/31/2015 1745          I reviewed his PSG from 2008, AHI= 9.7      Assessment and Plan:  In summary Silver Zamora is a 66 y.o. year old male here for consultation.    1. Shift work disorder.  We will try melatonin 10 mg every night at 9 pm.  We discussed the possibility of sleeping during the " day but this is not feasible at this time.    2.Obstructive sleep apnea  He will need a Home sleep study.    3. REM sleep behavior disorder.  We will start Melatonin for his SWD.   He is allergic to Clonazepam and this is not an option.     Patient verbalized understanding of these issues, agrees with the plan and all questions were answered today. Patient was given an opportuntity to voice any other symptoms or concerns not listed above. Patient did not have any other symptoms or concerns.      Patient instructed to stop at  to schedule appointment before leaving today.     MD YAKOV Barney Board Certified in Internal Medicine and Sleep Medicine  [facility].

## 2021-06-09 NOTE — PROGRESS NOTES
Assessment /Plan:  Came wife wife, Luz Marina. Right handed.   Parkinson's disease: On sinemet 25/100 3 tabs 4 times daily: 8am, noon, 4pm, 9pm, with entacapone. At bed time 10 pm one entacopone 200 each time and Sinemet CR 25/100 1 tabs. At dinner time from 2 pm balance off and a little slow.  He is getting less steadier on his feet.  Fall couple of times over the last 4 months trying to carry things in both hands or to hands reaching out to little children.  No major injuries.  No side effects.  There may be slightly wearing off for about 30 minutes to 1 hour between dosing.  No significant dyskinesia.  Parkinson disease features revealed and treatment options discussed.    Psychiatry issue and cognitive function ( MOCA: 21/30 on April 5, 2017) is a concern for DBS, and he also failed on-off testing about 2 years ago. Some hallucination, rarely seeing mouse running and seeing people in the house.     Autonomic: Some light headed. no particularly positional changes.  No orthostasis during clinical visit.      Sleep disorder. Not able to change his habit. Goes to sleep very late and not able function in am. He has had 3 sleep studies and has seen Dr. Jayesh Michaels and other doctors. He has tried clonazepam not able to tolerate. Tested other multiple medications to without benefit. Also may have REM sleep disorders.       Memory: had neuro psych test in 4/2013. Neuro psych testing 5/2015: a mild progression in cognitive impairment, repeat test 5/2016 showing no significant changes.  MOCA: 21/30 on April 5, 2017  .   Psychiatric: During interview, he is not focussed on discussing his PD and related management.       Depression. History of anxiety. He has seen Dr. Llanos and Dr. Tamayo on this.       Kenaitze: no hearing aids.   Still independent on daily routine. Not driving since 2013, since wife likes to it anyway. Has trouble getting in and out of car, wife feel concerned his motor control, poor reaction time, controlling the  pedals.       Therapy: in session with pool exercise.  He deferred referral to big and loud Parkinson therapies      Chronic lower back pain. NO focal weakness in left LE. Will continue therapy and weight reduction. 10/2013 had lower back surgery.        PD summary: Right handed. diagnosed in . Symptoms: stiffness, slow, soft speech, walking difficulty. Left hand tremor, spreading to right started . He feels symptoms started in . Has seen Dr. Suazo in the past. He has some freezing tremors occasionally, but asymmetry. Tried the Sinemet CR during day time or at night, didn't work well. Off pramipexole since  in Dec due to memory and behavior concern.       Had osteomyelitis, now amputated the 3rd and 4th on right.  Did have a crack in the left food that is not healing..       RLS: no worsening.       Small fiber neuropathy: most likely relating to PD   Leg edema and poor leg circulation and has seen a vascular surgeon.              Neuro psych testin2016  As compared to testing in , current performance is notable for subtle declines in basic attention, cognitive efficiency, nonverbal learning and memory, visuospatial judgement and aspects of executive functioning (deductive reasoning and complex attention). A significant improvement (e.g., from severely impaired to superior delayed recall on a list learning task) was evident on two measures of verbal memory. He exhibited stable performance on measures of inhibition as well as verbal and nonverbal reasoning.   Overall, Mr. Zamora's cognitive profile is most notable for evidence of executive dysfunction and visual spatial impairments. Now that verbal learning and memory are assessed within normal limits, this profile appears to more clearly reflect the cognitive difficulties often seen in individuals with Parkinson s disease. While some declines are evident, the level of impairment is not significant enough at present to warrant a  dementia diagnosis. There is some evidence that he requires more assistance with ADLs; however, there does also seem to be a mood component to this as well as the impact of his energy level and pain symptoms.    Today's instruction:  Come back in 3-4 month  He will see an endocrinologist regarding the use of testosterone  They will check on the pricing on Rytary 48.75/195 mg , 3 capsules 3 times daily versus current medication regimen seems to decide whether to replace current carbidopa levodopa with entacapone which is causing them about $200 a month.        Review of system otherwise unremarkable     Patient Active Problem List   Diagnosis     Parkinson disease     Back pain     Depression     Anxiety state, unspecified     Hypothyroidism     Pancreatitis     RBD (REM behavioral disorder)             Current Outpatient Prescriptions   Medication Sig Dispense Refill     carbidopa-levodopa (SINEMET CR)  mg ER tablet Take 3 tablets by mouth bedtime. (Patient taking differently: Take 1 tablet by mouth at bedtime. ) 90 tablet 0     carbidopa-levodopa (SINEMET)  mg per tablet Take 3 tablets by mouth 4 (four) times a day. At 0200, 0800, 1200, 1600, and then Sinement CR  mg @ 2100 (5 doses total doses per day)       carbidopa-levodopa (SINEMET)  mg per tablet Take 3 tablets by mouth 4 (four) times a day. 1080 tablet 1     entacapone (COMTAN) 200 mg tablet Take 200 mg by mouth 5 (five) times a day. with each Sinemet dose       gabapentin (NEURONTIN) 300 MG capsule TAKE 1 CAPSULE (300 MG TOTAL) BY MOUTH 4 (FOUR) TIMES A DAY. 120 capsule 5     levothyroxine (SYNTHROID, LEVOTHROID) 137 MCG tablet Take 137 mcg by mouth daily.       meloxicam (MOBIC) 7.5 MG tablet Take 7.5 mg by mouth as needed for pain. Take 1-2 tablets daily PRN   Indications: Back pain       multivitamin (MULTIVITAMIN) per tablet Take 1 tablet by mouth daily.       neomycin-bacitracin-polymyxin (NEOSPORIN) ointment Apply 1 application  topically 2 (two) times a day. apply small amount with qtip to wound bed and covered with bandaid.   Indications: open wound on bottom of left 2nd toe       omeprazole (PRILOSEC) 20 MG capsule Take 20 mg by mouth daily.       polyethylene glycol (MIRALAX) 17 gram packet Take 17 g by mouth daily.       TESTOSTERONE IM Inject 200 mg into the shoulder, thigh, or buttocks every 28 days.       venlafaxine (EFFEXOR-XR) 150 MG 24 hr capsule Take 1 capsule (150 mg total) by mouth daily. Take with 75mg cap (225mg) 90 capsule 3     venlafaxine (EFFEXOR-XR) 75 MG 24 hr capsule Take 1 capsule (75 mg total) by mouth daily. Take with 150mg cap 90 capsule 3     No current facility-administered medications for this encounter.        Clonazepam    Past Medical History:   Diagnosis Date     Chronic back pain      Depression      GERD (gastroesophageal reflux disease)      Hypothyroidism      Pancreatitis 12/31/2015     PD (Parkinson's disease)      Shingles     hx       Social History     Social History     Marital status:      Spouse name: N/A     Number of children: N/A     Years of education: N/A     Occupational History     Not on file.     Social History Main Topics     Smoking status: Former Smoker     Types: Cigarettes     Quit date: 1/1/1980     Smokeless tobacco: Never Used     Alcohol use Yes      Comment: one drink a week     Drug use: No     Sexual activity: Not on file     Other Topics Concern     Not on file     Social History Narrative       Family History   Problem Relation Age of Onset     Kidney failure Mother      Heart disease Father      Parkinsonism Paternal Grandfather        Objective:  Vitals:    04/05/17 1306   BP: 155/83   Pulse: 94     Alert, cooperative, no distress Sitting, appears stated age, normocephalic, atraumatic without cyanosisr skin rashes.  Mild edema in ankles.  Normal mood.  But this topic changes in legs.  Ok with mental status, language, but slightly soft speech, CNII-XII intact  except staring and decreased eye blink, mask face, normal muscle bulk and strength in 4 extremities with rigidity. No hand tremors today but in the past has had hand tremors greater in right. There is no focal sensory difficulties in 4 extremities. Gait wide-based and a little shuffling.  Decreased balance.  No significant stooping.     Slightly sanford and hypokinesia. No  freezing. NO dyskinesia.       MOCA: 21/30

## 2021-06-09 NOTE — PROGRESS NOTES
"Telephone Visit  Silver Zamora is a 70 y.o. male who is being evaluated via a billable telephone visit in light of the ongoing global health crisis (COVID-19) that requires us to abide by social distancing mandates in order to reduce the risk of COVID-19 exposure.       The patient has been notified of following:     \"This telephone visit will be conducted via a telephone call between you and your physician/provider. We have found that certain health care needs can be provided without the need for a physical exam.  This service lets us provide the care you need with a short phone conversation.  If a prescription is necessary we can send it directly to your pharmacy.  If lab work is needed we can place an order for that and you can then stop by our lab to have the test done at a later time.    If during the course of the phone call the physician/provider feels a telephone visit is not appropriate, you will not be charged for this service.\"     Patient has given verbal consent to a Telephone visit? Yes    Silver Zamoar chief complaint is Parkinson's Disease    ALLERGIES  Clonazepam    Neuropsychological assessment completed    Yes   Currently doing PT  No   Completed Yes   Currently doing OT  No   Completed Yes    Currently doing ST   No   Completed No   Psychology  No    Any new medication (other provider):   No   Meds started at last appointment  No   Meds increased at last appointment    Yes Move Sinemet ER dose to noon instead of in am  Is patient still taking:  Yes  Results: doing okay.      Is patient on a controlled substance   No     Any concerns you would like to be addressed at this appointment?  Pharmacist Suggested Silver to go on Cymbalta instead of venlafaxine and could potentially d/c gabapentin and would help with restless leg. Pt has taken a lot of falls recently, but in the last few weeks doesn't seem as bad as they were.                                                         Phone Start Time: 1:20PM "     Phone End Time: 1:35 PM       Total time of phone conversation: 15 minutes    Phone number the patient would like to use:  [phone]    Candis Joshi [facility]     There were no vitals filed for this visit.    Outpatient Followup Parkinson's Dx Evaluation     Pertinent History:  Patient is right-handed.  He was diagnosed with Parkinson's disease in 2004 at the age of about 54.  He feels that the symptoms may have started in about 1994.  Symptoms started with stiffness, slowness, soft speech, walking difficulties, and left hand tremor which spread to the right and about 2015.  Has seen Dr. Suazo in the past.  He has some bruising and tremors occasionally but there is asymmetry.  He tried Sinemet CR during the daytime and at night which did not work well.  He is been off the pramipexole since 12/2015 due to memory and behavioral concerns.  December 2010 he was taking Sinemet IR 25/100 and increased to 25/250 with a good response which also improved his back pain.  He was able to be more active.  In 2011 he was evaluated at the [facility] by , he was noted to have U PDR S score of 41 after all Parkinson's medications.  It was suggested that he take carbidopa levodopa every 3 hours.  Amantadine was discontinued because he did not have dyskinesia.  It was suggested that he switch his selegiline to Azilect or discontinue all MAO inhibitors.  It is  felt that his increased Sinemet may have exasperated his restless leg syndrome.  Gabapentin was started to control his restless leg symptoms.  He was followed by Dr. Suazo from 2002 - 2011.  He also saw Dr. Zuniga and a previous nurse practitioner at this clinic.  He did transfer care to  in 2013.  He has tried amantadine and selegiline in the past.  He is not able to tolerate Requip, which he tried for several years at a relatively high dose but discontinued due to edema in lower extremities, he also tried Mirapex in 2015 but this caused more  "freezing and increased tremor.  CAT scan in July 2013 showed absent radiotracer in the putamen with diminished accumulation in the caudate, most consistent with Parkinson's disease.  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      Is patient on a controlled substance   No     Subjective:          HPI: The patient returns [facility] for a follow-up visit, he was last seen by me on 5/11/2020, where he moved Sinemet CR to 11 AM.  Wife reports that patient was having some confusion so she took away 1 of the controlled release and now the patient is only taking CR at 5 PM.  Patient was sleeping when I did call, a long discussion was held with the patient about Parkinson's and treatment plans, and how important it is for the patient to get up and get moving and exercising.  Wife reports that patient sleeps all day and all night.  Pharmacy did suggest the patient may be trying Cymbalta would be good for the patient's neuropathy and restless feet.  The patient has \"cut\" to a callus on the side of his foot, this is an open area.  I have discussed using compression and elevation to help circulation to try to help heal wound.    We discussed some treatment options and have elected to start Cymbalta 20 mg, and if this goes well we will discontinue gabapentin and possibly venlafaxine.      Current Medications: Please see chart. Medications personally reviewed.     Medication Compliance: Yes    Patient Active Problem List    Diagnosis Date Noted     Acquired absence of other right toe(s) (H) 01/05/2020     Ulcer of right foot, unspecified ulcer stage (H) 09/16/2019     Cord compression myelopathy (H) 09/16/2019     Moderate major depression (H) 09/16/2019     Cervical stenosis of spinal canal 02/07/2019     Ischemic cardiomyopathy 12/14/2018     Heart failure with reduced ejection fraction (H) 12/14/2018     Coronary artery disease involving native coronary artery of native heart with " angina pectoris (H)      Abnormal nuclear stress test 10/25/2018     Abnormal echocardiogram 05/29/2018     Leukocytosis, unspecified type      Cholecystitis 05/23/2018     Choledocholithiasis 05/23/2018     Calculus of gallbladder with biliary obstruction but without cholecystitis      Weakness      Low vitamin D level 10/11/2017     RBD (REM behavioral disorder) 04/03/2017     Pancreatitis 12/31/2015     Parkinson disease (H) 07/30/2014     Back pain 07/30/2014     Depression 07/30/2014     Anxiety state, unspecified 07/30/2014     Hypothyroidism 07/30/2014     Past Medical History:   Diagnosis Date     Anxiety      Callus      Cardiomyopathy (H)      Chronic back pain      Delirium      Depression      Fall      GERD (gastroesophageal reflux disease)      Hypothyroidism      Pancreatitis 12/31/2015     PD (Parkinson's disease) (H)      RBD (REM behavioral disorder) 4/3/2017     Shingles     hx     Past Surgical History:   Procedure Laterality Date     CV CORONARY ANGIOGRAM N/A 10/31/2018    Procedure: Coronary Angiogram;  Surgeon: Jose Meyer MD;  Location: [facility] Cath Lab;  Service:      CV LEFT HEART CATHETERIZATION WO LEFT VETRICULOGRAM Left 10/31/2018    Procedure: Left Heart Catheterization Without Left Ventriculogram;  Surgeon: Jose Meyer MD;  Location: [facility] Cath Lab;  Service:      INGUINAL HERNIA REPAIR Bilateral 10/2013     LAMINECTOMY  10/2013     [facility] ERCP REMOVE CALCULI/DEBRIS BILIARY/PANCREAS DUCT N/A 5/25/2018    Procedure: ENDOSCOPIC RETROGRADE CHOLANGIOPANCREATOGRAPHY SPINCTEROTOMY BILIARY STONE EXTRACTION;  Surgeon: Curtis Mcclain MD;  Location: [facility] OR;  Service: Gastroenterology     VA LAP,CHOLECYSTECTOMY/GRAPH N/A 5/24/2018    Procedure: LAPAROSCOPIC CHOLECYSTECTOMY;  Surgeon: Joyce Melissa MD;  Location: [facility] OR;  Service: General     Family History   Problem Relation Age of Onset     Kidney failure Mother      Heart disease Father 82     Tremor  Father      Parkinsonism Paternal Grandfather      Tremor Sister      Leukemia Maternal Grandmother      Current Outpatient Medications   Medication Sig Dispense Refill     acetaminophen (TYLENOL) 325 MG tablet Take 2 tablets (650 mg total) by mouth every 4 (four) hours as needed.  0     ammonium lactate (AMLACTIN) 12 % cream Apply two times a day 385 g 2     amoxicillin-clavulanate (AUGMENTIN) 875-125 mg per tablet        aspirin 81 mg chewable tablet Chew 1 tablet (81 mg total) daily.  0     atorvastatin (LIPITOR) 80 MG tablet Take 1 tablet (80 mg total) by mouth daily. 90 tablet 1     bisacodyl (DULCOLAX) 10 mg suppository One supp TX qday prn constipation.  Give if no BM in prior 3 days.  0     carbidopa-levodopa (PARCOPA)  mg per disintegrating tablet Take 1 tablet by mouth as needed. 90 tablet 3     carbidopa-levodopa (SINEMET CR)  mg ER tablet Take 1 tablet by mouth 2 (two) times a day. One tab at 1200 and one bedtime 180 tablet 1     cholecalciferol, vitamin D3, (VITAMIN D3) 2,000 unit Tab Take 1 tablet (2,000 Units total) by mouth daily.  0     fluticasone propionate (FLONASE) 50 mcg/actuation nasal spray 2 sprays into each nostril daily. 16 g 3     gabapentin (NEURONTIN) 100 MG capsule TAKE ONE CAPSULE BY MOUTH TWICE A DAY 60 capsule 2     levothyroxine (SYNTHROID, LEVOTHROID) 137 MCG tablet TAKE ONE TABLET BY MOUTH ONCE DAILY AT 6 A.M. 90 tablet 3     metoprolol succinate (TOPROL-XL) 25 MG TAKE ONE-HALF TABLET BY MOUTH EVERY NIGHT AT BEDTIME 45 tablet 1     nitroglycerin (NITROSTAT) 0.4 MG SL tablet Place 1 tablet (0.4 mg total) under the tongue every 5 (five) minutes as needed for chest pain. 20 tablet 1     NUPLAZID 34 mg cap capsule TAKE 1 CAPSULE BY MOUTH ONE TIME A DAY AT BEDTIME 30 capsule 4     nystatin (MYCOSTATIN) cream Apply to affected area of groin three times a day x 10 days 30 g 1     polyethylene glycol (GLYCOLAX) 17 gram/dose powder Take 17 g by mouth daily. 1530 g 6     RYTARY  61. mg CpER ER capsule TAKE TWO CAPSULES BY MOUTH FOUR TIMES A  capsule 2     senna-docusate (SENNOSIDES-DOCUSATE SODIUM) 8.6-50 mg tablet Take 1 tablet by mouth 2 (two) times a day.             triamcinolone (KENALOG) 0.025 % ointment Apply topically 2 (two) times a day. 15 g 3     venlafaxine (EFFEXOR-XR) 150 MG 24 hr capsule TAKE ONE CAPSULE BY MOUTH ONCE DAILY ALONG WITH 75 MG CAPSULE FOR A TOTAL DAILY DOSE  MG. 90 capsule 2     venlafaxine (EFFEXOR-XR) 75 MG 24 hr capsule TAKE ONE CAPSULE BY MOUTH ONCE DAILY (TAKE 75 MG WITH 150 MG TO EQUAL 225 MG DAILY) 90 capsule 2     No current facility-administered medications for this encounter.        Allergies   Allergen Reactions     Clonazepam      Too drowsy     Social History     Socioeconomic History     Marital status:      Spouse name: Not on file     Number of children: Not on file     Years of education: Not on file     Highest education level: Not on file   Occupational History     Not on file   Social Needs     Financial resource strain: Not on file     Food insecurity     Worry: Not on file     Inability: Not on file     Transportation needs     Medical: Not on file     Non-medical: Not on file   Tobacco Use     Smoking status: Former Smoker     Types: Cigarettes     Last attempt to quit: 1980     Years since quittin.5     Smokeless tobacco: Never Used   Substance and Sexual Activity     Alcohol use: No     Comment: one drink a week     Drug use: No     Sexual activity: Not on file   Lifestyle     Physical activity     Days per week: Not on file     Minutes per session: Not on file     Stress: Not on file   Relationships     Social connections     Talks on phone: Not on file     Gets together: Not on file     Attends Baptism service: Not on file     Active member of club or organization: Not on file     Attends meetings of clubs or organizations: Not on file     Relationship status: Not on file     Intimate partner  violence     Fear of current or ex partner: Not on file     Emotionally abused: Not on file     Physically abused: Not on file     Forced sexual activity: Not on file   Other Topics Concern     Not on file   Social History Narrative     Not on file       Review of Systems  A comprehensive review of systems was negative except for:what is noted above    Mental Status Exam:   Appearance: .telephone appointment, I did not visualized the patient  Behavior:   .cooperative and pleasant, no apparent agitation, no irritability, denies any recent behavioral difficulties  Speech: .slow and soft speech, able to participate in conversation  Mood/Affect: .flat, no obvious significant depression or anxiety  Thought Content: .no psychotic symptoms were evident, patient and wife do state that the patient did have some hallucinations    Suicidal or Homicidal Thoughts:   .no evidence of suicidal or homicidal ideations, patient denies any suicidal ideations  Thought Process/Formulation:  .adequate, able to process information, no evidence of racing thoughts  Associations: .adequate, processing information and participating in conversation  Fund of Knowledge:  .intact, following conversation, adequate depth of understanding  Attention/Concentration: .slightly distractible, concentration is poor  Insight: .slightly impaired  Judgement:  .slightly impaired  Memory:   . .slight impairment   Motor Status:  .no noted tremor or dyskinesia   Orientation: .oriented to person, place, time and situation .  .  Diagnosis managed and treated at today's visit   Parkinson's disease  Anxiety d/t a medical condition  Chronic pain  Mild cognitive impairment  High risk for falls  Tremor  Foot ulcer  Physical deconditioning  Insomnia     Plan:  Medication Adjustment:  Cymbalta 20 mg    Other:   Patient will return to clinic in 2 weeks. They agree to call or return sooner with any questions or concerns.  Risks and benefits were discussed.  Continue with  his individual therapist.     Continue with the support of the clinic, reassurance, and redirection. Staff monitoring and ongoing assessments per team plan. Current psychotropic medication appears to represent the minimum effective dosage and appears medically necessary. We will continue to monitor and reassess. This team will utilize appropriate emergency services if necessary. I will make myself available if concerns or problems arise.    Video Visit Details    Type of service: Video Visit    Video Start Time: 1400    Video End Time:  1440    Total time for Video call: 40 minutes    Originating Location: Patient's home    Distant Location:  [facility]    Mode of Communication: Telephone call    General Information:  Today you had your appointment with Kelli Walker CNP     If lab work was done today as part of your evaluation you will generally be contacted via My Chart, mail, or phone with the results within 1-5 days. If there is an alarming result we will contact you by phone. Lab results come back at varying times, I generally wait until all labs are resulted before making comments on results. Please note labs are automatically released to My Chart once available.     If you need refills please contact your pharmacist. They will send a refill request to me to review. Please allow 3 business days for us to process all refill requests.     Please call or send a medical message through My Chart, with any questions or concerns    If you need any paperwork completed please fax forms to [phone]. Please state if you would like a copy of the completed paperwork, mailed or faxed back to the patient and a fax number to fax the paperwork to. Please allow up to 10 days for paperwork to be completed.    Kelli Walker CNP

## 2021-06-09 NOTE — PROGRESS NOTES
How have you been doing since we last saw you? any concerns? Medication f/u, increase in restless less and wants to talk about vitamins.       Current Symptoms : Yes

## 2021-06-09 NOTE — PROGRESS NOTES
Persons accompanying you (the patient) today: Wife Luz Marina    How have you been doing since we saw you last? Please note any concerns.  No concerns    Please list any surgeries or procedures you have had since we saw you last:  none    Have you had any falls since your last visit? No    Do you have any pain today? No    With whom do you currently reside? (alone, spouse, family, assisted living, nursing home)  Pt live with his wife in a single family home.  If assisted living or nursing home, please note name and location of facility:

## 2021-06-09 NOTE — PROGRESS NOTES
"Dear No referring provider defined for this encounter.,    Thank you for the opportunity to participate in the care of Silver Zamora.     He is a 67 y.o. y/o male patient who comes to the sleep medicine clinic for follow up.    We had an extensive conversation to review the results of his sleep study.    Procedure:   Type III home sleep test testing device utilizes wireless Bluetooth  Oximetry, Thorax and Abdomen RIP effort/Rip Flow, Snore Microphone, Nasal Pressure, Thermocouple, EKG monitoring, position sensor and actigraphy.     Primary Findings:  1. Respiratory monitoring showed intermittent snoring and overall mild obstructive sleep apnea/hypopnea (AHI=6.3).  2. The recording had adequate quality for clinical interpretation.    We reviewed the oxygen saturation graph as well as the result tables from the report.    Regarding his dream enactment behavior, he started taking melatonin 5 mg and used it for 1 week. He stopped taking melatonin because \"it was working too well\".     The patient has been using testosterone supplementation because he had low testosterone levels. Recently, he was travelling to [place] and a doctor in [place] was concerned that his testosterone dose is not right.       Past Medical History:   Diagnosis Date     Chronic back pain      Depression      GERD (gastroesophageal reflux disease)      Hypothyroidism      Pancreatitis 12/31/2015     PD (Parkinson's disease)      Shingles     hx       Past Surgical History:   Procedure Laterality Date     INGUINAL HERNIA REPAIR Bilateral 10/2013     LAMINECTOMY  10/2013       Social History     Social History     Marital status:      Spouse name: N/A     Number of children: N/A     Years of education: N/A     Occupational History     Not on file.     Social History Main Topics     Smoking status: Former Smoker     Types: Cigarettes     Quit date: 1/1/1980     Smokeless tobacco: Never Used     Alcohol use Yes      Comment: one drink a week     " Drug use: No     Sexual activity: Not on file     Other Topics Concern     Not on file     Social History Narrative       Review of Systems:  General: No weight gain, no weight loss  Eyes: No vision changes  ENT: No hearing changes  Cardio: No chest pain, no nocturnal dyspnea  Respiratory: No shortness of breath, no cough  Gastrointestinal: No diarrhea, no constipation  Genitourinary: No excessive urination  Tegumentary: No rashes  Neurological: No seizures, no loss of consciousness  Endo: No heat or cold intolerance.    Current Outpatient Prescriptions   Medication Sig Dispense Refill     carbidopa-levodopa (SINEMET CR)  mg ER tablet Take 3 tablets by mouth bedtime. (Patient taking differently: Take 1 tablet by mouth at bedtime. ) 90 tablet 0     carbidopa-levodopa (SINEMET)  mg per tablet Take 3 tablets by mouth 4 (four) times a day. At 0200, 0800, 1200, 1600, and then Sinement CR  mg @ 2100 (5 doses total doses per day)       entacapone (COMTAN) 200 mg tablet Take 200 mg by mouth 5 (five) times a day. with each Sinemet dose       gabapentin (NEURONTIN) 300 MG capsule TAKE 1 CAPSULE (300 MG TOTAL) BY MOUTH 4 (FOUR) TIMES A DAY. 120 capsule 5     levothyroxine (SYNTHROID, LEVOTHROID) 137 MCG tablet Take 137 mcg by mouth daily.       meloxicam (MOBIC) 7.5 MG tablet Take 7.5 mg by mouth as needed for pain. Take 1-2 tablets daily PRN   Indications: Back pain       multivitamin (MULTIVITAMIN) per tablet Take 1 tablet by mouth daily.       neomycin-bacitracin-polymyxin (NEOSPORIN) ointment Apply 1 application topically 2 (two) times a day. apply small amount with qtip to wound bed and covered with bandaid.   Indications: open wound on bottom of left 2nd toe       omeprazole (PRILOSEC) 20 MG capsule Take 20 mg by mouth daily.       polyethylene glycol (MIRALAX) 17 gram packet Take 17 g by mouth daily.       TESTOSTERONE IM Inject 200 mg into the shoulder, thigh, or buttocks every 28 days.        "venlafaxine (EFFEXOR-XR) 150 MG 24 hr capsule Take 1 capsule (150 mg total) by mouth daily. Take with 75mg cap (225mg) 90 capsule 3     venlafaxine (EFFEXOR-XR) 75 MG 24 hr capsule Take 1 capsule (75 mg total) by mouth daily. Take with 150mg cap 90 capsule 3     carbidopa-levodopa (SINEMET)  mg per tablet Take 3 tablets by mouth 4 (four) times a day. 1080 tablet 1     No current facility-administered medications for this visit.        Allergies   Allergen Reactions     Clonazepam      Too drowsy       Physical Exam:  /76 (Patient Site: Right Arm, Patient Position: Sitting, Cuff Size: Adult Regular)  Pulse 80  Resp 20  Ht 5' 7\" (1.702 m)  Wt 219 lb (99.3 kg)  BMI 34.3 kg/m2  BMI:Body mass index is 34.3 kg/(m^2).   GEN: NAD, obese  Head: Normocephalic.  Neurological: Alert, oriented to time, place, and person.  Psych: normal mood, normal affect     Labs/Studies:  - We reviewed the results of the overnight PSG as described on the HPI.     Lab Results   Component Value Date    WBC 7.7 12/31/2015    HGB 14.9 12/31/2015    HCT 46.7 12/31/2015    MCV 86 12/31/2015     12/31/2015         Chemistry        Component Value Date/Time     12/31/2015 1745    K 4.1 12/31/2015 1745     12/31/2015 1745    CO2 28 12/31/2015 1745    BUN 21 12/31/2015 1745    CREATININE 0.84 12/31/2015 1745     12/31/2015 1745        Component Value Date/Time    CALCIUM 8.8 12/31/2015 1745    ALKPHOS 68 12/31/2015 1745    AST 19 12/31/2015 1745    ALT 7 12/31/2015 1745    BILITOT 0.6 12/31/2015 1745           Assessment and Plan:  In summary Silver Zamora is a 67 y.o. year old male here for follow up.    1. Obstructive Sleep Apnea  We had an extensive conversation to review the results of his sleep study.     2. REM sleep behavior disorder.  I explained to him that the goal of treatment in this area was to prevent injury.   I We talked about the relationship between RBD and Parkinson's disease.   We spent a " significant amount of time counseling the patient about the importance of treating this problem.  His wife will help re-start melatonin 5 mg.     3.  Questions about testosterone usage.  I explained to Mr. Zamora that I am not an expert on this area. Testosterone can be worsening his SDB but it may be improving his sleep.  I recommend an endocrinology evaluation. He would like to go back to [facility] because he was evaluated there and I will place the referral.    Other problems: The patient had many questions about research and treatment options for his medical problems. A times, I got th impression that he is trying to get down to the molecular level of each problem. We talked about his medical problems and the dangers of not achieving any success if he wants to understand each problem at a molecular level.     Patient verbalized understanding of these issues, agrees with the plan and all questions were answered today. Patient was given an opportuntity to voice any other symptoms or concerns not listed above. Patient did not have any other symptoms or concerns.      Patient told to return in 1.5 months. Patient instructed to stop at  to schedule appointment before leaving today.    MD YAKOV Barney Board Certified in Internal Medicine and Sleep Medicine  [facility].    We spent a total of 60 minutes of face-to-face encounter and more than 50% of the encounter was used for counseling or coordination of care.

## 2021-06-09 NOTE — PROGRESS NOTES
Order for Durable Medical Equipment was processed and equipment ordered.   DME provider: [place]  Date Faxed: 04/06/2017  Ordering Provider: Dr. Henedrson  Equipment ordered: Re faxed  Sleep study.

## 2021-06-09 NOTE — PROGRESS NOTES
Order for Durable Medical Equipment was processed and equipment ordered.   DME provider: [facility]  Date Faxed: 04/03/17  Ordering Provider: Dr. Henderson  Equipment ordered: Supplies

## 2021-06-09 NOTE — PROGRESS NOTES
"Telephone Visit  Silver Zamora is a 70 y.o. male who is being evaluated via a billable telephone visit in light of the ongoing global health crisis (COVID-19) that requires us to abide by social distancing mandates in order to reduce the risk of COVID-19 exposure.       The patient has been notified of following:     \"This telephone visit will be conducted via a telephone call between you and your physician/provider. We have found that certain health care needs can be provided without the need for a physical exam.  This service lets us provide the care you need with a short phone conversation.  If a prescription is necessary we can send it directly to your pharmacy.  If lab work is needed we can place an order for that and you can then stop by our lab to have the test done at a later time.    If during the course of the phone call the physician/provider feels a telephone visit is not appropriate, you will not be charged for this service.\"     Patient has given verbal consent to a Telephone visit? Yes    Silver Zamora chief complaint is Parkinson's Disease    ALLERGIES  Clonazepam    Neuropsychological assessment completed    Yes   Currently doing PT  No   Completed Yes   Currently doing OT  No   Completed Yes    Currently doing ST   No   Completed No   Psychology  No        Any new medication (other provider):   No   Meds started at last appointment  Yes Cymbalta 20 mg  Is patient still on med:  Yes  Results: hasn't noticed much of a difference this was started on 7/6/2020.   Meds increased at last appointment    No      Is patient on a controlled substance   No     Any concerns you would like to be addressed at this appointment?  Pt is having hallucinations ever since starting Nuplazid which he did not have prior, still experiencing restless leg, wondering how to wean off of venlafaxine, fallen a little more lately because of balance in the home, going back to the foot doctor next week because of foot ulcer is " getting worse/bigger, would a whole body vibration system help for family to order to have in the home for the patient.                                                        Phone Start Time: 2:30pm    Phone End Time:  2:40pm    Total time of phone conversation: 10 minutes    Phone number the patient would like to use:  [phone]    Candis Joshi CMA     There were no vitals filed for this visit.    Outpatient Follow up Parkinson's Dx Evaluation     Pertinent History:  Patient is right-handed.  He was diagnosed with Parkinson's disease in 2004 at the age of about 54.  He feels that the symptoms may have started in about 1994.  Symptoms started with stiffness, slowness, soft speech, walking difficulties, and left hand tremor which spread to the right and about 2015.  Has seen Dr. Suazo in the past.  He has some bruising and tremors occasionally but there is asymmetry.  He tried Sinemet CR during the daytime and at night which did not work well.  He is been off the pramipexole since 12/2015 due to memory and behavioral concerns.  December 2010 he was taking Sinemet IR 25/100 and increased to 25/250 with a good response which also improved his back pain.  He was able to be more active.  In 2011 he was evaluated at the [facility] by , he was noted to have U PDR S score of 41 after all Parkinson's medications.  It was suggested that he take carbidopa levodopa every 3 hours.  Amantadine was discontinued because he did not have dyskinesia.  It was suggested that he switch his selegiline to Azilect or discontinue all MAO inhibitors.  It is  felt that his increased Sinemet may have exasperated his restless leg syndrome.  Gabapentin was started to control his restless leg symptoms.  He was followed by Dr. Suazo from 2002 - 2011.  He also saw Dr. Zuniga and a previous nurse practitioner at this clinic.  He did transfer care to  in 2013.  He has tried amantadine and selegiline in the past.  He  is not able to tolerate Requip, which he tried for several years at a relatively high dose but discontinued due to edema in lower extremities, he also tried Mirapex in 2015 but this caused more freezing and increased tremor.  CAT scan in July 2013 showed absent radiotracer in the putamen with diminished accumulation in the caudate, most consistent with Parkinson's disease.  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      Is patient on a controlled substance   No     Subjective:          HPI: The patient returns to the Parkinson's clinic for follow-up visit.  He was last seen by me on 6/30/2020, where the patient was started on Cymbalta to see if this would help for pain.  Wife reports the patient is doing well, she really likes the change.  We will now reduce and discontinue the Effexor.  Wife reports that she does believe that patient's mood is much better also.  Patient appears to be slightly more active.  Wife reports patient continues to have wound on his foot that she is concerned about.  They do have an appointment with the podiatrist.  Patient continues to complain about not wanting to sleep and at night and more in the daytime.  Wife also reports they have not been taking the extra dose of controlled release.  No signs or symptoms of depression or anxiety.    We discussed some treatment options and have elected to increase the Cymbalta and reduce and d/c Effexor.      Current Medications: Please see chart. Medications personally reviewed.     Medication Compliance: Yes    Patient Active Problem List    Diagnosis Date Noted     Acquired absence of other right toe(s) (H) 01/05/2020     Ulcer of right foot, unspecified ulcer stage (H) 09/16/2019     Cord compression myelopathy (H) 09/16/2019     Moderate major depression (H) 09/16/2019     Cervical stenosis of spinal canal 02/07/2019     Ischemic cardiomyopathy 12/14/2018     Heart failure with reduced ejection fraction  (H) 12/14/2018     Coronary artery disease involving native coronary artery of native heart with angina pectoris (H)      Abnormal nuclear stress test 10/25/2018     Abnormal echocardiogram 05/29/2018     Leukocytosis, unspecified type      Cholecystitis 05/23/2018     Choledocholithiasis 05/23/2018     Calculus of gallbladder with biliary obstruction but without cholecystitis      Weakness      Low vitamin D level 10/11/2017     RBD (REM behavioral disorder) 04/03/2017     Pancreatitis 12/31/2015     Parkinson disease (H) 07/30/2014     Back pain 07/30/2014     Depression 07/30/2014     Anxiety state, unspecified 07/30/2014     Hypothyroidism 07/30/2014     Past Medical History:   Diagnosis Date     Anxiety      Callus      Cardiomyopathy (H)      Chronic back pain      Delirium      Depression      Fall      GERD (gastroesophageal reflux disease)      Hypothyroidism      Pancreatitis 12/31/2015     PD (Parkinson's disease) (H)      RBD (REM behavioral disorder) 4/3/2017     Shingles     hx     Past Surgical History:   Procedure Laterality Date     CV CORONARY ANGIOGRAM N/A 10/31/2018    Procedure: Coronary Angiogram;  Surgeon: Jose Meyer MD;  Location: [facility];  Service:      CV LEFT HEART CATHETERIZATION WO LEFT VETRICULOGRAM Left 10/31/2018    Procedure: Left Heart Catheterization Without Left Ventriculogram;  Surgeon: Jose Meyer MD;  Location: [facility];  Service:      INGUINAL HERNIA REPAIR Bilateral 10/2013     LAMINECTOMY  10/2013     LA ERCP REMOVE CALCULI/DEBRIS BILIARY/PANCREAS DUCT N/A 5/25/2018    Procedure: ENDOSCOPIC RETROGRADE CHOLANGIOPANCREATOGRAPHY SPINCTEROTOMY BILIARY STONE EXTRACTION;  Surgeon: Curtis Mcclain MD;  [place] [facility] [place]: [place]     LA LAP,CHOLECYSTECTOMY/GRAPH N/A 5/24/2018    Procedure: LAPAROSCOPIC CHOLECYSTECTOMY;  Surgeon: Joyce Melissa MD;  [place] [facility] [place]: [place]     Family History    Problem Relation Age of Onset     Kidney failure Mother      Heart disease Father 82     Tremor Father      Parkinsonism Paternal Grandfather      Tremor Sister      Leukemia Maternal Grandmother      Current Outpatient Medications   Medication Sig Dispense Refill     acetaminophen (TYLENOL) 325 MG tablet Take 2 tablets (650 mg total) by mouth every 4 (four) hours as needed.  0     ammonium lactate (AMLACTIN) 12 % cream Apply two times a day 385 g 2     amoxicillin-clavulanate (AUGMENTIN) 875-125 mg per tablet        aspirin 81 mg chewable tablet Chew 1 tablet (81 mg total) daily.  0     atorvastatin (LIPITOR) 80 MG tablet Take 1 tablet (80 mg total) by mouth daily. 90 tablet 1     bisacodyl (DULCOLAX) 10 mg suppository One supp GA qday prn constipation.  Give if no BM in prior 3 days.  0     carbidopa-levodopa (PARCOPA)  mg per disintegrating tablet Take 1 tablet by mouth as needed. 90 tablet 3     carbidopa-levodopa (SINEMET CR)  mg ER tablet Take 1 tablet by mouth 2 (two) times a day. One tab at 1200 and one bedtime (Patient taking differently: Take 1 tablet by mouth daily. One tab at 5 PM) 180 tablet 1     cholecalciferol, vitamin D3, (VITAMIN D3) 2,000 unit Tab Take 1 tablet (2,000 Units total) by mouth daily.  0     DULoxetine (CYMBALTA) 20 MG capsule Take 1 capsule (20 mg total) by mouth daily. 30 capsule 2     fluticasone propionate (FLONASE) 50 mcg/actuation nasal spray 2 sprays into each nostril daily. 16 g 3     gabapentin (NEURONTIN) 100 MG capsule TAKE ONE CAPSULE BY MOUTH TWICE A DAY 60 capsule 2     levothyroxine (SYNTHROID, LEVOTHROID) 137 MCG tablet TAKE ONE TABLET BY MOUTH ONCE DAILY AT 6 A.M. 90 tablet 3     metoprolol succinate (TOPROL-XL) 25 MG TAKE ONE-HALF TABLET BY MOUTH EVERY NIGHT AT BEDTIME 45 tablet 1     nitroglycerin (NITROSTAT) 0.4 MG SL tablet Place 1 tablet (0.4 mg total) under the tongue every 5 (five) minutes as needed for chest pain. 20 tablet 1     NUPLAZID 34 mg  cap capsule TAKE 1 CAPSULE BY MOUTH ONE TIME A DAY AT BEDTIME 30 capsule 4     nystatin (MYCOSTATIN) cream Apply to affected area of groin three times a day x 10 days 30 g 1     polyethylene glycol (GLYCOLAX) 17 gram/dose powder Take 17 g by mouth daily. 1530 g 6     RYTARY 61. mg CpER ER capsule TAKE TWO CAPSULES BY MOUTH FOUR TIMES A  capsule 2     senna-docusate (SENNOSIDES-DOCUSATE SODIUM) 8.6-50 mg tablet Take 1 tablet by mouth 2 (two) times a day.             triamcinolone (KENALOG) 0.025 % ointment Apply topically 2 (two) times a day. 15 g 3     venlafaxine (EFFEXOR-XR) 150 MG 24 hr capsule TAKE ONE CAPSULE BY MOUTH ONCE DAILY ALONG WITH 75 MG CAPSULE FOR A TOTAL DAILY DOSE  MG. 90 capsule 2     venlafaxine (EFFEXOR-XR) 75 MG 24 hr capsule TAKE ONE CAPSULE BY MOUTH ONCE DAILY (TAKE 75 MG WITH 150 MG TO EQUAL 225 MG DAILY) 90 capsule 2     No current facility-administered medications for this encounter.        Allergies   Allergen Reactions     Clonazepam      Too drowsy     Social History     Socioeconomic History     Marital status:      Spouse name: Not on file     Number of children: Not on file     Years of education: Not on file     Highest education level: Not on file   Occupational History     Not on file   Social Needs     Financial resource strain: Not on file     Food insecurity     Worry: Not on file     Inability: Not on file     Transportation needs     Medical: Not on file     Non-medical: Not on file   Tobacco Use     Smoking status: Former Smoker     Types: Cigarettes     Last attempt to quit: 1980     Years since quittin.5     Smokeless tobacco: Never Used   Substance and Sexual Activity     Alcohol use: No     Comment: one drink a week     Drug use: No     Sexual activity: Not on file   Lifestyle     Physical activity     Days per week: Not on file     Minutes per session: Not on file     Stress: Not on file   Relationships     Social connections     Talks  on phone: Not on file     Gets together: Not on file     Attends [employer] service: Not on file     Active member of club or organization: Not on file     Attends meetings of clubs or organizations: Not on file     Relationship status: Not on file     Intimate partner violence     Fear of current or ex partner: Not on file     Emotionally abused: Not on file     Physically abused: Not on file     Forced sexual activity: Not on file   Other Topics Concern     Not on file   Social History Narrative     Not on file       Review of Systems  A comprehensive review of systems was negative except for:what is noted above    Mental Status Exam:   Appearance: .telephone appointment, I did not visualized the patient  Behavior:   cooperative and pleasant, no apparent agitation, no irritability, denies any recent behavioral difficulties  Speech: slow and soft speech, able to participate in conversation  Mood/Affect: flat, no obvious significant depression or anxiety  Thought Content:  no psychotic symptoms were evident,   Suicidal or Homicidal Thoughts:   no evidence of suicidal or homicidal ideations, patient denies any suicidal ideations  Thought Process/Formulation:  adequate, able to process information, no evidence of racing thoughts  Associations: adequate, processing information and participating in conversation  Fund of Knowledge:  intact, following conversation, adequate depth of understanding  Attention/Concentration: slightly distractible, concentration is poor  Insight:  impaired  Judgement:  slightly impaired  Memory:   slight impairment   Motor Status: no noted tremor or dyskinesia reported  Orientation: .oriented to person, place, time and situation .  .  Diagnosis managed and treated at today's visit   Parkinson's disease  Anxiety d/t a medical condition  Chronic pain  Mild cognitive impairment  High risk for falls  Tremor  Foot ulcer  Physical deconditioning  Insomnia     Plan:  Medication Adjustment:  Increase  Cymbalta to 40 mg and reduce and d/c Effexor    Other:   Patient will return to clinic in 4 weeks. They agree to call or return sooner with any questions or concerns.  Risks and benefits were discussed.  Continue with his individual therapist.     Continue with the support of the clinic, reassurance, and redirection. Staff monitoring and ongoing assessments per team plan. Current psychotropic medication appears to represent the minimum effective dosage and appears medically necessary. We will continue to monitor and reassess. This team will utilize appropriate emergency services if necessary. I will make myself available if concerns or problems arise.    Telephone Visit Details    Type of service: Telephone Visit    Phone Start Time: 1540    Phone End Time:  1610    Total time for telephone call: 40 minutes    Originating Location: Patient's home    Distant Location:  [facility]    Mode of Communication: Telephone call    General Information:  Today you had your appointment with Kelli Walker CNP     If lab work was done today as part of your evaluation you will generally be contacted via My Chart, mail, or phone with the results within 1-5 days. If there is an alarming result we will contact you by phone. Lab results come back at varying times, I generally wait until all labs are resulted before making comments on results. Please note labs are automatically released to My Chart once available.     If you need refills please contact your pharmacist. They will send a refill request to me to review. Please allow 3 business days for us to process all refill requests.     Please call or send a medical message through My Chart, with any questions or concerns    If you need any paperwork completed please fax forms to [phone]. Please state if you would like a copy of the completed paperwork, mailed or faxed back to the patient and a fax number to fax the paperwork to. Please  allow up to 10 days for paperwork to be completed.    Kelli Walker, CNP

## 2021-06-10 NOTE — PROGRESS NOTES
SUBJECTIVE:    This is a 67-year-old male who presents to the clinic to establish care.  He presents for a complete physical examination as well.  His wife Luz Marina is present.  His medical history is notable for Parkinson's disease, depression, anxiety, hypothyroidism, and low testosterone levels.  He has followed up with neurology for Parkinson syndrome that was diagnosed back in 2004.  He continues to follow-up with Dr. Gomez[facility].  He currently is being treated with Sinemet 25/103 pills 4 times a day.  He also has followed up with Dr. Joe Tamayo for depression and memory concerns.  He has been taking Effexor on a regular basis and his mood has generally been stable.  Medical history is otherwise notable for pain in the low back.  At times there has been radiation to his lower extremities.  He has had previous lower back surgery.  He has mild obstructive sleep apnea as well.  He has followed up with endocrinology receives testosterone supplementation.  In general, he has felt generally well.  He has not had recent falls.  Review of systems is negative for headache, dizziness, chest pain, palpitations, or bowel changes.  He denies any urinary concerns.  He would like to consider prescription for Viagra in the future.  Finally, he does have some concerns regarding his toenails as he has had a change in appearance with some thickening.        Patient Active Problem List   Diagnosis     Parkinson disease     Back pain     Depression     Anxiety state, unspecified     Hypothyroidism     Pancreatitis     RBD (REM behavioral disorder)       Current Outpatient Prescriptions on File Prior to Visit   Medication Sig     carbidopa-levodopa (SINEMET)  mg per tablet Take 3 tablets by mouth 4 (four) times a day. At 0200, 0800, 1200, 1600, and then Sinement CR  mg @ 2100 (5 doses total doses per day)     entacapone (COMTAN) 200 mg tablet Take 200 mg by mouth 5 (five) times a day. with each Sinemet dose      gabapentin (NEURONTIN) 300 MG capsule TAKE 1 CAPSULE (300 MG TOTAL) BY MOUTH 4 (FOUR) TIMES A DAY.     levothyroxine (SYNTHROID, LEVOTHROID) 137 MCG tablet Take 137 mcg by mouth daily.     multivitamin (MULTIVITAMIN) per tablet Take 1 tablet by mouth daily.     omeprazole (PRILOSEC) 20 MG capsule Take 20 mg by mouth daily as needed.      polyethylene glycol (MIRALAX) 17 gram packet Take 17 g by mouth daily.     TESTOSTERONE IM Inject 200 mg into the shoulder, thigh, or buttocks every 28 days.     venlafaxine (EFFEXOR-XR) 150 MG 24 hr capsule Take 1 capsule (150 mg total) by mouth daily. Take with 75mg cap (225mg)     venlafaxine (EFFEXOR-XR) 75 MG 24 hr capsule Take 1 capsule (75 mg total) by mouth daily. Take with 150mg cap     No current facility-administered medications on file prior to visit.        Allergies   Allergen Reactions     Clonazepam      Too drowsy            A 12 point comprehensive review of systems was negative except as noted.      OBJECTIVE:     Vitals:    05/08/17 1403   BP: 116/74   Pulse: 80   Resp: 20   Temp: 99  F (37.2  C)       General: Alert, not acutely distressed   Head:  Atraumatic, normocephalic  Ears:  TMs normal pearly-gray  Eyes:  PERRL, extraocular movements are intact  Nose:  Septum midline  Throat:  Oral mucosa moist, oropharynx clear  Neck:  Supple without adenopathy or thyromegaly   Cardiovascular: S1-S2 with regular rate and without murmur, rub, or gallop   Lungs:  Clear to auscultation bilaterally   Abdomen:  Soft, non tender, nondistended  Genitourinary: No scrotal masses, no inguinal hernia  Rectal: Normal structure tone, prostate smooth and symmetric  Extremities: Without cyanosis or edema   Neuro:  CN II-XII appear intact        Laboratory Results:    Results for orders placed or performed in visit on 04/11/17   T4, Free   Result Value Ref Range    Free T4 0.9 0.7 - 1.8 ng/dL   T3, Total   Result Value Ref Range    T3, Total 77 45 - 175 ng/dL   Thyroid Stimulating  Hormone (TSH)   Result Value Ref Range    TSH 2.80 0.30 - 5.00 uIU/mL   Potassium   Result Value Ref Range    Potassium 4.1 3.5 - 5.0 mmol/L   Creatinine   Result Value Ref Range    Creatinine 0.84 0.70 - 1.30 mg/dL    GFR MDRD Af Amer >60 >60 mL/min/1.73m2    GFR MDRD Non Af Amer >60 >60 mL/min/1.73m2   Testosterone, Total   Result Value Ref Range    Testosterone 793 (H) 221 - 716 ng/dL   Prostate-Specific Antigen (PSA), Total and Free   Result Value Ref Range    PSA, Total 0.95 <=4.5 ng/mL    PSA, Free 0.5 ng/mL    Free PSA/PSA Ratio SEE BELOW ratio         ASSESSMENT AND PLAN:    1. Routine general medical examination at a health care facility    Reviewed falls risks given his history of Parkinson's disease  Refer for a colonoscopy  - Ambulatory referral for Colonoscopy  - Lipid Cascade; Future  Received the pneumonia vaccine    2. Parkinson disease    Treat with Sinemet 25/100 as directed by neurology  Follow-up with neurology    3. Hypothyroidism    Follow-up to check a thyroid cascade  Continue levothyroxine    4. Esophageal reflux    Continue omeprazole as needed    5. Change in nail appearance    Follow-up with podiatry  - Ambulatory referral to Podiatry    6. Low testosterone    Continue testosterone injections as directed by endocrinology  Check a total and free testosterone level  - testosterone cypionate 200 mg/mL injection 200 mg (DEPOTESTOTERONE CYPIONATE); Inject 1 mL (200 mg total) into the shoulder, thigh, or buttocks every 14 (fourteen) days.  - Basic Metabolic Panel; Future  - Hepatic Profile; Future        Valentín Washington MD      This note has been dictated and transcribed using voice recognition software.  Any grammatical or contextual distortions are unintentional and inherent to the software.

## 2021-06-10 NOTE — PROGRESS NOTES
"Video Visit  Silver Zamora is a 70 y.o. male who is being evaluated via a billable video visit in light of the ongoing global health crisis (COVID-19) that requires us to abide by social distancing mandates in order to reduce the risk of COVID-19 exposure.       The patient has been notified of following:     \"This virtual visit will be conducted via a video call between you and your physician/provider. We have found that certain health care needs can be provided without the need for a physical exam.  This service lets us provide the care you need with a short video conversation.  If a prescription is necessary we can send it directly to your pharmacy.  If lab work is needed we can place an order for that and you can then stop by our lab to have the test done at a later time.    If during the course of the video call the physician/provider feels a video visit is not appropriate, you will not be charged for this service.\"     Patient has given verbal consent to a Video visit? Yes    Silver Zamora chief complaint is Parkinson's Disease    ALLERGIES  Clonazepam    Neuropsychological assessment completed    Yes   Currently doing PT  No   Completed Yes   Currently doing OT  No   Completed Yes    Currently doing ST   No   Completed No   Psychology  No       Any new medication (other provider):   No   Meds started at last appointment  Yes duloxetine 20 mg daily Is patient still on med:  Yes  Results: a lot better.   Meds increased at last appointment    No      Is patient on a controlled substance   No     Any concerns you would like to be addressed at this appointment?  Wife is noticing improvements, pt came out into the garage to help fix the kids bike, he was sitting outside, he has been looking through his books and engaging in life a little more. Hallucinations are not as bad since d/c medication.                                                        Phone Start Time: 12:50pm    Phone End Time:  12:58pm    Total time " of phone conversation: 8 minutes    Phone number the patient would like to use:  [phone]    Candis Joshi [facility]     There were no vitals filed for this visit.    Outpatient Follow up Parkinson's Dx Evaluation     Pertinent History:  Patient is right-handed.  He was diagnosed with Parkinson's disease in 2004 at the age of about 54.  He feels that the symptoms may have started in about 1994.  Symptoms started with stiffness, slowness, soft speech, walking difficulties, and left hand tremor which spread to the right and about 2015.  Has seen Dr. Suazo in the past.  He has some bruising and tremors occasionally but there is asymmetry.  He tried Sinemet CR during the daytime and at night which did not work well.  He is been off the pramipexole since 12/2015 due to memory and behavioral concerns.  December 2010 he was taking Sinemet IR 25/100 and increased to 25/250 with a good response which also improved his back pain.  He was able to be more active.  In 2011 he was evaluated at the [facility] by , he was noted to have U PDR S score of 41 after all Parkinson's medications.  It was suggested that he take carbidopa levodopa every 3 hours.  Amantadine was discontinued because he did not have dyskinesia.  It was suggested that he switch his selegiline to Azilect or discontinue all MAO inhibitors.  It is  felt that his increased Sinemet may have exasperated his restless leg syndrome.  Gabapentin was started to control his restless leg symptoms.  He was followed by Dr. Suazo from 2002 - 2011.  He also saw Dr. Zuniga and a previous nurse practitioner at this clinic.  He did transfer care to  in 2013.  He has tried amantadine and selegiline in the past.  He is not able to tolerate Requip, which he tried for several years at a relatively high dose but discontinued due to edema in lower extremities, he also tried Mirapex in 2015 but this caused more freezing and increased tremor.  CAT scan in July  2013 showed absent radiotracer in the putamen with diminished accumulation in the caudate, most consistent with Parkinson's disease.  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      Is patient on a controlled substance   No     Subjective:          HPI: The patient returns to the Parkinson's clinic for the purpose of medication management.  He was last seen by me on 7/15/2020 where we increase the patient's Cymbalta and reduced and discontinued the patient's Effexor.  Wife reports that the switch in medication has been good for the patient.  Patient is denying any signs or symptoms of depression or anxiety.  Wife reports that patient has been busier and doing more activities during the day.  Wife expresses concern over the wound on the patient's foot.  The patient has had this wound for quite some time now.  I am concerned that the wound could turn into a worse infection and amputation might be needed.  The patient is a Parkinson's patient the need for physical activity is very necessary. Patient is denying any hallucinations. Overall wife reports that patient is doing well except for wound on his foot    We discussed some treatment options and have elected to referral the patient to the wound clinic..      Current Medications: Please see chart. Medications personally reviewed.     Medication Compliance: Yes    Patient Active Problem List    Diagnosis Date Noted     Acquired absence of other right toe(s) (H) 01/05/2020     Ulcer of right foot, unspecified ulcer stage (H) 09/16/2019     Cord compression myelopathy (H) 09/16/2019     Moderate major depression (H) 09/16/2019     Cervical stenosis of spinal canal 02/07/2019     Ischemic cardiomyopathy 12/14/2018     Heart failure with reduced ejection fraction (H) 12/14/2018     Coronary artery disease involving native coronary artery of native heart with angina pectoris (H)      Abnormal nuclear stress test 10/25/2018     Abnormal  echocardiogram 05/29/2018     Leukocytosis, unspecified type      Cholecystitis 05/23/2018     Choledocholithiasis 05/23/2018     Calculus of gallbladder with biliary obstruction but without cholecystitis      Weakness      Low vitamin D level 10/11/2017     RBD (REM behavioral disorder) 04/03/2017     Pancreatitis 12/31/2015     Parkinson disease (H) 07/30/2014     Back pain 07/30/2014     Depression 07/30/2014     Anxiety state, unspecified 07/30/2014     Hypothyroidism 07/30/2014     Past Medical History:   Diagnosis Date     Anxiety      Callus      Cardiomyopathy (H)      Chronic back pain      Delirium      Depression      Fall      GERD (gastroesophageal reflux disease)      Hypothyroidism      Pancreatitis 12/31/2015     PD (Parkinson's disease) (H)      RBD (REM behavioral disorder) 4/3/2017     Shingles     hx     Past Surgical History:   Procedure Laterality Date     CV CORONARY ANGIOGRAM N/A 10/31/2018    Procedure: Coronary Angiogram;  Surgeon: Jose Meyer MD;  Location: [facility] Cath Lab;  Service:      CV LEFT HEART CATHETERIZATION WO LEFT VETRICULOGRAM Left 10/31/2018    Procedure: Left Heart Catheterization Without Left Ventriculogram;  Surgeon: Jose Meyer MD;  Location: [facility] Cath Lab;  Service:      INGUINAL HERNIA REPAIR Bilateral 10/2013     LAMINECTOMY  10/2013     KS ERCP REMOVE CALCULI/DEBRIS BILIARY/PANCREAS DUCT N/A 5/25/2018    Procedure: ENDOSCOPIC RETROGRADE CHOLANGIOPANCREATOGRAPHY SPINCTEROTOMY BILIARY STONE EXTRACTION;  Surgeon: Curtis Mcclain MD;  Location: [facility];  Service: Gastroenterology     KS LAP,CHOLECYSTECTOMY/GRAPH N/A 5/24/2018    Procedure: LAPAROSCOPIC CHOLECYSTECTOMY;  Surgeon: Joyce Melissa MD;  Location: [facility];  Service: General     Family History   Problem Relation Age of Onset     Kidney failure Mother      Heart disease Father 82     Tremor Father      Parkinsonism Paternal Grandfather      Tremor Sister      Leukemia  Maternal Grandmother      Current Outpatient Medications   Medication Sig Dispense Refill     acetaminophen (TYLENOL) 325 MG tablet Take 2 tablets (650 mg total) by mouth every 4 (four) hours as needed.  0     ammonium lactate (AMLACTIN) 12 % cream Apply two times a day 385 g 2     amoxicillin-clavulanate (AUGMENTIN) 875-125 mg per tablet        aspirin 81 mg chewable tablet Chew 1 tablet (81 mg total) daily.  0     atorvastatin (LIPITOR) 80 MG tablet Take 1 tablet (80 mg total) by mouth daily. 90 tablet 1     bisacodyl (DULCOLAX) 10 mg suppository One supp AZ qday prn constipation.  Give if no BM in prior 3 days.  0     carbidopa-levodopa (PARCOPA)  mg per disintegrating tablet Take 1 tablet by mouth as needed. 90 tablet 3     carbidopa-levodopa (SINEMET CR)  mg ER tablet Take 1 tablet by mouth 2 (two) times a day. One tab at 1200 and one bedtime (Patient taking differently: Take 1 tablet by mouth daily. One tab at 5 PM) 180 tablet 1     cholecalciferol, vitamin D3, (VITAMIN D3) 2,000 unit Tab Take 1 tablet (2,000 Units total) by mouth daily.  0     DULoxetine (CYMBALTA) 20 MG capsule Take 1 capsule (20 mg total) by mouth see administration instructions. 90 capsule 2     fluticasone propionate (FLONASE) 50 mcg/actuation nasal spray 2 sprays into each nostril daily. 16 g 3     gabapentin (NEURONTIN) 100 MG capsule TAKE ONE CAPSULE BY MOUTH TWICE A DAY 60 capsule 2     levothyroxine (SYNTHROID, LEVOTHROID) 137 MCG tablet TAKE ONE TABLET BY MOUTH ONCE DAILY AT 6 A.M. 90 tablet 3     metoprolol succinate (TOPROL-XL) 25 MG TAKE ONE-HALF TABLET BY MOUTH EVERY NIGHT AT BEDTIME 45 tablet 1     nitroglycerin (NITROSTAT) 0.4 MG SL tablet Place 1 tablet (0.4 mg total) under the tongue every 5 (five) minutes as needed for chest pain. 20 tablet 1     NUPLAZID 34 mg cap capsule TAKE 1 CAPSULE BY MOUTH ONE TIME A DAY AT BEDTIME 30 capsule 4     nystatin (MYCOSTATIN) cream Apply to affected area of groin three times a  day x 10 days 30 g 1     polyethylene glycol (GLYCOLAX) 17 gram/dose powder Take 17 g by mouth daily. 1530 g 6     RYTARY 61. mg CpER ER capsule TAKE TWO CAPSULES BY MOUTH FOUR TIMES A  capsule 2     senna-docusate (SENNOSIDES-DOCUSATE SODIUM) 8.6-50 mg tablet Take 1 tablet by mouth 2 (two) times a day.             triamcinolone (KENALOG) 0.025 % ointment Apply topically 2 (two) times a day. 15 g 3     venlafaxine (EFFEXOR-XR) 37.5 MG 24 hr capsule Take 1 capsule (37.5 mg total) by mouth daily. 12 capsule 0     No current facility-administered medications for this encounter.        Allergies   Allergen Reactions     Clonazepam      Too drowsy     Social History     Socioeconomic History     Marital status:      Spouse name: Not on file     Number of children: Not on file     Years of education: Not on file     Highest education level: Not on file   Occupational History     Not on file   Social Needs     Financial resource strain: Not on file     Food insecurity     Worry: Not on file     Inability: Not on file     Transportation needs     Medical: Not on file     Non-medical: Not on file   Tobacco Use     Smoking status: Former Smoker     Types: Cigarettes     Last attempt to quit: 1980     Years since quittin.6     Smokeless tobacco: Never Used   Substance and Sexual Activity     Alcohol use: No     Comment: one drink a week     Drug use: No     Sexual activity: Not on file   Lifestyle     Physical activity     Days per week: Not on file     Minutes per session: Not on file     Stress: Not on file   Relationships     Social connections     Talks on phone: Not on file     Gets together: Not on file     Attends [employer] service: Not on file     Active member of club or organization: Not on file     Attends meetings of clubs or organizations: Not on file     Relationship status: Not on file     Intimate partner violence     Fear of current or ex partner: Not on file     Emotionally  abused: Not on file     Physically abused: Not on file     Forced sexual activity: Not on file   Other Topics Concern     Not on file   Social History Narrative     Not on file       Review of Systems  A comprehensive review of systems was negative except for:what is noted above    Mental Status Exam:   Appearance:   Patient is appropriately groomed and alert sitting for appointment.  Behavior:   .cooperative and pleasant, no apparent agitation, no irritability, denies any recent behavioral difficulties  Speech: .slow and soft speech, able to participate in conversation  Mood/Affect: .flat, no obvious significant depression or anxiety  Thought Content: .no psychotic symptoms were evident, patient and wife do state that the patient did have some hallucinations    Suicidal or Homicidal Thoughts:   .no evidence of suicidal or homicidal ideations, patient denies any suicidal ideations  Thought Process/Formulation:  .adequate, able to process information, no evidence of racing thoughts  Associations: .adequate, processing information and participating in conversation  Fund of Knowledge:  .intact, following conversation, adequate depth of understanding  Attention/Concentration: .slightly distractible, concentration is poor  Insight: .slightly impaired  Judgement:  .slightly impaired  Memory:   . .slight impairment   Motor Status:  .no noted tremor or dyskinesia   Orientation: .oriented to person, place, time and situation .  .  Diagnosis managed and treated at today's visit   Parkinson's disease  Anxiety d/t a medical condition  Chronic pain  Mild cognitive impairment  High risk for falls  Tremor  Foot ulcer  Physical deconditioning  Insomnia     Plan:  Medication Adjustment:  No medication changes    Other:   Patient will return to clinic in 4 weeks. They agree to call or return sooner with any questions or concerns.  Risks and benefits were discussed.  Continue with his individual therapist.     Continue with the  support of the clinic, reassurance, and redirection. Staff monitoring and ongoing assessments per team plan. Current psychotropic medication appears to represent the minimum effective dosage and appears medically necessary. We will continue to monitor and reassess. This team will utilize appropriate emergency services if necessary. I will make myself available if concerns or problems arise.    Video Visit Details    Type of service: Video Visit    Video Start Time: 1300    Video End Time:  1340    Total time for video call: 40 minutes    Originating Location: Patient's home    Distant Location:  [facility]    Mode of Communication: Video call via [email or web address] [facility] Information:  Today you had your appointment with Kelli Walker CNP     If lab work was done today as part of your evaluation you will generally be contacted via My Chart, mail, or phone with the results within 1-5 days. If there is an alarming result we will contact you by phone. Lab results come back at varying times, I generally wait until all labs are resulted before making comments on results. Please note labs are automatically released to My Chart once available.     If you need refills please contact your pharmacist. They will send a refill request to me to review. Please allow 3 business days for us to process all refill requests.     Please call or send a medical message through My Chart, with any questions or concerns    If you need any paperwork completed please fax forms to [phone]. Please state if you would like a copy of the completed paperwork, mailed or faxed back to the patient and a fax number to fax the paperwork to. Please allow up to 10 days for paperwork to be completed.    Kelli Walker CNP

## 2021-06-10 NOTE — TELEPHONE ENCOUNTER
Spoke to pt's wife Luz Marina, who states that they did not go to the [facility] over the weekend. Luz Marina said that she will be speaking to the Wound RN through Dr. Pablo's office today and will be seeing him on Thursday. Luz Marina did schedule pt for a F/U with Dr. Jackson on Friday 8/21 at 1:20 at the  location. Completing task.

## 2021-06-10 NOTE — PATIENT INSTRUCTIONS - HE
I have seen you today for chronic heel right foot ulceration.  I have reviewed the notes from podiatry at [facility] orthopedics and they do  recommend surgery/arthroplasty due to the chronic nature and nonhealing for more than 6 months despite conservative care.    We can try referring to wound clinic to see if intensive wound therapy can help heal this ulcer.    A referral has been done.    Otherwise, for definitive care is a surgery will be needed.

## 2021-06-10 NOTE — TELEPHONE ENCOUNTER
RN Triage:   Pressure ulcer on the right foot under the ball of the great toe.  Wound started 6 months ago.   Seeing [facility] doctor every 4 weeks Hakeem Pablo MD  Patient has debridement in the office every 4 weeks.   Unsure if there is drainage. Patient has some pain, he has neuropathy. He reported pain is a 6.5/10  Not taking any antibiotics.   Patient has had 3 toes removed in the past.   Cleaning the foot with Antimicrobial Microclense and used metahoney.   Neurology ([facility] Kelli Walker) is giving an order for wound care per wife.   Advised an appointment for today and warm transferred to scheduling.   Leah's wife is requesting a face to face appointment today.   NO appointments available. [facility] discussed. Hours and location.   They will go to the [facility] today.   Shruti Davey RN, BSN [facility] Triage Nurse        Reason for Disposition    Patient wants to be seen    Additional Information    Negative: Bright red, wide-spread, sunburn-like rash    Negative: Black (necrotic) or blisters develop in wound    Negative: Looks infected (spreading redness, red streak, pus) and fever    Negative: Patient sounds very sick or weak to the triager    Negative: Stitches and not infected    Negative: Severe pain in the wound    Negative: Red streak runs from the wound    Negative: Facial wound looks infected (spreading redness)    Negative: Finger wound and entire finger swollen    Negative: No prior tetanus shots (or is not fully vaccinated) and any wound (e.g., cut or scrape)    Negative: Taking antibiotic > 72 hours (3 days) and infected wound not improved (pain, pus, redness)    Negative: Taking antibiotic > 48 hours and fever persists    Negative: Pus or cloudy fluid draining from wound    Negative: Skin redness around the wound larger than 2 inches (5 cm)    Protocols used: WOUND INFECTION-A-[place]

## 2021-06-10 NOTE — PROGRESS NOTES
Assessment:     1. 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 Parkinson disease (H)       Medical decision making: Patient is a 70-year-old gentleman with Parkinson's disease, coronary artery disease, ischemic cardiomyopathy, heart failure with reduced ejection fraction who presents today for nonhealing ulcer of the right foot.  I reviewed the notes from [facility] orthopedics.  It appears that conservative treatment has failed and patient has been asked to proceed with surgery.  He is hesitant for this.  Discussed possible referral to wound clinic for aggressive management to see if there can be any benefit.  This is discussed with patient and accompanying wife Luz Marina.     Plan:      The diagnosis was discussed with the patient and evaluation and treatment plans outlined.  Follow up: Return in about 2 months (around 11/2/2020) for Annual physical.     Patient Instructions   I have seen you today for chronic heel right foot ulceration.  I have reviewed the notes from podiatry at [facility] orthopedics and they do  recommend surgery/arthroplasty due to the chronic nature and nonhealing for more than 6 months despite conservative care.    We can try referring to wound clinic to see if intensive wound therapy can help heal this ulcer.    A referral has been done.    Otherwise, for definitive care is a surgery will be needed.      Subjective:      Silver Zamora is a  male who presents for evaluation of   Chief Complaint   Patient presents with     Wound Check     Check ulcer right great toe, podiatry wants to do surgery discuss today     Patient is a today for a wound check.  He has deep wound on his right toe with foot deformity.  This is supposed to be a pressure ulcer and it is nonhealing.  He follows with [facility] orthopedics Dr. Horn.  Conservative treatment has failed.  He has been asked to  proceed with surgery which patient is hesitant.  Patient does have underlying parkinsonism.  He is accompanied with his wife.      The following portions of the patient's history were reviewed and updated as appropriate: allergies, current medications, past family history, past medical history, past social history, past surgical history and problem list.  Allergies   Allergen Reactions     Clonazepam      Too drowsy       Current Outpatient Medications on File Prior to Visit   Medication Sig Dispense Refill     acetaminophen (TYLENOL) 325 MG tablet Take 2 tablets (650 mg total) by mouth every 4 (four) hours as needed.  0     ammonium lactate (AMLACTIN) 12 % cream Apply two times a day 385 g 2     amoxicillin-clavulanate (AUGMENTIN) 875-125 mg per tablet        aspirin 81 mg chewable tablet Chew 1 tablet (81 mg total) daily.  0     atorvastatin (LIPITOR) 80 MG tablet Take 1 tablet (80 mg total) by mouth daily. 90 tablet 1     bisacodyl (DULCOLAX) 10 mg suppository One supp GA qday prn constipation.  Give if no BM in prior 3 days.  0     carbidopa-levodopa (PARCOPA)  mg per disintegrating tablet Take 1 tablet by mouth as needed. 90 tablet 3     cholecalciferol, vitamin D3, (VITAMIN D3) 2,000 unit Tab Take 1 tablet (2,000 Units total) by mouth daily.  0     DULoxetine (CYMBALTA) 20 MG capsule Take 1 capsule (20 mg total) by mouth see administration instructions. (Patient taking differently: Take 20 mg by mouth 2 (two) times a day. ) 90 capsule 2     fluticasone propionate (FLONASE) 50 mcg/actuation nasal spray 2 sprays into each nostril daily. 16 g 3     levothyroxine (SYNTHROID, LEVOTHROID) 137 MCG tablet TAKE ONE TABLET BY MOUTH ONCE DAILY AT 6 A.M. 90 tablet 3     metoprolol succinate (TOPROL-XL) 25 MG TAKE ONE-HALF TABLET BY MOUTH EVERY NIGHT AT BEDTIME 45 tablet 1     nitroglycerin (NITROSTAT) 0.4 MG SL tablet Place 1 tablet (0.4 mg total) under the tongue every 5 (five) minutes as needed for chest pain. 20  tablet 1     NUPLAZID 34 mg cap capsule TAKE 1 CAPSULE BY MOUTH ONE TIME A DAY AT BEDTIME 30 capsule 4     nystatin (MYCOSTATIN) cream Apply to affected area of groin three times a day x 10 days 30 g 1     polyethylene glycol (GLYCOLAX) 17 gram/dose powder Take 17 g by mouth daily. 1530 g 6     RYTARY 61. mg CpER ER capsule TAKE TWO CAPSULES BY MOUTH FOUR TIMES A  capsule 2     senna-docusate (SENNOSIDES-DOCUSATE SODIUM) 8.6-50 mg tablet Take 1 tablet by mouth 2 (two) times a day.             triamcinolone (KENALOG) 0.025 % ointment Apply topically 2 (two) times a day. 15 g 3     carbidopa-levodopa (SINEMET CR)  mg ER tablet Take 1 tablet by mouth 2 (two) times a day. One tab at 1200 and one bedtime (Patient taking differently: Take 1 tablet by mouth daily. One tab at 5 PM) 180 tablet 1     gabapentin (NEURONTIN) 100 MG capsule TAKE ONE CAPSULE BY MOUTH TWICE A DAY 60 capsule 2     No current facility-administered medications on file prior to visit.        Patient Active Problem List   Diagnosis     Parkinson disease (H)     Back pain     Depression     Anxiety state, unspecified     Hypothyroidism     Pancreatitis     RBD (REM behavioral disorder)     Low vitamin D level     Calculus of gallbladder with biliary obstruction but without cholecystitis     Weakness     Cholecystitis     Leukocytosis, unspecified type     Choledocholithiasis     Abnormal echocardiogram     Abnormal nuclear stress test     Coronary artery disease involving native coronary artery of native heart with angina pectoris (H)     Ischemic cardiomyopathy     Heart failure with reduced ejection fraction (H)     Cervical stenosis of spinal canal     Ulcer of right foot, unspecified ulcer stage (H)     Cord compression myelopathy (H)     Moderate major depression (H)     Acquired absence of other right toe(s) (H)     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)       Past Medical History:   Diagnosis Date      Anxiety      Callus      Cardiomyopathy (H)      Chronic back pain      Delirium      Depression      Fall      GERD (gastroesophageal reflux disease)      Hypothyroidism      Pancreatitis 12/31/2015     PD (Parkinson's disease) (H)      RBD (REM behavioral disorder) 4/3/2017     Shingles     hx       Past Surgical History:   Procedure Laterality Date     CV CORONARY ANGIOGRAM N/A 10/31/2018    Procedure: Coronary Angiogram;  Surgeon: Jose Meyer MD;  Location: [facility];  Service:      CV LEFT HEART CATHETERIZATION WO LEFT VETRICULOGRAM Left 10/31/2018    Procedure: Left Heart Catheterization Without Left Ventriculogram;  Surgeon: Jose Meyer MD;  Location: [facility];  Service:      INGUINAL HERNIA REPAIR Bilateral 10/2013     LAMINECTOMY  10/2013     [place] ERCP REMOVE CALCULI/DEBRIS BILIARY/PANCREAS DUCT N/A 5/25/2018    Procedure: ENDOSCOPIC RETROGRADE CHOLANGIOPANCREATOGRAPHY SPINCTEROTOMY BILIARY STONE EXTRACTION;  Surgeon: Curtis Mcclain MD;  Location: [facility];  Service: Gastroenterology     W[place] LAP,CHOLECYSTECTOMY/GRAPH N/A 5/24/2018    Procedure: LAPAROSCOPIC CHOLECYSTECTOMY;  Surgeon: Joyce Melissa MD;  Location: [facility] OR;  Service: General       Family History   Problem Relation Age of Onset     Kidney failure Mother      Heart disease Father 82     Tremor Father      Parkinsonism Paternal Grandfather      Tremor Sister      Leukemia Maternal Grandmother        Social History     Socioeconomic History     Marital status:      Spouse name: None     Number of children: None     Years of education: None     Highest education level: None   Occupational History     None   Social Needs     Financial resource strain: None     Food insecurity     Worry: None     Inability: None     Transportation needs     Medical: None     Non-medical: None   Tobacco Use     Smoking status: Former Smoker     Types: Cigarettes     Last attempt to quit: 1/1/1980     Years  since quittin.6     Smokeless tobacco: Never Used   Substance and Sexual Activity     Alcohol use: No     Comment: one drink a week     Drug use: No     Sexual activity: None   Lifestyle     Physical activity     Days per week: None     Minutes per session: None     Stress: None   Relationships     Social connections     Talks on phone: None     Gets together: None     Attends Anabaptism service: None     Active member of club or organization: None     Attends meetings of clubs or organizations: None     Relationship status: None     Intimate partner violence     Fear of current or ex partner: None     Emotionally abused: None     Physically abused: None     Forced sexual activity: None   Other Topics Concern     None   Social History Narrative    Patient lives with his wife Luz Marina     Review of Systems  Constitutional: negative  Respiratory: negative  Cardiovascular: negative       Objective:     /57 (Patient Site: Left Arm, Patient Position: Sitting, Cuff Size: Adult Large)   Pulse 69   Temp (!) 96.3  F (35.7  C) (Oral)   Resp 20   Wt 217 lb 12.8 oz (98.8 kg)   BMI 31.25 kg/m      GENERAL: Healthy, alert and no distress  EYES: Eyes grossly normal to inspection. No discharge or erythema, or obvious scleral/conjunctival abnormalities.  RESP: No audible wheeze, cough, or visible cyanosis.  No visible retractions or increased work of breathing.    MS: Examination of the right foot shows deformity of the great toe that looks like a bunion deformity with scissoring and also noted at the dorsum of first MTP.  This is fairly deep with necrotic tissue after removing the bandage.  It is deep.  It was not further probed due to the fact that patient is already following with specialist at [facility] orthopedics.  NEURO: Cranial nerves grossly intact. Mentation and speech appropriate for age.  PSYCH: Mentation appears normal, affect normal/bright, judgement and insight intact, normal speech and appearance  well-groomed

## 2021-06-10 NOTE — PROGRESS NOTES
"Progress Note    Reason for Visit:  Chief Complaint     Parkinson's Disease          Progress Note:    HPI: This patient is\" at the request of his primary care physician because of hypogonadism.    Thank you for referring this pleasant 67-year-old male patient who is known to have Parkinson disease and reviewing his chart he had low testosterone since 2011 it was harder and 22.    According to the history from him he has been in testosterone for one E.    His dose ranges between 200 mg IM every 2 weeks or 400 every one month.    He takes Sinemet.    Review of Systems:    Nervous System: No headache, dizziness, fainting or memory loss. No tingling sensation of hand or feet.  Ears: No hearing loss or ringing in the ears  Eyes: No blurring of vision, redness, itching or dryness.  Nose: No nosebleed or loss of smell  Mouth: No mouth sores or loss of taste  Throat: No hoarseness or difficulty swallowing  Neck: No enlarged thyroid or lymph nodes.  Heart: No chest pain, palpitation or irregular heartbeat. No swelling of hands or feet  Lungs: No shortness of breath, cough, night sweats, wheezing or hemoptysis.  Gastrointestinal: No nausea or vomiting, constipation or diarrhea.  No acid reflux, abdominal pain or blood in stools.  Kidney/Bladdr: No polyuria, polydipsia, nocturia or hematuria.  Genital/Sexual: No loss of libido  Skin: No rash, hair loss or hirsutism.  No abnormal striae  Muscles/Joints/Bones: No morning stiffness, muscle aches and pain or loss of height.    Current Medications:  Current Outpatient Prescriptions   Medication Sig     carbidopa-levodopa (SINEMET CR)  mg ER tablet Take 3 tablets by mouth bedtime. (Patient taking differently: Take 1 tablet by mouth at bedtime. )     carbidopa-levodopa (SINEMET)  mg per tablet Take 3 tablets by mouth 4 (four) times a day. At 0200, 0800, 1200, 1600, and then Sinement CR  mg @ 2100 (5 doses total doses per day)     entacapone (COMTAN) 200 mg tablet " Take 200 mg by mouth 5 (five) times a day. with each Sinemet dose     gabapentin (NEURONTIN) 300 MG capsule TAKE 1 CAPSULE (300 MG TOTAL) BY MOUTH 4 (FOUR) TIMES A DAY.     levothyroxine (SYNTHROID, LEVOTHROID) 137 MCG tablet Take 137 mcg by mouth daily.     meloxicam (MOBIC) 7.5 MG tablet Take 7.5 mg by mouth as needed for pain. Take 1-2 tablets daily PRN   Indications: Back pain     multivitamin (MULTIVITAMIN) per tablet Take 1 tablet by mouth daily.     neomycin-bacitracin-polymyxin (NEOSPORIN) ointment Apply 1 application topically 2 (two) times a day. apply small amount with qtip to wound bed and covered with bandaid.   Indications: open wound on bottom of left 2nd toe     omeprazole (PRILOSEC) 20 MG capsule Take 20 mg by mouth daily.     polyethylene glycol (MIRALAX) 17 gram packet Take 17 g by mouth daily.     TESTOSTERONE IM Inject 200 mg into the shoulder, thigh, or buttocks every 28 days.     venlafaxine (EFFEXOR-XR) 150 MG 24 hr capsule Take 1 capsule (150 mg total) by mouth daily. Take with 75mg cap (225mg)     venlafaxine (EFFEXOR-XR) 75 MG 24 hr capsule Take 1 capsule (75 mg total) by mouth daily. Take with 150mg cap     carbidopa-levodopa (SINEMET)  mg per tablet Take 3 tablets by mouth 4 (four) times a day.       Patients Active Problems:  Patient Active Problem List   Diagnosis     Parkinson disease     Back pain     Depression     Anxiety state, unspecified     Hypothyroidism     Pancreatitis     RBD (REM behavioral disorder)       History:   reports that he quit smoking about 37 years ago. His smoking use included Cigarettes. He has never used smokeless tobacco. He reports that he drinks alcohol. He reports that he does not use illicit drugs.   reports that he quit smoking about 37 years ago. His smoking use included Cigarettes. He has never used smokeless tobacco. He reports that he drinks alcohol. He reports that he does not use illicit drugs.  History   Smoking Status     Former Smoker  "    Types: Cigarettes     Quit date: 1/1/1980   Smokeless Tobacco     Never Used      reports that he quit smoking about 37 years ago. His smoking use included Cigarettes. He has never used smokeless tobacco. He reports that he drinks alcohol. He reports that he does not use illicit drugs.  History   Sexual Activity     Sexual activity: Not on file     Past Medical History:   Diagnosis Date     Chronic back pain      Depression      GERD (gastroesophageal reflux disease)      Hypothyroidism      Pancreatitis 12/31/2015     PD (Parkinson's disease)      Shingles     hx     Family History   Problem Relation Age of Onset     Kidney failure Mother      Heart disease Father      Parkinsonism Paternal Grandfather      Past Medical History:   Diagnosis Date     Chronic back pain      Depression      GERD (gastroesophageal reflux disease)      Hypothyroidism      Pancreatitis 12/31/2015     PD (Parkinson's disease)      Shingles     hx     Past Surgical History:   Procedure Laterality Date     INGUINAL HERNIA REPAIR Bilateral 10/2013     LAMINECTOMY  10/2013       Vitals   height is 5' 9\" (1.753 m) and weight is 223 lb (101.2 kg) (abnormal). His blood pressure is 120/78.         Exam  General appearance: The patient looked well, not in acute distress.  Eyes: no evidence of thyroid eye disease.   Retinal exam: No evidence of diabetic retinopathy.  Mouth and Throat: Normal  Neck: No evidence of thyromegaly, enlarged lymph node or tenderness  Chest: Trachea is central. Chest is clear to auscultation and percussion. Breat sounds are normal.  Cardiovascular exam: JVP is not raised. Heart sounds are normal, no murmurs or rub  Peripheral pulses are palpable.   Abdomen: No masses or tenderness.    Back: No vertebral tenderness or kyphosis.  Extremities: No evidence of leg edema.   Skin: Normal to touch.  No abnormal striae  Neurologic exam:  Visual fields are intact by confrontation, grossly intact. No evidence of peripheral " neuropathy.  Detailed foot exam normal.        Diagnosis:  Encounter Diagnoses   Name Primary?     Hypothyroid Yes     BPH (benign prostatic hyperplasia)        Orders:   Orders Placed This Encounter   Procedures     T4, Free     T3, Total     Thyroid Stimulating Hormone (TSH)     Potassium     Creatinine     Testosterone, Total     Prostate-Specific Antigen (PSA), Total and Free     Standing Status:   Future     Standing Expiration Date:   4/11/2018         Assessment and Plan:  Hypogonadism his wife said that he was out the testosterone he's pretty tired with no motivation.    He has most of libido and erectile dysfunction.    I will check a thyroid function test today I will also check his total testosterone and PSA and a lipase is not elevated we will put him on testosterone 200 mg IM every 2 weeks.    Hypothyroidism he is taking Synthroid 137  g daily lost D such 2.7 which is thyroid function tests of those as required.    Thyroid exams normal.    Fingers secondary causes for hypogonadism here.    Patient will return to clinic in 6 month.    I did spend 60 minutes with the patient more than 50% was spent in counseling and managing his care.

## 2021-06-11 NOTE — TELEPHONE ENCOUNTER
Left message for Odessa that the fax number for the [facility] is [phone], not [phone].  Asked her to resend fax to that number.

## 2021-06-11 NOTE — TELEPHONE ENCOUNTER
I reviewed the paperwork and signed the order for his wound care.  However, I do have concerns about his follow-up plan.      He recently was diagnosed with a right plantar foot neuropathic ulceration and was referred to podiatry.  He is receiving wound care which is good.  However, there is a recommendation for him to have a work-up for osteomyelitis which would include a foot x-ray, CRP, and sedimentation rate.  This was referred to a Dr. Markham but he reportedly is no longer with the clinic.  It is very important that the patient follow-up with a podiatrist/foot specialist, especially if there are concerns about osteomyelitis.  I have not seen him since January of 2020.  He most recently had a visit with one of my partners in August of 2020 but has been referred to a podiatrist.  My concern is that if there is a delay in care that osteomyelitis may develop and this cannot be assessed virtually but he should have an in person visit with a foot specialist as soon as possible.  Typically, if follow-up was recommended they should have a provider that he can see if Dr. Markham is no longer available.      Please let me know if there is a problem or if he needs to be referred urgently to another foot specialist.  I would like to avoid delay.  He could be seen with a face-to-face provider as soon as possible for the testing which is recommended but should continue to follow-up with the specialist until his wound is healed. Please let the nursing team know.  I can be available for a phone conversation if there are questions or other thoughts.

## 2021-06-11 NOTE — TELEPHONE ENCOUNTER
I have not seen any specific orders.  Okay for ongoing wound care.  I will wait for the specific requests.

## 2021-06-11 NOTE — TELEPHONE ENCOUNTER
There was paperwork for dressing material requirements.  I forwarded it to plastic surgery as I do not know what kind of wound dressing is needed for this patient.  They are managing the ulcer.    Taylor Jackson MD  10/1/2020

## 2021-06-11 NOTE — TELEPHONE ENCOUNTER
Did you receive any paperwork on pt that needs to be sent back?  Not sure who she was returning the call to.

## 2021-06-11 NOTE — TELEPHONE ENCOUNTER
Who is calling:  [place] Wound Care Nurse  [facility]  Reason for Call:    Returning call to give fax number to care  Team.  No documentation in chart for who to give fax number.  Fax# [phone]  Date of last appointment with primary care: N/A  Okay to leave a detailed message: Yes

## 2021-06-11 NOTE — TELEPHONE ENCOUNTER
Left detailed message.  I refaxed orders for ostomy supplies from 9-14-20.  Informed that if this is not what she wanted, we do not have the requested forms.  They may have been forwarded to his plastic surgeon.

## 2021-06-11 NOTE — TELEPHONE ENCOUNTER
FYI - Status Update  Who is Calling: wound care nurse  Update: I am just updating Valentín Washington MD that I send over an orders request for wound care and supplies via fax to [phone] and these can be faxed back to me at [phone].  Okay to leave a detailed message?:  Yes

## 2021-06-11 NOTE — TELEPHONE ENCOUNTER
Left detailed message for Odessa wound care nurse, relaying MD message below.  Asked if Odessa could return the call and let us know if she thinks pt will be able to get in with another podiatrist/foot specialist given that Dr. Horn is no longer there. He can be seen by a provider for a face to face visit for the recommended labs and xray, but should continue to follow with the specialist until the wound is healed.      Faxed wound care supply orders to [facility] as requested. Also faxed the order back to Odessa wound care nurse.

## 2021-06-11 NOTE — TELEPHONE ENCOUNTER
I have completed some paperwork recently which I believe was forwarded.  If there is new paperwork it may have gone to  who saw this patient for a face-to-face visit.  Please let me know if anything needs to be signed.

## 2021-06-11 NOTE — TELEPHONE ENCOUNTER
Who is calling:  [facility] Odessa Ruffin  Reason for Call:  Had faxed over orders on 9/11/20, no response, Odessa will again fax these orders to both the  fax lines at the clinic, and ask that they be reviewed ASAP and sent back please.  Date of last appointment with primary care: 8/21/20  Okay to leave a detailed message: Yes

## 2021-06-11 NOTE — PROGRESS NOTES
"ASSESSMENT: Onychauxis, callus, altered sensation in the lower extremities because of Parkinson's, history of partial right third and fourth toe amputations, foot pain.    PLAN: Toenails were debrided manually and mechanically x8.  Callus debrided ×1.  We reviewed options for padding and moisturizing.  Return to clinic in nine weeks.         SUBJECTIVE: The patient presents to the [place] clinic as a new patient. Toenails are long, thick and painful.  Callus of the right first metatarsal head is thickened.  This was an area of ulceration within the past year.  About 2 years ago of the distal portions of the right third and fourth toes were amputated because of ulceration and infection.  Diminished sensation in the feet because of Parkinson's.  He is not diabetic.    OBJECTIVE:  Pulse 88  Resp 16  Ht 5' 8\" (1.727 m)  Wt 213 lb (96.6 kg)  BMI 32.39 kg/m2  General: Pleasant 67 y.o. male in no acute distress.  Vascular: DP pulses are absent. PT pulses are diminished. Pedal hair is absent. Feet are warm to the touch.  Cardiac: Pulse is regular.  Lymphatic: No edema at the ankles.  Neuro: Sensation in the feet is altered. Patient describes paresthesias.  Derm: Toenails are elongated, thickened and dystrophic with discoloration and subungual debris. Skin is thin and shiny but intact.  Hyperkeratosis under the right bunion.  Musculoskeletal: Partial amputation of the right third and fourth toes.  Hallux valgus bilaterally.     Medical History  Past Medical History:   Diagnosis Date     Chronic back pain      Depression      GERD (gastroesophageal reflux disease)      Hypothyroidism      Pancreatitis 12/31/2015     PD (Parkinson's disease)      Shingles     Surgical History   has a past surgical history that includes Laminectomy (10/2013) and Inguinal hernia repair (Bilateral, 10/2013).     Allergies  Allergies   Allergen Reactions     Clonazepam      Too drowsy    Family History  family history includes Heart " disease in his father; Kidney failure in his mother; Leukemia in his maternal grandmother; Parkinsonism in his paternal grandfather; Tremor in his father and sister.     Medications  Current Outpatient Prescriptions   Medication Sig Dispense Refill     carbidopa-levodopa (SINEMET CR)  mg ER tablet Take 1 tablet by mouth at bedtime.       carbidopa-levodopa (SINEMET)  mg per tablet Take 3 tablets by mouth 4 (four) times a day. At 0200, 0800, 1200, 1600, and then Sinement CR  mg @ 2100 (5 doses total doses per day)       entacapone (COMTAN) 200 mg tablet Take 200 mg by mouth 5 (five) times a day. with each Sinemet dose       gabapentin (NEURONTIN) 300 MG capsule TAKE 1 CAPSULE (300 MG TOTAL) BY MOUTH 4 (FOUR) TIMES A DAY. 120 capsule 5     levothyroxine (SYNTHROID, LEVOTHROID) 137 MCG tablet Take 137 mcg by mouth daily.       melatonin 2.5 mg Chew Chew.       meloxicam (MOBIC) 7.5 MG tablet Take 1 tablet (7.5 mg total) by mouth as needed for pain. Take 1-2 tablets daily PRN 90 tablet 3     multivitamin (MULTIVITAMIN) per tablet Take 1 tablet by mouth daily.       omeprazole (PRILOSEC) 20 MG capsule Take 20 mg by mouth daily as needed.        polyethylene glycol (MIRALAX) 17 gram packet Take 17 g by mouth daily.       sildenafil (VIAGRA) 50 MG tablet Take 1 tablet (50 mg total) by mouth as needed for erectile dysfunction. 6 tablet 5     TESTOSTERONE IM Inject 200 mg into the shoulder, thigh, or buttocks every 28 days.       venlafaxine (EFFEXOR-XR) 150 MG 24 hr capsule Take 1 capsule (150 mg total) by mouth daily. Take with 75mg cap (225mg) 90 capsule 3     venlafaxine (EFFEXOR-XR) 75 MG 24 hr capsule Take 1 capsule (75 mg total) by mouth daily. Take with 150mg cap 90 capsule 3     Current Facility-Administered Medications   Medication Dose Route Frequency Provider Last Rate Last Dose     testosterone cypionate 200 mg/mL injection 200 mg (DEPOTESTOTERONE CYPIONATE)  200 mg Intramuscular Q14 Days  Valentín Washington MD   200 mg at 05/22/17 1051       Social History  Reviewed, and he  reports that he quit smoking about 37 years ago. His smoking use included Cigarettes. He has never used smokeless tobacco. He reports that he drinks alcohol. He reports that he does not use illicit drugs.        Review of Systems:  A 12 point comprehensive review of systems was negative except as noted.

## 2021-06-12 NOTE — PROGRESS NOTES
ASSESSMENT: Onychauxis, callus, altered sensation in the lower extremities because of Parkinson's, history of partial right third and fourth toe amputations, foot pain.    PLAN: Toenails were debrided manually and mechanically x8.  Callus debrided ×1.  We reviewed options for padding and moisturizing.  Return to clinic in nine weeks.         SUBJECTIVE: The patient returns to the [place] clinic with toenails that are long, thick and painful.  Callus of the right first metatarsal head is thickened.  In 2015 the distal portions of the right third and fourth toes were amputated because of ulceration and infection.  Diminished sensation in the feet because of Parkinson's.  He is not diabetic.  Last nail debridement in the clinic was June 6, 2017.    OBJECTIVE:  General: Pleasant 67 y.o. male in no acute distress.  Vascular: DP pulses are absent. PT pulses are diminished. Pedal hair is absent. Feet are warm to the touch.  Cardiac: Pulse is regular.  Lymphatic: No edema at the ankles.  Neuro: Sensation in the feet is altered. Patient describes paresthesias.  Derm: Toenails are elongated, thickened and dystrophic with discoloration and subungual debris. Skin is thin and shiny but intact.  Hyperkeratosis under the right bunion.  Musculoskeletal: Partial amputation of the right third and fourth toes.  Hallux valgus bilaterally.

## 2021-06-12 NOTE — PROGRESS NOTES
" Telephone Visit  Silver Zamora is a 70 y.o. male who is being evaluated via a billable telephone visit in light of the ongoing global health crisis (COVID-19) that requires us to abide by social distancing mandates in order to reduce the risk of COVID-19 exposure.       The patient has been notified of following:     \"This telephone visit will be conducted via a telephone call between you and your physician/provider. We have found that certain health care needs can be provided without the need for a physical exam.  This service lets us provide the care you need with a short phone conversation.  If a prescription is necessary we can send it directly to your pharmacy.  If lab work is needed we can place an order for that and you can then stop by our lab to have the test done at a later time.    If during the course of the phone call the physician/provider feels a telephone visit is not appropriate, you will not be charged for this service.\"     Patient has given verbal consent to a Telephone visit? Yes    Silver Zamora chief complaint is Parkinson's Disease    ALLERGIES  Clonazepam    Neuropsychological assessment completed    No   Currently doing PT  No   Completed No   Currently doing OT  No   Completed No    Currently doing ST   No   Completed No   Psychology  Yes    Done where: [facility]    Any new medication (other provider):   No     Is patient on a controlled substance   No     Over all how would you rate your Parkinson's Symptoms?     Any concerns you would like to be addressed at this appointment? Patient's wife states the timing of Duloxetine. He took one in morning and one in the evening and it doesn't seem to help. Sleep issues. The wife states his medication is wearing off right before his next medication. His hearing is worse and the wife said she called the [facility] in [place] and there were no refills on the [place]. The wife is asking for a prescription for pads.                      "                                   Phone Start Time: 1:00pm    Phone End Time:  1:06pm    Total time of phone conversation: 6 minutes    Phone number the patient would like to use: [phone]     Lewis Kilpatrick CMA     There were no vitals filed for this visit.    Outpatient Follow up Parkinson's Dx Evaluation     Pertinent History:  Patient is right-handed.  He was diagnosed with Parkinson's disease in 2004 at the age of about 54.  He feels that the symptoms may have started in about 1994.  Symptoms started with stiffness, slowness, soft speech, walking difficulties, and left hand tremor which spread to the right and about 2015.  Has seen Dr. Suazo in the past.  He has some bruising and tremors occasionally but there is asymmetry.  He tried Sinemet CR during the daytime and at night which did not work well.  He is been off the pramipexole since 12/2015 due to memory and behavioral concerns.  December 2010 he was taking Sinemet IR 25/100 and increased to 25/250 with a good response which also improved his back pain.  He was able to be more active.  In 2011 he was evaluated at the [facility] by , he was noted to have U PDR S score of 41 after all Parkinson's medications.  It was suggested that he take carbidopa levodopa every 3 hours.  Amantadine was discontinued because he did not have dyskinesia.  It was suggested that he switch his selegiline to Azilect or discontinue all MAO inhibitors.  It is  felt that his increased Sinemet may have exasperated his restless leg syndrome.  Gabapentin was started to control his restless leg symptoms.  He was followed by Dr. Suazo from 2002 - 2011.  He also saw Dr. Zuniga and a previous nurse practitioner at this clinic.  He did transfer care to  in 2013.  He has tried amantadine and selegiline in the past.  He is not able to tolerate Requip, which he tried for several years at a relatively high dose but discontinued due to edema in lower extremities,  "he also tried Mirapex in 2015 but this caused more freezing and increased tremor.  CAT scan in July 2013 showed absent radiotracer in the putamen with diminished accumulation in the caudate, most consistent with Parkinson's disease.  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      Is patient on a controlled substance   No       Subjective:          HPI: The patient returns to the Parkinson's clinic for follow-up visit.  He was last seen by me on 8/14/2020, at that appointment I referred the patient to the wound clinic.  Wife and patient were both present during appointment today wife reports that the patient is sleeping better, he is up less at night.  The patient was having some hallucinations in the early a.m.  Wife also states that the ulcer is \"a little better\".  He has a new podiatrist and a new nurse helping trying to heal his foot ulcer.  Patient continues to take some naps during the day but wife reports that he is slightly more active.  Overall wife and patient deny any problems or concerns.  But Aflaxen has been discontinued and patient and wife deny any signs or symptoms of increasing depression or anxiety.  Wife continues to try to have patient be more active, patient agrees that he needs to do more exercise.        We discussed some treatment options and have elected to order a brace for the wife so she can get it through the medical supply house.      Current Medications: Please see chart. Medications personally reviewed.     Medication Compliance: Yes    Patient Active Problem List    Diagnosis Date Noted     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) 08/21/2020     Acquired absence of other right toe(s) (H) 01/05/2020     Ulcer of right foot, unspecified ulcer stage (H) 09/16/2019     Cord compression myelopathy (H) 09/16/2019     Moderate major depression (H) 09/16/2019     Cervical stenosis of spinal canal 02/07/2019     Ischemic " cardiomyopathy 12/14/2018     Heart failure with reduced ejection fraction (H) 12/14/2018     Coronary artery disease involving native coronary artery of native heart with angina pectoris (H)      Abnormal nuclear stress test 10/25/2018     Abnormal echocardiogram 05/29/2018     Leukocytosis, unspecified type      Cholecystitis 05/23/2018     Choledocholithiasis 05/23/2018     Calculus of gallbladder with biliary obstruction but without cholecystitis      Weakness      Low vitamin D level 10/11/2017     RBD (REM behavioral disorder) 04/03/2017     Pancreatitis 12/31/2015     Parkinson disease (H) 07/30/2014     Back pain 07/30/2014     Depression 07/30/2014     Anxiety state, unspecified 07/30/2014     Hypothyroidism 07/30/2014     Past Medical History:   Diagnosis Date     Anxiety      Callus      Cardiomyopathy (H)      Chronic back pain      Delirium      Depression      Fall      GERD (gastroesophageal reflux disease)      Hypothyroidism      Pancreatitis 12/31/2015     PD (Parkinson's disease) (H)      RBD (REM behavioral disorder) 4/3/2017     Shingles     hx     Past Surgical History:   Procedure Laterality Date     CV CORONARY ANGIOGRAM N/A 10/31/2018    Procedure: Coronary Angiogram;  Surgeon: Jose Meyer MD;  Location: [facility] Cath Lab;  Service:      CV LEFT HEART CATHETERIZATION WO LEFT VETRICULOGRAM Left 10/31/2018    Procedure: Left Heart Catheterization Without Left Ventriculogram;  Surgeon: Jose Meyer MD;  Location: [facility] Cath Lab;  Service:      INGUINAL HERNIA REPAIR Bilateral 10/2013     LAMINECTOMY  10/2013     TX ERCP REMOVE CALCULI/DEBRIS BILIARY/PANCREAS DUCT N/A 5/25/2018    Procedure: ENDOSCOPIC RETROGRADE CHOLANGIOPANCREATOGRAPHY SPINCTEROTOMY BILIARY STONE EXTRACTION;  Surgeon: Curtis Mcclain MD;  Location: [facility] OR;  Service: Gastroenterology     TX LAP,CHOLECYSTECTOMY/GRAPH N/A 5/24/2018    Procedure: LAPAROSCOPIC CHOLECYSTECTOMY;  Surgeon: Joyce Melissa,  MD;  Location: [facility] OR;  Service: General     Family History   Problem Relation Age of Onset     Kidney failure Mother      Heart disease Father 82     Tremor Father      Parkinsonism Paternal Grandfather      Tremor Sister      Leukemia Maternal Grandmother      Current Outpatient Medications   Medication Sig Dispense Refill     acetaminophen (TYLENOL) 325 MG tablet Take 2 tablets (650 mg total) by mouth every 4 (four) hours as needed.  0     ammonium lactate (AMLACTIN) 12 % cream Apply two times a day 385 g 2     amoxicillin-clavulanate (AUGMENTIN) 875-125 mg per tablet        aspirin 81 mg chewable tablet Chew 1 tablet (81 mg total) daily.  0     atorvastatin (LIPITOR) 80 MG tablet Take 1 tablet (80 mg total) by mouth daily. 90 tablet 1     bisacodyl (DULCOLAX) 10 mg suppository One supp IL qday prn constipation.  Give if no BM in prior 3 days.  0     carbidopa-levodopa (PARCOPA)  mg per disintegrating tablet Take 1 tablet by mouth as needed. 90 tablet 3     cholecalciferol, vitamin D3, (VITAMIN D3) 2,000 unit Tab Take 1 tablet (2,000 Units total) by mouth daily.  0     DULoxetine (CYMBALTA) 20 MG capsule Take 1 capsule (20 mg total) by mouth 2 (two) times a day. 60 capsule 3     fluticasone propionate (FLONASE) 50 mcg/actuation nasal spray 2 sprays into each nostril daily. 16 g 3     levothyroxine (SYNTHROID, LEVOTHROID) 137 MCG tablet TAKE ONE TABLET BY MOUTH ONCE DAILY AT 6 A.M. 90 tablet 3     metoprolol succinate (TOPROL-XL) 25 MG TAKE ONE-HALF TABLET BY MOUTH EVERY NIGHT AT BEDTIME 45 tablet 1     nitroglycerin (NITROSTAT) 0.4 MG SL tablet Place 1 tablet (0.4 mg total) under the tongue every 5 (five) minutes as needed for chest pain. 20 tablet 1     NUPLAZID 34 mg cap capsule TAKE 1 CAPSULE BY MOUTH ONE TIME A DAY AT BEDTIME 30 capsule 4     nystatin (MYCOSTATIN) cream Apply to affected area of groin three times a day x 10 days 30 g 1     polyethylene glycol (GLYCOLAX) 17 gram/dose powder  Take 17 g by mouth daily. 1530 g 6     RYTARY 61. mg CpER ER capsule TAKE TWO CAPSULES BY MOUTH FOUR TIMES A  capsule 2     senna-docusate (SENNOSIDES-DOCUSATE SODIUM) 8.6-50 mg tablet Take 1 tablet by mouth 2 (two) times a day.             triamcinolone (KENALOG) 0.025 % ointment Apply topically 2 (two) times a day. 15 g 3     No current facility-administered medications for this encounter.        Allergies   Allergen Reactions     Clonazepam      Too drowsy     Social History     Socioeconomic History     Marital status:      Spouse name: Not on file     Number of children: Not on file     Years of education: Not on file     Highest education level: Not on file   Occupational History     Not on file   Social Needs     Financial resource strain: Not on file     Food insecurity     Worry: Not on file     Inability: Not on file     Transportation needs     Medical: Not on file     Non-medical: Not on file   Tobacco Use     Smoking status: Former Smoker     Types: Cigarettes     Quit date: 1980     Years since quittin.7     Smokeless tobacco: Never Used   Substance and Sexual Activity     Alcohol use: No     Comment: one drink a week     Drug use: No     Sexual activity: Not on file   Lifestyle     Physical activity     Days per week: Not on file     Minutes per session: Not on file     Stress: Not on file   Relationships     Social connections     Talks on phone: Not on file     Gets together: Not on file     Attends Buddhism service: Not on file     Active member of club or organization: Not on file     Attends meetings of clubs or organizations: Not on file     Relationship status: Not on file     Intimate partner violence     Fear of current or ex partner: Not on file     Emotionally abused: Not on file     Physically abused: Not on file     Forced sexual activity: Not on file   Other Topics Concern     Not on file   Social History Narrative    Patient lives with his wife Luz Marina        Review of Systems  A comprehensive review of systems was negative except for:what is noted above    Mental Status Exam:   Appearance:   Patient is appropriately groomed and alert sitting for appointment.  Behavior:   .cooperative and pleasant, no apparent agitation, no irritability, denies any recent behavioral difficulties  Speech: .slow and soft speech, able to participate in conversation  Mood/Affect: .flat, no obvious significant depression or anxiety  Thought Content: .no psychotic symptoms were evident, patient and wife do state that the patient did have some hallucinations    Suicidal or Homicidal Thoughts:   .no evidence of suicidal or homicidal ideations, patient denies any suicidal ideations  Thought Process/Formulation:  .adequate, able to process information, no evidence of racing thoughts  Associations: .adequate, processing information and participating in conversation  Fund of Knowledge:  .intact, following conversation, adequate depth of understanding  Attention/Concentration: .slightly distractible, concentration is poor  Insight: .slightly impaired  Judgement:  .slightly impaired  Memory:   . .slight impairment   Motor Status:  .no noted tremor or dyskinesia   Orientation: .oriented to person, place, time and situation .  .  Diagnosis managed and treated at today's visit   Parkinson's disease  Anxiety d/t a medical condition  Chronic pain  Mild cognitive impairment  High risk for falls  Tremor  Foot ulcer  Physical deconditioning  Insomnia     Plan:  Medication Adjustment:  No medication changes    Other:   Patient will return to clinic in 6 weeks. They agree to call or return sooner with any questions or concerns.  Risks and benefits were discussed.  Continue with his individual therapist.     Continue with the support of the clinic, reassurance, and redirection. Staff monitoring and ongoing assessments per team plan. Current psychotropic medication appears to represent the minimum effective  dosage and appears medically necessary. We will continue to monitor and reassess. This team will utilize appropriate emergency services if necessary. I will make myself available if concerns or problems arise.    Telephone Visit Details    Type of service: Telephone Visit    Phone Start Time: 1254    Phone End Time:  1300    Total time for telephone call: 30 minutes    Originating Location: Patient's home    Distant Location:  [facility] [place]    Mode of Communication: Telephone call    General Information:  Today you had your appointment with Kelli Walker CNP     If lab work was done today as part of your evaluation you will generally be contacted via My Chart, mail, or phone with the results within 1-5 days. If there is an alarming result we will contact you by phone. Lab results come back at varying times, I generally wait until all labs are resulted before making comments on results. Please note labs are automatically released to My Chart once available.     If you need refills please contact your pharmacist. They will send a refill request to me to review. Please allow 3 business days for us to process all refill requests.     Please call or send a medical message through My Chart, with any questions or concerns    If you need any paperwork completed please fax forms to [phone]. Please state if you would like a copy of the completed paperwork, mailed or faxed back to the patient and a fax number to fax the paperwork to. Please allow up to 10 days for paperwork to be completed.    Kelli Walker CNP

## 2021-06-12 NOTE — PROGRESS NOTES
Assessment and Plan:     Patient has been advised of split billing requirements and indicates understanding: Yes , card given  1. Medicare annual wellness visit, subsequent    Reviewed the annual wellness visit health risk assessment form  PHQ-2 score is positive.  PHQ-9 score is 11 which was reviewed  Has had multiple falls secondary to Parkinson's we will continue to follow-up with neurology    Influenza vaccine given  - Influenza,Quad,High Dose,PF 65 YR+    Reviewed colon cancer screening.  Cologuard test was negative in September 2019    2. Parkinson disease (H)    Continue plan per neurology  Continue Rytary and Nuplazid  Continue carbidopa-levodopa    3. Hypothyroidism, unspecified type    Continue levothyroxine   Check a thyroid cascade  - Thyroid [place]    4. Coronary artery disease involving native coronary artery of native heart with angina pectoris (H)  5. Heart failure with reduced ejection fraction (H)  Status post stenting of the right coronary artery  60% stenosis of LAD noted    Continue to follow plan of Dr. Barraza, cardiology    Reviewed most recent stress test from 2019:    Stress and rest nuclear images demonstrate a large area of nontransmural infarction involving the lateral to inferolateral wall.  There is a small area of mild ramon-infarct ischemia in the mid inferolateral wall.     The left ventricular ejection fraction at stress is 48% with severe hypokinesis of the lateral wall..     A prior study was conducted on 10/22/2018.  Previous area of ischemia now appears to be an area     Continue current plan with atorvastatin    - Comprehensive Metabolic Panel  - Lipid Cascade FASTING  - HM2(CBC w/o Differential)    6. Screening for prostate cancer  Check a PSA  - PSA, Annual Screen (Prostatic-Specific Antigen)    7. 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)  8. Acquired absence of other right toe(s) (H)    He has had multiple partial toe amputations and has had ongoing  treatment of a right heel ulcer    9. Moderate major depression (H)    PHQ-9 score is 11  His advanced Parkinson's disease and challenges in treating his foot wounds as well as subsequent partial limitations has contributed to significant depression symptoms  Reviewed treatment options including considering adjustment of medications  Patient preference is to continue duloxetine    10. Cord compression myelopathy (H)    He has follow-up with a spine specialist for conservative treatment  Continue duloxetine which can help treat depression as well as his pain  Continue gabapentin    11. Vasculogenic erectile dysfunction, unspecified vasculogenic erectile dysfunction type    Treatment Viagra  Have reviewed potential side effects  Recommend not taking Viagra at same time as nitroglycerin and reviewed potential risks  - sildenafiL (VIAGRA) 50 MG tablet; Take 1 tablet (50 mg total) by mouth as needed for erectile dysfunction.  Dispense: 6 tablet; Refill: 1         The patient's current medical problems were reviewed.    I have had an Advance Directives discussion with the patient.  The following health maintenance schedule was reviewed with the patient and provided in printed form in the after visit summary:   Health Maintenance   Topic Date Due     DEPRESSION ACTION PLAN  1950     HEPATITIS C SCREENING  1950     HEART FAILURE ACTION PLAN  1950     ZOSTER VACCINES (1 of 2) 02/16/2000     INFLUENZA VACCINE RULE BASED (1) 08/01/2020     BMP  08/25/2020     ALT  09/06/2020     LIPID  09/06/2020     CBC  02/25/2021     MEDICARE ANNUAL WELLNESS VISIT  11/04/2021     FALL RISK ASSESSMENT  11/04/2021     COLORECTAL CANCER SCREENING  09/25/2022     ADVANCE CARE PLANNING  09/06/2024     TD 18+ HE  02/09/2026     TSH  Completed     Pneumococcal Vaccine: 65+ Years  Completed     Pneumococcal Vaccine: Pediatrics (0 to 5 Years) and At-Risk Patients (6 to 64 Years)  Aged Out     HEPATITIS B VACCINES  Aged Out     "    Subjective:   Chief Complaint: Silver Zamora is an 70 y.o. male here for an Annual Wellness visit.   HPI: This is a pleasant 70-year-old male who presents to clinic for annual wellness visit.  His wife Luz Marina is present today.    He has a complex medical history including advanced Parkinson's disease with multiple falls.  He follows up with neurology and has been treated with multiple medications including carbidopa-levodopa as well as right tarry and Nuplazid.  He has a history of hallucinations and Nuplazid has been adjusted.  He uses a walker for ambulation.  He does experience double vision and has had weakness.  He has been prone to episodes of \"freezing \".  He continues to follow-up with neurology.    He has a known history of ischemic cardiomyopathy and follows up with , cardiology.  He had a previous coronary stent placed in the right coronary artery in 2018 and required dual antiplatelet therapy for a period of time.  An echocardiogram showed an ejection fraction of 44% at the time.  He did have a nuclear stress test that was abnormal in 2019.  His ejection fraction was noted to be 48%.  He has been stable and compensated as noted per cardiology.    With a previous hospitalization he did have a fall and a CT scan of the cervical spine showed severe spinal canal stenosis at multiple levels.  There was neural foraminal narrowing noted as well and prominent degenerative changes.  Given his history of heart disease and medications as well as risk for falling he was deemed not to be a candidate for spinal surgery.  He has been treated with gabapentin and duloxetine and his pain has lessened over time.    Also, he has had problems with a right heel ulcer recently that has required treatment.  He does have a history of osteomyelitis and has had partial amputations of multiple toes of both feet.    He does note that he can feel depressed.  However, prefers not to make any medication changes at this time.  " He rates his is fair.  He gets limited aerobic exercise.  He does require significant assistance with activities of daily living including getting dressed, bathing, and walking.  His wife Luz Marina has been very supportive.    Denies recent chest pain or palpitations.  He denies bowel changes.  He would like to consider medication given a history of difficulty in maintaining erections.      Review of Systems:    Please see above.  The rest of the review of systems are negative for all systems.    Patient Care Team:  Valentín Washington MD as PCP - General (Family Medicine)  Elyssa Dolan PA-C as Physician Assistant (Orthopedic Surgery)  Elyssa Gomez MD as Physician (Neurology)  Leif Washington DPM as Physician (Podiatry)  Valentín Washington MD as Assigned PCP  Roxy Barraza MD as Assigned Heart and Vascular Provider     Patient Active Problem List   Diagnosis     Parkinson disease (H)     Back pain     Depression     Anxiety state, unspecified     Hypothyroidism     Pancreatitis     RBD (REM behavioral disorder)     Low vitamin D level     Calculus of gallbladder with biliary obstruction but without cholecystitis     Weakness     Cholecystitis     Leukocytosis, unspecified type     Choledocholithiasis     Abnormal echocardiogram     Abnormal nuclear stress test     Coronary artery disease involving native coronary artery of native heart with angina pectoris (H)     Ischemic cardiomyopathy     Heart failure with reduced ejection fraction (H)     Cervical stenosis of spinal canal     Ulcer of right foot, unspecified ulcer stage (H)     Cord compression myelopathy (H)     Moderate major depression (H)     Acquired absence of other right toe(s) (H)     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)     Past Medical History:   Diagnosis Date     Anxiety      Callus      Cardiomyopathy (H)      Chronic back pain      Delirium      Depression      Fall      GERD (gastroesophageal reflux  disease)      Hypothyroidism      Pancreatitis 2015     PD (Parkinson's disease) (H)      RBD (REM behavioral disorder) 4/3/2017     Shingles     hx      Past Surgical History:   Procedure Laterality Date     CV CORONARY ANGIOGRAM N/A 10/31/2018    Procedure: Coronary Angiogram;  Surgeon: Jose Meyer MD;  Location: [facility] Cath Lab;  Service:      CV LEFT HEART CATHETERIZATION WO LEFT VETRICULOGRAM Left 10/31/2018    Procedure: Left Heart Catheterization Without Left Ventriculogram;  Surgeon: Jose Meyer MD;  Location: [facility] Cath Lab;  Service:      INGUINAL HERNIA REPAIR Bilateral 10/2013     LAMINECTOMY  10/2013     TN ERCP REMOVE CALCULI/DEBRIS BILIARY/PANCREAS DUCT N/A 2018    Procedure: ENDOSCOPIC RETROGRADE CHOLANGIOPANCREATOGRAPHY SPINCTEROTOMY BILIARY STONE EXTRACTION;  Surgeon: Curtis Mcclain MD;  Location: [facility];  Service: Gastroenterology     TN LAP,CHOLECYSTECTOMY/GRAPH N/A 2018    Procedure: LAPAROSCOPIC CHOLECYSTECTOMY;  Surgeon: Joyce Melissa MD;  Location: [facility];  Service: General      Family History   Problem Relation Age of Onset     Kidney failure Mother      Heart disease Father 82     Tremor Father      Parkinsonism Paternal Grandfather      Tremor Sister      Leukemia Maternal Grandmother       Social History     Socioeconomic History     Marital status:      Spouse name: Not on file     Number of children: Not on file     Years of education: Not on file     Highest education level: Not on file   Occupational History     Not on file   Social Needs     Financial resource strain: Not on file     Food insecurity     Worry: Not on file     Inability: Not on file     Transportation needs     Medical: Not on file     Non-medical: Not on file   Tobacco Use     Smoking status: Former Smoker     Types: Cigarettes     Quit date: 1980     Years since quittin.8     Smokeless tobacco: Never Used   Substance and Sexual Activity      Alcohol use: No     Comment: one drink a week     Drug use: No     Sexual activity: Not on file   Lifestyle     Physical activity     Days per week: Not on file     Minutes per session: Not on file     Stress: Not on file   Relationships     Social connections     Talks on phone: Not on file     Gets together: Not on file     Attends Restoration service: Not on file     Active member of club or organization: Not on file     Attends meetings of clubs or organizations: Not on file     Relationship status: Not on file     Intimate partner violence     Fear of current or ex partner: Not on file     Emotionally abused: Not on file     Physically abused: Not on file     Forced sexual activity: Not on file   Other Topics Concern     Not on file   Social History Narrative    Patient lives with his wife Luz Marina      Current Outpatient Medications   Medication Sig Dispense Refill     acetaminophen (TYLENOL) 325 MG tablet Take 2 tablets (650 mg total) by mouth every 4 (four) hours as needed.  0     ammonium lactate (AMLACTIN) 12 % cream Apply two times a day 385 g 2     amoxicillin-clavulanate (AUGMENTIN) 875-125 mg per tablet        aspirin 81 mg chewable tablet Chew 1 tablet (81 mg total) daily.  0     atorvastatin (LIPITOR) 80 MG tablet Take 1 tablet (80 mg total) by mouth daily. 90 tablet 1     bisacodyl (DULCOLAX) 10 mg suppository One supp CT qday prn constipation.  Give if no BM in prior 3 days.  0     carbidopa-levodopa (PARCOPA)  mg per disintegrating tablet Take 1 tablet by mouth as needed. 90 tablet 3     cholecalciferol, vitamin D3, (VITAMIN D3) 2,000 unit Tab Take 1 tablet (2,000 Units total) by mouth daily.  0     diaper,brief,adult,disposable (BRIEFS) Misc Use 1 Units As Directed 2 (two) times a day as needed. 45 each 6     DULoxetine (CYMBALTA) 20 MG capsule Take 1 capsule (20 mg total) by mouth 2 (two) times a day. 60 capsule 3     fluticasone propionate (FLONASE) 50 mcg/actuation nasal spray 2 sprays  "into each nostril daily. 16 g 3     gabapentin (NEURONTIN) 100 MG capsule TAKE ONE CAPSULE BY MOUTH TWICE A DAY 60 capsule 2     levothyroxine (SYNTHROID, LEVOTHROID) 137 MCG tablet TAKE ONE TABLET BY MOUTH ONCE DAILY AT 6 A.M. 90 tablet 3     nitroglycerin (NITROSTAT) 0.4 MG SL tablet Place 1 tablet (0.4 mg total) under the tongue every 5 (five) minutes as needed for chest pain. 20 tablet 1     NUPLAZID 34 mg cap capsule TAKE 1 CAPSULE BY MOUTH ONE TIME A DAY AT BEDTIME 30 capsule 4     nystatin (MYCOSTATIN) cream Apply to affected area of groin three times a day x 10 days 30 g 1     polyethylene glycol (GLYCOLAX) 17 gram/dose powder Take 17 g by mouth daily. 1530 g 6     RYTARY 61. mg CpER ER capsule TAKE TWO CAPSULES BY MOUTH FOUR TIMES A  capsule 2     senna-docusate (SENNOSIDES-DOCUSATE SODIUM) 8.6-50 mg tablet Take 1 tablet by mouth 2 (two) times a day.             triamcinolone (KENALOG) 0.025 % ointment Apply topically 2 (two) times a day. 15 g 3     No current facility-administered medications for this visit.       Objective:   Vital Signs:   Visit Vitals  /76 (Patient Site: Left Arm, Patient Position: Sitting, Cuff Size: Adult Regular)   Pulse (!) 53   Ht 5' 10\" (1.778 m)   Wt 211 lb 6.4 oz (95.9 kg)   SpO2 98%   BMI 30.33 kg/m           VisionScreening:  No exam data present     PHYSICAL EXAM  General appearance: alert, appears stated age and cooperative  He moves quite slowly in general  Head: Normocephalic, without obvious abnormality, atraumatic  Eyes: conjunctivae/corneas clear. PERRL, EOM's intact.   Ears: normal TM's and external ear canals both ears  Nose: Nares normal. Septum midline. Mucosa normal. No drainage or sinus tenderness.  Throat: lips, mucosa, and tongue normal; teeth and gums normal  Neck: no adenopathy, supple, symmetrical, trachea midline   Back: symmetric, no curvature. ROM normal. No CVA tenderness.  Lungs: clear to auscultation bilaterally  Heart: regular rate " and rhythm, S1, S2 normal, no murmur, click, rub or gallop  Abdomen: soft, non-tender; bowel sounds normal  Extremities: extremities normal, atraumatic, no cyanosis or edema  Skin: Skin color, texture, turgor normal  Lymph nodes: Cervical nodes normal.  Neurologic: Alert and oriented X 3  He moves quite slowly  There are some cogwheel rigidity of the upper extremities  There is mild tremor with intention      Assessment Results 11/4/2020   Activities of Daily Living 2-4 - Moderate impairment   Instrumental Activities of Daily Living 5-6 - Severe functional impairment   Mini Cog Total Score 5   Some recent data might be hidden     A Mini-Cog score of 0-2 suggests the possibility of dementia, score of 3-5 suggests no dementia    Cardiology:    Conclusion          The nuclear stress test is abnormal.     Stress and rest nuclear images demonstrate a large area of nontransmural infarction involving the lateral to inferolateral wall.  There is a small area of mild ramon-infarct ischemia in the mid inferolateral wall.     The left ventricular ejection fraction at stress is 48% with severe hypokinesis of the lateral wall..     A prior study was conducted on 10/22/2018.  Previous area of ischemia now appears to be an area of nontransmural infarction.     The patient is at a low risk of future cardiac ischemic events.         Identified Health Risks:     The patient was provided with suggestions to help him develop a healthy physical lifestyle.   He is at risk for lack of exercise and has been provided with information to increase physical activity for the benefit of his well-being.  The patient reports that he has difficulty with activities of daily living. I have asked that the patient make a follow up appointment in 12 weeks where this issue will be further evaluated and addressed.  The patient reports that he has difficulty with instrumental activities of daily living.  He was provided with a list of local organizations  that provide support services and advised to make a follow up appointment in 12 weeks to address this further.   The patient was provided with written information regarding signs of hearing loss.  Information on urinary incontinence and treatment options given to patient.  The patient was provided with suggestions to help him develop a healthy emotional lifestyle.   He is at risk for falling and has been provided with information to reduce the risk of falling at home.  Patient's advanced directive was discussed and I am comfortable with the patient's wishes.

## 2021-06-12 NOTE — PROGRESS NOTES
Persons accompanying you (the patient) today: Wife, Luz Marina    How have you been doing since we saw you last? Please note any concerns.  Pt .says hes been okay. Feeling tired.     Please list any surgeries or procedures you have had since we saw you last:  none    Have you had any falls since your last visit? Yes    Do you have any pain today? Yes: Neck and back pain.    With whom do you currently reside? (alone, spouse, family, assisted living, nursing home)  Pt . Lives in a town home.

## 2021-06-12 NOTE — PROGRESS NOTES
"  Assessment / Impression     1.  Parkinson's disease  2.  MCI  3.  Amotivational state  4.  Depression NOS    Plan:   1.  Trial of methylphenidate 5 mg p.o. q. 8 AM and 12 noon  2.  Environmental support and wellness programs    Long conversation held with the patient and wife in attendance.  All manner of topics discussed today including polypharmacy nonmotor manifestations of Parkinson's disease approach to management of nonmotor manifestations etc.  During the course of conversation I did raise the option of a gentle trial of psychostimulant as both an augmentation strategy for depression as well as a method of boosting motivation for this patient who is spending much time in bed.  After much discussion including potential for side effects the patient wife have agreed to a gentle trial.  I did write a prescription for 35 mg tablets of methylphenidate to be administered as noted above.    Total time for evaluation 40 minutes with majority of time spent counseling regarding these matters noted above.  All questions answered.      Subjective:     HPI: Silver Zamora is a 67 y.o. male with above-noted diagnoses who returns for follow-up.  The patient has been demonstrating a significant amount of apathy and possibly lethargy associated with Parkinson's disease.  The wife reports that the patient spends much of his day in bed sleeping.  He is quite difficult to motivate him to do anything outside of the home although, on occasion, the patient is able to motivate and enjoy activities.  The wife voices some concern regarding the patient's polypharmacy and I did discuss this at length.    Today the patient appears a bit more impaired than what I noted previously.  He reports some residual symptoms of depression but overall the patient has been doing well in this regard.  He does note significant fatigue.  There have been at least 2 episodes of the patient \"falling asleep\" on the toilet          Review of " Systems:          As noted        Objective:     Vitals:    08/02/17 1256 08/02/17 1257 08/02/17 1258   BP: 135/79 139/80 136/75   Pulse: 77 77 83   Weight: 216 lb (98 kg)         No results found for this or any previous visit (from the past 24 hour(s)).    Physical Exam: Patient is neatly groomed casually dressed and seated for evaluation.  I noted to be alert and interactive.  No evidence of excessive sedation at this time.  No fluctuation level of consciousness or marty distractibility.  A degree of hypomania is noted.  Hypophonia noted.

## 2021-06-12 NOTE — PROGRESS NOTES
Assessment /Plan:  Came wife wife, Luz Marina. Right handed.   Parkinson's disease: On sinemet 25/100 3 tabs 4 times daily: 8am, noon, 4pm, 9pm, with entacapone. At bed time 10 pm one entacopone 200 each time and Sinemet CR 25/100 1 tabs. At dinner time from 2 pm balance off and a little slow.  He is getting less steadier on his feet.  Fall couple of times over the last 4 months trying to carry things in both hands or to hands reaching out to little children.  No major injuries.  No side effects.  There may be slightly wearing off for about 30 minutes to 1 hour between dosing.  No significant dyskinesia.  Parkinson disease features revealed and treatment options discussed.     Psychiatry issue and cognitive function ( MOCA: 21/30 on April 5, 2017) is a concern for DBS, and he also failed on-off testing about 2 years ago. Some hallucination, rarely seeing mouse running and seeing people in the house.     Standing in the bathroom, fall into sleep and fell. Has another falls tripped in carpet. No major injury. No LOC.     Has had stiff neck for many  Years, goes into the shoulders. Therapies, massage  Helps.     Not exercising and     Autonomic: Some light headed. no particularly positional changes.  No orthostasis during clinical visit.      Sleep disorder. Not able to change his habit. Goes to sleep very late and not able function in am. He has had 3 sleep studies and has seen Dr. Jayesh Michaels and other doctors. He has tried clonazepam not able to tolerate. Tested other multiple medications to without benefit. Also may have REM sleep disorders.       Memory: had neuro psych test in 4/2013. Neuro psych testing 5/2015: a mild progression in cognitive impairment, repeat test 5/2016 showing no significant changes.  MOCA: 21/30 on April 5, 2017  .   Psychiatric: During interview, he is not focussed on discussing his PD and related management.       Depression. History of anxiety. He has seen Dr. Llanos and Dr. Tamayo on this.        [place]: no hearing aids.   Still independent on daily routine. Not driving since , since wife likes to it anyway. Has trouble getting in and out of car, wife feel concerned his motor control, poor reaction time, controlling the pedals.       Therapy: in session with pool exercise.  He deferred referral to big and loud Parkinson therapies      Chronic lower back pain. NO focal weakness in left LE. Will continue therapy and weight reduction. 10/2013 had lower back surgery.        PD summary: Right handed. diagnosed in . Symptoms: stiffness, slow, soft speech, walking difficulty. Left hand tremor, spreading to right started . He feels symptoms started in . Has seen Dr. Suazo in the past. He has some freezing tremors occasionally, but asymmetry. Tried the Sinemet CR during day time or at night, didn't work well. Off pramipexole since  in Dec due to memory and behavior concern.       Had osteomyelitis, now amputated the 3rd and 4th on right.  Did have a crack in the left food that is not healing..       RLS: no worsening.       Small fiber neuropathy: most likely relating to PD   Leg edema and poor leg circulation and has seen a vascular surgeon.       On testosterone, im q 2 weeks. Wanting to do more, but wears off toward the end.       Neuro psych [id number]  As compared to testing in , current performance is notable for subtle declines in basic attention, cognitive efficiency, nonverbal learning and memory, visuospatial judgement and aspects of executive functioning (deductive reasoning and complex attention). A significant improvement (e.g., from severely impaired to superior delayed recall on a list learning task) was evident on two measures of verbal memory. He exhibited stable performance on measures of inhibition as well as verbal and nonverbal reasoning.   Overall, Mr. Zamora's cognitive profile is most notable for evidence of executive dysfunction and visual spatial  impairments. Now that verbal learning and memory are assessed within normal limits, this profile appears to more clearly reflect the cognitive difficulties often seen in individuals with Parkinson s disease. While some declines are evident, the level of impairment is not significant enough at present to warrant a dementia diagnosis. There is some evidence that he requires more assistance with ADLs; however, there does also seem to be a mood component to this as well as the impact of his energy level and pain symptoms.    Today's instruction:  Come back in 3-4 month  Start aricept 10 mg, 1/2 tab hs for 1 week, then increase to 1 tab hs.              Review of system otherwise unremarkable     Patient Active Problem List   Diagnosis     Parkinson disease     Back pain     Depression     Anxiety state, unspecified     Hypothyroidism     Pancreatitis     RBD (REM behavioral disorder)             Current Outpatient Prescriptions   Medication Sig Dispense Refill     carbidopa-levodopa (SINEMET CR)  mg ER tablet Take 1 tablet by mouth at bedtime.       carbidopa-levodopa (SINEMET)  mg per tablet Take 3 tablets by mouth 4 (four) times a day. At 0200, 0800, 1200, 1600, and then Sinement CR  mg @ 2100 (5 doses total doses per day)       entacapone (COMTAN) 200 mg tablet Take 200 mg by mouth 5 (five) times a day. with each Sinemet dose       gabapentin (NEURONTIN) 300 MG capsule TAKE 1 CAPSULE (300 MG TOTAL) BY MOUTH 4 (FOUR) TIMES A DAY. 120 capsule 5     levothyroxine (SYNTHROID, LEVOTHROID) 137 MCG tablet Take 137 mcg by mouth daily.       meloxicam (MOBIC) 7.5 MG tablet Take 1 tablet (7.5 mg total) by mouth as needed for pain. Take 1-2 tablets daily PRN 90 tablet 3     multivitamin (MULTIVITAMIN) per tablet Take 1 tablet by mouth daily.       omeprazole (PRILOSEC) 20 MG capsule Take 20 mg by mouth daily as needed.        polyethylene glycol (MIRALAX) 17 gram packet Take 17 g by mouth daily.        TESTOSTERONE IM Inject 200 mg into the shoulder, thigh, or buttocks every 28 days.       venlafaxine (EFFEXOR-XR) 150 MG 24 hr capsule Take 1 capsule (150 mg total) by mouth daily. Take with 75mg cap (225mg) 90 capsule 3     venlafaxine (EFFEXOR-XR) 75 MG 24 hr capsule Take 1 capsule (75 mg total) by mouth daily. Take with 150mg cap 90 capsule 3     melatonin 2.5 mg Chew Chew.       sildenafil (VIAGRA) 50 MG tablet Take 1 tablet (50 mg total) by mouth as needed for erectile dysfunction. 6 tablet 5     Current Facility-Administered Medications   Medication Dose Route Frequency Provider Last Rate Last Dose     testosterone cypionate 200 mg/mL injection 200 mg (DEPOTESTOTERONE CYPIONATE)  200 mg Intramuscular Q14 Days Valentín Wasihngton MD   200 mg at 07/28/17 1534       Clonazepam    Past Medical History:   Diagnosis Date     Chronic back pain      Depression      GERD (gastroesophageal reflux disease)      Hypothyroidism      Pancreatitis 12/31/2015     PD (Parkinson's disease)      Shingles     hx       Social History     Social History     Marital status:      Spouse name: N/A     Number of children: N/A     Years of education: N/A     Occupational History     Not on file.     Social History Main Topics     Smoking status: Former Smoker     Types: Cigarettes     Quit date: 1/1/1980     Smokeless tobacco: Never Used     Alcohol use Yes      Comment: one drink a week     Drug use: No     Sexual activity: Not on file     Other Topics Concern     Not on file     Social History Narrative       Family History   Problem Relation Age of Onset     Kidney failure Mother      Heart disease Father      Tremor Father      Parkinsonism Paternal Grandfather      Tremor Sister      Leukemia Maternal Grandmother        Objective:  Vitals:    08/02/17 1149   BP: 177/79   Pulse: 77     Alert, cooperative, no distress Sitting, appears stated age, normocephalic, atraumatic without cyanosisr skin rashes.  Mild edema in  ankles.  Normal mood.  But this topic changes in legs.  Ok with mental status, language, but slightly soft speech, CNII-XII intact except staring and decreased eye blink, mask face, normal muscle bulk and strength in 4 extremities with rigidity. No hand tremors today but in the past has had hand tremors greater in right. There is no focal sensory difficulties in 4 extremities. Gait wide-based and a little shuffling.  Decreased balance.  No significant stooping.     Slightly sanford and hypokinesia. No  freezing. NO dyskinesia.

## 2021-06-12 NOTE — PROGRESS NOTES
How have you been doing since we last saw you? any concerns? F/u apt. Pt would like to talk about dental, sinus, and falling issues and wondering if it has to do with the medications.  Pt has been having sleeping issues and sweats while he's sleeping.  Falling into sleeping spells.      Current Symptoms : Yes

## 2021-06-12 NOTE — PATIENT INSTRUCTIONS - HE
1. Discontinue metoprolol given low heart rate. Continue other medications as is.  2. Follow up in 1 year or sooner if new symptoms

## 2021-06-12 NOTE — TELEPHONE ENCOUNTER
Wellness Screening Tool  Symptom Screening:  Do you have one of the following NEW symptoms:    Fever (subjective or >100.0)?  No    A new cough?  No    Shortness of breath?  No     Chills? No     New loss of taste or smell? No     Generalized body aches? No     New persistent headache? No     New sore throat? No     Nausea, vomiting, or diarrhea?  No    Within the past 2 weeks, have you been exposed to someone with a known positive illness below:    COVID-19 (known or suspected)?  No    Chicken pox?  No    Mealses?  No    Pertussis?  No    Patient notified of visitor policy- They may have one person accompany them to their appointment, but they will need to wear a mask and will be screened upon arrival for symptoms: Yes  Pt informed to wear a mask: Yes  Pt notified if they develop any symptoms listed above, prior to their appointment, they are to call the clinic directly at [phone] for further instructions.  Yes  Patient's appointment status: Patient will be seen in clinic as scheduled on 11/3

## 2021-06-13 NOTE — PROGRESS NOTES
.  Assessment /Plan:  Came wife wife, Luz Marina. Right handed.   Parkinson's disease: On sinemet 25/100 3 tabs 4 times daily: 8am, noon, 4pm, 9pm, with entacapone.  Sinemet CR 25/100 1 tabs.     Chronic back pain: We will change, pending 300 mg 4 times daily to 300 mg, 3 tablets nightly.    Daytime hypersomnia: We will discontinue the time, pending and changed to 3 tablets nightly.  He was taking 1 tablets 4 times daily.  Consider to try Provigil.  Was not able to tolerate Ritalin tried in 2017.        Subjectively and objectively observed by family:  Excessive daytime sleepiness.  Not exercising much at all.  Very sedentary.  No clear wearing off effects or any side effects.  No progression of hallucinations.  Tried Ritalin for daytime sleepiness not able to tolerate.  Never take Aricept for memory difficulty for trial, though the prescription was given in about May 2017.        Psychiatry issue and cognitive function ( MOCA: 21/30 on April 5, 2017) is a concern for DBS, and he also failed on-off testing in about 2015.  Some hallucination, rarely seeing mouse running and seeing people in the house.      Has had stiff neck for many  Years, goes into the shoulders. Therapies, massage  Helps.      Not exercising and sleeping a lot during the daytime but more awake at nighttime.     Autonomic: Some light headed. no particularly positional changes.  No orthostasis during clinical visit.      Sleep disorder. Not able to change his habit. Goes to sleep very late and not able function in am. He has had 3 sleep studies and has seen Dr. Jayesh Michaels and other doctors. He has tried clonazepam not able to tolerate. Tested other multiple medications to without benefit. Also may have REM sleep disorders.       Memory: had neuro psych test in 4/2013. Neuro psych testing 5/2015: a mild progression in cognitive impairment, repeat test 5/2016 showing no significant changes.  MOCA: 21/30 on April 5, 2017  .   Psychiatric: During  interview, he is not focussed on discussing his PD and related management.       Depression. History of anxiety. He has seen Dr. Llanos and Dr. Tamayo on this.       [place]: no hearing aids.   Still independent on daily routine. Not driving since , since wife likes to it anyway. Has trouble getting in and out of car, wife feel concerned his motor control, poor reaction time, controlling the pedals.       Therapy: in session with pool exercise.  He deferred referral to big and loud Parkinson therapies      Chronic lower back pain. NO focal weakness in left LE. Will continue therapy and weight reduction. 10/2013 had lower back surgery.        PD summary: Right handed. diagnosed in . Symptoms: stiffness, slow, soft speech, walking difficulty. Left hand tremor, spreading to right started . He feels symptoms started in . Has seen Dr. Suazo in the past. He has some freezing tremors occasionally, but asymmetry. Tried the Sinemet CR during day time or at night, didn't work well. Off pramipexole since  in Dec due to memory and behavior concern.       Had osteomyelitis, now amputated the 3rd and 4th on right.  Did have a crack in the left food that is not healing..       RLS: no worsening.       Small fiber neuropathy: most likely relating to PD   Leg edema and poor leg circulation and has seen a vascular surgeon.       On testosterone, im q 2 weeks. Wanting to do more, but wears off toward the end.       Neuro psych [id number]  As compared to testing in , current performance is notable for subtle declines in basic attention, cognitive efficiency, nonverbal learning and memory, visuospatial judgement and aspects of executive functioning (deductive reasoning and complex attention). A significant improvement (e.g., from severely impaired to superior delayed recall on a list learning task) was evident on two measures of verbal memory. He exhibited stable performance on measures of inhibition as well  as verbal and nonverbal reasoning.   Overall, Mr. Zamora's cognitive profile is most notable for evidence of executive dysfunction and visual spatial impairments. Now that verbal learning and memory are assessed within normal limits, this profile appears to more clearly reflect the cognitive difficulties often seen in individuals with Parkinson s disease. While some declines are evident, the level of impairment is not significant enough at present to warrant a dementia diagnosis. There is some evidence that he requires more assistance with ADLs; however, there does also seem to be a mood component to this as well as the impact of his energy level and pain symptoms.    Today's instruction:  Change gabapentin 1 tablets 4 times a day to 3 tablets nightly for trial to avoid daytime sleepiness.  Exercise daily.  Start therapy  Return to clinic in about 2-3 months.  Consider a trial of Provigil for excessive daytime sleepiness.                 Review of system otherwise unremarkable        Patient Active Problem List   Diagnosis     Parkinson disease     Back pain     Depression     Anxiety state, unspecified     Hypothyroidism     Pancreatitis     RBD (REM behavioral disorder)     Low vitamin D level             Current Outpatient Prescriptions   Medication Sig Dispense Refill     carbidopa-levodopa (SINEMET)  mg per tablet Take 3 tablets by mouth 4 (four) times a day. At 0200, 0800, 1200, 1600, and then Sinement CR  mg @ 2100 (5 doses total doses per day)       cholecalciferol, vitamin D3, (VITAMIN D3) 2,000 unit Tab Take 2,000 Units by mouth.       entacapone (COMTAN) 200 mg tablet Take 200 mg by mouth 5 (five) times a day. with each Sinemet dose       gabapentin (NEURONTIN) 300 MG capsule TAKE 1 CAPSULE (300 MG TOTAL) BY MOUTH 4 (FOUR) TIMES A DAY. 360 capsule 0     levothyroxine (SYNTHROID, LEVOTHROID) 137 MCG tablet Take 137 mcg by mouth daily.       melatonin 2.5 mg Chew Chew.       meloxicam (MOBIC)  7.5 MG tablet Take 1 tablet (7.5 mg total) by mouth as needed for pain. Take 1-2 tablets daily PRN 90 tablet 3     multivitamin (MULTIVITAMIN) per tablet Take 1 tablet by mouth daily.       omeprazole (PRILOSEC) 20 MG capsule TAKE 1 CAPSULE BY MOUTH ONCE DAILY 90 capsule 2     polyethylene glycol (MIRALAX) 17 gram packet Take 17 g by mouth daily.       venlafaxine (EFFEXOR-XR) 150 MG 24 hr capsule Take 1 capsule (150 mg total) by mouth daily. Take with 75mg cap (225mg) 90 capsule 3     venlafaxine (EFFEXOR-XR) 75 MG 24 hr capsule Take 1 capsule (75 mg total) by mouth daily. Take with 150mg cap 90 capsule 3     Current Facility-Administered Medications   Medication Dose Route Frequency Provider Last Rate Last Dose     testosterone cypionate 200 mg/mL injection 200 mg (DEPOTESTOTERONE CYPIONATE)  200 mg Intramuscular Q14 Days Valentín Washington MD   200 mg at 07/28/17 1534   Off Testosterone.     Clonazepam    Past Medical History:   Diagnosis Date     Anxiety      Callus      Chronic back pain      Depression      GERD (gastroesophageal reflux disease)      Hypothyroidism      Pancreatitis 12/31/2015     PD (Parkinson's disease)      RBD (REM behavioral disorder) 4/3/2017     Shingles     hx       Social History     Social History     Marital status:      Spouse name: N/A     Number of children: N/A     Years of education: N/A     Occupational History     Not on file.     Social History Main Topics     Smoking status: Former Smoker     Types: Cigarettes     Quit date: 1/1/1980     Smokeless tobacco: Never Used     Alcohol use Yes      Comment: one drink a week     Drug use: No     Sexual activity: Not on file     Other Topics Concern     Not on file     Social History Narrative       Family History   Problem Relation Age of Onset     Kidney failure Mother      Heart disease Father      Tremor Father      Parkinsonism Paternal Grandfather      Tremor Sister      Leukemia Maternal Grandmother         Objective:  Vitals:    11/01/17 1147   BP: 156/86   Pulse: 81     Alert, cooperative, no distress Sitting, appears stated age, normocephalic, atraumatic without cyanosisr skin rashes.  Mild edema in ankles.  Normal mood.  But this topic changes in legs.  Ok with mental status, language, but slightly soft speech, CNII-XII intact except staring and decreased eye blink, mask face, normal muscle bulk and strength in 4 extremities with rigidity. No hand tremors today but in the past has had hand tremors greater in right. There is no focal sensory difficulties in 4 extremities. Gait wide-based and a little shuffling.  Decreased balance.  No significant stooping.     Slightly [place] and hypokinesia. No  freezing. NO dyskinesia.

## 2021-06-13 NOTE — PROGRESS NOTES
"Telephone Visit  Silver Zamora is a 70 y.o. male who is being evaluated via a billable telephone visit in light of the ongoing global health crisis (COVID-19) that requires us to abide by social distancing mandates in order to reduce the risk of COVID-19 exposure.       The patient has been notified of following:     \"This telephone visit will be conducted via a telephone call between you and your physician/provider. We have found that certain health care needs can be provided without the need for a physical exam.  This service lets us provide the care you need with a short phone conversation.  If a prescription is necessary we can send it directly to your pharmacy.  If lab work is needed we can place an order for that and you can then stop by our lab to have the test done at a later time.    If during the course of the phone call the physician/provider feels a telephone visit is not appropriate, you will not be charged for this service.\"     Patient has given verbal consent to a Telephone visit? Yes    Silver Zamora chief complaint is Parkinson's Disease    ALLERGIES  Clonazepam    Neuropsychological assessment completed    Yes   Currently doing PT  No   Completed No   Currently doing OT  No   Completed No    Currently doing ST   No   Completed No   Psychology  No    Done where:     Any new medication (other provider):   Yes, Fluoxetine. He also stopped taking metoprolol because his cardiologist took him off it.    Over all how would you rate your Parkinson's Symptoms? It's off and on.    Any concerns you would like to be addressed at this appointment? Patient's wife said he took a fall on the 18th, his foot ulcer is getting better and she'd like to discuss his sleep.                                                       Phone Start Time: 12:42pm    Phone End Time:  12:49pm    Total time of phone conversation: 7 minutes    Phone number the patient would like to use:  [phone]     Lewis Kilpatrick [facility]     Vitals: " "   11/20/20 1245   Weight: 211 lb (95.7 kg)   Height: 5' 10\" (1.778 m)       Outpatient Follow up Parkinson's Dx Evaluation     Pertinent History:   Patient is right-handed.  He was diagnosed with Parkinson's disease in 2004 at the age of about 54.  He feels that the symptoms may have started in about 1994.  Symptoms started with stiffness, slowness, soft speech, walking difficulties, and left hand tremor which spread to the right and about 2015.  Has seen Dr. Suazo in the past.  He has some bruising and tremors occasionally but there is asymmetry.  He tried Sinemet CR during the daytime and at night which did not work well.  He is been off the pramipexole since 12/2015 due to memory and behavioral concerns.  December 2010 he was taking Sinemet IR 25/100 and increased to 25/250 with a good response which also improved his back pain.  He was able to be more active.  In 2011 he was evaluated at the [facility] by , he was noted to have U PDR S score of 41 after all Parkinson's medications.  It was suggested that he take carbidopa levodopa every 3 hours.  Amantadine was discontinued because he did not have dyskinesia.  It was suggested that he switch his selegiline to Azilect or discontinue all MAO inhibitors.  It is  felt that his increased Sinemet may have exasperated his restless leg syndrome.  Gabapentin was started to control his restless leg symptoms.  He was followed by Dr. Suazo from 2002 - 2011.  He also saw Dr. Zuniga and a previous nurse practitioner at this clinic.  He did transfer care to  in 2013.  He has tried amantadine and selegiline in the past.  He is not able to tolerate Requip, which he tried for several years at a relatively high dose but discontinued due to edema in lower extremities, he also tried Mirapex in 2015 but this caused more freezing and increased tremor.  CAT scan in July 2013 showed absent radiotracer in the putamen with diminished accumulation in the " caudate, most consistent with Parkinson's disease.  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      Is patient on a controlled substance   No     Subjective:          HPI: The patient returns to the Parkinson's clinic for follow-up visit.  He was last seen by me on 10/6/2020 no medication changes were made at that appointment.  Wife reports that the patient's foot is a lot better they have been going to orthopedic doctor is helping with the wound.  Patient reports that he is tired more wife would like to decrease gabapentin to see if this helps the patient wife also reports that the patient has no hallucinations.  She is considering lowering the Nuplazid.  The patient has had some freezing episodes and did have a fall.  ,     We discussed some treatment options and have elected to decrease Gabapentin.      Current Medications: Please see chart. Medications personally reviewed.     Medication Compliance: Yes    Patient Active Problem List    Diagnosis Date Noted     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) 08/21/2020     Acquired absence of other right toe(s) (H) 01/05/2020     Ulcer of right foot, unspecified ulcer stage (H) 09/16/2019     Cord compression myelopathy (H) 09/16/2019     Moderate major depression (H) 09/16/2019     Cervical stenosis of spinal canal 02/07/2019     Ischemic cardiomyopathy 12/14/2018     Heart failure with reduced ejection fraction (H) 12/14/2018     Coronary artery disease involving native coronary artery of native heart with angina pectoris (H)      Abnormal nuclear stress test 10/25/2018     Abnormal echocardiogram 05/29/2018     Leukocytosis, unspecified type      Cholecystitis 05/23/2018     Choledocholithiasis 05/23/2018     Calculus of gallbladder with biliary obstruction but without cholecystitis      Weakness      Low vitamin D level 10/11/2017     RBD (REM behavioral disorder) 04/03/2017     Pancreatitis 12/31/2015      Parkinson disease (H) 07/30/2014     Back pain 07/30/2014     Depression 07/30/2014     Anxiety state, unspecified 07/30/2014     Hypothyroidism 07/30/2014     Past Medical History:   Diagnosis Date     Anxiety      Callus      Cardiomyopathy (H)      Chronic back pain      Delirium      Depression      Fall      GERD (gastroesophageal reflux disease)      Hypothyroidism      Pancreatitis 12/31/2015     PD (Parkinson's disease) (H)      RBD (REM behavioral disorder) 4/3/2017     Shingles     hx     Past Surgical History:   Procedure Laterality Date     CV CORONARY ANGIOGRAM N/A 10/31/2018    Procedure: Coronary Angiogram;  Surgeon: Jose Meyer MD;  Location: [facility];  Service:      CV LEFT HEART CATHETERIZATION WO LEFT VETRICULOGRAM Left 10/31/2018    Procedure: Left Heart Catheterization Without Left Ventriculogram;  Surgeon: Jose Meyer MD;  Location: [facility];  Service:      INGUINAL HERNIA REPAIR Bilateral 10/2013     LAMINECTOMY  10/2013     SC ERCP REMOVE CALCULI/DEBRIS BILIARY/PANCREAS DUCT N/A 5/25/2018    Procedure: ENDOSCOPIC RETROGRADE CHOLANGIOPANCREATOGRAPHY SPINCTEROTOMY BILIARY STONE EXTRACTION;  Surgeon: Curtis Mcclain MD;  Location: [facility];  Service: Gastroenterology     SC LAP,CHOLECYSTECTOMY/GRAPH N/A 5/24/2018    Procedure: LAPAROSCOPIC CHOLECYSTECTOMY;  Surgeon: Joyce Melissa MD;  Location: [facility] OR;  Service: General     Family History   Problem Relation Age of Onset     Kidney failure Mother      Heart disease Father 82     Tremor Father      Parkinsonism Paternal Grandfather      Tremor Sister      Leukemia Maternal Grandmother      Current Outpatient Medications   Medication Sig Dispense Refill     acetaminophen (TYLENOL) 325 MG tablet Take 2 tablets (650 mg total) by mouth every 4 (four) hours as needed.  0     ammonium lactate (AMLACTIN) 12 % cream Apply two times a day 385 g 2     amoxicillin-clavulanate (AUGMENTIN) 875-125 mg  per tablet        aspirin 81 mg chewable tablet Chew 1 tablet (81 mg total) daily.  0     atorvastatin (LIPITOR) 80 MG tablet Take 1 tablet (80 mg total) by mouth daily. 90 tablet 1     bisacodyl (DULCOLAX) 10 mg suppository One supp SC qday prn constipation.  Give if no BM in prior 3 days.  0     carbidopa-levodopa (PARCOPA)  mg per disintegrating tablet Take 1 tablet by mouth as needed. 90 tablet 3     cholecalciferol, vitamin D3, (VITAMIN D3) 2,000 unit Tab Take 1 tablet (2,000 Units total) by mouth daily.  0     diaper,brief,adult,disposable (BRIEFS) Misc Use 1 Units As Directed 2 (two) times a day as needed. 45 each 6     DULoxetine (CYMBALTA) 20 MG capsule Take 1 capsule (20 mg total) by mouth 2 (two) times a day. 60 capsule 3     fluticasone propionate (FLONASE) 50 mcg/actuation nasal spray 2 sprays into each nostril daily. 16 g 3     gabapentin (NEURONTIN) 100 MG capsule TAKE ONE CAPSULE BY MOUTH TWICE A DAY 60 capsule 2     levothyroxine (SYNTHROID, LEVOTHROID) 137 MCG tablet TAKE ONE TABLET BY MOUTH ONCE DAILY AT 6 A.M. 90 tablet 3     nitroglycerin (NITROSTAT) 0.4 MG SL tablet Place 1 tablet (0.4 mg total) under the tongue every 5 (five) minutes as needed for chest pain. 20 tablet 1     NUPLAZID 34 mg cap capsule TAKE 1 CAPSULE BY MOUTH ONE TIME A DAY AT BEDTIME 30 capsule 4     nystatin (MYCOSTATIN) cream Apply to affected area of groin three times a day x 10 days 30 g 1     polyethylene glycol (GLYCOLAX) 17 gram/dose powder Take 17 g by mouth daily. 1530 g 6     RYTARY 61. mg CpER ER capsule TAKE TWO CAPSULES BY MOUTH FOUR TIMES A  capsule 2     senna-docusate (SENNOSIDES-DOCUSATE SODIUM) 8.6-50 mg tablet Take 1 tablet by mouth 2 (two) times a day.             sildenafiL (VIAGRA) 50 MG tablet Take 1 tablet (50 mg total) by mouth as needed for erectile dysfunction. 6 tablet 1     triamcinolone (KENALOG) 0.025 % ointment Apply topically 2 (two) times a day. 15 g 3     No current  facility-administered medications for this encounter.        Allergies   Allergen Reactions     Clonazepam      Too drowsy     Social History     Socioeconomic History     Marital status:      Spouse name: Not on file     Number of children: Not on file     Years of education: Not on file     Highest education level: Not on file   Occupational History     Not on file   Social Needs     Financial resource strain: Not on file     Food insecurity     Worry: Not on file     Inability: Not on file     Transportation needs     Medical: Not on file     Non-medical: Not on file   Tobacco Use     Smoking status: Former Smoker     Types: Cigarettes     Quit date: 1980     Years since quittin.9     Smokeless tobacco: Never Used   Substance and Sexual Activity     Alcohol use: No     Comment: one drink a week     Drug use: No     Sexual activity: Not on file   Lifestyle     Physical activity     Days per week: Not on file     Minutes per session: Not on file     Stress: Not on file   Relationships     Social connections     Talks on phone: Not on file     Gets together: Not on file     Attends Yarsanism service: Not on file     Active member of club or organization: Not on file     Attends meetings of clubs or organizations: Not on file     Relationship status: Not on file     Intimate partner violence     Fear of current or ex partner: Not on file     Emotionally abused: Not on file     Physically abused: Not on file     Forced sexual activity: Not on file   Other Topics Concern     Not on file   Social History Narrative    Patient lives with his wife Luz Marina       Review of Systems  A comprehensive review of systems was negative except for:what is noted above    Mental Status Exam:   Appearance:   Patient is appropriately groomed and alert sitting for appointment.  Behavior:   .cooperative and pleasant, no apparent agitation, no irritability, denies any recent behavioral difficulties  Speech: .slow and soft  speech, able to participate in conversation  Mood/Affect: .flat, no obvious significant depression or anxiety  Thought Content: .no psychotic symptoms were evident, patient and wife do state that the patient did have some hallucinations    Suicidal or Homicidal Thoughts:   .no evidence of suicidal or homicidal ideations, patient denies any suicidal ideations  Thought Process/Formulation:  .adequate, able to process information, no evidence of racing thoughts  Associations: .adequate, processing information and participating in conversation  Fund of Knowledge:  .intact, following conversation, adequate depth of understanding  Attention/Concentration: .slightly distractible, concentration is poor  Insight: .slightly impaired  Judgement:  .slightly impaired  Memory:   . .slight impairment   Motor Status:  .no noted tremor or dyskinesia   Orientation: .oriented to person, place, time and situation .  .  Diagnosis managed and treated at today's visit   Parkinson's disease  Anxiety d/t a medical condition  Chronic pain  Mild cognitive impairment  High risk for falls  Tremor  Foot ulcer  Physical deconditioning  Insomnia    Plan:  Medication Adjustment:  Lower Nuplazid, stop gabapentine    Other:   Patient will return to clinic in 5 weeks. They agree to call or return sooner with any questions or concerns.  Risks and benefits were discussed.  Continue with his individual therapist.     Continue with the support of the clinic, reassurance, and redirection. Staff monitoring and ongoing assessments per team plan. Current psychotropic medication appears to represent the minimum effective dosage and appears medically necessary. We will continue to monitor and reassess. This team will utilize appropriate emergency services if necessary. I will make myself available if concerns or problems arise.    Telephone Visit Details    Type of service: Telephone Visit    Phone Start Time: 1300    Phone End Time:  1325    Total time for  telephone call: 25 minutes    Originating Location: Patient's home    Distant Location:  [facility] [place]    Mode of Communication: Telephone call    General Information:  Today you had your appointment with Kelli Walker CNP     If lab work was done today as part of your evaluation you will generally be contacted via My Chart, mail, or phone with the results within 1-5 days. If there is an alarming result we will contact you by phone. Lab results come back at varying times, I generally wait until all labs are resulted before making comments on results. Please note labs are automatically released to My Chart once available.     If you need refills please contact your pharmacist. They will send a refill request to me to review. Please allow 3 business days for us to process all refill requests.     Please call or send a medical message through My Chart, with any questions or concerns    If you need any paperwork completed please fax forms to [phone]. Please state if you would like a copy of the completed paperwork, mailed or faxed back to the patient and a fax number to fax the paperwork to. Please allow up to 10 days for paperwork to be completed.    Kelli Walker CNP

## 2021-06-13 NOTE — PROGRESS NOTES
How have you been doing since we last saw you? any concerns? Pt would like to discuss medications and the dosages and times of the day.  Pt has been having balancing issues and was in his primary doc yesterday and has low vitamin d       Current Symptoms : Yes

## 2021-06-13 NOTE — PROGRESS NOTES
ASSESSMENT: Onychauxis, callus, altered sensation in the lower extremities because of Parkinson's, history of partial right third and fourth toe amputations, foot pain.    PLAN: Toenails were debrided manually and mechanically x8. Return to clinic in nine weeks.         SUBJECTIVE: The patient returns to the [place] clinic with toenails that are long, thick and painful. In 2015 the distal portions of the right third and fourth toes were amputated because of ulceration and infection.  Diminished sensation in the feet because of Parkinson's.  He is not diabetic.  Last nail debridement in the clinic was August 15, 2017.    OBJECTIVE:  General: Pleasant 67 y.o. male in no acute distress.  Vascular: DP pulses are absent. PT pulses are diminished. Pedal hair is absent. Feet are warm to the touch.  Cardiac: Pulse is regular.  Lymphatic: No edema at the ankles.  Neuro: Sensation in the feet is altered. Patient describes paresthesias.  Derm: Toenails are elongated, thickened and dystrophic with discoloration and subungual debris. Skin is thin and shiny but intact.  Mild hyperkeratosis under the right bunion.  Musculoskeletal: Partial amputation of the right third and fourth toes.  Hallux valgus bilaterally.

## 2021-06-13 NOTE — TELEPHONE ENCOUNTER
Refill Approved    Rx renewed per Medication Renewal Policy. Medication was last renewed on 11/29/2019.  Last office visit was 11/04/2020 with PCP.    Janette Jarquin, [facility] Triage/Med Refill 11/21/2020     Requested Prescriptions   Pending Prescriptions Disp Refills     levothyroxine (SYNTHROID, LEVOTHROID) 137 MCG tablet [Pharmacy Med Name: LEVOTHYROXINE SODIUM 137MCG TABS] 90 tablet 3     Sig: TAKE ONE TABLET BY MOUTH ONCE DAILY AT 6 A.M.       Thyroid Hormones Protocol Passed - 11/20/2020 11:13 AM        Passed - Provider visit in past 12 months or next 3 months     Last office visit with prescriber/PCP: 1/2/2020 Valentín Washington MD OR same dept: 1/2/2020 Valentín Washington MD OR same specialty: 8/21/2020 Taylor Jackson MD  Last physical: 11/4/2020 Last MTM visit: Visit date not found   Next visit within 3 mo: Visit date not found  Next physical within 3 mo: Visit date not found  Prescriber OR PCP: Valentín Washington MD  Last diagnosis associated with med order: 1. Hypothyroidism  - levothyroxine (SYNTHROID, LEVOTHROID) 137 MCG tablet [Pharmacy Med Name: LEVOTHYROXINE SODIUM 137MCG TABS]; TAKE ONE TABLET BY MOUTH ONCE DAILY AT 6 A.M.  Dispense: 90 tablet; Refill: 3    If protocol passes may refill for 12 months if within 3 months of last provider visit (or a total of 15 months).             Passed - TSH on file in past 12 months for patient age 12 & older     TSH   Date Value Ref Range Status   11/04/2020 2.88 0.30 - 5.00 uIU/mL Final

## 2021-06-13 NOTE — TELEPHONE ENCOUNTER
Pt & wife calling    Past couple of days pt seems more fatigued  No fever  Sleeping a bit more during the day,  Restless at night  Decreased appetite over past 2-3 days,  improving today  Wife is wondering if he should be tested for covid.  Grandchildren do come over but there is limited contact with them  Luz Marina will watch him over the next 24 hrs to see if there is improvement in mood,energy & appetite & call back if needed  Luz Marina requests this note be sent to PCP as FYI to see if PCP has any input or suggestions.  Marina Roberts RN  [facility] Nurse Advisor        Reason for Disposition    MILD weakness or fatigue with acute minor illness (e.g., colds)    Additional Information    Negative: SEVERE difficulty breathing (e.g., struggling for each breath, speaks in single words)    Negative: Shock suspected (e.g., cold/pale/clammy skin, too weak to stand, low BP, rapid pulse)    Negative: Difficult to awaken or acting confused (e.g., disoriented, slurred speech)    Negative: Fainted > 15 minutes ago and still feels too weak or dizzy to stand    Negative: SEVERE weakness (i.e., unable to walk or barely able to walk, requires support) and new onset or worsening    Negative: Sounds like a life-threatening emergency to the triager    Negative: Weakness of the face, arm or leg on one side of the body    Negative: Has diabetes and weakness from low blood sugar (i.e., < 60 mg/dL or 3.5 mmol/L)    Negative: Recent heat exposure, suspected cause of weakness    Negative: Vomiting is the main symptom    Negative: Diarrhea is the main symptom    Negative: Difficulty breathing    Negative: Heart beating < 50 beats per minute OR > 140 beats per minute    Negative: Extra heartbeats OR irregular heart beating (i.e., 'palpitations')    Negative: Follows bleeding (e.g., from vomiting, rectum, vagina)    Negative: Bloody, black, or tarry bowel movements    Negative: MODERATE weakness from poor fluid intake with no improvement after 2 hours  of rest and fluids    Negative: Drinking very little and dehydration suspected (e.g., no urine > 12 hours, very dry mouth, very lightheaded)    Negative: Patient sounds very sick or weak to the triager    Negative: MODERATE weakness (i.e., interferes with work, school, normal activities) and cause unknown    Negative: Fever > 103 F (39.4 C) and not able to get the fever down using CARE ADVICE    Negative: Fever > 100.0 F (37.8 C) and bedridden (e.g., nursing home patient, stroke, chronic illness, recovering from surgery)    Negative: Fever > 101 F (38.3 C) and over 60 years of age    Negative: Fever > 100.0 F (37.8 C) and diabetes mellitus or weak immune system (e.g., HIV positive, cancer chemo, splenectomy, organ transplant, chronic steroids)    Negative: Pale skin (pallor)    Negative: MODERATE weakness (i.e., interferes with work, school, normal activities) and persists > 3 days    Negative: Taking a medicine that could cause weakness (e.g., blood pressure medications, diuretics)    Negative: Patient wants to be seen    Negative: MILD weakness (i.e., does not interfere with ability to work, go to school, normal activities) and persists > 1 week    Negative: Fatigue (i.e., tires easily, decreased energy) and persists > 1 week    Negative: Weakness is a chronic symptom (recurrent or ongoing AND lasting > 4 weeks)    Negative: Fatigue is a chronic symptom (recurrent or ongoing AND present > 4 weeks)    Protocols used: WEAKNESS (GENERALIZED) AND FATIGUE-A-OH

## 2021-06-13 NOTE — PROGRESS NOTES
Assessment/ Plan     1. Hypothyroidism    Check a thyroid cascade.  Adjust levothyroxine appropriately  - Thyroid Cascade    2. Parkinson disease    Recommend he continue to follow the plan from neurology  Continue Sinemet  Reviewed side effects    3. Fatigue, may be multifactorial  His history of hypothyroidism as well as Parkinson's disease  He has had low vitamin D levels    Reviewed previous laboratory testing  Check vitamin D and B12 level  - Vitamin B12  Consider further evaluation including an echocardiogram if there are ongoing concerns    4. Low vitamin D level    Check a vitamin D level  Recommend oral vitamin D supplementation  - Vitamin D, Total (25-Hydroxy)    5. Colon cancer screening    Check a Cologuard test  - Wally  Have reviewed colon cancer screening and would favor a colonoscopy but he agrees to the Cologuard test    25 minutes were spent with the patient and greater than 50% of the time was spent in face to face counseling and coordination of care        Subjective:       Silver Zamora is a 67 y.o. male who presents to the clinic in follow-up with his wife for multiple concerns.  He is due for a thyroid test and has been taking levothyroxine.  He has a complex medical history.  He most recently presented for complete physical examination in May 2017.  His wife Luz Marina is present.  His medical history is notable for Parkinson's disease, depression, anxiety, hypothyroidism, and low testosterone levels.  He has followed up with neurology for Parkinson syndrome that was diagnosed back in 2004.  He continues to follow-up with Dr. Gomez, [facility].  He currently is being treated with Sinemet 25/103 pills 4 times a day.  He also has followed up with Dr. Joe Tamayo for depression and memory concerns.  He has been taking Effexor on a regular basis and his mood has generally been stable.     He does note that he can be quite fatigued.  When he presented for his complete physical examination  his testosterone level was adequately replaced and borderline elevated.  His total cholesterol is 169 with an LDL of 106.  His glucose was mildly elevated.  His hemoglobin was 16.9 white blood cell count was 7300.  His thyroid test was normal.  At times, he can miss doses.  He denies recent fever, chest pain, or facial pain.  Denies shortness of breath.  Remainder review of systems negative for other concerns.    The following portions of the patient's history were reviewed and updated as appropriate: allergies, current medications, past family history, past medical history, past social history, past surgical history and problem list.    Review of Systems   A 12 point comprehensive review of systems was negative except as noted.      Current Outpatient Prescriptions   Medication Sig Dispense Refill     carbidopa-levodopa (SINEMET)  mg per tablet Take 3 tablets by mouth 4 (four) times a day. At 0200, 0800, 1200, 1600, and then Sinement CR  mg @ 2100 (5 doses total doses per day)       entacapone (COMTAN) 200 mg tablet Take 200 mg by mouth 5 (five) times a day. with each Sinemet dose       gabapentin (NEURONTIN) 300 MG capsule TAKE 1 CAPSULE (300 MG TOTAL) BY MOUTH 4 (FOUR) TIMES A DAY. 360 capsule 0     levothyroxine (SYNTHROID, LEVOTHROID) 137 MCG tablet Take 137 mcg by mouth daily.       melatonin 2.5 mg Chew Chew.       meloxicam (MOBIC) 7.5 MG tablet Take 1 tablet (7.5 mg total) by mouth as needed for pain. Take 1-2 tablets daily PRN 90 tablet 3     multivitamin (MULTIVITAMIN) per tablet Take 1 tablet by mouth daily.       omeprazole (PRILOSEC) 20 MG capsule TAKE 1 CAPSULE BY MOUTH ONCE DAILY 90 capsule 2     polyethylene glycol (MIRALAX) 17 gram packet Take 17 g by mouth daily.       venlafaxine (EFFEXOR-XR) 150 MG 24 hr capsule Take 1 capsule (150 mg total) by mouth daily. Take with 75mg cap (225mg) 90 capsule 3     venlafaxine (EFFEXOR-XR) 75 MG 24 hr capsule Take 1 capsule (75 mg total) by mouth  "daily. Take with 150mg cap 90 capsule 3     Current Facility-Administered Medications   Medication Dose Route Frequency Provider Last Rate Last Dose     testosterone cypionate 200 mg/mL injection 200 mg (DEPOTESTOTERONE CYPIONATE)  200 mg Intramuscular Q14 Days Valentín Washington MD   200 mg at 07/28/17 1534       Objective:      /76  Pulse 88  Temp 98.6  F (37  C) (Oral)   Resp 16  Ht 5' 8\" (1.727 m)  Wt 213 lb (96.6 kg)  BMI 32.39 kg/m2      General appearance: alert, appears stated age and cooperative  Head: Normocephalic, without obvious abnormality, atraumatic  Eyes: conjunctivae/corneas clear. PERRL, EOM's intact.   Ears: normal TM's and external ear canals both ears  Nose: Nares normal. Septum midline. Mucosa normal. No drainage or sinus tenderness.  Throat: lips, mucosa, and tongue normal; teeth and gums normal  Neck: no adenopathy, supple, symmetrical, trachea midline and thyroid not enlarged, symmetric, no tenderness/mass/nodules  Back: symmetric, no curvature. ROM normal  Lungs: clear to auscultation bilaterally  Heart: regular rate and rhythm, S1, S2 normal, no murmur, click, rub or gallop  Abdomen: soft, non-tender; bowel sounds normal; no masses,  no organomegaly  Extremities: extremities normal, atraumatic, no cyanosis or edema  Neurologic: Alert and oriented X 3. Normal coordination and gait         No results found for this or any previous visit (from the past 168 hour(s)).       This note has been dictated using voice recognition software. Any grammatical or context distortions are unintentional and inherent to the software  "

## 2021-06-13 NOTE — TELEPHONE ENCOUNTER
Agree with the plan to continue to monitor. He can be considered for testing if there are ongoing concerns. His recent laboratory testing looked very good.

## 2021-06-13 NOTE — PROGRESS NOTES
Assessment / Impression   1.  Parkinson's disease  2.  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  Depression NOS    Plan:   1.  I agree with physical therapy and wellness programming to address amotivation  2.  Continue present therapies for depression      Long conversation held with the patient and wife in attendance.  Ritalin therapy provided of 5 mg every 8 a.m. and 12 noon dose did not improve patient's motivation as might be expected.  No change in mood noted.  I informed the patient that I am less inclined to believe that depression is the culprit behind a motivation as opposed to Parkinson's disease.  I am not in favor of a trial of higher dose Ritalin for concern regarding potential adverse effects and given what I proceed to be as low likelihood of success.  Currently Dr. Gomez has recommended physical therapy and minimizing sedating medicines in the morning is a reasonable next step and I would agree.    Total time for this evaluation 25 minutes with majority time spent in counseling.      Subjective:     HPI: Silver Zamora is a 67 y.o. male with above-noted diagnoses who is seen in follow-up.  The patient received no benefit from psychostimulant augmentation of antidepressant therapy in terms of improving motivation.  There has been some concerns about lack of motivation relates to untreated depression but I believe this to be unlikely based on the patient's reports at this time.  I also mentioned to the patient that psychostimulant augmentation of antidepressant therapies tends to be rapidly effective in cases of a motivation secondary to mood disorder.    Patient continues to be plagued by difficulty with sleep as well as constipation.  Sleep quality is a bit better with supplemental melatonin however.  Motorically the patient appears to be fairly stable.          Review of Systems:          As above        Objective:     Vitals:    11/01/17 1152   BP: 156/86   Pulse: 81    Weight: 218 lb (98.9 kg)       No results found for this or any previous visit (from the past 24 hour(s)).    Physical Exam: Patient is reasonably neatly groomed casually dressed and seated for evaluation.  He is alert and generally cooperative.  Mild confusion is noted during the course of conversation but without fluctuation in level of consciousness.  Affect is constricted mood appears to be euthymic at this time although the patient does report brief periods of depression intermittently.

## 2021-06-13 NOTE — TELEPHONE ENCOUNTER
Relayed message below from Dr. Washington to patient's wife Luz Marina. Luz Marina voiced understanding.

## 2021-06-14 NOTE — TELEPHONE ENCOUNTER
----- Message from Christina Jett sent at 1/28/2021  4:22 PM CST -----      Caller: Wife  Primary cardiologist: Dr. Barraza    Detailed reason for call: Wife is wondering if they could cut the atorvastatin (LIPITOR) 80 MG tablet down to 40mg    Best phone number: [phone]  Best time to contact: today  Ok to leave a detailed message? yes  Device? No    Additional Info:

## 2021-06-14 NOTE — TELEPHONE ENCOUNTER
Spoke to pts wife and relayed MD message. She will check with Dr Barraza before decreasing medication

## 2021-06-14 NOTE — TELEPHONE ENCOUNTER
I just saw Silver for a virtual visit.  I would like to check a urinalysis.  They have connections with the [facility].  I would like you to please fax the order to their lab so that this urinalysis can be done since they are on a different version of [facility].  The fax number is [phone].  Please fax the order.  Please also make sure there is a return fax number so that they can send the report back to me later today.  I gave the patient our fax #[phone].  Please let him know that is incorrect.  Once you have sent the order please tell the patient so that they can go  the container and drop off a sample.  Please let me know if there is a problem. Thank you!

## 2021-06-14 NOTE — TELEPHONE ENCOUNTER
Please call.  It is true that statins such as atorvastatin can contribute to muscle aches and pains and could be contributing to his leg pain.  (He is on the maximum dose). There are other possible causes as well including referred pain from his spine or possible musculoskeletal pain related to other causes.  He can consider decreasing to 40 mg over the next 3 weeks and see if his symptoms improve.  However, recommend that he communicate with his cardiologist, Dr. Barraza regarding this to make sure she is aware.

## 2021-06-14 NOTE — PROGRESS NOTES
"Telephone Visit  Silver Zamora is a 70 y.o. male who is being evaluated via a billable telephone visit in light of the ongoing global health crisis (COVID-19) that requires us to abide by social distancing mandates in order to reduce the risk of COVID-19 exposure.       The patient has been notified of following:     \"This telephone visit will be conducted via a telephone call between you and your physician/provider. We have found that certain health care needs can be provided without the need for a physical exam.  This service lets us provide the care you need with a short phone conversation.  If a prescription is necessary we can send it directly to your pharmacy.  If lab work is needed we can place an order for that and you can then stop by our lab to have the test done at a later time.    If during the course of the phone call the physician/provider feels a telephone visit is not appropriate, you will not be charged for this service.\"     Patient has given verbal consent to a Telephone visit? Yes    Silver Zamora chief complaint is Parkinson's Disease    ALLERGIES  Clonazepam    Neuropsychological assessment completed    Yes   Currently doing PT  No   Completed No   Currently doing OT  No   Completed No    Currently doing ST   No   Completed No   Psychology  No      Any new medication (another provider):   No   Meds started at last appointment  No   Meds changed at last appointment    Yes Lower Nuplazid, stop gabapentin Is patient still taking:  No  Results: discharge Nuplazid completely Taking 1 of the gabapentin at night.      Is patient on a controlled substance   No     Any concerns you would like to be addressed at this appointment?  Pt was recently seen for hallucinations and they thought that he had a UTI, it ended up being dehydration.  She is wondering if pt is on too much carbidopa levodopa which could be causing those.  He is no longer taking nuplazid.  He is taking 1 100 mg tablet of gabapentin for " restless leg.                                                        Phone Start Time: 1:10pm    Phone End Time:  1:20pm    Total time of phone conversation: 10 minutes    Phone number the patient would like to use:  [phone]    Candis Joshi CMA     There were no vitals filed for this visit.    Outpatient Follow up Parkinson's Dx Evaluation     Pertinent History:    Patient is right-handed.  He was diagnosed with Parkinson's disease in 2004 at the age of about 54.  He feels that the symptoms may have started in about 1994.  Symptoms started with stiffness, slowness, soft speech, walking difficulties, and left hand tremor which spread to the right and about 2015.  Has seen Dr. Suazo in the past.  He has some bruising and tremors occasionally but there is asymmetry.  He tried Sinemet CR during the daytime and at night which did not work well.  He is been off the pramipexole since 12/2015 due to memory and behavioral concerns.  December 2010 he was taking Sinemet IR 25/100 and increased to 25/250 with a good response which also improved his back pain.  He was able to be more active.  In 2011 he was evaluated at the [facility] by , he was noted to have U PDR S score of 41 after all Parkinson's medications.  It was suggested that he take carbidopa levodopa every 3 hours.  Amantadine was discontinued because he did not have dyskinesia.  It was suggested that he switch his selegiline to Azilect or discontinue all MAO inhibitors.  It is  felt that his increased Sinemet may have exasperated his restless leg syndrome.  Gabapentin was started to control his restless leg symptoms.  He was followed by Dr. Suazo from 2002 - 2011.  He also saw Dr. Zuniga and a previous nurse practitioner at this clinic.  He did transfer care to  in 2013.  He has tried amantadine and selegiline in the past.  He is not able to tolerate Requip, which he tried for several years at a relatively high dose but  "discontinued due to edema in lower extremities, he also tried Mirapex in 2015 but this caused more freezing and increased tremor.  CAT scan in July 2013 showed absent radiotracer in the putamen with diminished accumulation in the caudate, most consistent with Parkinson's disease.  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      Is patient on a controlled substance that I prescribe? No       Subjective:          HPI: The patient returns to the Parkinson's clinic for follow-up visit, he was last seen by me on 11/20/2020, at that appointment I lowered Nuplazid and stopped gabapentin.  Wife reports that Nuplazid has been stopped and have noted any side effects.  Wife reports that the patient is now scared of falling.  Wife also reports that is harder for the patient to get out of the chair.  Wife and patient deny any signs or symptoms of increasing depression or anxiety.  The patient did have increased confusion he was tested for urinary tract infection, this came out negative but is thought that the patient was dehydrated.  Wife reports that the patient is slowing down with his walking.  Wife would like to know if she could reduce Rytary if the patient is going to sleep, I did state that this is okay.  Patient and wife report that the patient sleep is \"up-and-down\".    We discussed some treatment options and have elected to start Nuplazid, reduce Rytary if patient is just sleeping.      Current Medications: Please see chart. Medications personally reviewed.     Medication Compliance: Yes    Patient Active Problem List    Diagnosis Date Noted     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) 08/21/2020     Acquired absence of other right toe(s) (H) 01/05/2020     Ulcer of right foot, unspecified ulcer stage (H) 09/16/2019     Cord compression myelopathy (H) 09/16/2019     Moderate major depression (H) 09/16/2019     Cervical stenosis of spinal canal 02/07/2019     " Ischemic cardiomyopathy 12/14/2018     Heart failure with reduced ejection fraction (H) 12/14/2018     Coronary artery disease involving native coronary artery of native heart with angina pectoris (H)      Abnormal nuclear stress test 10/25/2018     Abnormal echocardiogram 05/29/2018     Leukocytosis, unspecified type      Cholecystitis 05/23/2018     Choledocholithiasis 05/23/2018     Calculus of gallbladder with biliary obstruction but without cholecystitis      Weakness      Low vitamin D level 10/11/2017     RBD (REM behavioral disorder) 04/03/2017     Pancreatitis 12/31/2015     Parkinson disease (H) 07/30/2014     Back pain 07/30/2014     Depression 07/30/2014     Anxiety state, unspecified 07/30/2014     Hypothyroidism 07/30/2014     Past Medical History:   Diagnosis Date     Anxiety      Callus      Cardiomyopathy (H)      Chronic back pain      Delirium      Depression      Fall      GERD (gastroesophageal reflux disease)      Hypothyroidism      Pancreatitis 12/31/2015     PD (Parkinson's disease) (H)      RBD (REM behavioral disorder) 4/3/2017     Shingles     hx     Past Surgical History:   Procedure Laterality Date     CV CORONARY ANGIOGRAM N/A 10/31/2018    Procedure: Coronary Angiogram;  Surgeon: Jose Meyer MD;  Location: [facility];  Service:      CV LEFT HEART CATHETERIZATION WO LEFT VETRICULOGRAM Left 10/31/2018    Procedure: Left Heart Catheterization Without Left Ventriculogram;  Surgeon: Jose Meyer MD;  Location: [facility];  Service:      INGUINAL HERNIA REPAIR Bilateral 10/2013     LAMINECTOMY  10/2013     OR ERCP REMOVE CALCULI/DEBRIS BILIARY/PANCREAS DUCT N/A 5/25/2018    Procedure: ENDOSCOPIC RETROGRADE CHOLANGIOPANCREATOGRAPHY SPINCTEROTOMY BILIARY STONE EXTRACTION;  Surgeon: Curtis Mcclain MD;  Location: [facility] OR;  Service: Gastroenterology     OR LAP,CHOLECYSTECTOMY/GRAPH N/A 5/24/2018    Procedure: LAPAROSCOPIC CHOLECYSTECTOMY;  Surgeon: Joyce  CONCHITA Melissa MD;  Location: [facility] OR;  Service: General     Family History   Problem Relation Age of Onset     Kidney failure Mother      Heart disease Father 82     Tremor Father      Parkinsonism Paternal Grandfather      Tremor Sister      Leukemia Maternal Grandmother      Current Outpatient Medications   Medication Sig Dispense Refill     acetaminophen (TYLENOL) 325 MG tablet Take 2 tablets (650 mg total) by mouth every 4 (four) hours as needed.  0     ammonium lactate (AMLACTIN) 12 % cream Apply two times a day 385 g 2     aspirin 81 mg chewable tablet Chew 1 tablet (81 mg total) daily.  0     atorvastatin (LIPITOR) 80 MG tablet Take 1 tablet (80 mg total) by mouth daily. 90 tablet 1     bisacodyl (DULCOLAX) 10 mg suppository One supp CA qday prn constipation.  Give if no BM in prior 3 days.  0     carbidopa-levodopa (PARCOPA)  mg per disintegrating tablet DISSOLVE 1 TABLET ON TONGUE ONCE DAILY AS NEEDED 90 tablet 3     cholecalciferol, vitamin D3, (VITAMIN D3) 2,000 unit Tab Take 1 tablet (2,000 Units total) by mouth daily.  0     diaper,brief,adult,disposable (BRIEFS) Misc Use 1 Units As Directed 2 (two) times a day as needed. 45 each 6     DULoxetine (CYMBALTA) 20 MG capsule Take 1 capsule (20 mg total) by mouth 2 (two) times a day. 60 capsule 3     fluticasone propionate (FLONASE) 50 mcg/actuation nasal spray 2 sprays into each nostril daily. 16 g 3     gabapentin (NEURONTIN) 100 MG capsule TAKE ONE CAPSULE BY MOUTH TWICE A DAY 60 capsule 2     levothyroxine (SYNTHROID, LEVOTHROID) 137 MCG tablet TAKE ONE TABLET BY MOUTH ONCE DAILY AT 6 A.M. 90 tablet 3     nitroglycerin (NITROSTAT) 0.4 MG SL tablet Place 1 tablet (0.4 mg total) under the tongue every 5 (five) minutes as needed for chest pain. 20 tablet 1     nystatin (MYCOSTATIN) cream Apply to affected area of groin three times a day x 10 days 30 g 1     polyethylene glycol (GLYCOLAX) 17 gram/dose powder Take 17 g by mouth daily. 1530 g 6      RYTARY 61. mg CpER ER capsule TAKE TWO CAPSULES BY MOUTH FOUR TIMES A  capsule 6     senna-docusate (SENNOSIDES-DOCUSATE SODIUM) 8.6-50 mg tablet Take 1 tablet by mouth 2 (two) times a day.             triamcinolone (KENALOG) 0.025 % ointment Apply topically 2 (two) times a day. 15 g 3     No current facility-administered medications for this encounter.        Allergies   Allergen Reactions     Clonazepam      Too drowsy     Social History     Socioeconomic History     Marital status:      Spouse name: Not on file     Number of children: Not on file     Years of education: Not on file     Highest education level: Not on file   Occupational History     Not on file   Social Needs     Financial resource strain: Not on file     Food insecurity     Worry: Not on file     Inability: Not on file     Transportation needs     Medical: Not on file     Non-medical: Not on file   Tobacco Use     Smoking status: Former Smoker     Types: Cigarettes     Quit date: 1980     Years since quittin.0     Smokeless tobacco: Never Used   Substance and Sexual Activity     Alcohol use: No     Comment: one drink a week     Drug use: No     Sexual activity: Not on file   Lifestyle     Physical activity     Days per week: Not on file     Minutes per session: Not on file     Stress: Not on file   Relationships     Social connections     Talks on phone: Not on file     Gets together: Not on file     Attends [employer] service: Not on file     Active member of club or organization: Not on file     Attends meetings of clubs or organizations: Not on file     Relationship status: Not on file     Intimate partner violence     Fear of current or ex partner: Not on file     Emotionally abused: Not on file     Physically abused: Not on file     Forced sexual activity: Not on file   Other Topics Concern     Not on file   Social History Narrative    Patient lives with his wife Luz Marina       Review of Systems  A comprehensive  review of systems was negative except for:what is noted above    Mental Status Exam:   Appearance: Phone appointment I did not visualize the patient  Behavior:   .cooperative and pleasant, no apparent agitation, no irritability, denies any recent behavioral difficulties  Speech: .slow and soft speech, able to participate in conversation  Mood/Affect: .flat, no obvious significant depression or anxiety  Thought Content: .no psychotic symptoms were evident, patient and wife do state that the patient did have some hallucinations    Suicidal or Homicidal Thoughts:   .no evidence of suicidal or homicidal ideations, patient denies any suicidal ideations  Thought Process/Formulation:  .adequate, able to process information, no evidence of racing thoughts  Associations: .adequate, processing information and participating in conversation  Fund of Knowledge:  .intact, following conversation, adequate depth of understanding  Attention/Concentration: .slightly distractible, concentration is poor  Insight: .slightly impaired  Judgement:  .slightly impaired  Memory:   . .slight impairment   Motor Status:  .no noted tremor or dyskinesia   Orientation: .oriented to person, place, time and situation .  .  Diagnosis managed and treated at today's visit   Parkinson's disease  Anxiety d/t a medical condition  Chronic pain  Mild cognitive impairment  High risk for falls  Tremor  Foot ulcer  Physical deconditioning  Insomnia    Plan:  Medication Adjustment:  Start Nuplazid    Total time spent with the patient today was 40 minutes with greater than 50% of the time spent in counseling and care coordination. The patient will call before then with any questions, concerns or problems. We will assess for the appropriateness of possible psychotropic medication trials/changes. The patient will seek out appropriate emergency services should that become necessary.    Other:   Patient will return to clinic in  3 months. They agree to call or return  sooner with any questions or concerns.  Risks and benefits were discussed.  Continue with his individual therapist.     Continue with the support of the clinic, reassurance, and redirection. Staff monitoring and ongoing assessments per team plan. Current psychotropic medication appears to represent the minimum effective dosage and appears medically necessary. We will continue to monitor and reassess. This team will utilize appropriate emergency services if necessary. I will make myself available if concerns or problems arise.    Telephone Visit Details    Type of service: Telephone Visit    Phone Start Time: 1330    Phone End Time: 1400    Total time for telephone call: 30 minutes    Originating Location: Patient's home    Distant Location:  [facility]    Mode of Communication: Telephone call    10 minutes spent on the date of the encounter doing chart review, review of outside records and documentation     General Information:  Today you had your appointment with Kelli Walker CNP     If lab work was done today as part of your evaluation you will generally be contacted via My Chart, mail, or phone with the results within 1-5 days. If there is an alarming result we will contact you by phone. Lab results come back at varying times, I generally wait until all labs are resulted before making comments on results. Please note labs are automatically released to My Chart once available.     If you need refills please contact your pharmacist. They will send a refill request to me to review. Please allow 3 business days for us to process all refill requests.     Please call or send a medical message through My Chart, with any questions or concerns    If you need any paperwork completed please fax forms to [phone]. Please state if you would like a copy of the completed paperwork, mailed or faxed back to the patient and a fax number to fax the paperwork to. Please allow up to 10 days  for paperwork to be completed.    Kelli Walker, CNP

## 2021-06-14 NOTE — PROGRESS NOTES
ASSESSMENT: Onychauxis, altered sensation in the lower extremities because of Parkinson's, history of partial right third and fourth toe amputations, foot pain.    PLAN: Toenails were debrided manually and mechanically x8. Return to clinic in March when he is back from [place].         SUBJECTIVE: The patient returns to the [facility] clinic with toenails that are long, thick and painful. In 2015 the distal portions of the right third and fourth toes were amputated because of ulceration and infection.  Diminished sensation in the feet because of Parkinson's.  He is not diabetic.  Last nail debridement in the clinic was October 17, 2017.    OBJECTIVE:  General: Pleasant 67 y.o. male in no acute distress.  Vascular: DP pulses are absent. PT pulses are diminished. Pedal hair is absent. Feet are warm to the touch.  Cardiac: Pulse is regular.  Lymphatic: No edema at the ankles.  Neuro: Sensation in the feet is altered. Patient describes paresthesias.  Derm: Toenails are elongated, thickened and dystrophic with discoloration and subungual debris. Skin is thin and shiny but intact.  Hyperkeratosis under the right bunion.  Musculoskeletal: Partial amputation of the right third and fourth toes.  Hallux valgus bilaterally.

## 2021-06-14 NOTE — TELEPHONE ENCOUNTER
Reason for call:  Patient reporting a symptom    Symptom or request: Leg pain    Duration (how long have symptoms been present): for a long period of time but not sure exact time since pt has parkinson's    Have you been treated for this before? No    Additional comments: Patient's wife was talking to the pharmacist and was told that atorvastatin (LIPITOR) 80 MG tablet could cause leg pain. She would like to know, since the patient's cholesterol looks a lot better if he can be decreased to 40mg.     Phone Number patient can be reached at:  Home number on file [phone] (home)    Best Time:      Can we leave a detailed message on this number: Yes    Call taken on 1/27/2021 at 11:04 AM by Rose Marie Norris

## 2021-06-14 NOTE — TELEPHONE ENCOUNTER
Rec'd return call from patient's wife Luz Marina - informed her of Dr. Barraza's response/recommendations - Luz Marina offered questions/concerns that were addressed and explained that patient's Parkinson's meds are presently being adjusted so she does not want to make any additional changes at this time - Luz Marina agreed to discuss chgs to statin with patient's PCP and call back if she agrees to proceed.  mg

## 2021-06-14 NOTE — PROGRESS NOTES
"Silver Zamora is a 70 y.o. male who is being evaluated via a billable video visit.      The patient has been notified of following:     \"This video visit will be conducted via a call between you and your physician/provider. We have found that certain health care needs can be provided without the need for an in-person physical exam.  This service lets us provide the care you need with a video conversation.  If a prescription is necessary we can send it directly to your pharmacy.  If lab work is needed we can place an order for that and you can then stop by our lab to have the test done at a later time.    Video visits are billed at different rates depending on your insurance coverage. Please reach out to your insurance provider with any questions.    If during the course of the call the physician/provider feels a video visit is not appropriate, you will not be charged for this service.\"    Patient has given verbal consent to a Video visit? Yes  How would you like to obtain your AVS? AVS Preference: Mail a copy.  If dropped by the video visit, the video invitation should be sent to: Text to cell phone: [phone]  Will anyone else be joining your video visit? No        Video Start Time: 11:40    Additional provider notes: See visit note    Assessment/ Plan     1. Urinary frequency    Consider possible urinary tract infection given his history.  He does have multiple comorbid conditions that could contribute to his symptoms  His wife is quite concerned as we are heading into a holiday weekend  Recommend checking a urinalysis  The order will be forwarded to the [facility] and will ask them to fax back the results  Recommend monitor your symptoms of concern  Recommend immediate follow-up if there are concerns  - Urinalysis-UC if Indicated; Future    2. Parkinson disease (H)    Continue to follow plan of neurology    Recommend immediate follow-up in the emergency department if there are symptoms of " "concern including increasing weakness, fever, or confusion  The patient and his wife Luz Marina agree with the above plan      Subjective:       Silver Zamora is a 70 y.o. male who presents for a video visit during the COVID-19 pandemic.  There were some technical difficulties with the camera so part of the visit involved a phone.    His wife Luz Marina is present and there is concerned about a possible urinary tract infection.  His wife Luz Marina reports that there are concerns as he has experienced urinary frequency.  He has had some low back discomfort and has been mildly confused as well.  He has not had a fever or chills.  She would like to get him tested for urinary tract infection as we are heading into the long holiday weekend.    As noted from his previous annual wellness visit he has a complex medical history including advanced Parkinson's disease with multiple falls previously.  He is follow-up with neurology and is being treated with Rytary (Carbidopa and Levodopa) and Nuplazid as well as carbidopa-levodopa.  He does have symptoms including double vision and weakness.  He has been prone to \"freezing \"and has had falls as he has been quite unsteady.  Additionally, he has a known ischemic cardiopathy and follows up with Dr. Nolan, cardiology.  He has had previous coronary stent placement.  He also has known cervical spinal stenosis with neural foraminal narrowing.    Also, he has had problems with a right heel ulcer recently that has required treatment.  He does have a history of osteomyelitis and has had partial amputations of multiple toes of both feet.    His wife Luz Marina would like him to get tested today.    The following portions of the patient's history were reviewed and updated as appropriate: allergies, current medications, past family history, past medical history, past social history, past surgical history and problem list. Medications have been reconciled    Review of Systems   A 12 point comprehensive review of " systems was negative except as noted.      Current Outpatient Medications   Medication Sig Dispense Refill     acetaminophen (TYLENOL) 325 MG tablet Take 2 tablets (650 mg total) by mouth every 4 (four) hours as needed.  0     ammonium lactate (AMLACTIN) 12 % cream Apply two times a day 385 g 2     aspirin 81 mg chewable tablet Chew 1 tablet (81 mg total) daily.  0     atorvastatin (LIPITOR) 80 MG tablet Take 1 tablet (80 mg total) by mouth daily. 90 tablet 1     bisacodyl (DULCOLAX) 10 mg suppository One supp CO qday prn constipation.  Give if no BM in prior 3 days.  0     carbidopa-levodopa (PARCOPA)  mg per disintegrating tablet DISSOLVE 1 TABLET ON TONGUE ONCE DAILY AS NEEDED 90 tablet 3     cholecalciferol, vitamin D3, (VITAMIN D3) 2,000 unit Tab Take 1 tablet (2,000 Units total) by mouth daily.  0     diaper,brief,adult,disposable (BRIEFS) Misc Use 1 Units As Directed 2 (two) times a day as needed. 45 each 6     DULoxetine (CYMBALTA) 20 MG capsule Take 1 capsule (20 mg total) by mouth 2 (two) times a day. 60 capsule 3     fluticasone propionate (FLONASE) 50 mcg/actuation nasal spray 2 sprays into each nostril daily. 16 g 3     gabapentin (NEURONTIN) 100 MG capsule TAKE ONE CAPSULE BY MOUTH TWICE A DAY 60 capsule 2     levothyroxine (SYNTHROID, LEVOTHROID) 137 MCG tablet TAKE ONE TABLET BY MOUTH ONCE DAILY AT 6 A.M. 90 tablet 3     nitroglycerin (NITROSTAT) 0.4 MG SL tablet Place 1 tablet (0.4 mg total) under the tongue every 5 (five) minutes as needed for chest pain. 20 tablet 1     nystatin (MYCOSTATIN) cream Apply to affected area of groin three times a day x 10 days 30 g 1     polyethylene glycol (GLYCOLAX) 17 gram/dose powder Take 17 g by mouth daily. 1530 g 6     RYTARY 61. mg CpER ER capsule TAKE TWO CAPSULES BY MOUTH FOUR TIMES A  capsule 6     senna-docusate (SENNOSIDES-DOCUSATE SODIUM) 8.6-50 mg tablet Take 1 tablet by mouth 2 (two) times a day.             triamcinolone (KENALOG)  0.025 % ointment Apply topically 2 (two) times a day. 15 g 3     No current facility-administered medications for this visit.        Objective:      There were no vitals taken for this visit.    GENERAL: Healthy, alert and no distress  EYES: Eyes grossly normal to inspection. No discharge or erythema, or obvious scleral/conjunctival abnormalities.  RESP: No audible wheeze, cough, or visible cyanosis.  No visible retractions or increased work of breathing.    NEURO: Cranial nerves grossly intact. Mentation and speech appropriate for age.  PSYCH: Mentation appears normal, affect normal/bright, judgement and insight intact, normal speech and appearance well-groomed           No results found for this or any previous visit (from the past 168 hour(s)).       This note has been dictated using voice recognition software. Any grammatical or context distortions are unintentional and inherent to the software    Valentín Washington MD      Video-Visit Details    Type of service:  Video Visit    Video End Time (time video stopped): 11:52  Originating Location (pt. Location): Home    Distant Location (provider location):  [facility]     Platform used for Video Visit: [facility]      Valentín Washington MD

## 2021-06-14 NOTE — TELEPHONE ENCOUNTER
How about switch to rosuvastatin 20 mg daily. I also wonder if his leg pain is not musculoskeletal as he should have more diffuse muscle aching with statin therapy.    CYNDI

## 2021-06-14 NOTE — TELEPHONE ENCOUNTER
Return call to patient's wife Luz Marina who stated patient recently had yrly Rx review with [facility] pharmacy and noted that Lipitor could cause leg pain which patient has been complaining of - Luz Marina explained that patient's cholesterol was ck'd recently and was really good, so she is inquiring if patient could reduce Lipitor dose to see if leg pain improves.    Informed patient that update and question would be forwarded to Dr. Barraza to address - understanding verbalized.    Please review 11-4-20 FLP results and address wife's question/cncerns.  mg

## 2021-06-14 NOTE — TELEPHONE ENCOUNTER
Please call.  He has a urinalysis and I have seen the preliminary results.  There is no clear indication of an infection as there is no bacteria or blood present.  There are some ketones so he should be encouraged to drink more fluids.  A urinary tract infection does not appear to be the problem at this time.  I do encourage follow-up if Silver has symptoms of concern.

## 2021-06-14 NOTE — TELEPHONE ENCOUNTER
Reason for call: Hallucinations     Symptom or request: Lower Duloxetine from 40 mg two times a day to 20 mg DAILY.     Duration (how long have symptoms been present): 40+ Days     Have you been treated for this before? Yes    Additional comments: Pt's wife reports that patient has been off of Nuplazid for over 40 days.  He is still experiencing hallucinations.  She called the pharmacist through their specialty pharmacy to review medications that could be causing this.  Pharmacist suggested that pt's duloxetine be lowered to 20 mg at night.  This medication can cause dopamine levels to change which would cause hallucinations.  Pt is currently taking 40 mg daily (20 mg in the Am and 20 mg in the PM).       Best # for return call: [phone]    Can we leave a detailed message? Yes    Call taken on 1/27/21 at 11:30 AM by Candis Gardner

## 2021-06-15 NOTE — TELEPHONE ENCOUNTER
Prior Authorization Request  Who s requesting:  Pharmacy  Pharmacy Name and Location: [facility] [phone]  Medication Name: carbidopa-levodopa (RYTARY) 23.75-95 mg CpER ER capsule  Insurance Plan: [employer] [facility]   Insurance Member ID Number:  [id number]  CoverMyMeds Key: N/A  Informed patient that prior authorizations can take up to 10 business days for response:   Yes  Okay to leave a detailed message: Yes

## 2021-06-15 NOTE — TELEPHONE ENCOUNTER
"Spouse calling reporting patient has been treating right foot ulcer x 1 year. Reporting new onset of great toe swelling and redness through out toe. Pus pocket of yellow fluid \"across toe and spreading down the side.\" Afebrile.     Disposition to see provider with in 4 hours.    Transferred to Central Scheduling for [facility] per request.    Asia Guillory RN  [facility]  COVID 19 Nurse Triage Plan/Patient Instructions    Please be aware that novel coronavirus (COVID-19) may be circulating in the community. If you develop symptoms such as fever, cough, or SOB or if you have concerns about the presence of another infection including coronavirus (COVID-19), please contact your health care provider or visit [email or web address].     Disposition/Instructions    In-Person Visit with provider recommended. Reference Visit Selection Guide.    Thank you for taking steps to prevent the spread of this virus.  o Limit your contact with others.  o Wear a simple mask to cover your cough.  o Wash your hands well and often.    Resources    [facility]: About COVID-19: [email or web address]    CDC: What to Do If You're Sick: www.cdc.gov/coronavirus/2019-ncov/about/steps-when-sick.html    CDC: Ending Home Isolation: www.cdc.gov/coronavirus/2019-ncov/hcp/disposition-in-home-patients.html     CDC: Caring for Someone: www.cdc.gov/coronavirus/2019-ncov/if-you-are-sick/care-for-someone.html     [facility]: Interim Guidance for Hospital Discharge to Home: www.health.[facility].mn.us/diseases/coronavirus/hcp/hospdischarge.pdf    [facility] clinical trials (COVID-19 research studies): [email or web address]/umn-clinical-trials     Below are the COVID-19 hotlines at the [facility] of [facility] ([facility]). Interpreters are available.   o For health questions: Call 652-553-4129 or 1-483.469.7571 (7 a.m. to 7 p.m.)  o For questions about schools and childcare: Call 006-853-4650 or " 1-226.361.8721 (7 a.m. to 7 p.m.)       Additional Information    Negative: Followed a foot injury    Negative: Diabetes    Negative: Ankle pain is main symptom    Negative: Thigh or calf pain is main symptom    Negative: Entire foot is cool or blue in comparison to other foot    Negative: Purple or black skin on foot or toe    Negative: [1] Red area or streak AND [2] fever    Negative: [1] Swollen foot AND [2] fever    Negative: Patient sounds very sick or weak to the triager    Negative: [1] SEVERE pain (e.g., excruciating, unable to do any normal activities) AND [2] not improved after 2 hours of pain medicine    [1] Looks infected (spreading redness, pus) AND [2] large red area (> 2 in. or 5 cm)    Protocols used: FOOT PAIN-A-AH

## 2021-06-15 NOTE — TELEPHONE ENCOUNTER
RN cannot approve Refill Request    RN can NOT refill this medication med is not covered by policy/route to provider. Last office visit: 1/2/2020 Valentín Washington MD Last Physical: 11/4/2020 Last MTM visit: Visit date not found Last visit same specialty: 8/21/2020 Taylor Jackson MD.  Next visit within 3 mo: Visit date not found  Next physical within 3 mo: Visit date not found      Antonia Harrison, [facility] Connection Triage/Med Refill 2/19/2021    Requested Prescriptions   Pending Prescriptions Disp Refills     nystatin (MYCOSTATIN) cream [Pharmacy Med Name: NYSTATIN [id number]/GM CREA] 30 g 1     Sig: APPLY TO AFFECTED AREA OF GROIN THREE TIMES A DAY FOR 10 DAYS       There is no refill protocol information for this order

## 2021-06-15 NOTE — TELEPHONE ENCOUNTER
Central PA team  [phone]  Pool: HE PA MED ([id number])          PA has been initiated.       PA form completed and faxed insurance via Cover My Meds     Key:  [id number]     Medication:  Rytary 23.75-95    Insurance:  [employer]        Response will be received via fax and may take up to 5-10 business days depending on plan

## 2021-06-16 NOTE — TELEPHONE ENCOUNTER
Incoming call from nurse Prater with [facility]. She wanted to update you on pt. She has had in depth conversation with Luz Marina his wife about his significantly declining condition and they think he would now qualify for hospice. Luz Marina would like to go forward with this because they feel he will get more benefit from the hospice care than the palliative care as he is not even participating in therapies anymore. Can you put in the eval and treat for hospice and I can fax it to them? They are having one of their nurses go out to do an assessment today and they will update us after that assessment as well!

## 2021-06-16 NOTE — TELEPHONE ENCOUNTER
Incoming call from wife requesting to restart palliative care now that covid has settled down some. Do you need to have an appointment with him to discuss this? Or ok to put in new referral to restart care?

## 2021-06-16 NOTE — PROGRESS NOTES
"Telephone Visit  Silver Zamora is a 71 y.o. male who is being evaluated via a billable telephone visit in light of the ongoing global health crisis (COVID-19) that requires us to abide by social distancing mandates in order to reduce the risk of COVID-19 exposure.       The patient has been notified of following:     \"This telephone visit will be conducted via a telephone call between you and your physician/provider. We have found that certain health care needs can be provided without the need for a physical exam.  This service lets us provide the care you need with a short phone conversation.  If a prescription is necessary we can send it directly to your pharmacy.  If lab work is needed we can place an order for that and you can then stop by our lab to have the test done at a later time.    If during the course of the phone call the physician/provider feels a telephone visit is not appropriate, you will not be charged for this service.\"     Patient has given verbal consent to a Telephone visit? Yes    Silver Zamora chief complaint is Parkinson's Disease    ALLERGIES  Clonazepam    Neuropsychological assessment completed    Yes   Currently doing PT  No   Completed No   Currently doing OT  No   Completed No    Currently doing ST   No   Completed No   Psychology  No        Any new medication (other provider):   No   Meds started at last appointment  No  Meds increased at last appointment    No     Is patient on a controlled substance   No     Any concerns you would like to be addressed at this appointment?  Patient has been approved for hospice care in their home.  Pt's wife morales will be having surgery at the beginning of May and needs to ensure pt is taken care of during her recovery time. Hospice has already started in their home and it has been going well.                                                        Phone Start Time: 11:30am    Phone End Time:  11:35am    Total time of phone conversation: 5 " minutes    Phone number the patient would like to use:  [phone]    Candis F Adi [facility]     There were no vitals filed for this visit.    Outpatient Follow up Parkinson's Dx Evaluation     Pertinent History:   Patient is right-handed.  He was diagnosed with Parkinson's disease in 2004 at the age of about 54.  He feels that the symptoms may have started in about 1994.  Symptoms started with stiffness, slowness, soft speech, walking difficulties, and left hand tremor which spread to the right and about 2015.  Has seen Dr. Suazo in the past.  He has some bruising and tremors occasionally but there is asymmetry.  He tried Sinemet CR during the daytime and at night which did not work well.  He is been off the pramipexole since 12/2015 due to memory and behavioral concerns.  December 2010 he was taking Sinemet IR 25/100 and increased to 25/250 with a good response which also improved his back pain.  He was able to be more active.  In 2011 he was evaluated at the [facility] by , he was noted to have U PDR S score of 41 after all Parkinson's medications.  It was suggested that he take carbidopa levodopa every 3 hours.  Amantadine was discontinued because he did not have dyskinesia.  It was suggested that he switch his selegiline to Azilect or discontinue all MAO inhibitors.  It is  felt that his increased Sinemet may have exasperated his restless leg syndrome.  Gabapentin was started to control his restless leg symptoms.  He was followed by Dr. Suazo from 2002 - 2011.  He also saw Dr. Zuniga and a previous nurse practitioner at this clinic.  He did transfer care to  in 2013.  He has tried amantadine and selegiline in the past.  He is not able to tolerate Requip, which he tried for several years at a relatively high dose but discontinued due to edema in lower extremities, he also tried Mirapex in 2015 but this caused more freezing and increased tremor.  CAT scan in July 2013 showed absent  radiotracer in the putamen with diminished accumulation in the caudate, most consistent with Parkinson's disease.  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      Is patient on a controlled substance that I prescribe? No     Subjective:          HPI:  The patient returns to the Parkinson's clinic for follow-up visit, he was last seen by me on 1/5/2021, at that appointment we decided to not wake patient up if he is sleeping to give him medication.  The patient is currently on hospice for heart failure.  From a Parkinson's standpoint patient is doing well, wife reports that he continues to sleep a lot and is not all that active.  Wife will be having eye surgery in June if able.  Wife has been working with pharmacist to reduce meds and hopefully eliminate any hallucinations.  Wife reports the patient is doing really well on hospice, and requests no medication changes at this time. Wife also reports that foot ulcer is almost healed.    We discussed some treatment options and have elected to continue with current therapies.      Current Medications: Please see chart. Medications personally reviewed.     Medication Compliance: Yes    Patient Active Problem List    Diagnosis Date Noted     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) 08/21/2020     Acquired absence of other right toe(s) (H) 01/05/2020     Ulcer of right foot, unspecified ulcer stage (H) 09/16/2019     Cord compression myelopathy (H) 09/16/2019     Moderate major depression (H) 09/16/2019     Cervical stenosis of spinal canal 02/07/2019     Ischemic cardiomyopathy 12/14/2018     Heart failure with reduced ejection fraction (H) 12/14/2018     Coronary artery disease involving native coronary artery of native heart with angina pectoris (H)      Abnormal nuclear stress test 10/25/2018     Abnormal echocardiogram 05/29/2018     Leukocytosis, unspecified type      Cholecystitis 05/23/2018      Choledocholithiasis 05/23/2018     Calculus of gallbladder with biliary obstruction but without cholecystitis      Weakness      Low vitamin D level 10/11/2017     RBD (REM behavioral disorder) 04/03/2017     Pancreatitis 12/31/2015     Parkinson disease (H) 07/30/2014     Back pain 07/30/2014     Depression 07/30/2014     Anxiety state, unspecified 07/30/2014     Hypothyroidism 07/30/2014     Past Medical History:   Diagnosis Date     Anxiety      Callus      Cardiomyopathy (H)      Chronic back pain      Delirium      Depression      Fall      GERD (gastroesophageal reflux disease)      Hypothyroidism      Pancreatitis 12/31/2015     PD (Parkinson's disease) (H)      RBD (REM behavioral disorder) 4/3/2017     Shingles     hx     Past Surgical History:   Procedure Laterality Date     CV CORONARY ANGIOGRAM N/A 10/31/2018    Procedure: Coronary Angiogram;  Surgeon: Jose Meyer MD;  Location: [facility];  Service:      CV LEFT HEART CATHETERIZATION WO LEFT VETRICULOGRAM Left 10/31/2018    Procedure: Left Heart Catheterization Without Left Ventriculogram;  Surgeon: Jose Meyer MD;  Location: [facility];  Service:      INGUINAL HERNIA REPAIR Bilateral 10/2013     LAMINECTOMY  10/2013     UT ERCP REMOVE CALCULI/DEBRIS BILIARY/PANCREAS DUCT N/A 5/25/2018    Procedure: ENDOSCOPIC RETROGRADE CHOLANGIOPANCREATOGRAPHY SPINCTEROTOMY BILIARY STONE EXTRACTION;  Surgeon: Curtis Mcclain MD;  Location: [facility];  Service: Gastroenterology     UT LAP,CHOLECYSTECTOMY/GRAPH N/A 5/24/2018    Procedure: LAPAROSCOPIC CHOLECYSTECTOMY;  Surgeon: Joyce Melissa MD;  Location: [facility];  Service: General     Family History   Problem Relation Age of Onset     Kidney failure Mother      Heart disease Father 82     Tremor Father      Parkinsonism Paternal Grandfather      Tremor Sister      Leukemia Maternal Grandmother      Current Outpatient Medications   Medication Sig Dispense Refill      acetaminophen (TYLENOL) 325 MG tablet Take 2 tablets (650 mg total) by mouth every 4 (four) hours as needed.  0     ammonium lactate (AMLACTIN) 12 % cream Apply two times a day 385 g 2     aspirin 81 mg chewable tablet Chew 1 tablet (81 mg total) daily.  0     atorvastatin (LIPITOR) 80 MG tablet Take 1 tablet (80 mg total) by mouth daily. 90 tablet 1     bisacodyl (DULCOLAX) 10 mg suppository One supp ME qday prn constipation.  Give if no BM in prior 3 days.  0     carbidopa-levodopa (PARCOPA)  mg per disintegrating tablet DISSOLVE 1 TABLET ON TONGUE ONCE DAILY AS NEEDED 90 tablet 3     carbidopa-levodopa (RYTARY) 23.75-95 mg CpER ER capsule Take 1 capsule by mouth 2 (two) times a day. 60 capsule 3     cholecalciferol, vitamin D3, (VITAMIN D3) 2,000 unit Tab Take 1 tablet (2,000 Units total) by mouth daily.  0     diaper,brief,adult,disposable (BRIEFS) Misc Use 1 Units As Directed 2 (two) times a day as needed. 45 each 6     DULoxetine (CYMBALTA) 20 MG capsule TAKE ONE CAPSULE BY MOUTH TWICE A DAY 60 capsule 3     fluticasone propionate (FLONASE) 50 mcg/actuation nasal spray 2 sprays into each nostril daily. 16 g 3     gabapentin (NEURONTIN) 100 MG capsule TAKE ONE CAPSULE BY MOUTH TWICE A DAY (Patient taking differently: Take 100 mg by mouth daily. ) 60 capsule 2     levothyroxine (SYNTHROID, LEVOTHROID) 137 MCG tablet TAKE ONE TABLET BY MOUTH ONCE DAILY AT 6 A.M. 90 tablet 3     nitroglycerin (NITROSTAT) 0.4 MG SL tablet Place 1 tablet (0.4 mg total) under the tongue every 5 (five) minutes as needed for chest pain. 20 tablet 1     nystatin (MYCOSTATIN) cream APPLY TO AFFECTED AREA OF GROIN THREE TIMES A DAY FOR 10 DAYS 30 g 1     polyethylene glycol (GLYCOLAX) 17 gram/dose powder Take 17 g by mouth daily. 1530 g 6     RYTARY 61. mg CpER ER capsule TAKE TWO CAPSULES BY MOUTH FOUR TIMES A  capsule 6     senna-docusate (SENNOSIDES-DOCUSATE SODIUM) 8.6-50 mg tablet Take 1 tablet by mouth 2 (two)  times a day.             triamcinolone (KENALOG) 0.025 % ointment Apply topically 2 (two) times a day. 15 g 3     No current facility-administered medications for this visit.        Allergies   Allergen Reactions     Clonazepam      Too drowsy     Social History     Socioeconomic History     Marital status:      Spouse name: Not on file     Number of children: Not on file     Years of education: Not on file     Highest education level: Not on file   Occupational History     Not on file   Social Needs     Financial resource strain: Not on file     Food insecurity     Worry: Not on file     Inability: Not on file     Transportation needs     Medical: Not on file     Non-medical: Not on file   Tobacco Use     Smoking status: Former Smoker     Types: Cigarettes     Quit date: 1980     Years since quittin.2     Smokeless tobacco: Never Used   Substance and Sexual Activity     Alcohol use: No     Comment: one drink a week     Drug use: No     Sexual activity: Not on file   Lifestyle     Physical activity     Days per week: Not on file     Minutes per session: Not on file     Stress: Not on file   Relationships     Social connections     Talks on phone: Not on file     Gets together: Not on file     Attends Jew service: Not on file     Active member of club or organization: Not on file     Attends meetings of clubs or organizations: Not on file     Relationship status: Not on file     Intimate partner violence     Fear of current or ex partner: Not on file     Emotionally abused: Not on file     Physically abused: Not on file     Forced sexual activity: Not on file   Other Topics Concern     Not on file   Social History Narrative    Patient lives with his wife Luz Marina       Review of Systems  A comprehensive review of systems was negative except for:what is noted above    Mental Status Exam:   Appearance: Phone appointment I did not visualize the patient  Behavior:   .cooperative and pleasant, no  apparent agitation, no irritability, denies any recent behavioral difficulties  Speech: .slow and soft speech, able to participate in conversation  Mood/Affect: .flat, no obvious significant depression or anxiety  Thought Content: .no psychotic symptoms were evident, patient and wife do state that the patient did have some hallucinations    Suicidal or Homicidal Thoughts:   .no evidence of suicidal or homicidal ideations, patient denies any suicidal ideations  Thought Process/Formulation:  .adequate, able to process information, no evidence of racing thoughts  Associations: .adequate, processing information and participating in conversation  Fund of Knowledge:  .intact, following conversation, adequate depth of understanding  Attention/Concentration: .slightly distractible, concentration is poor  Insight: .slightly impaired  Judgement:  .slightly impaired  Memory:   . .slight impairment   Motor Status:  .no noted tremor or dyskinesia   Orientation: .oriented to person, place, time and situation .  .  Diagnosis managed and treated at today's visit   Parkinson's disease  Anxiety d/t a medical condition  Chronic pain  Mild cognitive impairment  High risk for falls  Tremor  Foot ulcer  Physical deconditioning  Insomnia    Plan:  Medication Adjustment:  No medication changes    Total time spent with the patient today was 60 minutes with greater than 50% of the time spent in counseling and care coordination. The patient will call before then with any questions, concerns or problems. We will assess for the appropriateness of possible psychotropic medication trials/changes. The patient will seek out appropriate emergency services should that become necessary.    Other:   Patient will return to clinic in 3 months. They agree to call or return sooner with any questions or concerns.  Risks and benefits were discussed.  Continue with his individual therapist.     Continue with the support of the clinic, reassurance, and  redirection. Staff monitoring and ongoing assessments per team plan. Current psychotropic medication appears to represent the minimum effective dosage and appears medically necessary. We will continue to monitor and reassess. This team will utilize appropriate emergency services if necessary. I will make myself available if concerns or problems arise.    Telephone Visit Details    Type of service: Telephone Visit    Phone Start Time: 1300    Phone End Time:  1350    Total time for telephone call: 50 minutes    Originating Location: Patient's home    Distant Location:  [facility]    Mode of Communication: Telephone call      10 minutes spent on the date of the encounter doing chart review, review of outside records and documentation     General Information:  Today you had your appointment with Kelli Walker CNP     If lab work was done today as part of your evaluation you will generally be contacted via My Chart, mail, or phone with the results within 1-5 days. If there is an alarming result we will contact you by phone. Lab results come back at varying times, I generally wait until all labs are resulted before making comments on results. Please note labs are automatically released to My Chart once available.     If you need refills please contact your pharmacist. They will send a refill request to me to review. Please allow 3 business days for us to process all refill requests.     Please call or send a medical message through My Chart, with any questions or concerns    If you need any paperwork completed please fax forms to [phone]. Please state if you would like a copy of the completed paperwork, mailed or faxed back to the patient and a fax number to fax the paperwork to. Please allow up to 10 days for paperwork to be completed.    Kelli Walker CNP

## 2021-06-16 NOTE — PROGRESS NOTES
ASSESSMENT: Onychauxis, altered sensation in the lower extremities because of Parkinson's, history of partial right third and fourth toe amputations, foot pain.    PLAN: Toenails were debrided manually x8.  We reviewed options for managing calluses at home including moisturizer under occlusion and filing.  Return to clinic in three months as needed.        SUBJECTIVE: The patient returns to the [place] clinic with toenails that are long, thick and painful. In 2015 the distal portions of the right third and fourth toes were amputated because of ulceration and infection.  Diminished sensation in the feet because of Parkinson's.  He is not diabetic.  Last nail debridement in the clinic was December 19, 2017.    OBJECTIVE:  General: Pleasant 68 y.o. male in no acute distress.  Vascular: DP pulses are absent. PT pulses are diminished. Pedal hair is absent. Feet are warm to the touch.  Cardiac: Pulse is regular.  Lymphatic: No edema at the ankles.  Neuro: Sensation in the feet is altered. Patient describes paresthesias.  Derm: Toenails are elongated, thickened and dystrophic with discoloration and subungual debris. Skin is thin and shiny but intact.  Painful fissures in the calluses adjacent to the bilateral first metatarsal heads.  No active bleeding or drainage today.  Musculoskeletal: Partial amputation of the right third and fourth toes.  Hallux valgus bilaterally.

## 2021-06-16 NOTE — PROGRESS NOTES
Assessment /Plan: Came wife wife, Luz Marina. Right handed.   Parkinson's disease: On sinemet 25/100 3 tabs 4 times daily: 8am, noon, 4pm, 9pm, with entacapone.  Sinemet CR 25/100 1 tabs.   Chronic back pain and restless leg syndrome: We will change, Neurontin 300 mg 4 times daily to 300 mg, 1 tablet in the morning and 3 tablets nightly.  Depression: Follow-up with Dr. Joe Tamayo.  Right foot callus cracks with pains to follow-up with the foot doctor.   Daytime hypersomnia: Consider to try Provigil.  Was not able to tolerate Ritalin tried in 2017.     Plans:  Will return to clinic in about 3-4 weeks.  Hold off all Parkinson disease medication the night before after 7 PM.  I bring pills for Parkinson disease to clinical visit with the first available appointment in the morning.  We will see how he responds to Sinemet.  Wife feels that without the medication he seemed to be better. Consider repeat neuropsych testing, on and off testing for potential DBS interventions.  He has tried Requip and Mirapex not able to tolerate.  Currently on 3 tablets of Sinemet 4 times a day with Sinemet ER in the evening.  Continue to have a lot of symptoms.  There are some atypical features such as lack of initial dramatic response to Sinemet, lack of prominent asymmetry, no disc seen kinesia after prolonged use of Sinemet.  There is also concerned about his cognitive function as well as psychiatric aspect.  Continue Sinemet 25/100, 3 tablets 4 times a day at 8 AM, noon, 4 PM and 9 PM with entacapone 200 mg each time.  Continue Sinemet CR 25/100 mean grams, 2 tablet at at bedtime, add 1 Sinemet immediate release if wake up in the middle the night     Subjectively and objectively observed by family:  Still some daytime sleepiness.  Not exercising much at all.  Very sedentary.  No clear wearing off effects or any side effects.  No progression of hallucinations.  Tried Ritalin for daytime sleepiness not able to tolerate.  Never tried Aricept for  memory difficulty for trial, though the prescription was given in about May 2017. Wife feels he feels comfort, doesn't want to do things. Children and other relatives feel he is depressed. Wife feels need some help.  In 2015 he has failed on and off testing.      Psychiatry: Some hallucination, rarely seeing mouse running and seeing people in the house.  Some depression but wife feels may be due to lack of motivation.  He has seen Dr. Llanos and Dr. Tamayo for this condition.      Has had stiff neck for many  Years, goes into the shoulders. Therapies, massage  Helps.       Not exercising and sleeping a lot during the daytime but more awake at nighttime.      Autonomic: Some light headed. no particularly positional changes.  No orthostasis during clinical visit.      Sleep disorder. Not able to change his habit. Goes to sleep very late and not able function in am. He has had 3 sleep studies and has seen Dr. Jayesh Michaels and other doctors. He has tried clonazepam not able to tolerate. Tested other multiple medications to without benefit. Also may have REM sleep disorders.       Memory: had neuro psych test in 4/2013. Neuro psych testing 5/2015: a mild progression in cognitive impairment, repeat test 5/2016 showing no significant changes.  MOCA: 21/30 on April 5, 2017  .   Psychiatric: During interview, he is not focussed on discussing his PD and related management.       [place]: no hearing aids.       Therapy:  He deferred referral to big and loud Parkinson therapies      Chronic lower back pain.  NO focal weakness in left LE. Will continue therapy and weight reduction. 10/2013 had lower back surgery, which has reduce the lower back chronic pain significantly..      RLS: no worsening.    Small fiber neuropathy: most likely relating to PD   Leg edema and poor leg circulation and has seen a vascular surgeon.         PD summary: Right handed.   diagnosed in 2004 at the age about 54.  But he feels the symptoms may have started  in .  Symptoms: stiffness, slow, soft speech, walking difficulty. Left hand tremor, spreading to right started . He feels symptoms started in . Has seen Dr. Suazo in the past. He has some freezing tremors occasionally, but asymmetry. Tried the Sinemet CR during day time or at night, didn't work well. Off pramipexole since  in Dec due to memory and behavior concern.  In 2010 he was taking Sinemet 25/100 up to 225/250 with a good response improving back pain.  He was able to be more active.  In  he was evaluated at [facility] by Dr. Sifuentes.  He was noted to have U PDR as part 3 score of 41 after of all Parkinson medication for 4 hours.  It was suggested to take carbidopa levodopa every 3/2 hours.  Amantadine was discontinued because he did not have dyskinesia.  It was suggested to change selegiline to Azilect or discontinue all MAO inhibitor altogether.  It was felt that increased Sinemet may have exacerbated his left restless leg syndrome.  Gabapentin was started for control of the symptoms.  He was followed up with Dr. Ellis Suazo from 2011, so Dr Zuniga periodically as well as nurse practitioner Sonal Rowe.  [place] [facility] to me in 2013.      He has tried amantadine and selegiline in the past.  Not able to tolerate Requip (which he tried feel a few years at relatively high dose but discontinued due to edema in lower extremities significantly) and Mirapex tried in  but caused more freezing and lots of shakes..    For sleep, he has seen Dr. Jayesh Michaels in  with a diagnosis of REM sleep behavior, delayed sleep phase syndrome, sleep apnea.  Had sleep studies.  Tried clonazepam, trazodone, CPAP machine.    Had osteomyelitis, now amputated the 3rd and 4th on right.  Did have a crack in the left food that is not healing..       Neuro psych testin2016 As compared to testing in , subtle declines in basic attention, cognitive efficiency,  nonverbal learning and memory, visuospatial judgement and aspects of executive functioning (deductive reasoning and complex attention). A significant improvement (e.g., from severely impaired to superior delayed recall on a list learning task) was evident on two measures of verbal memory. He exhibited stable performance on measures of inhibition as well as verbal and nonverbal reasoning.     DaTscan in July 2013 showing absent radiotracer in the putamen with diminished accumulation in the caudate, most consistent with Parkinson disease.    EMG study in July 2013 showing mild demyelinating neuropathy in lower extremities.    He is a retired  and has very supportive wife and children.    Review of system   otherwise unremarkable    Patient Active Problem List   Diagnosis     Parkinson disease     Back pain     Depression     Anxiety state, unspecified     Hypothyroidism     Pancreatitis     RBD (REM behavioral disorder)     Low vitamin D level             Current Outpatient Prescriptions   Medication Sig Dispense Refill     carbidopa-levodopa (SINEMET)  mg per tablet Take 3 tablets by mouth 4 (four) times a day. At 0200, 0800, 1200, 1600, and then Sinement CR  mg @ 2100 (5 doses total doses per day)       cholecalciferol, vitamin D3, (VITAMIN D3) 2,000 unit Tab Take 2,000 Units by mouth.       entacapone (COMTAN) 200 mg tablet Take 200 mg by mouth 5 (five) times a day. with each Sinemet dose       gabapentin (NEURONTIN) 300 MG capsule TAKE 1 CAPSULE (300 MG TOTAL) BY MOUTH 4 (FOUR) TIMES A DAY. 360 capsule 1     levothyroxine (SYNTHROID, LEVOTHROID) 137 MCG tablet TAKE 1 TAB BY MOUTH ONCE DAILY. - NEEDS TO BE SEEN 90 tablet 2     melatonin 2.5 mg Chew Chew.       meloxicam (MOBIC) 7.5 MG tablet Take 1 tablet (7.5 mg total) by mouth as needed for pain. Take 1-2 tablets daily PRN 90 tablet 3     methylphenidate HCl (RITALIN) 5 MG tablet TAKE 1 TABLET BY MOUTH AT 8 AM AND AT NOON DAILY  0      multivitamin (MULTIVITAMIN) per tablet Take 1 tablet by mouth daily.       omeprazole (PRILOSEC) 20 MG capsule TAKE 1 CAPSULE BY MOUTH ONCE DAILY 90 capsule 2     polyethylene glycol (MIRALAX) 17 gram packet Take 17 g by mouth daily.       venlafaxine (EFFEXOR-XR) 150 MG 24 hr capsule Take 1 capsule (150 mg total) by mouth daily. Take with 75mg cap (225mg) 90 capsule 3     venlafaxine (EFFEXOR-XR) 75 MG 24 hr capsule Take 1 capsule (75 mg total) by mouth daily. Take with 150mg cap 90 capsule 3     Current Facility-Administered Medications   Medication Dose Route Frequency Provider Last Rate Last Dose     testosterone cypionate 200 mg/mL injection 200 mg (DEPOTESTOTERONE CYPIONATE)  200 mg Intramuscular Q14 Days Valentín Washington MD   200 mg at 07/28/17 1534       Clonazepam    Past Medical History:   Diagnosis Date     Anxiety      Callus      Chronic back pain      Depression      GERD (gastroesophageal reflux disease)      Hypothyroidism      Pancreatitis 12/31/2015     PD (Parkinson's disease)      RBD (REM behavioral disorder) 4/3/2017     Shingles     hx       Social History     Social History     Marital status:      Spouse name: N/A     Number of children: N/A     Years of education: N/A     Occupational History     Not on file.     Social History Main Topics     Smoking status: Former Smoker     Types: Cigarettes     Quit date: 1/1/1980     Smokeless tobacco: Never Used     Alcohol use Yes      Comment: one drink a week     Drug use: No     Sexual activity: Not on file     Other Topics Concern     Not on file     Social History Narrative       Family History   Problem Relation Age of Onset     Kidney failure Mother      Heart disease Father      Tremor Father      Parkinsonism Paternal Grandfather      Tremor Sister      Leukemia Maternal Grandmother        Objective:  Vitals:    03/21/18 1429   BP: 172/84   Pulse: 69        Alert, cooperative, no distress Sitting, appears stated age,  normocephalic, atraumatic without cyanosisr skin rashes or edema.  There are dystrophic skin changes below the knee.  Thick calluses in the bowl of right foot with a big crack of flash, but clean and no signs of infection.  Normal mood.  But this topic changes in legs.  Ok with mental status, language, but slightly soft speech, CNII-XII significant for hard of hearing worse on the right side, staring and decreased eye blink, mask face. Normal muscle bulk and strength in 4 extremities with rigidity.  Bilateral hand tremor that is post resting type may be slightly worse on the right. There is no focal sensory difficulties in 4 extremities. Gait wide-based and a little shuffling.  Decreased balance.  No significant stooping.     Slightly [place] and hypokinesia. No  freezing. NO dyskinesia.

## 2021-06-16 NOTE — PROGRESS NOTES
Assessment / Impression   1.  Parkinson's disease  2.  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  Nonmotor manifestations of Parkinson's disease including most importantly apathy and constipation   4.  Depression NOS      Plan:   1.  Aspects of wellness including daily routine and physical activity  2.  One could continue medicine trials to improve motivation although I am a bit skeptical with respect to the value of doing such including cholinesterase inhibitor therapy trial, higher dose antidepressant trial, alternative psychostimulant trial.  I mentioned to the patient that we could begin a trial at home with a follow-up in 2 weeks otherwise will follow-up in 3-4 months.    Total time for evaluation 45 minutes with the majority time spent in counseling.  The patient remains a motivated and this is of great frustration to the spouse.  The patient's daughter Cookie was also present today and raised the question as to whether antidepressant therapy should be tapered (I cautioned against such a move).  At the end of our visit we agreed to continue present therapies while working on wellness programming including physical activity daily routine etc.      Subjective:     HPI: Silver Zamora is a 68 y.o. male with Parkinson's disease who returns for follow-up.  The patient generally has been doing well but he remains difficult to motivate as reported by wife Luz Marina.  The patient today appears to be doing reasonably well.  He does describe passage phenomena.  No clear evidence of worsening depression at this time.  The patient's daughter Cookie is for reasons that are clear concerned about his ongoing use of antidepressant medicines as outlined above.          Review of Systems:          Essentially unremarkable with respect to new symptoms        Objective:   There were no vitals filed for this visit.    No results found for this or any previous visit (from the past 24 hour(s)).    Physical Exam: The patient is neatly  groomed casually dressed and seated for evaluation.  He demonstrates a degree of hypophonia and hypomimia.  He is able to ambulate with assistance and response to verbal cues with respect to stride length ground clearance etc.  Cognition is reasonably well intact at this point in time as I note no overt evidence of delirium.  The patient may be experiencing fleeting visual hallucinations or passage phenomena.  Mood appears to be fairly pleasant at this time.

## 2021-06-16 NOTE — PROGRESS NOTES
".[facility]    Persons accompanying you (the patient) today: Luz Marina (wife) ; Cookie (daughter)    How have you been doing since we saw you last? Please note any concerns.  \" Doing pretty good, went on vacation. Fell, entered a dark room, closed the bedroom door, couldn't see where I was going, fell on a chair, my face hit the arm of the wood chair.\" I have done it several times, you'd think I would learn already.\"    Please list any surgeries or procedures you have had since we saw you last: Dentist: Fillings placed/replaced Has Dry mouth could medications affect that? He gets at least 3 cavities each time he goes ( quarterly)    Have you had any falls since your last visit? Yes:  03/05/2018    Do you have any pain today? Yes: trouble getting out of bed, getting motivated, takes showers now instead of tubs.    With whom do you currently reside? (alone, spouse, family, assisted living, nursing home)  spouse  If assisted living or nursing home, please note name and location of facility: Their own home          "

## 2021-06-16 NOTE — TELEPHONE ENCOUNTER
Telephone Encounter by Gladys Vaughn at 1/3/2020 12:36 PM     Author: Gladys Vaughn Service: -- Author Type: --    Filed: 1/3/2020 12:37 PM Encounter Date: 1/3/2020 Status: Signed    : Gladys Vaughn       Medication is covered without PA

## 2021-06-16 NOTE — TELEPHONE ENCOUNTER
Telephone Encounter by Shruti Davey RN at 8/24/2019  9:55 AM     Author: Shruti Davey RN Service: -- Author Type: Registered Nurse    Filed: 8/24/2019 10:09 AM Encounter Date: 8/24/2019 Status: Signed    : Shruti Davey RN (Registered Nurse)       RN Triage:     Home care calling in on an encounter from 8/23/19    Valentín Washington MD           5:42 PM   Note      Given the circumstances and difficulty with him ambulating OK for a urine test. However, it will need to be called to the on call doctor.  Depending on results they may make other recommendations.  If there are concerns regarding a fever, chills, or weakness then he should be evaluated in the emergency department.  Finally, okay for the therapy request. Thank you for calling on this patient. Staff are not currently available.           Urgency frequency, urgency and foul smelling.   Where can patient drop off the urine? [facility] out patient lab    Triage placed an order for UA UC if indicated, it was not done on Friday. Written order in the chart from PCP.     Need an order for the PT eval and treat  to start on 8/24. Not in the system. Verbal order given to home care.     Shruti Davey RN, BSN [facility] Triage Nurse

## 2021-06-16 NOTE — TELEPHONE ENCOUNTER
Pt's wife called asking if a lift chair could be ordered from our clinic.  The hospice nurse told wife that it would be great for him to have one.  Let me know your thoughts.  Our Epic isn't allowing me to order one.

## 2021-06-16 NOTE — TELEPHONE ENCOUNTER
Left message to call back for: Referral  Information to relay to patient:  Left message on VM notifying referral placed.

## 2021-06-17 NOTE — TELEPHONE ENCOUNTER
Wife stopped in to speak to someone about his Gabapentin medication, she told me his neurologist took him off this medication and she wanted us to know and update his chart.

## 2021-06-17 NOTE — TELEPHONE ENCOUNTER
Telephone Encounter by Heavenly Ghosh at 3/8/2021  3:46 PM     Author: Heavenly Ghosh Service: -- Author Type: --    Filed: 3/8/2021  3:59 PM Encounter Date: 3/8/2021 Status: Addendum    : Heavenly Ghosh    Related Notes: Original Note by Heavenly Ghosh filed at 3/8/2021  3:59 PM       PA APPROVED:    Approval start date: 12/8/2020  Approval end date:  3/8/2022    Per [facility] this was transferred out to [facility]. They has been notified of approval and will contact patient when medication is ready for pickup.

## 2021-06-17 NOTE — TELEPHONE ENCOUNTER
Telephone Encounter by Michelle Lopez at 4/8/2020  6:21 AM     Author: Michelle Lopez Service: -- Author Type: --    Filed: 4/8/2020  6:27 AM Encounter Date: 4/6/2020 Status: Signed    : Michelle Lopez APPROVED:    Approval start date: 01/07/2020  Approval end date:  04/06/2021    I have called the pharmacy and left them a voicemail in regards to the approval. I left my direct number in case they have further questions. The medication was filled on 03/30/2020 under a transitional fill. The pharmacy should not have a problem filling the medication at his next refill date.

## 2021-06-17 NOTE — TELEPHONE ENCOUNTER
Telephone Encounter by Michelle Lopez at 4/6/2020 12:56 PM     Author: Michelle Lopez Service: -- Author Type: --    Filed: 4/6/2020 12:58 PM Encounter Date: 4/6/2020 Status: Signed    : Michelle Lopez       [facility] PA team  [phone]  Pool: KATERYNA PA MED ([id number])          PA has been initiated.       PA form completed and faxed insurance via Cover My Meds     Key:  Manual fax     Medication:  Rtary    Insurance:  Medicare[employer]Rx        Response will be received via fax and may take up to 5-10 business days depending on plan

## 2021-06-18 NOTE — PROGRESS NOTES
Visited and examined the patient with Dr. Graciela lynn.  Agree with her assessment and plans.    I personally talked to ER doctor at [facility] and recommend to be admitted for failure to thrive, not able to eat for 3-4 days and no bowel movement for 4 days with nausea no vomiting.  He looked pale and has compromised cognitive function and looks like he is going to faint any moment.  He went to the cafeteria try to eat and drink but could not do either.

## 2021-06-18 NOTE — PROGRESS NOTES
[facility]    Facility:   [facility] [[id number]]   Code Status: FULL CODE      CHIEF COMPLAINT/REASON FOR VISIT:  Chief Complaint   Patient presents with     Follow Up     rehab,       HISTORY:      HPI: Silver is a 68 y.o. male who I had a chance to revisit with secondary to his hospitalization May 23 - May 29, 2018 secondary to choledocholithiasis/cholecystectomy along with pancreatitis and biliary obstruction calculus.  He did undergo a laparoscopic cholecystectomy.  He does have generalized weakness but is participating with the rehabilitation services.  He has been on Seroquel 12.5 mg at night he does not feel like he needs it any longer so we will discontinue that medication is been no mood changes or increase in depression.  He does take Sinemet for Parkinson's.  He has been normotensive and afebrile and also on room air.  Rarely takes the Tylenol for pain.  No recent care conference.  Does follow-up with cardiology in June 20.    Past Medical History:   Diagnosis Date     Anxiety      Callus      Chronic back pain      Depression      GERD (gastroesophageal reflux disease)      Hypothyroidism      Pancreatitis 12/31/2015     PD (Parkinson's disease)      RBD (REM behavioral disorder) 4/3/2017     Shingles     hx             Family History   Problem Relation Age of Onset     Kidney failure Mother      Heart disease Father      Tremor Father      Parkinsonism Paternal Grandfather      Tremor Sister      Leukemia Maternal Grandmother      Social History     Social History     Marital status:      Spouse name: N/A     Number of children: N/A     Years of education: N/A     Social History Main Topics     Smoking status: Former Smoker     Types: Cigarettes     Quit date: 1/1/1980     Smokeless tobacco: Never Used     Alcohol use Yes      Comment: one drink a week     Drug use: No     Sexual activity: Not on file     Other Topics Concern     Not on file     Social  History Narrative         Review of Systems  Currently denies any chills and fever coughing wheezing chest pain dizziness vertigo nausea vomiting diarrhea dysuria headache stiff neck swollen glands or headaches.  History of Parkinson's hypothyroidism recent laparoscopic cholecystectomy      Current Outpatient Prescriptions:      acetaminophen (TYLENOL) 325 MG tablet, Take 2 tablets (650 mg total) by mouth every 4 (four) hours as needed., Disp: , Rfl: 0     bisacodyl (DULCOLAX) 10 mg suppository, One supp OR qday prn constipation.  Give if no BM in prior 3 days., Disp: , Rfl: 0     carbidopa-levodopa (SINEMET CR)  mg ER tablet, Take 1 tablet by mouth at bedtime. At ~2200, Disp: , Rfl:      carbidopa-levodopa (SINEMET)  mg per tablet, Take 3 tablets by mouth 3 (three) times a day. At approx. 0800, 1200, and 1600., Disp: , Rfl:      carbidopa-levodopa (SINEMET)  mg per tablet, Take 2 tablets by mouth at bedtime. At approx 2200, Disp: , Rfl:      cholecalciferol, vitamin D3, (VITAMIN D3) 2,000 unit Tab, Take 1 tablet (2,000 Units total) by mouth daily., Disp: , Rfl: 0     entacapone (COMTAN) 200 mg tablet, Take 200 mg by mouth 4 (four) times a day. with each Sinemet dose , Disp: , Rfl:      gabapentin (NEURONTIN) 300 MG capsule, Take 300 mg by mouth daily with supper., Disp: , Rfl:      gabapentin (NEURONTIN) 300 MG capsule, Take 600 mg by mouth at bedtime., Disp: , Rfl:      levothyroxine (SYNTHROID, LEVOTHROID) 137 MCG tablet, Take 137 mcg by mouth Daily at 6:00 am., Disp: , Rfl:      multivitamin (MULTIVITAMIN) per tablet, Take 1 tablet by mouth daily., Disp: , Rfl:      omeprazole (PRILOSEC) 20 MG capsule, Take 20 mg by mouth daily as needed., Disp: , Rfl:      polyethylene glycol (MIRALAX) 17 gram packet, Take 17 g by mouth daily. , Disp: , Rfl:      senna-docusate (SENNOSIDES-DOCUSATE SODIUM) 8.6-50 mg tablet, Take 2 tablets by mouth daily. Hold for loose stools., Disp: , Rfl: 0     venlafaxine  (EFFEXOR-XR) 150 MG 24 hr capsule, Take 1 capsule (150 mg total) by mouth daily. Take with 75mg cap (225mg), Disp: 90 capsule, Rfl: 3     venlafaxine (EFFEXOR-XR) 75 MG 24 hr capsule, Take 1 capsule (75 mg total) by mouth daily. Take with 150mg cap, Disp: 90 capsule, Rfl: 3    .There were no vitals filed for this visit.  Blood pressure 116/75 pulse 82 respirations 17 temperature 97.2  Physical Exam   Constitutional: No distress.   HENT:   Head: Normocephalic.   Eyes: Pupils are equal, round, and reactive to light.   Neck: Neck supple. No thyromegaly present.   Cardiovascular: Normal rate, regular rhythm and normal heart sounds.    Hypertension.  Ejection fraction 45%   Pulmonary/Chest: Breath sounds normal.   Abdominal: Bowel sounds are normal. There is no tenderness. There is no guarding.   Abdominal stab wounds within normal limits   Musculoskeletal:   History of Parkinson's.  Pain is managed.   Lymphadenopathy:     He has no cervical adenopathy.    LABS:   Lab Results   Component Value Date    WBC 19.3 (H) 06/01/2018    HGB 12.9 (L) 05/28/2018    HCT 40.6 05/28/2018    MCV 93 05/28/2018     05/28/2018     Results for orders placed or performed during the hospital encounter of 05/23/18   Basic Metabolic Panel   Result Value Ref Range    Sodium 139 136 - 145 mmol/L    Potassium 3.7 3.5 - 5.0 mmol/L    Chloride 108 (H) 98 - 107 mmol/L    CO2 21 (L) 22 - 31 mmol/L    Anion Gap, Calculation 10 5 - 18 mmol/L    Glucose 93 70 - 125 mg/dL    Calcium 8.5 8.5 - 10.5 mg/dL    BUN 10 8 - 22 mg/dL    Creatinine 0.70 0.70 - 1.30 mg/dL    GFR MDRD Af Amer >60 >60 mL/min/1.73m2    GFR MDRD Non Af Amer >60 >60 mL/min/1.73m2     Lab Results   Component Value Date    ALT 14 06/01/2018    AST 20 06/01/2018    ALKPHOS 106 06/01/2018    BILITOT 0.5 06/01/2018           ASSESSMENT:      ICD-10-CM    1. Acute biliary pancreatitis, unspecified complication status K85.10    2. Parkinson disease G20    3. Choledocholithiasis K80.50     4. Depression, unspecified depression type F32.9        PLAN:    At this time no further changes are necessary except for discontinue the Seroquel 12.5 mg at nighttime secondary to sleep and moods.  He seems to be doing good overall.  Did encourage his therapy program.      Electronically signed by: Michael Duane Johnson, CNP

## 2021-06-18 NOTE — PROGRESS NOTES
How have you been doing since we last saw you? Most important neurological symptom or concern for this visit (Medication wearing off, hallucinations, freezing, pain, sleep difficulty, constipation etc.) Has now been feeling good. NO BM for the past 3-4 days. Was in bed all day yesterday. PT here for a F/U    How many falls has the pt. Had over the last year?(if pt. Falls frequently, daily, weekly, monthly?) yes many falls     Best explanation of falls (poor balance, fail/recognizing/judgement, trip, dizzy, moving too quickly, carrying too much, poor lighting, any loss of consciousness, confusion, incontinence, tongue biting, or any unusual features of events)? Many reasons      Any pains? (Locations, frequency, positional factors, time pattern, aggravating factors.) yes.

## 2021-06-18 NOTE — LETTER
Letter by Kelli Walker FNP at      Author: Kelli Walker FNP Service: -- Author Type: --    Filed:  Date of Service:  Status: (Other)       February 26, 2019     Patient: Silver Zamora   YOB: 1950   Date of Visit: 2/26/2019     Plan:         8 am   12  pm   4 pm   8 pm    HS                             Sinemet ER    25/100          1 tab                Rytary      4 caps    4 cap   4 cap    4 cap

## 2021-06-18 NOTE — PROGRESS NOTES
[facility]    HPI:   Mr. Zamora is a 68 year old man with Parkisonism presenting for followup.  He was diagnosed with Parkinson's disease in 2004, however may have had symptom is as early as 1995 with stiffness, slowness, and gait changes.  This morning, as he is off medications, he had a much more difficult time with mobility.  He also has not had a bowel movement in the last 3-4 days.  He has been diaphoretic, although is not febrile.  Yesterday, he also began having nausea and vomiting as well as abdominal pain.  At baseline he takes daily MiraLAX, stool softeners, as well as dietary adjustments to control constipation.  Constipation is a long-standing issue for him and he sometimes needs to use enemas.    He does have midday somnolence for which he frequently naps.    He alternates between a walker and a cane at home.  He has about 1-2 falls per week, these can be in the setting of carrying objects or one time he fell forward while trying to get dressed.  Home health physical therapy has been evaluating him they are very happy with the service.    He currently is endorsing prominent akathisia, and does get a sensation of restlessness as his medications wear off.  He denies dyskinesia.    He does endorse some lightheadedness with standing.    He has tried amantadine and selegiline in the past.  Not able to tolerate Requip (which he tried feel a few years at relatively high dose but discontinued due to edema in lower extremities significantly) and Mirapex tried in 2015 but caused more freezing and lots of shakes.    PD Medications                   8am noon 5pm 9-10pm Overnight(variable times)    Sinemet 25/100mg                                                  3 tab 3 tab 3 tab  3 tab    Entacapone 200mg                          3 tab 3 tab 3 tab  3 tab            Sinemet CR                      1 tab              .  Current Outpatient Prescriptions   Medication Sig Dispense Refill      carbidopa-levodopa (SINEMET)  mg per tablet Take 3 tablets by mouth 4 (four) times a day. At 0200, 0800, 1200, 1600, and then Sinement CR  mg @ 2100 (5 doses total doses per day)       cholecalciferol, vitamin D3, (VITAMIN D3) 2,000 unit Tab Take 2,000 Units by mouth.       entacapone (COMTAN) 200 mg tablet Take 200 mg by mouth 5 (five) times a day. with each Sinemet dose       gabapentin (NEURONTIN) 300 MG capsule TAKE 1 CAPSULE (300 MG TOTAL) BY MOUTH 4 (FOUR) TIMES A DAY. (Patient taking differently: No sig reported) 360 capsule 1     levothyroxine (SYNTHROID, LEVOTHROID) 137 MCG tablet TAKE 1 TAB BY MOUTH ONCE DAILY. - NEEDS TO BE SEEN 90 tablet 2     melatonin 2.5 mg Chew Chew.       meloxicam (MOBIC) 7.5 MG tablet Take 1 tablet (7.5 mg total) by mouth as needed for pain. Take 1-2 tablets daily PRN 90 tablet 3     methylphenidate HCl (RITALIN) 5 MG tablet TAKE 1 TABLET BY MOUTH AT 8 AM AND AT NOON DAILY  0     multivitamin (MULTIVITAMIN) per tablet Take 1 tablet by mouth daily.       omeprazole (PRILOSEC) 20 MG capsule TAKE 1 CAPSULE BY MOUTH ONCE DAILY 90 capsule 2     polyethylene glycol (MIRALAX) 17 gram packet Take 17 g by mouth daily.       venlafaxine (EFFEXOR-XR) 150 MG 24 hr capsule Take 1 capsule (150 mg total) by mouth daily. Take with 75mg cap (225mg) 90 capsule 3     venlafaxine (EFFEXOR-XR) 75 MG 24 hr capsule Take 1 capsule (75 mg total) by mouth daily. Take with 150mg cap 90 capsule 3     No current facility-administered medications for this encounter.         Allergies   Allergen Reactions     Clonazepam      Too drowsy      Patient Active Problem List   Diagnosis     Parkinson disease     Back pain     Depression     Anxiety state, unspecified     Hypothyroidism     Pancreatitis     RBD (REM behavioral disorder)     Low vitamin D level       Examination   There were no vitals filed for this visit.  Blood pressure 129/68, pulse (!) 103., There is no height or weight on file to  calculate BMI.    General examination: He appears restless and is diaphoretic  CV: Mildly tachycardic, regular rhythm  Pulm: clear to ascultation bilaterally   On abdominal exam, there is diffuse tenderness to palpation, with voluntary guarding, no rebound, positive bowel sounds    Neuro exam:   Mental status:  Alert, oriented x3.  He answers questions and follows commands appropriately  Cranial nerves: Extraocular movements intact. Face symmetric, hypomimia present, hypophonic speech   Motor: Per UPDRS part III: off meds  Tone 2/4 in the neck, 1/4 in right upper extremity, 2/4 and left upper extremity bilateral lower extremities  Finger tapping right 1/4, 2/4 on the left  Hand grasps 1/4 bilaterally  No rest tremor, postural, or action tremor  Coordination: Finger to nose intact bilaterally without dysmetria  Gait: Needs to use his arms to stand, he has hesitation with initiation of gait, and gait freezing on turns as well as straightaway, and festination, decreased arm swing bilaterally    45 minutes after taking carbidopa levodopa, he had slight improvement in right upper extremity tone, finger tapping hand grasps are about stable, he continues to have festination with gait although may be slightly less gait freezing    Assessment/plan:   68-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  On today's assessment, he had slight improvement of gait with minimal improvement in bradykinesia and rigidity.  He however is also ill, which makes this a less than ideal time to assess levodopa responsiveness.  Would recommend reassessment of levodopa challenge when he is feeling better.    -He does have significant constipation. It is reassuring that he has bowel sounds, however he should be evaluated to make sure he is not developing an ileus or bowel obstruction.  We recommended he go to the local emergency room accordingly to be evaluated  -For the constipation, I have also added daily the docusate,  senna 2 tablets, he should also continue taking the daily MiraLAX with plan to hold for loose stools  Continue Sinemet, controlled release Sinemet, and entacapone for now as above      Patient and plan seen and discussed with Dr. Elyssa Gomez.    Graciela Sanderson MD  [facility]

## 2021-06-18 NOTE — ANESTHESIA PREPROCEDURE EVALUATION
Anesthesia Evaluation      Patient summary reviewed   No history of anesthetic complications     Airway   Mallampati: II   Pulmonary - negative ROS and normal exam   (+) a smoker                         Cardiovascular - negative ROS and normal exam  Rhythm: regular  Rate: normal,         Neuro/Psych    (+) Parkinson's disease, depression, anxiety/panic attacks, chronic pain    Comments: Cognitive decline    acute confusion this hospital stay  REM behavior disorder    Endo/Other    (+) hypothyroidism,      GI/Hepatic/Renal    (+) GERD, esophageal disease,      Comments: pancreatitis          Dental - normal exam                 Chemistry        Component Value Date/Time     05/25/2018 0558    K 3.8 05/25/2018 0558     (H) 05/25/2018 0558    CO2 25 05/25/2018 0558    BUN 20 05/25/2018 0558    CREATININE 0.78 05/25/2018 0558     05/25/2018 0558        Component Value Date/Time    CALCIUM 8.7 05/25/2018 0558    ALKPHOS 79 05/25/2018 0558    ALKPHOS 79 05/25/2018 0558    AST 39 05/25/2018 0558    AST 39 05/25/2018 0558    ALT 33 05/25/2018 0558    ALT 33 05/25/2018 0558    BILITOT 1.0 05/25/2018 0558    BILITOT 1.0 05/25/2018 0558        Lab Results   Component Value Date    WBC 17.0 (H) 05/25/2018    HGB 12.9 (L) 05/25/2018    HCT 40.4 05/25/2018    MCV 92 05/25/2018     05/25/2018                  Anesthesia Plan  Planned anesthetic: general endotracheal    ASA 3   Induction: intravenous   Anesthetic plan and risks discussed with: patient, spouse and sibling (Telephone consent with spouse)  Anesthesia plan special considerations: antiemetics,   Post-op plan: routine recovery

## 2021-06-18 NOTE — ANESTHESIA POSTPROCEDURE EVALUATION
Patient: Silver Zamora  LAPAROSCOPIC CHOLECYSTECTOMY, WITH CHOLANGIOGRAMS  Anesthesia type: general    Patient location: PACU  Last vitals:   Vitals:    05/24/18 1500   BP: 133/70   Pulse: 85   Resp: 18   Temp: 37.4  C (99.4  F)   SpO2: 93%     Post vital signs: stable  Level of consciousness: awake and responds to simple questions  Post-anesthesia pain: pain controlled  Post-anesthesia nausea and vomiting: no  Pulmonary: unassisted, return to baseline  Cardiovascular: stable and blood pressure at baseline  Hydration: adequate  Anesthetic events: no    QCDR Measures:  ASA# 11 - Bailey-op Cardiac Arrest: ASA11B - Patient did NOT experience unanticipated cardiac arrest  ASA# 12 - Bailey-op Mortality Rate: ASA12B - Patient did NOT die  ASA# 13 - PACU Re-Intubation Rate: ASA13B - Patient did NOT require a new airway mgmt  ASA# 10 - Composite Anes Safety: ASA10A - No serious adverse event    Additional Notes:

## 2021-06-18 NOTE — PROGRESS NOTES
Assessment/ Plan     1. Parkinsonism, unspecified Parkinsonism type (H)    Continue carbidopa levodopa as prescribed by neurology  Have reviewed falls risk  Consider physical therapy for strengthening and balance as needed  He may use a walker as needed    Will refer for care guide management  - Ambulatory referral to Medication Management    - Ambulatory referral to Care Management (Primary Care)    2. Cholecystitis  Status post laparoscopic cholecystectomy    He has advanced his diet  Reviewed constipation.  Recommend increase fluids.  He may consider MiraLAX or Colace as needed  Follow-up with surgery as advised    3. Heart failure with preserved ejection fraction, NYHA class I (H)    He previously was treated with furosemide which has been discontinued  He was treated with lisinopril and metoprolol  His preference is not to take medications at this time    He will follow-up with the heart failure clinic as planned    Follow-up with the pharmacist for medication management and medication review  - Ambulatory referral to Medication Management    - Ambulatory referral to Care Management (Primary Care)    Favor follow-up with cardiology for recheck of the echocardiogram in the future to see if his ejection fraction has normalized  If his ejection fraction remains low would favor resuming lisinopril or metoprolol    4. Transfusion associated circulatory overload    He was treated with furosemide and doing well    5.  Tinnitus    Recommend follow-up with audiology    Healthcare maintenance    He will be referred to podiatry for nail care as well    Reviewed recent hospitalization including gastroneurology and surgical nodes and surgical pathology review as well as ERCP report  Reviewed echocardiogram  Reviewed laboratory testing      Subjective:       Silver Zamora is a 68 y.o. male who presents to the clinic following a recent hospitalization for pancreatitis due to acute cholecystitis.  He underwent a laparoscopic  cholecystectomy. He was admitted from May 23, 2018 and discharged on May 29, 2018.  His medical history is notable for parkinsonism, gastroesophageal reflux disease, and hypothyroidism.  He had presented to the emergency department with abdominal pain.  He wa assessed to have acute pancreatitis which likely was secondary to cholelithiasis.  He was evaluated by gastroenterology as well as surgery and underwent a laparoscopic cholecystectomy on May 24, 2018.  The following day he underwent an ERCP.  During the course of the hospitalization he was found to have elevated liver function tests which improved following his surgery and ERCP.  He had leukocytosis and had a low-grade fever likely secondary to acute cholecystitis.  During the course of the hospitalization he also was found to have mild cardiomegaly and likely had fluid overload.  An echocardiogram revealed an ejection fraction of 45%.  He was treated with diuretics including furosemide.  His blood pressure was elevated and he was started on low-dose lisinopril and metoprolol given cardiomegaly and his reduced ejection fraction.  It was felt that aggressive fluid resuscitation likely was the cause his cardiomegaly.    He did undergo a CT scan of the chest which was negative for pulmonary embolism.  It was assessed that his shortness of breath and wheezing may have been secondary to fluid overload and furosemide alleviated those symptoms.  He did have an acute encephalopathy with paranoia and hallucinations which likely was metabolic in etiology.  That has since improved.  He had hypokalemia and low magnesium levels which resolved as well.  He also had constipation which has since improved.  He was treated with Seroquel at night when he experienced some of those hallucinations.  At this point he continues to be treated with Sinemet and follows up with neurology.  Given the complexity of his health history he would be interested in having care guide management  and also with following up with a pharmacist to review medications.  He denies current chest pain, shortness of breath, or palpitations.    Other concerns include his hearing.  He would be interested in following up with an audiologist.  He does have ongoing ringing in his ears.  Also, he would like to follow-up with a podiatrist to help trim his toenails.      Other medical history is notable for hypothyroidism.  He does take levothyroxine consistently.  He has been taking gabapentin as well.    His wife Luz Marina is present today.    The following portions of the patient's history were reviewed and updated as appropriate: allergies, current medications, past family history, past medical history, past social history, past surgical history and problem list.    Review of Systems   A 12 point comprehensive review of systems was negative except as noted.      Current Outpatient Prescriptions   Medication Sig Dispense Refill     acetaminophen (TYLENOL) 325 MG tablet Take 2 tablets (650 mg total) by mouth every 4 (four) hours as needed.  0     bisacodyl (DULCOLAX) 10 mg suppository One supp KS qday prn constipation.  Give if no BM in prior 3 days.  0     carbidopa-levodopa (SINEMET CR)  mg ER tablet Take 1 tablet by mouth at bedtime. At ~2200       carbidopa-levodopa (SINEMET)  mg per tablet Take 3 tablets by mouth 3 (three) times a day. At approx. 0800, 1200, and 1600.       carbidopa-levodopa (SINEMET)  mg per tablet Take 2 tablets by mouth at bedtime. At approx 2200       cholecalciferol, vitamin D3, (VITAMIN D3) 2,000 unit Tab Take 1 tablet (2,000 Units total) by mouth daily.  0     entacapone (COMTAN) 200 mg tablet Take 200 mg by mouth 4 (four) times a day. with each Sinemet dose        gabapentin (NEURONTIN) 300 MG capsule Take 300 mg by mouth. 2 in the evening and 1 tab at bedtime       levothyroxine (SYNTHROID, LEVOTHROID) 137 MCG tablet Take 137 mcg by mouth Daily at 6:00 am.       meloxicam (MOBIC)  "7.5 MG tablet Take 7.5 mg by mouth daily as needed for pain.       multivitamin (MULTIVITAMIN) per tablet Take 1 tablet by mouth daily.       omeprazole (PRILOSEC) 20 MG capsule Take 20 mg by mouth daily as needed.       senna-docusate (SENNOSIDES-DOCUSATE SODIUM) 8.6-50 mg tablet Take 2 tablets by mouth daily. Hold for loose stools.  0     venlafaxine (EFFEXOR-XR) 150 MG 24 hr capsule Take 1 capsule (150 mg total) by mouth daily. Take with 75mg cap (225mg) 90 capsule 3     venlafaxine (EFFEXOR-XR) 75 MG 24 hr capsule Take 1 capsule (75 mg total) by mouth daily. Take with 150mg cap 90 capsule 3     No current facility-administered medications for this visit.        Objective:      /70  Pulse 68  Temp 98.6  F (37  C) (Oral)   Resp 16  Ht 5' 9\" (1.753 m)  Wt 212 lb (96.2 kg)  BMI 31.31 kg/m2      General appearance: alert, appears stated age and cooperative  Head: Normocephalic, without obvious abnormality, atraumatic  Eyes: conjunctivae/corneas clear. PERRL, EOM's intact.   Ears: normal TM's and external ear canals both ears  Nose: Nares normal. Septum midline. Mucosa normal. No drainage or sinus tenderness.  Throat: lips, mucosa, and tongue normal; teeth and gums normal  Neck: no adenopathy, supple, symmetrical, trachea midline   Back: symmetric, no curvature. ROM normal. No CVA tenderness.  Lungs: clear to auscultation bilaterally  Heart: regular rate and rhythm, S1, S2 normal, no murmur, click, rub or gallop  Abdomen: soft, minimally tender in the right upper quadrant  The incisions appear to be healing well from his previous laparoscopic cholecystectomy  Extremities: extremities normal, atraumatic, no cyanosis or edema  Skin: Skin color, texture, turgor normal. No rashes or lesions  Lymph nodes: Cervical nodes normal.  Neurologic: Alert and oriented X 3.   There is generalized slowness in movement  He is somewhat unsteady with ambulation     Radiology:    CTA Chest PE Run (Order [id number])   Imaging "   Date: 2018 Department: [facility] P3 Released By/Authorizing: Joshua Becker MD (auto-released)   Study Result   CTA CHEST PE RUN  2018 3:01 PM     INDICATION: Shortness of breath. History of pancreatitis.4  TECHNIQUE: Helical acquisition through the chest was performed during the arterial phase of contrast enhancement using IV contrast. 2D and 3D reconstructions were performed by the CT technologist. Dose reduction techniques were used.   IV CONTRAST: Iohexol (Omni) 100 mL  COMPARISON: None.     FINDINGS:  ANGIOGRAM CHEST: No central or lobar PE. Segmental and subsegmental vessels cannot be adequately assessed due to motion. The main pulmonary artery is enlarged at 34 mm in diameter. No thoracic aortic aneurysm or dissection.     RV/LV RATIO: N/A     LUNGS AND PLEURA: No bronchial wall thickening or bronchiectasis. No pneumothorax or pleural fluid.     MEDIASTINUM: The heart is enlarged. Normal appearance of the esophagus. No thoracic lymphadenopathy by size criteria.     LIMITED UPPER ABDOMEN: There is stranding adjacent to the pancreas. Status post cholecystectomy. There are nonobstructing left renal calculi in the field-of-view.     MUSCULOSKELETAL: Negative.     IMPRESSION:   CONCLUSION:  1.  No central or lobar PE. Segmental and subsegmental vessels cannot be adequately assessed due to motion.   2.  The main pulmonary artery is enlarged at 34 mm in diameter, which can be seen with pulmonary hypertension.   3.  Cardiomegaly.  4.  Peripancreatic stranding is consistent with pancreatitis.     Cardiology:    Echo Complete   Order# [id number]   Reading physician: Tisha Liu MD Ordering physician: Joshua Becker MD Study date: 18   Patient Information   Patient Name MRN Sex              Age   Silver Zamora [id number] Male 1950 (68 y.o.)   Indications   Bradycardia   Summary     1.Left ventricle ejection fraction is mildly decreased. The estimated left ventricular ejection fraction  is 45%.    2.TAPSE is normal, which is consistent with normal right ventricular systolic function.    3.The aortic root is mildly dilated.    4.No hemodynamically significant valvular heart abnormalities.    5.No previous study for comparison.             No results found for this or any previous visit (from the past 168 hour(s)).       This note has been dictated using voice recognition software. Any grammatical or context distortions are unintentional and inherent to the software

## 2021-06-18 NOTE — PROGRESS NOTES
[facility]    Facility:   [facility] [[id number]]   Code Status: FULL CODE      CHIEF COMPLAINT/REASON FOR VISIT:  Chief Complaint   Patient presents with     Follow Up     rehab, choledo       HISTORY:      HPI: Silver is a 68 y.o. male who I had a chance to visit with secondary to his hospitalization May 23 - May 29, 2018 secondary to choledocholithiasis cholecystectomy along with pancreatitis with biliary obstruction calculus.  Did undergo a laparoscopic cholecystectomy with Shani angiogram.  Also did have a history of hypokalemia and hypomagnesemia.  The elevated liver function test did resolve.  He did have some fluid overload.  Does have generalized weakness and deconditioning.  He also does have a history of Parkinson's currently on Sinemet.  Had a chance to go over his medications as well as his overall care on the transitional care unit.  Stab wounds look good he has been normotensive and afebrile.  His pain seems to be well managed.  Not requiring any Seroquel as needed so we will discontinue the as needed he also wants the MiraLAX scheduled.  Does see the cardiologist on June 20.  No heartburn or reflux no omeprazole.    Past Medical History:   Diagnosis Date     Anxiety      Callus      Chronic back pain      Depression      GERD (gastroesophageal reflux disease)      Hypothyroidism      Pancreatitis 12/31/2015     PD (Parkinson's disease)      RBD (REM behavioral disorder) 4/3/2017     Shingles     hx             Family History   Problem Relation Age of Onset     Kidney failure Mother      Heart disease Father      Tremor Father      Parkinsonism Paternal Grandfather      Tremor Sister      Leukemia Maternal Grandmother      Social History     Social History     Marital status:      Spouse name: N/A     Number of children: N/A     Years of education: N/A     Social History Main Topics     Smoking status: Former Smoker     Types: Cigarettes     Quit date:  1/1/1980     Smokeless tobacco: Never Used     Alcohol use Yes      Comment: one drink a week     Drug use: No     Sexual activity: Not on file     Other Topics Concern     Not on file     Social History Narrative         Review of Systems  Currently denies any chills and fever coughing wheezing chest pain dizziness vertigo nausea vomiting diarrhea dysuria headache stiff neck swollen glands or headaches.  History of Parkinson's hypothyroidism recent laparoscopic cholecystectomy    Current Outpatient Prescriptions   Medication Sig Note     acetaminophen (TYLENOL) 325 MG tablet Take 2 tablets (650 mg total) by mouth every 4 (four) hours as needed.      bisacodyl (DULCOLAX) 10 mg suppository One supp AR qday prn constipation.  Give if no BM in prior 3 days.      carbidopa-levodopa (SINEMET CR)  mg ER tablet Take 1 tablet by mouth at bedtime. At ~2200      carbidopa-levodopa (SINEMET)  mg per tablet Take 3 tablets by mouth 3 (three) times a day. At approx. 0800, 1200, and 1600.      carbidopa-levodopa (SINEMET)  mg per tablet Take 2 tablets by mouth at bedtime. At approx 2200      cholecalciferol, vitamin D3, (VITAMIN D3) 2,000 unit Tab Take 1 tablet (2,000 Units total) by mouth daily.      entacapone (COMTAN) 200 mg tablet Take 200 mg by mouth 4 (four) times a day. with each Sinemet dose       gabapentin (NEURONTIN) 300 MG capsule Take 300 mg by mouth daily with supper.      gabapentin (NEURONTIN) 300 MG capsule Take 600 mg by mouth at bedtime.      levothyroxine (SYNTHROID, LEVOTHROID) 137 MCG tablet Take 137 mcg by mouth Daily at 6:00 am.      multivitamin (MULTIVITAMIN) per tablet Take 1 tablet by mouth daily.      omeprazole (PRILOSEC) 20 MG capsule Take 20 mg by mouth daily as needed.      polyethylene glycol (MIRALAX) 17 gram packet Take 17 g by mouth daily.       QUEtiapine (SEROQUEL) 25 MG tablet Take 0.5 tablets (12.5 mg total) by mouth at bedtime. And 12.5mg PO three times a day PRN  agitation      senna-docusate (SENNOSIDES-DOCUSATE SODIUM) 8.6-50 mg tablet Take 2 tablets by mouth daily. Hold for loose stools. 5/23/2018: This medication was just ordered- not yet picked up     venlafaxine (EFFEXOR-XR) 150 MG 24 hr capsule Take 1 capsule (150 mg total) by mouth daily. Take with 75mg cap (225mg)      venlafaxine (EFFEXOR-XR) 75 MG 24 hr capsule Take 1 capsule (75 mg total) by mouth daily. Take with 150mg cap        .There were no vitals filed for this visit.  Blood pressure 110/75 pulse 66 respirations 20 temperature 97.6  Physical Exam   Constitutional: No distress.   HENT:   Head: Normocephalic.   Eyes: Pupils are equal, round, and reactive to light.   Neck: Neck supple. No thyromegaly present.   Cardiovascular: Normal rate, regular rhythm and normal heart sounds.    Hypertension.  Ejection fraction 45%   Pulmonary/Chest: Breath sounds normal.   Abdominal: Bowel sounds are normal. There is no tenderness. There is no guarding.   Abdominal stab wounds within normal limits   Musculoskeletal:   History of Parkinson's.  Pain is managed.   Lymphadenopathy:     He has no cervical adenopathy.   Skin: Skin is warm and dry. No rash noted.         LABS:   Lab Results   Component Value Date    WBC 19.3 (H) 06/01/2018    HGB 12.9 (L) 05/28/2018    HCT 40.6 05/28/2018    MCV 93 05/28/2018     05/28/2018     Results for orders placed or performed during the hospital encounter of 05/23/18   Basic Metabolic Panel   Result Value Ref Range    Sodium 139 136 - 145 mmol/L    Potassium 3.7 3.5 - 5.0 mmol/L    Chloride 108 (H) 98 - 107 mmol/L    CO2 21 (L) 22 - 31 mmol/L    Anion Gap, Calculation 10 5 - 18 mmol/L    Glucose 93 70 - 125 mg/dL    Calcium 8.5 8.5 - 10.5 mg/dL    BUN 10 8 - 22 mg/dL    Creatinine 0.70 0.70 - 1.30 mg/dL    GFR MDRD Af Amer >60 >60 mL/min/1.73m2    GFR MDRD Non Af Amer >60 >60 mL/min/1.73m2           ASSESSMENT:      ICD-10-CM    1. Choledocholithiasis K80.50    2. Cholecystitis  K81.9    3. Acute biliary pancreatitis, unspecified complication status K85.10    4. General weakness R53.1        PLAN:    Discontinue the Seroquel as needed continue with the Sinemet and his other medications.  Continue to work with therapy department.  His pain is managed.  Does see cardiology on the 20th.  Is able to use his walker.Adding MiraLAX daily.      Electronically signed by: Michael Duane Johnson, CNP

## 2021-06-18 NOTE — PROGRESS NOTES
[facility] Patient Outreach:     Discharge Date::  6/13/18      Reason for TCU stay (discharge diagnosis)::  Acute pancreatitis, cholelithasis, dementia, paranoia, myocardial myopathy, encephalopathy      Are you feeling better, the same or worse since your discharge?:  Patient is feeling better          As part of your discharge plan, did they discuss home care with you?: Yes        Have your seen them yet, or are they scheduled to visit?: Yes                Do you have any follow up visits scheduled with your PCP or Specialist?:  Yes, with Specialist      Who are you seeing and when is it scheduled?:  Cardiology on 6/20/18      I'm glad to hear you're doing well and we want you to continue to do well. Your PCP would like to see you for a follow-up visit. Can we help set that up for your today?: No (Patient has an appt with PCP on 6/25.  Patient's wife doesn't want to move up appt, because she wants to see PCP after he sees cardiology.  )        (RN) Provided patient the PCP's phone number to call if they have any questions or concerns?: Yes

## 2021-06-18 NOTE — ANESTHESIA POSTPROCEDURE EVALUATION
Patient: Silver Zamora  ENDOSCOPIC RETROGRADE CHOLANGIOPANCREATOGRAPHY SPINCTEROTOMY BILIARY STONE EXTRACTION  Anesthesia type: general    Patient location: PACU  Last vitals:   Vitals:    05/25/18 1615   BP: 122/70   Pulse: 65   Resp: 25   Temp: 36.7  C (98  F)   SpO2: 95%     Post vital signs: stable  Level of consciousness: awake, confused and responds to simple questions  Post-anesthesia pain: pain controlled  Post-anesthesia nausea and vomiting: no  Pulmonary: unassisted, return to baseline  Cardiovascular: stable and blood pressure at baseline  Hydration: adequate  Anesthetic events: no    QCDR Measures:  ASA# 11 - Bailey-op Cardiac Arrest: ASA11B - Patient did NOT experience unanticipated cardiac arrest  ASA# 12 - Bailey-op Mortality Rate: ASA12B - Patient did NOT die  ASA# 13 - PACU Re-Intubation Rate: ASA13B - Patient did NOT require a new airway mgmt  ASA# 10 - Composite Anes Safety: ASA10A - No serious adverse event    Additional Notes:

## 2021-06-18 NOTE — ANESTHESIA CARE TRANSFER NOTE
Last vitals:   Vitals:    05/25/18 1435   BP: 177/83   Pulse: 82   Resp: 20   Temp: 36.8  C (98.2  F)   SpO2: 98%     Patient's level of consciousness is drowsy  Spontaneous respirations: yes  Maintains airway independently: yes  Dentition unchanged: yes  Oropharynx: oropharynx clear of all foreign objects    QCDR Measures:  ASA# 20 - Surgical Safety Checklist: WHO surgical safety checklist completed prior to induction  PQRS# 430 - Adult PONV Prevention: 4558F - Pt received => 2 anti-emetic agents (different classes) preop & intraop  ASA# 8 - Peds PONV Prevention: NA - Not pediatric patient, not GA or 2 or more risk factors NOT present  PQRS# 424 - Bailey-op Temp Management: 4559F - At least one body temp DOCUMENTED => 35.5C or 95.9F within required timeframe  PQRS# 426 - PACU Transfer Protocol: - Transfer of care checklist used  ASA# 14 - Acute Post-op Pain: ASA14B - Patient did NOT experience pain >= 7 out of 10

## 2021-06-18 NOTE — ANESTHESIA PREPROCEDURE EVALUATION
Anesthesia Evaluation        Airway   Mallampati: II   Pulmonary - negative ROS and normal exam                          Cardiovascular - negative ROS and normal exam  Rhythm: regular  Rate: normal,         Neuro/Psych    (+) Parkinson's disease, depression,     Endo/Other    (+) hypothyroidism,      GI/Hepatic/Renal    (+) GERD, esophageal disease,      Comments: pancreatitis          Dental - normal exam                 Chemistry        Component Value Date/Time     05/24/2018 0441    K 3.6 05/24/2018 0441     05/24/2018 0441    CO2 25 05/24/2018 0441    BUN 18 05/24/2018 0441    CREATININE 0.79 05/24/2018 0441     05/24/2018 0441        Component Value Date/Time    CALCIUM 8.9 05/24/2018 0441    ALKPHOS 101 05/24/2018 0441    AST 88 (H) 05/24/2018 0441    ALT 58 (H) 05/24/2018 0441    BILITOT 1.7 (H) 05/24/2018 0441        Lab Results   Component Value Date    WBC 21.2 (H) 05/24/2018    HGB 14.3 05/24/2018    HCT 42.8 05/24/2018    MCV 90 05/24/2018     05/24/2018                Anesthesia Plan  Planned anesthetic: general endotracheal    ASA 3   Induction: intravenous   Anesthetic plan and risks discussed with: patient    Post-op plan: routine recovery

## 2021-06-18 NOTE — ANESTHESIA CARE TRANSFER NOTE
Last vitals:   Vitals:    05/24/18 1400   BP: 147/78   Pulse: 85   Resp: 16   Temp: 37.2  C (99  F)   SpO2: 100%     Pt brought to PACU on 10L facemask. Monitors applied. VSS upon arrival.    Patient's level of consciousness is drowsy  Spontaneous respirations: yes  Maintains airway independently: yes  Dentition unchanged: yes  Oropharynx: oropharynx clear of all foreign objects    QCDR Measures:  ASA# 20 - Surgical Safety Checklist: WHO surgical safety checklist completed prior to induction  PQRS# 430 - Adult PONV Prevention: 4558F - Pt received => 2 anti-emetic agents (different classes) preop & intraop  ASA# 8 - Peds PONV Prevention: NA - Not pediatric patient, not GA or 2 or more risk factors NOT present  PQRS# 424 - Bailey-op Temp Management: 4559F - At least one body temp DOCUMENTED => 35.5C or 95.9F within required timeframe  PQRS# 426 - PACU Transfer Protocol: - Transfer of care checklist used  ASA# 14 - Acute Post-op Pain: ASA14B - Patient did NOT experience pain >= 7 out of 10

## 2021-06-18 NOTE — PROGRESS NOTES
[facility]    Facility:   [facility] [[id number]]   Code Status: FULL CODE  PCP: Valentín Washington MD   Phone: [phone]   Fax: [phone]      CHIEF COMPLAINT/REASON FOR VISIT:  Chief Complaint   Patient presents with     Discharge Summary       HISTORY COURSE:  Silver is a 68 y.o. male who significant past medical history of Parkinson's disease pancreatitis and hypothyroidism.  He did get admitted to the hospital recently with acute abdominal pain.  He was admitted to the hospital was worked up very thoroughly and imaging was done.  It was probable secondary to cholelithiasis and he did have acute pancreatitis.  He did have cholelithiasis and cholecystitis and did undergo laparoscopic cholecystectomy.  He also had elevated liver function test that did resolve and he did also have an ERCP.  He did have leukocytosis unclear etiology it could be stress reaction versus cholecystitis.  He did continue IV antibiotics had negative blood cultures and no signs of any other infectious ideology.  He did have myocardial myopathy with fluid overload with ejection fraction 45% and he did continue his oral Lasix.  His acute encephalopathy with paranoia and hallucinations likely secondary to above and his baseline cognitive impairment dementia as per family.  He does have a positive history of encephalopathy during previous hospital admissions and supportive care was continued.  He does have Parkinson's disease which he did continue his medications and he did have constipation likely the medications and poor oral intake.  His TSH was normal and he continue with weakness and deconditioning so was transferred here to the TCU at [facility] in stable condition.    He has been able to participate with rehabilitation services and at this point pretty independent.  He is not having any current belly issues and he did feel like he did have successful stay in the hospital as  well as a stay on the transitional care unit.  There is no fluid overload he has been normotensive and afebrile.  No abdominal distention.  No stooling problems.  His stab wounds are resolving.  He is able to use a walker.  Not having any discomfort.  No Tylenol as needed.  We did take him off his Seroquel and no current side effects or issues with the discontinuation of the Seroquel.  His appetite is been appropriate.  He is taking Sinemet for his Parkinson's.  Review of Systems  Currently denies any chills and fever coughing wheezing chest pain dizziness vertigo nausea vomiting diarrhea dysuria headache stiff neck swollen glands or headaches.  History of Parkinson's hypothyroidism recent laparoscopic cholecystectomy  Vitals:    06/11/18 0723   BP: 125/73   Pulse: 83   Resp: 18   Temp: 97.1  F (36.2  C)   SpO2: 98%       Physical Exam   Constitutional: No distress.   Eyes: Pupils are equal, round, and reactive to light.   Neck: Neck supple. No thyromegaly present.   Cardiovascular: Normal rate, regular rhythm and normal heart sounds.    Hypertension.  Ejection fraction 45%   Pulmonary/Chest: Breath sounds normal.   Abdominal: Bowel sounds are normal. There is no tenderness. There is no guarding.   Abdominal stab wounds within normal limits   Musculoskeletal:   History of Parkinson's.  Pain is managed.   Lymphadenopathy:     He has no cervical adenopathy.    Lab Results   Component Value Date    ALT 14 06/01/2018    AST 20 06/01/2018    ALKPHOS 106 06/01/2018    BILITOT 0.5 06/01/2018     Lab Results   Component Value Date    WBC 19.3 (H) 06/01/2018    HGB 12.9 (L) 05/28/2018    HCT 40.6 05/28/2018    MCV 93 05/28/2018     05/28/2018     Results for orders placed or performed during the hospital encounter of 05/23/18   Basic Metabolic Panel   Result Value Ref Range    Sodium 139 136 - 145 mmol/L    Potassium 3.7 3.5 - 5.0 mmol/L    Chloride 108 (H) 98 - 107 mmol/L    CO2 21 (L) 22 - 31 mmol/L    Anion Gap,  Calculation 10 5 - 18 mmol/L    Glucose 93 70 - 125 mg/dL    Calcium 8.5 8.5 - 10.5 mg/dL    BUN 10 8 - 22 mg/dL    Creatinine 0.70 0.70 - 1.30 mg/dL    GFR MDRD Af Amer >60 >60 mL/min/1.73m2    GFR MDRD Non Af Amer >60 >60 mL/min/1.73m2         MEDICATION LIST:  Current Outpatient Prescriptions   Medication Sig     acetaminophen (TYLENOL) 325 MG tablet Take 2 tablets (650 mg total) by mouth every 4 (four) hours as needed.     bisacodyl (DULCOLAX) 10 mg suppository One supp [place] qday prn constipation.  Give if no BM in prior 3 days.     carbidopa-levodopa (SINEMET CR)  mg ER tablet Take 1 tablet by mouth at bedtime. At ~2200     carbidopa-levodopa (SINEMET)  mg per tablet Take 3 tablets by mouth 3 (three) times a day. At approx. 0800, 1200, and 1600.     carbidopa-levodopa (SINEMET)  mg per tablet Take 2 tablets by mouth at bedtime. At approx 2200     cholecalciferol, vitamin D3, (VITAMIN D3) 2,000 unit Tab Take 1 tablet (2,000 Units total) by mouth daily.     entacapone (COMTAN) 200 mg tablet Take 200 mg by mouth 4 (four) times a day. with each Sinemet dose      gabapentin (NEURONTIN) 300 MG capsule Take 300 mg by mouth daily with supper.     gabapentin (NEURONTIN) 300 MG capsule Take 600 mg by mouth at bedtime.     levothyroxine (SYNTHROID, LEVOTHROID) 137 MCG tablet Take 137 mcg by mouth Daily at 6:00 am.     multivitamin (MULTIVITAMIN) per tablet Take 1 tablet by mouth daily.     senna-docusate (SENNOSIDES-DOCUSATE SODIUM) 8.6-50 mg tablet Take 2 tablets by mouth daily. Hold for loose stools.     venlafaxine (EFFEXOR-XR) 150 MG 24 hr capsule Take 1 capsule (150 mg total) by mouth daily. Take with 75mg cap (225mg)     venlafaxine (EFFEXOR-XR) 75 MG 24 hr capsule Take 1 capsule (75 mg total) by mouth daily. Take with 150mg cap       DISCHARGE DIAGNOSIS:    ICD-10-CM    1. Parkinsonism, unspecified Parkinsonism type G20    2. Hypothyroidism E03.9    3. Acute biliary pancreatitis, unspecified  complication status K85.10    4. Choledocholithiasis K80.50        MEDICAL EQUIPMENT NEEDS:  none    DISCHARGE PLAN/FACE TO FACE:  I certify that services are/were furnished while this patient was under the care of a physician and that a physician or an allowed non-physician practitioner (NPP), had a face-to-face encounter that meets the physician face-to-face encounter requirements. The encounter was in whole, or in part, related to the primary reason for home health. The patient is confined to his/her home and needs intermittent skilled nursing, physical therapy, speech-language pathology, or the continued need for occupational therapy. A plan of care has been established by a physician and is periodically reviewed by a physician.  Date of Face-to-Face Encounter: June 11, 2018    I certify that, based on my findings, the following services are medically necessary home health services: He will be discharging to home on June 13, 2018 with current medications he will also receive physical and occupational therapy home health aide nursing and .  The home care agency has not yet been identified.  He will require the skilled services secondary to his hospitalization for choledocholithiasis status post laparoscopic cholecystectomy    My clinical findings support the need for the above skilled services because: (Please write a brief narrative summary that describes what the RN, PT, SLP, or other services will be doing in the home. A list of diagnoses in this section does not meet the CMS requirements.)  Along with weakness deconditioning Parkinson's assistance with ADLs generalized weakness transfers medication management and  for community involvement    This patient is homebound because: (Please write a brief narrative summary describing the functional limitations as to why this patient is homebound and specifically what makes this patient homebound.)  Secondary to his chronic medical  conditions including Parkinson's recent hospitalization for choledocholithiasis status post laparoscopic cholecystectomy along with weakness deconditioning generalized debility self-care deficits medication management and  for community services    The patient is, or has been, under my care and I have initiated the establishment of the plan of care. This patient will be followed by a physician who will periodically review the plan of care.    Schedule follow up visit with primary care provider within 7 days to reestablish care.  He will follow-up with the surgeon as previously arranged.  He will follow-up with his primary care doctor regarding medication management and future laboratory studies.  He has had a successful hospitalization as well as stay on the transitional care unit.  He had no further questions.    Discharge coordination care greater than 30 minutes  Electronically signed by: Michael Duane Johnson, CNP

## 2021-06-18 NOTE — PROGRESS NOTES
[facility]      Facility:    [facility] [[id number]]  Code Status: UNKNOWN      Chief Complaint/Reason for Visit:  Chief Complaint   Patient presents with     H & P     Chronic cholecystitis with cholelithiasis, status post laparoscopic cholecystectomy, acute pancreatitis likely due to cholelithiasis, pain management, elevated LFTs which did resolve, leukocytosis of unclear etiology, mild cardiomyopathy with fluid overload, acute encephalopathy with paranoia and hallucinations, hypokalemia with low magnesium levels, Parkinson's disease, hypothyroidism, weakness and deconditioning.       HPI:   Silver is a 68 y.o. male who significant past medical history of Parkinson's disease pancreatitis and hypothyroidism.  He did get admitted to the hospital recently with acute abdominal pain.  He was admitted to the hospital was worked up very thoroughly and imaging was done.  It was probable secondary to cholelithiasis and he did have acute pancreatitis.  He did have cholelithiasis and cholecystitis and did undergo laparoscopic cholecystectomy.  He also had elevated liver function test that did resolve and he did also have an ERCP.  He did have leukocytosis unclear etiology it could be stress reaction versus cholecystitis.  He did continue IV antibiotics had negative blood cultures and no signs of any other infectious ideology.  He did have myocardial myopathy with fluid overload with ejection fraction 45% and he did continue his oral Lasix.  His acute encephalopathy with paranoia and hallucinations likely secondary to above and his baseline cognitive impairment dementia as per family.  He does have a positive history of encephalopathy during previous hospital admissions and supportive care was continued.  He does have Parkinson's disease which he did continue his medications and he did have constipation likely the medications and poor oral intake.  His TSH was normal and he continue  with weakness and deconditioning so was transferred here to the TCU at [facility] in stable condition.    Patient does have a history of diaphoresis was diaphoretic this morning sweating but did not have any chest pain or shortness of breath.  This is not a new occurrence for him and this could be medical medication related.  Entacapone could be responsible for this and will continue to monitor.  He is moving his bowels okay and his pain is well-managed.  After talking to him and his wife he is in need of physical therapy at this time and his Parkinson's disease is likely at its baseline.    Past Medical History:  Past Medical History:   Diagnosis Date     Anxiety      Callus      Chronic back pain      Depression      GERD (gastroesophageal reflux disease)      Hypothyroidism      Pancreatitis 12/31/2015     PD (Parkinson's disease)      RBD (REM behavioral disorder) 4/3/2017     Shingles     hx           Surgical History:  Past Surgical History:   Procedure Laterality Date     INGUINAL HERNIA REPAIR Bilateral 10/2013     LAMINECTOMY  10/2013     [place] ERCP REMOVE CALCULI/DEBRIS BILIARY/PANCREAS DUCT N/A 5/25/2018    Procedure: ENDOSCOPIC RETROGRADE CHOLANGIOPANCREATOGRAPHY SPINCTEROTOMY BILIARY STONE EXTRACTION;  Surgeon: Curtis Mcclain MD;  Location: [facility];  Service: Gastroenterology     WA LAP,CHOLECYSTECTOMY/GRAPH N/A 5/24/2018    Procedure: LAPAROSCOPIC CHOLECYSTECTOMY;  Surgeon: Joyce Melissa MD;  Location: [facility];  Service: General       Family History:   Family History   Problem Relation Age of Onset     Kidney failure Mother      Heart disease Father      Tremor Father      Parkinsonism Paternal Grandfather      Tremor Sister      Leukemia Maternal Grandmother        Social History:    Social History     Social History     Marital status:      Spouse name: N/A     Number of children: N/A     Years of education: N/A     Social History Main Topics     Smoking  status: Former Smoker     Types: Cigarettes     Quit date: 1/1/1980     Smokeless tobacco: Never Used     Alcohol use Yes      Comment: one drink a week     Drug use: No     Sexual activity: Not Asked     Other Topics Concern     None     Social History Narrative          Review of Systems   Constitutional:        Patient claims his pain is in good control he and he denies any fevers chills nausea vomiting diarrhea change in vision hearing taste or smell weakness one side the other chest pain or shortness of breath.  He did was diaphoretic this morning but this is not a new occurrence with him but it was without any prodrome of chest pain shortness of breath.  His room is very humid also.  The remainder the review of systems is negative is urinating without difficulty and moving his bowels okay.  Is normal at home is every third day and is on MiraLAX.       Vitals:    05/31/18 1008   BP: 119/76   Pulse: 76   Resp: 16   Temp: 98.9  F (37.2  C)   SpO2: 97%       Physical Exam   Constitutional: He appears well-nourished. No distress.   HENT:   Head: Normocephalic and atraumatic.   Nose: Nose normal.   Eyes: Right eye exhibits no discharge. Left eye exhibits no discharge. No scleral icterus.   Neck: Neck supple.   Cardiovascular: Normal rate and regular rhythm.    Pulmonary/Chest: Effort normal and breath sounds normal. No respiratory distress. He has no wheezes. He has no rales.   Abdominal: Soft. Bowel sounds are normal. He exhibits distension. There is no tenderness.   Patient is a clean.   Musculoskeletal: He exhibits no edema.   Lymphadenopathy:     He has no cervical adenopathy.   Neurological: He is alert. No cranial nerve deficit. He exhibits normal muscle tone.   Patient is bradykinetic at this time but no real rigidity and moves all 4 extremities without any issues.   Skin: Skin is warm. No rash noted. He is diaphoretic. No erythema.   Psychiatric: He has a normal mood and affect. His behavior is normal.        Medication List:  Current Outpatient Prescriptions   Medication Sig     acetaminophen (TYLENOL) 325 MG tablet Take 2 tablets (650 mg total) by mouth every 4 (four) hours as needed.     bisacodyl (DULCOLAX) 10 mg suppository One supp [place] qday prn constipation.  Give if no BM in prior 3 days.     carbidopa-levodopa (SINEMET CR)  mg ER tablet Take 1 tablet by mouth at bedtime. At ~2200     carbidopa-levodopa (SINEMET)  mg per tablet Take 3 tablets by mouth 3 (three) times a day. At approx. 0800, 1200, and 1600.     carbidopa-levodopa (SINEMET)  mg per tablet Take 2 tablets by mouth at bedtime. At approx 2200     cholecalciferol, vitamin D3, (VITAMIN D3) 2,000 unit Tab Take 1 tablet (2,000 Units total) by mouth daily.     entacapone (COMTAN) 200 mg tablet Take 200 mg by mouth 4 (four) times a day. with each Sinemet dose      gabapentin (NEURONTIN) 300 MG capsule Take 300 mg by mouth daily with supper.     gabapentin (NEURONTIN) 300 MG capsule Take 600 mg by mouth at bedtime.     levothyroxine (SYNTHROID, LEVOTHROID) 137 MCG tablet Take 137 mcg by mouth Daily at 6:00 am.     multivitamin (MULTIVITAMIN) per tablet Take 1 tablet by mouth daily.     omeprazole (PRILOSEC) 20 MG capsule Take 20 mg by mouth daily as needed.     polyethylene glycol (MIRALAX) 17 gram packet Take 17 g by mouth daily as needed.      QUEtiapine (SEROQUEL) 25 MG tablet Take 0.5 tablets (12.5 mg total) by mouth at bedtime. And 12.5mg PO three times a day PRN agitation     senna-docusate (SENNOSIDES-DOCUSATE SODIUM) 8.6-50 mg tablet Take 2 tablets by mouth daily. Hold for loose stools.     venlafaxine (EFFEXOR-XR) 150 MG 24 hr capsule Take 1 capsule (150 mg total) by mouth daily. Take with 75mg cap (225mg)     venlafaxine (EFFEXOR-XR) 75 MG 24 hr capsule Take 1 capsule (75 mg total) by mouth daily. Take with 150mg cap       Labs: Latest Hospital labs on 5/28 and 5/29/2018 was follows sodium was 139, potassium is 4.1, CO2 is 21,  creatinine was 0.70, calcium was 8.5, white count in the hospital ranged from 12.4 up to 19.3 and on 5/28/2018 it was 16.5.  Hemoglobin was 12.9, and platelets were all within normal limits.  Patient's lipase is not available at this time but his ALT was 18 which is down from 79 and his AST was 29 down from 250.  Pathology of the gallbladder revealed the following chronic cholecystitis with cholelithiasis and focally impacted duct stones.  There is no evidence of malignancy.  Transthoracic echocardiogram indicated ejection fraction 45% with mildly decreased left ventricular ejection fraction.  There was no hemodynamically significant valvular heart abnormalities.      Assessment:    ICD-10-CM    1. Cholelithiasis K80.20    2. Pancreatitis K85.90    3. Status post laparoscopic cholecystectomy Z90.49    4. Pain management R52    5. Hypokalemia E87.6    6. Low magnesium levels R79.0    7. Leukocytosis D72.829    8. Fluid overload E87.70    9. Acute encephalopathy G93.40        Plan: Plan at this time is we will check a magnesium tomorrow second low magnesium levels a basic metabolic profile second acute kidney injury.  Weights will be done on a daily basis and I will be notified if any 2 pounds weight changes either way.  INR be done today and EKG twelve-lead with troponins and CK-MB will be done today secondary to his diaphoresis.  He is not have any chest pain or shortness of breath so we will monitor there.  We will also check lipase and liver function tests tomorrow secondary pancreatitis and elevated LFTs and will continue with physical and Occupational Therapy.  His Parkinson's disease seems to be well controlled at this time and is baseline.  Greater than 58 minutes was spent on this admission with greater than 50% of the time dedicated to coordination of care.        Electronically signed by: Silver Hanna DO

## 2021-06-19 NOTE — PROGRESS NOTES
CCC CG and SW consulted on [employer], SW provided list via [email or web address] of legal resources in pt's area.

## 2021-06-19 NOTE — PROGRESS NOTES
Assessment/Plan:        Phone RN assessment done with patient's wife, Luz Marina.  Patient has home care servcies currently through [facility].  Skilled nursing discharged, but still has therapy and  in the home.  WIfe has received good support from home care and has received information regarding some resources from the home care SW.  Does have many needs from : understanding finances, assistance in home, power of  as outlined in goals.  Patient and wife live in home in [place] that has stairs that patient must use. Patient is able to use stairs safely presently, but as disease progresses will need more assistance or may need to move.  Wife would like to stay in home for a long as possible, but is not opposed to moving if needed when the time comes.  WIfe sets up medications and adminsters but patient does take on his own prn pain meds.  Patient does have right arm, leg and back pain, but main concern is progression of Parkinsons, decreased cognition and safety in home with ambulation and personal cares.         Subjective:    Patient ID: Silver Zamora is a 68 y.o. male.    HPI    The following portions of the patient's history were reviewed and updated as appropriate: allergies, current medications, past family history, past medical history, past social history, past surgical history and problem list     RN Recommendations and Referrals  Financial resource guide consult/follow-up: For assistance as needed with resources.  [facility] : For assistance as outlined in goals.  [facility] RN: As needed with medication follow up questions.     Action Plan    RN Will  Will add the patient to [facility] RN tracking list  Be available to the patient as nursing needs arise    Care Guide Will  At goal setting visit : Review goals set at PCAM visit and create or add to action plan.    Goals  Goals        Patient Stated      I want my wife to have a better understanding of medications within the next 3 months.  (pt-stated)            Action steps to achieve this goal  1.  Keep upcoming appointment with the HE Pharmacist at the Clinic.  2.  My wife will discuss any medication concerns with the care guide when they call for outreach and will ask to talk or meet with the RN if necessary.  3.  My wife will reach out to RN if any concerns or questions arise regarding medications.  4. My wife will share the concern of affording one medication that is very expensive and explore if there are any options with assistance.      Date goal set:  7/3/18        I want my wife to have a better understanding of the financial resources available to manage my health care. (pt-stated)            Action steps to achieve this goal  1.  My wife or I will speak to the care guide and [facility] SW or FRG when they call to discuss financial needs.  2.  My wife or I will share our financial information to better understand spend down, and what could be available to us in the future.  3.  My wife will fill out the necessary paperwork, if needed, to assist with our finances.     Date goal set:  7/3/18        I would like resources for in home assistance (pt-stated)            Action steps to achieve this goal  1.  My wife and I  will speak with the care guide when they reach out regarding in home resources.  2.  My wife and I will continue to explore the options already provided to me by the home care SW: [place] day program, [facility], and will report findings to care guide.  3.  My wife and I will share of financial information to the [facility] SW and care guide to assist in obtaining in home resources.     Date goal set:  7/3/18        My wife and I will find resources for therapy and /or support groups to help with coping and managing living with chronic health conditions.  (pt-stated)            Action steps to achieve this goal  1.  My wife and I will seek out resources given to us by care guide when contacted.  2.  My wife will look in to the  "resources given to her by her PCP for therapy.      Date goal set:  7/3/18        My wife would like better understanding of when to contact the Elder law  . (pt-stated)            Action steps to achieve this goal  1.  My wife will speak to the [facility] SW when she calls to discuss the will/estate planning/power of  and when to meet with the elder law .  2.  I will continue to discuss with the home care  and will share with the [facility] SW any information obtained.    Date goal set:  7/3/18              Clinic Care Coordination RN Assessed Needs  Patient Centered Assessment Method-PCAM TOTAL SCORE: 30 (7/3/2018 12:47 PM)  Level 2:  A score of 25-36 indicates that the patient has a moderate initial need for RN or SW intervention at the discretion of the .  The RN will add this patient to her panel and follow closely in partnership with the care guide until stable.  She will reach out to the care guide for support in care coordination needs and graduate the patient to standard care guide outreach when appropriate.      PCAM (Patient Centered Assessment Method)   HEALTH AND WELL-BEING  Other Physical Health Concerns:: Parkinsons, decreasing cognition  RN Assessment: Physical Health Needs: Mod to severe symptoms or problems that impact on daily life  RN Assessment: Physical Health Problems: Mild impact on mental well-being e.g. \"feeling fed-up\", \"reduced enjoyment\"  Mental Health Concerns: Depression, Anxiety  RN Assessment:Other Mental Well-Being Concern: Mod to severe problems that interfere with function  Lifestyle/Habit Concerns: Exercise/Activity (Decreased walking and activity is impacting bowel status.  )  RN Assessment: Lifestyle Behaviors: Mod to severe impact on client's well-being, preventing enjoyment of usual activities  SOCIAL ENVIRONMENT  Home Environment Concerns: Denies concerns that require further investigation  Other Home Environment Concerns:: Does have stairs " that must use in home.  Does manage well presently at home and uses equipment and precautions.  Has very well lighted environment for nighttime to help to prevent falls.   RN Assessment: Home Environment: Safe, stable, but with some inconsistency  Daily Activities Concerns: Denies concerns that require further investigation  RN Assessment: Daily Activites: Restricted participation with some degree of social isolation (Is currently checking into a day program near home with Kenneth Saenz.)  Social Network Concerns: Denies concerns that require further investigation  RN Assessment: Social Network: Adequate participation with social networks  Financial Status and Service Concerns: Disabled  RN Assessment: Financial Resources: Financially secure, some resource challenges  HEALTH LITERACY AND COMMUNICATION  Understanding of Health and Wellbeing Concerns: Little understanding which impacts on their ability to undertake better management  RN Assessment: Health Literacy: Reasonable to good understanding but do not feel able to engage with advice at this time  Engagement Concerns: Some difficulties in communication with or without moderate barriers  RN Assessment: Engagement: Adequate communication, with or without minor barriers  Barriers to Compliance with Medical Recommendations: Transportation (Wife is able to transport, but has difficulty getting patient in to car independently. )  SERVICE COORDINATION  Other Services: Other care/services in place with some coordination barriers  Coordination of Services: Required care/services in place with some coordination barriers  PCAM TOTAL SCORE: 30      Emergency Plan  Emergency Plan Recommendation:    When to Use the Emergency Department (ED)  An emergency means you could die if you don t get care quickly. Or you could be hurt permanently (disabled). Read below to know when to use -- and when not to use -- an emergency department (also called ED).    Dangers to your life  Here  are examples of emergencies. These need immediate care:  A hard time breathing  Severe chest pain  Choking  Severe bleeding  Suddenly not able to move or speak  Blacking out (fainting)`  Poisoning    Dangers of permanent injuries  Here are other emergencies. These also need immediate care:  Deep cuts or severe burns  Broken bones, or sudden severe pain and swelling in a joint    When it s an emergency  If you have an emergency, follow these steps:    1. Go to the nearest emergency department  If you can, go to the hospital ED closest to you right away.  If you cannot get there right away, or if it is not safe to take yourself, call 911 or your police emergency number.  2. Call your primary care doctor  Tell your doctor about the emergency. Call within 24 hours of going to the ED.  If you cannot call, have someone call for you.  Go to your doctor (not the ED) for any follow-up care.    When it s not an emergency  If a problem is not an emergency, follow these steps:    1. Call your primary care doctor  If you don t know the name of your doctor, call your health plan.  If you cannot call, have someone call for you.  2. Follow instructions  Your doctor will tell you what you should do.  You may be told to see your doctor right away. You may be told to go to the ED. Or you may be told to go to an urgent care center.  Follow your doctor s advice.    Depression  Everyone feels down at times. The blues are a natural part of life. But an unhappy period that s intense or lasts for more than a couple of weeks can be a sign of depression. Depression is a serious illness. It can disrupt the lives of family and friends. If you know someone you think may be depressed, find out what you can do to help.    Know the serious signals  Warning signals for suicide include:    Threats or talk of suicide    Statements such as  I won t be a problem much longer  or  Nothing matters     Giving away possessions or making a will or   arrangements    Buying a gun or other weapon    Sudden, unexplained cheerfulness or calm after a period of depression  If you notice any of these signs, get help right away. Call a health care professional, mental health clinic, or suicide hotline and ask what action to take. In an emergency, don t hesitate to call the police.    [facility] Mental Health Crisis Lines:  [facility], [place], operated [facility] [phone]  [facility] [phone]  [facility] [phone]  [facility] [phone]  [facility], Adults [phone]  [facility], Children [phone]  [facility], Adults [phone]  [facility], Children [phone]    Preventing Falls    Having a health problem can make you more likely to fall. Taking certain kinds of medicines may also increase your risk of falls. So, improving your health can help you avoid a fall. Work with your healthcare provider to manage health problems and to review your medicines. If you have your health under control, your risk of falling is lessened.    When to call your healthcare provider  Be sure to call your healthcare provider if you fall. Also call if you have any of these signs or symptoms (someone else may need to point them out to you):    Feeling lightheaded or dizzy more than once a day    Losing your balance often or feeling unsteady on your feet    Feeling numbness in your legs or feet, or noticing a change in the way you walk    Having a steady decline in your memory or mental sharpness        .    Review of Systems          Objective:    Physical Exam

## 2021-06-19 NOTE — PROGRESS NOTES
Care Guide Stephie spoke with Luz Marina for PCAM follow up/goal setting Clinic Care Coordination outreach. CG and Luz Marina agreed to speak at a later date since Luz Marina was babysitting.    Next outreach: 07/09/18, MORNING    Plan:  -Conduct Goal setting visit  -Schedule [facility] SW visit to: discuss medicare spend-down if indicated, provide in-home care resources not already provided by HE homecare, provide information on accessing Elder Law assistance  -CG send [employer] Info brochure and [facility] resources for support groups and care giver support

## 2021-06-19 NOTE — PROGRESS NOTES
Attempt 1: Care Guide called patient.  If this patient is returning my call, please transfer to  [facility] at ext [phone] MWF or [phone] on T/TH. CG combined advanced care planning resources for pt's upcoming 08/02/18 visit.    Next outreach: 08/07/18  Plan:  -CG to inquire about usefulness of 07/16/18 CCC SW telephone apt recommendations and information on finances and facility placement.  -Inquire about usefulness of 08/02/18 OV recommendations  -Provide elder law resources: https://www.[email or web address]/FlowCo/Seniors/Search?sm=All&q=Elder+Law&loc=[place]&mae=45.859663%2C-92.167462&reg=[place]   -Provide PO[place] specific elder law resources: https://www.[email or web address]/FlowCo/[employer]/Search?sm=All&q=Elder+Law&loc=[place]&mae=44.772686%2C-93.396390&reg=[place]    Upcoming:  OV 08/02/18 to discuss POLST and healthcare directive

## 2021-06-19 NOTE — PROGRESS NOTES
[facility] SW called pt's spouse Luz Marina at scheduled phone visit time of 8:00 AM and was able to connect. SW asked for clarification on what Luz Marina's questions were as CG note stated she had questions about insurance options and spend downs--pt already has Medicare and a supplement plan through [facility]. Luz Marina informed JOLIE that she had met with the home care SW who had provided resources for in-home services including [facility], [facility] and [facility]. Luz Marina reported she had questions about how they work with regards to contracts and minimum amount of hours that a worker can be scheduled. JOLIE explained that each agency has their own policies, however, JOLIE is aware that agencies do have minimum hours to schedule a visit--can't have a PCA come out for only an hour, some agencies require minimum of 2-3 hours. JOLIE explained that Luz Marina will have to discuss this further with each agency. JOLIE suggested for Luz Marina to look on the Medicare website for reviews of the agencies if she's wondering about the quality of their service. Luz Marina reported she's also looking into an adult day program through the [facility].    Luz Marina reported at this time, she's not looking for a facility placement but may eventually and was wondering about what she could expect as far as costs go. JOLIE explained MA and the income guidelines. Luz Marina reported that they were over income by a lot and was wondering how much income the county would take and what she would have left if he had to go to a facility. JOLIE explained that SW cannot answer this, it will depend on the circumstances at the time when the county comes out to assess. JOLIE explained the county ultimately determines what the spend down is based specifically on the income and assets of the pt and in order to know what that will look like, they will need to get the assessment completed. JOLIE discussed cost of facility per month and that basically, the pt will have to private pay until he meets the income guidelines  before the waiver/county will assist with the cost of the facility.    JOLIE informed Luz Marina that when she is thinking the pt needs facility placement, she should connect with the county to discuss further. JOLIE can place a referral for the county to send an RN to do the assessment when the time comes.

## 2021-06-19 NOTE — PROGRESS NOTES
Care Guide Stephie spoke with Silver's spouse Luz Marina for Clinic Care Coordination goal setting telephone appointment following his 07/03/18 nurse's assessment intake. MIHIR and Luz Marina reconciled pt's primary goals to have a better understanding of finances related to care and advanced care planning and created care plan, in 07/05/18 outreach note.  CG has printed and mailed care plan to pt.  CG assisted in scheduling pt's spouse to speak with CCC SW 07/16/18 8am to discuss insurance options and spenddowns, and to answer any additional questions regarding financing in home care after homecare, and accessing Elder Law specialists.    Next outreach: 07/20/18    Plan:  -Inquire about usefulness of CCC JOLIE recommendations

## 2021-06-19 NOTE — PROGRESS NOTES
Care Guide Stephie spoke with Silver's spouse Luz Marina for standard Clinic Care Coordination outreach. CG and Luz Marina reviewed the following:    -CG to inquire about usefulness of 07/16/18 CCC SW telephone apt recommendations and information on finances and facility placement.  -Inquire about usefulness of 08/02/18 OV recommendations on healthcare directives. Yes helpful, healthcare directive now filed.  -Provide elder law resources: https://www.[employer]/Seniors/Search?sm=All&q=Elder+Law&loc=[place]&mae=45.893015%2C-92.635325&reg=[place]  Will accept via email. CG has emailed to pt's spouse.  -Provide POA specific elder law resources: https://www.[employer]/Search?sm=All&q=Elder+Law&loc=[place]&mae=44.744576%2C-93.908195&reg=[place] Will accept via email. CG has emailed to pt's spouse.    Luz Marina reported the following:    -HE HC has ended, have set up in-home assistive devices  -Pt is starting at [facility] adult day 2xweek  -Pt is doing well despite recent CHF dx  -Pt is in need of foot care and has apt scheduled in October. Wife would like pt to be seen sooner and is following PCP recommendations with this. Will consult CG if still facing problems.    CG clarified [facility] program and staff roles, verified pt's spouse has CG phone numbers, set outreach at 1 mo.    Next outreach: 08/21/18    Plan:  -Inquire if facing problems getting foot care  -Offer CHF educational materials if indicated

## 2021-06-19 NOTE — PROGRESS NOTES
"Care Guide Stephie spoke with Silver's spouse Luz Marina to offer the Clinic Care Coordination per Ambulatory Referral to Care Management placed by PCP.    Pt's wife reported:  -Pt had home care prior to recent hospitalization  -Pt was recently hospitalized 6 days for gall bladder problems, stones found  -Pt was in TCU for two weeks following  -Pt now has homecare services but they know they will end soon. They working with [facility] Home[facility] SW to find services for afterward  -They are already working with a \"will/\"  -They want to know if [facility] offers staff who have more information to answer their questions    CG explained that there is risk of duplication of services given information shared with CG by pt's spouse.  CG encouraged pt's spouse to continue to work closely with HE home care SW and to look into the resources already shared.  CG explained strengths and limitations of CCC. CG explained that pt is welcome to pursue intake assessment to determine if CCC is appropriate. CG assisted in scheduling for pt's wife Luz Marina to complete PCAM on his behalf 07/03/18.    Next outreach: 07/05/18    Plan:  -Consult CCC RN after PCAM to determine if CCC enrolment is appropriate  "

## 2021-06-19 NOTE — LETTER
Letter by Stephie Pineda CHW at      Author: Stephie Pineda CHW Service: -- Author Type: --    Filed:  Encounter Date: 5/3/2019 Status: (Other)         [facility]  [address] [place] [address]    Noemy Sheppard is a copy of your Clinic Care Coordination Wellness Plan.  This plan was created and compiled by our Health Care Home nurses and your Care GuideStephie.  As a component of coordinating your care independantly, I encourage you to consider your wellness plan as a guide.  I will be reaching out in three months to check in, at which time we may discuss any changes to your wellness, continuing participation in Care Coordination through maintenance, or discontinuing your Care Coordination services.  In the mean time, please feel free to contact me directly with any questions or concerns.      My Clinic Care Coordination Wellness Plan    [facility]  [address] [place]  [address] [phone]      My Preferred Method of Contact:  Phone: [phone]    My Primary/Preferred Language:  English    Preferred Learning Style:  Reading information, Face to face discussion, Pictures/Diagrams and Hands on teaching    Emergency Contact: Extended Emergency Contact Information  Primary Emergency Contact: NoahLuz Marina  Address: [address] [place] [address] [address]           [place] [address] [facility]  Home Phone: [phone]  Mobile Phone: [phone]  Relation: Spouse  Secondary Emergency Contact: Cookie Wiseman   [facility]  Home Phone: [phone]  Mobile Phone: [phone]  Relation: Child     PCP:  Valentín Washington MD  Specialists:    Care Team            Valentín Washington MD   [phone] PCP - General, Family Medicine    Luz Marina Amador RN Registered Nurse    [facility] Care Coordination Care Guide, Primary Care - CC    MWF: [phone], T/TH: [phone]    Elyssa Dolan PA-C   [phone] Physician Assistant, Orthopedic Surgery    Patricia  Elyssa CALDERÓN MD   [phone] Physician, Neurology    Felix Leif BLACKMONLubna, DPM   [phone] Physician, Podiatry    Naresh Nails, PharmD   [phone] Pharmacist, Pharmacist        Accomplishments:  Goals        Patient Stated    ? COMPLETED: CG GOAL: (Wife stated) I want resources to support Silver's care needs in the next three weeks. (pt-stated)      Action steps to achieve this goal  1.  I will continue to contact the resources provided by the Clinic Care Coordination , Tiffanie VEGA, like [facility] and [facility] to arrange in home care.  2.  I will provide financial information to Stephie, the Care Guide, so that the Clinic Care Coordination , Tiffanie VEGA Can refer us to [facility] for a waiver assessment (DONE: 01/18/19).  3.  I will follow up with the [facility] nurse who called me today to schedule a waiver assessment time (DONE: 01/18/19). I will continue to follow up and let Stephie know what Silver's eligibility is (Continuous: 03/11/19).  4. I will consider the respite care resources that Stephie, the Care Guide, has sent to me in the mail (DONE: 11/26/18). I have accessed the [facility] , Luz Marina Chaves, and have received even more information on respite care and therapy Silver and I can attend together (DONE: 01/18/19).  5. I will consider the health alert device recommendations made by Stephie, the Care Guide (Continuous: 11/26/18).  6. I will consider taking Silver to the senior center in [place] and utilizing their respite care services (Continuous: 03/11/19).    Completed: 04/18/19  Date goal set:  11/09/18      ? COMPLETED: I want my wife to have a better understanding of the financial resources available to manage my health care. (pt-stated)      Action steps to achieve this goal  1. My wife Luz Marina attended a telephone appointment with the Clinic Care Coordination Tiffanie, on 07/16/18 8am to discuss financial considerations including insurance  saeed (DONE: 07/11/18).  2. My wife will fill out the necessary paperwork, if needed, to assist with our finances.     Updated: 08/07/18  Date goal set:  07/03/18      ? COMPLETED: I would like information on resources for in home assistance. (pt-stated)      Action steps to achieve this goal  1.  My wife and I  Have already been given resources including [facility], [facility], and [facility] by [facility] that we've begun looking into (DONE: 07/11/18). We'll report our findings to my Care Guide, Stephie, when she calls for standard Clinic Care Coordination check in.  2.  My wife and I will shared our financial information with the Clinic Care Coordination , Tiffanie, during my wife's 07/16/18 8am telephone visit with her if we want to learn more about financing/obtaining in home resources (DONE: 07/16/18).  3. My wife and I know that if I need to consider moving into a  Residential facility at any time, we may ask Tiffanie the  for a referral to be place to the [facility] to begin the process.     Updated: 08/07/18  Date goal set:  07/03/18      ? COMPLETED: RN Goal:  I have ongoing health resources and information for all of my physicans who are involved in my Care Team. (pt-stated)      Action steps to achieve this goal  1.  Silver will attend cardiac rehab two times each week and will start today (Continuous: 01/18/19).  2.  I will speak with Stephie, the Care Guide, when she calls to follow up on heart failure symptom reported (Continuous; 01/18/19).  3. I will encourage Silver to take his weight and record it each day (Continuous: 01/18/19).  4. I will continue to support diet change to low sodium for Silver (Continuous: 01/18/19).  5. I will look for and post the Tpn-Sx-Kwds-When  Emergency phone number sheet (Continuous: 01/18/19).      Updated: 01/18/19  Date goal set:  11/09/18         Other    ? COMPLETED: My wife would like better understanding of planning for the future including  reconciling my healthcare directive, POLST, and knowing when to contact the Elder Law  to nola ARIAS.      Action steps to achieve this goal  1.  We attended our appointment with Dr. Washington to discuss revising healthcare directive and POLST to ensure they are complimentary. I know we can meet with my Care Guide, Stephie, for facilitated advanced care planning of my healthcare directive, if we need assistance (DONE: )8/02/18).  2. My Care Guide, Stephie, scheduled Luz Marina to speak with the Clinic Care Coordination , Tiffanie, on 07/16/18 8am to discuss the will/estate planning/power of  and when to meet with the elder law  (DONE: 07/11/18).  3. My Care Guide, Stephie, provided Luz Marina with the links to Elder Law resources through the [employer] website, as provided by Tiffanie the Clinic Care Coordination  (DONE: 08/07/18).    Updated: 08/07/18  Date goal set:  07/03/18            Advanced Directive/Living Will: On file with the clinic    Clinical Emergency Plan  Preventing Falls in the Home  An adult or child can fall for many reasons. If you are an older adult, you may fall because your reaction time slows down. Your muscles and joints may get stiff, weak, or less flexible because of illness, medicines, or a physical condition. These things can also make a child more likely to fall or be injured in a fall.  Other health problems that make falls more likely include:    Arthritis    Dizziness or lightheadedness when you get out of bed (orthostatic hypotension)    History of a stroke    Dizziness    Anemia    Certain medicines taken for mental illness    Problems with balance or gait    History of falls with or without an injury    Changes in vision (vision impairment)    Changes in thinking skills and memory (cognitive impairment)  Injuries from a fall can include broken bones, dislocated joints, and cuts. When these injuries are serious enough, they can make it impossible  for you or a child who is injured in a fall to live on his or her own.  Prevention tips  To help prevent falls and fall-related injuries, follow the tips below.   Floors  Make floors safer by doing the following:     Put nonskid pads under area rugs.    Remove throw rugs.    Replace worn floor coverings.    Tack carpets firmly to each step on carpeted stairs. Put nonskid strips on the edges of uncarpeted stairs.    Keep floors and stairs free of clutter and cords.    Arrange furniture so there are clear pathways.    Clean up any spills right away.    Wear shoes that fit.  Bathrooms  Make bathrooms safer by doing the following:     Install grab bars in the tub or shower.    Apply nonskid strips or put a nonskid rubber mat in the tub or shower.    Sit on a bath chair to bathe.    Use bathmats with nonskid backing.  Lighting and the environment  Improve lighting in your home by doing the following:     Keep a flashlight in each room. Or put a lamp next to the bed within easy reach.    Put nightlights in the bedrooms, hallways, kitchen, and bathrooms.    Make sure all stairways have good lighting.    Take your time when going up and down stairs.    Put handrails on both sides of stairs and in walkways for more support. To prevent injury to your wrist or arm, dont use handrails to pull yourself up.    Install grab bars to pull yourself up.    Move or rearrange items that you use often. This will make them easier to find or reach.    Look at your home to find any safety hazards. Especially look at doorways, walkways, and the driveway. Remove or repair any safety problems that you find.  Preventing Falls    Having a health problem can make you more likely to fall. Taking certain kinds of medicines may also increase your risk of falls. So, improving your health can help you avoid a fall. Work with your healthcare provider to manage health problems and to review your medicines. If you have your health under control, your  risk of falling is lessened.     When to call your healthcare provider  Be sure to call your healthcare provider if you fall. Also call if you have any of these signs or symptoms (someone else may need to point them out to you):    Feeling lightheaded or dizzy more than once a day    Losing your balance often or feeling unsteady on your feet    Feeling numbness in your legs or feet, or noticing a change in the way you walk    Having a steady decline in your memory or mental sharpness       8101-6892 [employer]. [address] [place] [address]. All rights reserved. This information is not intended as a substitute for professional medical care. Always follow your healthcare professional's instructions.   Depression  Everyone feels down at times. The blues are a natural part of life. But an unhappy period thats intense or lasts for more than a couple of weeks can be a sign of depression. Depression is a serious illness. It can disrupt the lives of family and friends. If you know someone you think may be depressed, find out what you can do to help.     Know the serious signals  Warning signals for suicide include:    Threats or talk of suicide    Statements such as I wont be a problem much longer or Nothing matters    Giving away possessions or making a will or  arrangements    Buying a gun or other weapon    Sudden, unexplained cheerfulness or calm after a period of depression  If you notice any of these signs, get help right away. Call a health care professional, mental health clinic, or suicide hotline and ask what action to take. In an emergency, dont hesitate to call the police.     [facility] Mental Health Crisis Lines:  [facility] [phone]  [facility] [phone]  [facility] [phone]  [facility] [phone]  [facility], Adults [phone]  [facility], Children [phone]  [facility], Adults [phone]  [place]  [facility] [phone]     Parkinson Disease: Home Safety  As Parkinson disease progresses, home safety will be an increasing concern. This page includes tips that can help make your daily life safer and easier. Your doctor may also recommend a therapist to advise you on the best ways to set up your home.  Setting up living spaces  Get help from family and friends to make these changes:    Keep walkways open and free of clutter.    Move phone and electrical cords out of the way.    Remove throw rugs to prevent trips.    Get a cordless or speakerphone. Program numbers for family and emergency services.    Make sure rooms are well lit. Install nightlights along walkways.    If freezing at doorways is a problem, consider placing lines of tape on the floor between rooms. Stepping over the tape may prompt you to keep moving.  Setting up the bathroom  Use the tips below to make changes to your bathroom. Medicare or insurance may help cover the costs of some of these items, depending on your particular needs and plan.    Have grab bars put in the shower or tub for support getting in and out.    Install a hand-held showerhead for easier bathing.    Raise the height of the toilet with a commode chair or elevated toilet seat.    Use a rubber-backed bath mat to help prevent slips and falls.    Buy a shower seat to make bathing safer and less tiring.     All [facility] clinic patients have access to a Nurse 24 hours a day, 7 days a week.  If you have questions or want advice from a Nurse, please know [facility] is here for you.  You can call your clinic and they will connect you or you can call Care Connection at [phone].  [facility] also has Walk In Care clinics in multiple locations.  Call the number listed above for more information about our Walk In Care clinics or visit the [facility] website at [email or web address].    Stephie PERALTA, STEFFI  Care Guide  Clinic Care Coordination  [facility]  [facility]: [phone]  [facility]: [phone]

## 2021-06-19 NOTE — PROGRESS NOTES
Assessment/ Plan     1. Parkinsonism, unspecified Parkinsonism type (H)    Continue current plan per neurology  He has reviewed supplements with pharmacy and will attempt to take gabapentin and melatonin per his preference to see if that may help with sleep disruption  Continue Sinemet  Recommend ongoing physical therapy to aid with strength and reviewed falls risk    2. Hypothyroidism    Continue level thyroxine  Monitor thyroid cascade    3. Cholecystitis  Status post cholecystectomy in May 2018    He will continue to monitor and follow-up as needed    4. Heart failure with reduced ejection fraction, NYHA class I (H)    Reviewed his echocardiogram  Continue metoprolol  Patient agrees to plan    5.  Discussion of healthcare directives    Discussed healthcare directives as well as the POLST form  Forms will be copied and placed in the chart    40 minutes were spent with the patient and greater than 50% of the time was spent in face to face counseling and coordination of care        Subjective:       Silver Zamora is a 68 y.o. male who presents to the clinic with his wife and sister to review his current health and also to review advanced directive completion.  As noted, he has a complex medical history including advanced Parkinson's disease.  He has history of congestive heart failure and also underwent a laparoscopic cholecystectomy for cholecystitis in May 2018.  He has known gastroesophageal reflux disease as well as hypothyroidism.  As noted, he had presented to the emergency department with abdominal pain and was found to have cholecystitis and underwent a laparoscopic cholecystectomy.  He underwent a follow-up ERCP as well.  He was found to have mild cardiomegaly and had fluid overload and an echocardiogram revealed an ejection fraction of 44%.  The echocardiogram from June of 2018 also revealed hypokinesis in the inferior and inferolateral walls.  This was unchanged from his previous echocardiogram.  He  initially was treated with diuretics but currently is taking metoprolol and follows up with cardiology.    He has followed up with pharmacy and is discussed some supplements that he would like to take to aid with his sleep which has been quite poor.  Also, he has a history of thickened toenails and plans on following up with podiatry to have his nails cut.  He does have occasional dizziness.  As noted, his Parkinson's is quite advanced and the often will need to walk with the aid of a walker.  He has been compliant with his medications.  Denies chest pain, shortness of breath, palpitations.    They like to review healthcare directives.  He has had thorough discussions with his family regarding this.      The following portions of the patient's history were reviewed and updated as appropriate: allergies, current medications, past family history, past medical history, past social history, past surgical history and problem list.    Review of Systems   A 12 point comprehensive review of systems was negative except as noted.      Current Outpatient Prescriptions   Medication Sig Dispense Refill     acetaminophen (TYLENOL) 325 MG tablet Take 2 tablets (650 mg total) by mouth every 4 (four) hours as needed.  0     bisacodyl (DULCOLAX) 10 mg suppository One supp OR qday prn constipation.  Give if no BM in prior 3 days.  0     carbidopa-levodopa (SINEMET CR)  mg ER tablet Take 1 tablet by mouth at bedtime. At ~2200       carbidopa-levodopa (SINEMET)  mg per tablet Take 3 tablets by mouth 3 (three) times a day. At approx. 0800, 1200, and 1600.       carbidopa-levodopa (SINEMET)  mg per tablet Take 2 tablets by mouth at bedtime. At approx 2200       cholecalciferol, vitamin D3, (VITAMIN D3) 2,000 unit Tab Take 1 tablet (2,000 Units total) by mouth daily.  0     entacapone (COMTAN) 200 mg tablet Take 200 mg by mouth 4 (four) times a day. with each Sinemet dose        gabapentin (NEURONTIN) 300 MG capsule Take  "300 mg by mouth. 2 in the evening and 1 tab at bedtime       levothyroxine (SYNTHROID, LEVOTHROID) 137 MCG tablet Take 1 tablet (137 mcg total) by mouth Daily at 6:00 am. 90 tablet 3     meloxicam (MOBIC) 7.5 MG tablet Take 7.5 mg by mouth daily as needed for pain.       metoprolol succinate (TOPROL-XL) 25 MG Take 1 tablet (25 mg total) by mouth daily. 90 tablet 3     multivitamin (MULTIVITAMIN) per tablet Take 1 tablet by mouth daily.       omeprazole (PRILOSEC) 20 MG capsule Take 20 mg by mouth daily as needed.       senna-docusate (SENNOSIDES-DOCUSATE SODIUM) 8.6-50 mg tablet Take 2 tablets by mouth daily. Hold for loose stools.  0     venlafaxine (EFFEXOR-XR) 150 MG 24 hr capsule Take 1 capsule (150 mg total) by mouth daily. Take with 75mg cap (225mg) 90 capsule 3     venlafaxine (EFFEXOR-XR) 75 MG 24 hr capsule Take 1 capsule (75 mg total) by mouth daily. Take with 150mg cap 90 capsule 3     No current facility-administered medications for this visit.        Objective:      /74  Pulse 76  Temp 98.5  F (36.9  C) (Oral)   Resp 16  Ht 5' 9\" (1.753 m)  Wt 216 lb (98 kg)  BMI 31.9 kg/m2      General appearance: alert, appears stated age and cooperative  Head: Normocephalic, without obvious abnormality, atraumatic  Eyes: conjunctivae/corneas clear. PERRL, EOM's intact.   Ears: normal TM's and external ear canals both ears  Nose: Nares normal. Septum midline. Mucosa normal. No drainage or sinus tenderness.  Throat: lips, mucosa, and tongue normal; teeth and gums normal  Neck: no adenopathy, supple, symmetrical  Back: symmetric, no curvature. ROM normal. No CVA tenderness.  Lungs: clear to auscultation bilaterally  Heart: regular rate and rhythm, S1, S2 normal, no murmur, click, rub or gallop  Abdomen: soft, non-tender  The previous incisions from his cholecystectomy are healing well  Extremities: There is thickening of multiple toenails of his feet with an irregular contour of the great toenails  Skin: " Skin color, texture, turgor normal. No rashes or lesions  Lymph nodes: Cervical nodes normal.  Neurologic: Alert and oriented X 3.   There is slowness of movement and unsteadiness of gait         Recent Results (from the past 168 hour(s))   Echo Complete   Result Value Ref Range    LV volume diastolic 126 62 - 150 cm3    LV volume systolic 71 21 - 61 cm3    IVSd 0.8 0.6 - 1.0 cm    LVIDd 4.6 4.2 - 5.8 cm    LVIDs 3.4 2.5 - 4.0 cm    LVOT diam 2 cm    LVOT mean gradient 1 mmHg    LVOT peak VTI 16.2 cm    LVOT mean yadira 47.5 cm/s    LV PWd 1.1 0.6 - 1.0 cm    MV E' lat yadira 6.14 cm/s    AO root 3.3 cm    LA size 4.4 cm    LA/AO root ratio 1.33 no units    MV peak A yadira 83.4 cm/s    MV peak E yadira 55.3 cm/s    TR peak yadira 279 cm/s    TAPSE 2.6 cm    LA area 2 14.4 cm2    LA area 1 19.7 cm2    LA length 5.23 cm    IVS/PW ratio 0.7     TR peak gradent 31.1 mmHg    LV FS 26.1 28 - 44 %    Echo LVEF calculated 44 55 - 75 %    LA volume 46.1 mL    LV mass 148.1 g    MV E/A Ratio 0.7     LVOT area 3.14 cm2    LVOT SV 50.9 cm3    MV lat E/e' ratio 9.0           This note has been dictated using voice recognition software. Any grammatical or context distortions are unintentional and inherent to the software

## 2021-06-19 NOTE — PROGRESS NOTES
My Clinic Care Coordination Care Plan    [facility]  [address]  [address]  [phone]      My Preferred Method of Contact:  Phone: [phone]    My Primary/Preferred Language:  English    Preferred Learning Style:  Reading information, Face to face discussion, Pictures/Diagrams and Hands on teaching    Emergency Contact: Extended Emergency Contact Information  Primary Emergency Contact: Luz Marina Zamora  Address: [address] [place] [address] [address] [place] [address] [facility]  Home Phone: [phone]  Relation: Spouse  Secondary Emergency Contact: Cookie Wiseman   [facility]  Home Phone: [phone]  Mobile Phone: [phone]  Relation: Child     PCP:  Valentín Washington MD  Specialists:    Care Team            Valentín Washington MD PCP - General, Family Medicine    [phone]     Luz Marina Amador, RN Registered Nurse    Stephie Pineda [facility] Care Coordination Care Guide, Clinic Care Coordination    KIMBERLY HandC Physician Assistant, Orthopedic Surgery    [phone]     Elyssa Gomez MD Physician, Neurology    [phone]     Leif Washington DPM Physician, Podiatry    [phone]           Hospitalizations and/or ED Visits  Most recent: date unknown  Reason:  gallbladder    Concerns regarding my health : managing my pain, living my optimal lifestyle    Advanced Directive/Living Will: On file with the clinic      Silver elected to enroll in the [facility] Care Coordination.  Silver was given a copy of the Clinic Care Coordination brochure and full description of how to access their care team both during clinic hours and after hours.   My Care Guide's Name and Phone Number:  Stephie KIRAN Care Guide [facility] [phone], [phone]  The Care Guide and myself agreed to be in contact every 2 weeks.      Medication Update  The patient's medication list is up to date per : SIRI Marroquin    Health Maintenance and Immunization Update  The patient's preventive  health screenings and immunizations are up to date per : SIRI Marroquin.    My Emergency Plan  Emergency Plan Recommendation:     When to Use the Emergency Department (ED)  An emergency means you could die if you don t get care quickly. Or you could be hurt permanently (disabled). Read below to know when to use -- and when not to use -- an emergency department (also called ED).     Dangers to your life  Here are examples of emergencies. These need immediate care:  A hard time breathing  Severe chest pain  Choking  Severe bleeding  Suddenly not able to move or speak  Blacking out (fainting)`  Poisoning     Dangers of permanent injuries  Here are other emergencies. These also need immediate care:  Deep cuts or severe burns  Broken bones, or sudden severe pain and swelling in a joint     When it s an emergency  If you have an emergency, follow these steps:     1. Go to the nearest emergency department  If you can, go to the hospital ED closest to you right away.  If you cannot get there right away, or if it is not safe to take yourself, call 911 or your police emergency number.  2. Call your primary care doctor  Tell your doctor about the emergency. Call within 24 hours of going to the ED.  If you cannot call, have someone call for you.  Go to your doctor (not the ED) for any follow-up care.     When it s not an emergency  If a problem is not an emergency, follow these steps:     1. Call your primary care doctor  If you don t know the name of your doctor, call your health plan.  If you cannot call, have someone call for you.  2. Follow instructions  Your doctor will tell you what you should do.  You may be told to see your doctor right away. You may be told to go to the ED. Or you may be told to go to an urgent care center.  Follow your doctor s advice.     Depression  Everyone feels down at times. The blues are a natural part of life. But an unhappy period that s intense or lasts for more than a couple of weeks can be a  sign of depression. Depression is a serious illness. It can disrupt the lives of family and friends. If you know someone you think may be depressed, find out what you can do to help.     Know the serious signals  Warning signals for suicide include:    Threats or talk of suicide    Statements such as  I won t be a problem much longer  or  Nothing matters     Giving away possessions or making a will or  arrangements    Buying a gun or other weapon    Sudden, unexplained cheerfulness or calm after a period of depression  If you notice any of these signs, get help right away. Call a health care professional, mental health clinic, or suicide hotline and ask what action to take. In an emergency, don t hesitate to call the police.     [facility] Mental Health Crisis Lines:  [facility] [place], [facility] [phone]  [facility] [phone]  [facility] [phone]  [facility] [phone]  [facility], Adults [phone]  [facility], Children [phone]  [facility], Adults [phone]  [facility], Children [phone]     Preventing Falls    Having a health problem can make you more likely to fall. Taking certain kinds of medicines may also increase your risk of falls. So, improving your health can help you avoid a fall. Work with your healthcare provider to manage health problems and to review your medicines. If you have your health under control, your risk of falling is lessened.     When to call your healthcare provider  Be sure to call your healthcare provider if you fall. Also call if you have any of these signs or symptoms (someone else may need to point them out to you):    Feeling lightheaded or dizzy more than once a day    Losing your balance often or feeling unsteady on your feet    Feeling numbness in your legs or feet, or noticing a change in the way you walk    Having a steady decline in your memory or mental sharpness           All [facility] clinic patients have access to a  Nurse 24 hours a day, 7 days a week.  If you have questions or want advice from a Nurse, please know [facility] is here for you.  You can call your clinic and they will connect you or you can call Care [facility] at [phone].  [facility] also has Walk In Care clinics in multiple locations.  Call the number listed above for more information about our Walk In Care clinics or visit the [facility] website at [email or web address].    Patient Support  I will ask my emergency contact for help in supporting me in these goals.  Relationship to patient: Wife Luz Marina Mendoza # : Phone: [phone] , best time to call Mornings

## 2021-06-19 NOTE — PROGRESS NOTES
MTM Initial Encounter  Assessment & Plan                                                     1. Parkinsonism, unspecified Parkinsonism type (H)  Somewhat controlled. Silver is experiencing symptoms of dizziness, occasional hypertension, and nausea which may be related to high doses of Sinemet.  However without this medication his Parkinson symptoms get worse.  We discussed how entacapone works to boost the effects of Sinemet.  And showed selena that he had tried most of the available therapies for Parkinson's that I was aware of and there were no medical drugs that I know of.  I encouraged him to follow-up with his neurologist for adjustments to dose and timing of his medications as he likely has more insight into Silver's progression of disease than I do after one visit.  -Continue current therapy    2. Chronic low back pain with sciatica, sciatica laterality unspecified, unspecified back pain laterality  We discussed the differences between gabapentin, meloxicam, and acetaminophen.  Explained that these 3 medications are all safe to take together and what type of pain each might be most effective for her.  I confirmed for Luz Marina that evening and bedtime are the best times to take gabapentin, this is when marks neuropathy is the worst.    3. Low vitamin D level  We discussed the pros and cons of continued use of vitamin D.  I suggested that in the interest of reducing polypharmacy we could consider stopping vitamin D to help reduce pill burden.    4. Constipation, unspecified constipation type  We reviewed the differences between MiraLAX, docusate sodium, senna, and bisacodyl.  I recommended consistent use of MiraLAX to prevent constipation in the first place as Silver is prone to chronic constipation.  - daily or every other day use of MiraLAX    5. Depression, unspecified depression type  Advised them to inquire with Dr. Tamayo if venlafaxine could be changed to a single 225 mg extended release capsule.    6.  Gastroesophageal reflux disease, esophagitis presence not specified  Advised more consistent use of omeprazole if any stomach pain develops from the use of meloxicam.    7. Sleep-wake disorder  Suggested that a low dose of trazodone may help him fall asleep at the same time as Luz Marina.  He will think about this and was advised to discuss with Dr. Tamayo before starting, bu that the drug interaction between trazodone and venlafaxine is not likely to be problematic.  - Consider trazodone 50mg at bedtime if desired    Follow Up  As requested    Subjective & Objective                                                     Silver Zamora is a 68 y.o. male coming in for an initial visit for Medication Therapy Management. He was referred to me from Valentín Washington MD    Chief Complaint: medication review    Medication Adherence/Access: Silver's medications are managed by his wife Luz Marina.  He uses a pill box.  Will be attending adult  soon and needs to have his medications in a labeled bottle for the period of time when he is there.    Parkinsons: Carbidopa-levodopa 3 tabets four times per day (one ER in the evening), entacapone five times daily  Paying $140/month for the Entacopone.  Silver has a pretty good pension and SS and Luz Marina also has SS, with a combined income above the amounts expected to be helpful for the patient assistance program.   He takes 3 tablets of Sinemet 3 times a day and 2 in the evening with one extended release tablet.  He has tried several other antiparkinsonian drugs in the past, but was switched off of these to entacapone because they were ineffective or too dangerous.    Chronic back pain: Gabapentin 300 mg evening and 600mg pm, acetaminophen 650mg four times a day, meloxicam  Luz Marina would like to know if giving his gabapentin in the evening and at bedtime is the right thing to do.  Silver has questions the differences between the 3 medications.    Hypovitaminosis D: Cholecalciferol 2000 IU  Vitamin D,  Total (25-Hydroxy)   Date Value Ref Range Status   10/03/2017 22.7 (L) 30.0 - 80.0 ng/mL Final     Constipation: Miralax 17 g prn, docusate sodium 100mg daily, senna-docusate prn, bisacodyl 10mg daily  Luz Marina and Silver both have questions about these medications and when they should use them, how much they should use.    Depression: Venlafaxine XR 150mg am and 75mg pm  Managed by Dr. Tamayo.      GERD: Omeprazole 20mg prn    Sleep-wake disorder:   Years of  have affected Silver's circadian rhythm, and he tends to fall asleep very late.  He sleeps well and does not see this as a problem.  He wonders if there is something else other than melatonin that he could take to help regulate his sleep.    PMH: reviewed in EPIC   Allergies/ADRs: reviewed in EPIC   Alcohol: reviewed in EPIC   Tobacco:   History   Smoking Status     Former Smoker     Types: Cigarettes     Quit date: 1/1/1980   Smokeless Tobacco     Never Used     Today's Vitals: There were no vitals filed for this visit.  ----------------    Much or all of the text in this note was generated through the use of Dragon Dictate voice-to-text software. Errors in spelling or words which seem out of context are unintentional. Sound alike errors, in particular, may have escaped editing.    The patient was given a summary of these recommendations as an after visit summary    I spent 90 minutes with this patient today;  All changes were made via collaborative practice agreement with Valentín Washington MD. A copy of the visit note was provided to the patient's provider.     Naresh Nails, PharmD, BCACP  MTM Pharmacist at [facility]       Current Outpatient Prescriptions   Medication Sig Dispense Refill     acetaminophen (TYLENOL) 325 MG tablet Take 2 tablets (650 mg total) by mouth every 4 (four) hours as needed.  0     bisacodyl (DULCOLAX) 10 mg suppository One supp DC qday prn constipation.  Give if no BM in prior 3 days.  0      carbidopa-levodopa (SINEMET CR)  mg ER tablet Take 1 tablet by mouth at bedtime. At ~2200       carbidopa-levodopa (SINEMET)  mg per tablet Take 3 tablets by mouth 3 (three) times a day. At approx. 0800, 1200, and 1600.       carbidopa-levodopa (SINEMET)  mg per tablet Take 2 tablets by mouth at bedtime. At approx 2200       cholecalciferol, vitamin D3, (VITAMIN D3) 2,000 unit Tab Take 1 tablet (2,000 Units total) by mouth daily.  0     entacapone (COMTAN) 200 mg tablet Take 200 mg by mouth 4 (four) times a day. with each Sinemet dose        gabapentin (NEURONTIN) 300 MG capsule Take 300 mg by mouth. 2 in the evening and 1 tab at bedtime       levothyroxine (SYNTHROID, LEVOTHROID) 137 MCG tablet Take 137 mcg by mouth Daily at 6:00 am.       meloxicam (MOBIC) 7.5 MG tablet Take 7.5 mg by mouth daily as needed for pain.       multivitamin (MULTIVITAMIN) per tablet Take 1 tablet by mouth daily.       omeprazole (PRILOSEC) 20 MG capsule Take 20 mg by mouth daily as needed.       senna-docusate (SENNOSIDES-DOCUSATE SODIUM) 8.6-50 mg tablet Take 2 tablets by mouth daily. Hold for loose stools.  0     venlafaxine (EFFEXOR-XR) 150 MG 24 hr capsule Take 1 capsule (150 mg total) by mouth daily. Take with 75mg cap (225mg) 90 capsule 3     venlafaxine (EFFEXOR-XR) 75 MG 24 hr capsule Take 1 capsule (75 mg total) by mouth daily. Take with 150mg cap 90 capsule 3     No current facility-administered medications for this visit.

## 2021-06-20 NOTE — PROGRESS NOTES
Assessment /Plan:   Came wife wife, Luz Marina. Right handed.   Parkinson's disease: On sinemet 25/100 3 tabs 4 times daily: 8am, noon, 4pm, 9pm, with entacapone.  Sinemet CR 25/100, 1 tabs.   Chronic back pain and restless leg syndrome: We will change, Neurontin 300 mg 4 times daily to 300 mg, 1 tablet in the morning and 3 tablets nightly.  Depression: Follow-up with Dr. Joe Tamayo.  Daytime hypersomnia: Consider to try Provigil.  Was not able to tolerate Ritalin tried in 2017.  Daughter would like to try nicotine patch.      Plans:  Continue the current treatment.  Return clinic in about 4 months.      Subjectively and objectively observed by family:  Going to day program sometimes and exercising more regularly.  Grades family support.  Sleep sleeping better and also better.  Taking glycine melatonin and DEJA supplement before sleep, help him.  Drinking little coffee in the morning.  Daughter would like to try nicotine patch to see if that keep his daytime more awake.  More positive.  Has gotten home health that is also baked relieve of pressure on his wife.  Falls.  Emotionally feeling better.  No significant side effects of medication.  Her cognitive function maintained.      Psychiatry: Some hallucination, rarely seeing mouse running and seeing people in the house.  Some depression but wife feels may be due to lack of motivation.  He has seen Dr. Llanos and Dr. Tamayo for this condition.      Has had stiff neck for many  Years, goes into the shoulders. Therapies, massage  Helps.       Not exercising and sleeping a lot during the daytime but more awake at nighttime.      Autonomic: Some light headed. no particularly positional changes.  No orthostasis during clinical visit.      Sleep disorder. Not able to change his habit. Goes to sleep very late and not able function in am. He has had 3 sleep studies and has seen Dr. Jayesh Michaels and other doctors. He has tried clonazepam not able to tolerate. Tested other multiple  medications to without benefit. Also may have REM sleep disorders.       Memory: had neuro psych test in 4/2013. Neuro psych testing 5/2015: a mild progression in cognitive impairment, repeat test 5/2016 showing no significant changes.  MOCA: 21/30 on April 5, 2017  .   Psychiatric: During interview, he is not focussed on discussing his PD and related management.       [place]: no hearing aids.       Therapy:  He deferred referral to big and loud Parkinson therapies      Chronic lower back pain.  NO focal weakness in left LE. Will continue therapy and weight reduction. 10/2013 had lower back surgery, which has reduce the lower back chronic pain significantly..       RLS: no worsening.     Small fiber neuropathy: most likely relating to PD   Leg edema and poor leg circulation and has seen a vascular surgeon.            PD summary: Right handed.   diagnosed in 2004 at the age about 54.  But he feels the symptoms may have started in 1994.  Symptoms: stiffness, slow, soft speech, walking difficulty. Left hand tremor, spreading to right started 2015. He feels symptoms started in 1995. Has seen Dr. Suazo in the past. He has some freezing tremors occasionally, but asymmetry. Tried the Sinemet CR during day time or at night, didn't work well. Off pramipexole since 2015 in Dec due to memory and behavior concern.  In December 2010 he was taking Sinemet 25/100 up to 225/250 with a good response improving back pain.  He was able to be more active.  In 2011 he was evaluated at [facility] by Dr. Sifuentes.  He was noted to have U PDR as part 3 score of 41 after of all Parkinson medication for 4 hours.  It was suggested to take carbidopa levodopa every 3/2 hours.  Amantadine was discontinued because he did not have dyskinesia.  It was suggested to change selegiline to Azilect or discontinue all MAO inhibitor altogether.  It was felt that increased Sinemet may have exacerbated his left restless leg syndrome.  Gabapentin was  started for control of the symptoms.  He was followed up with Dr. Ellis Suazo from 2011, so Dr Zuniga periodically as well as nurse practitioner Sonal Rowe.  Geoffrey care to me in 2013.      He has tried amantadine and selegiline in the past.  Not able to tolerate Requip (which he tried feel a few years at relatively high dose but discontinued due to edema in lower extremities significantly) and Mirapex tried in  but caused more freezing and lots of shakes..     For sleep, he has seen Dr. Jayesh Michaels in  with a diagnosis of REM sleep behavior, delayed sleep phase syndrome, sleep apnea.  Had sleep studies.  Tried clonazepam, trazodone, CPAP machine.     Had osteomyelitis, now amputated the 3rd and 4th on right.  Did have a crack in the left food that is not healing..       Neuro psych testin2016 As compared to testing in , subtle declines in basic attention, cognitive efficiency, nonverbal learning and memory, visuospatial judgement and aspects of executive functioning (deductive reasoning and complex attention). A significant improvement (e.g., from severely impaired to superior delayed recall on a list learning task) was evident on two measures of verbal memory. He exhibited stable performance on measures of inhibition as well as verbal and nonverbal reasoning.      DaTscan in 2013 showing absent radiotracer in the putamen with diminished accumulation in the caudate, most consistent with Parkinson disease.     EMG study in 2013 showing mild demyelinating neuropathy in lower extremities.     He is a retired  and has very supportive wife and children.     Review of system   otherwise unremarkable           Patient Active Problem List   Diagnosis     Parkinson disease (H)     Back pain     Depression     Anxiety state, unspecified     Hypothyroidism     Pancreatitis     RBD (REM behavioral disorder)     Low vitamin D level     Lower abdominal pain      Parkinsonism, unspecified Parkinsonism type (H)     Calculus of gallbladder with biliary obstruction but without cholecystitis     Weakness     Cholecystitis     Leukocytosis, unspecified type     Choledocholithiasis     Acute encephalopathy     SOB (shortness of breath)     Hypokalemia     Hypomagnesemia     Fluid overload     Delirium     Abnormal echocardiogram     Heart failure with reduced ejection fraction, NYHA class I (H)             Current Outpatient Prescriptions   Medication Sig Dispense Refill     acetaminophen (TYLENOL) 325 MG tablet Take 2 tablets (650 mg total) by mouth every 4 (four) hours as needed.  0     aminocaproic acid (AMICAR) 500 mg tablet Take by mouth every 6 (six) hours.       bisacodyl (DULCOLAX) 10 mg suppository One supp [place] qday prn constipation.  Give if no BM in prior 3 days.  0     carbidopa-levodopa (SINEMET CR)  mg ER tablet Take 1 tablet by mouth at bedtime. At ~2200       carbidopa-levodopa (SINEMET)  mg per tablet Take 3 tablets by mouth 3 (three) times a day. At approx. 0800, 1200, and 1600.       carbidopa-levodopa (SINEMET)  mg per tablet Take 2 tablets by mouth at bedtime. At approx 2200       cholecalciferol, vitamin D3, (VITAMIN D3) 2,000 unit Tab Take 1 tablet (2,000 Units total) by mouth daily.  0     entacapone (COMTAN) 200 mg tablet Take 200 mg by mouth 4 (four) times a day. with each Sinemet dose        gabapentin (NEURONTIN) 300 MG capsule Take 300 mg by mouth. 2 in the evening and 1 tab at bedtime       levothyroxine (SYNTHROID, LEVOTHROID) 137 MCG tablet Take 1 tablet (137 mcg total) by mouth Daily at 6:00 am. 90 tablet 3     melatonin 3 mg Tab tablet Take 3 mg by mouth at bedtime as needed.       meloxicam (MOBIC) 7.5 MG tablet Take 7.5 mg by mouth daily as needed for pain.       metoprolol succinate (TOPROL-XL) 25 MG Take 1 tablet (25 mg total) by mouth daily. 90 tablet 3     multivitamin (MULTIVITAMIN) per tablet Take 1 tablet by mouth  daily.       omeprazole (PRILOSEC) 20 MG capsule Take 20 mg by mouth daily as needed.       senna-docusate (SENNOSIDES-DOCUSATE SODIUM) 8.6-50 mg tablet Take 2 tablets by mouth daily. Hold for loose stools.  0     venlafaxine 225 mg TR24 Take 1 tablet by mouth daily. 30 each 6     No current facility-administered medications for this encounter.        Clonazepam    Past Medical History:   Diagnosis Date     Anxiety      Callus      Chronic back pain      Depression      GERD (gastroesophageal reflux disease)      Hypothyroidism      Pancreatitis 12/31/2015     PD (Parkinson's disease) (H)      RBD (REM behavioral disorder) 4/3/2017     Shingles     hx       Social History     Social History     Marital status:      Spouse name: N/A     Number of children: N/A     Years of education: N/A     Occupational History     Not on file.     Social History Main Topics     Smoking status: Former Smoker     Types: Cigarettes     Quit date: 1/1/1980     Smokeless tobacco: Never Used     Alcohol use Yes      Comment: one drink a week     Drug use: No     Sexual activity: Not on file     Other Topics Concern     Not on file     Social History Narrative       Family History   Problem Relation Age of Onset     Kidney failure Mother      Heart disease Father      Tremor Father      Parkinsonism Paternal Grandfather      Tremor Sister      Leukemia Maternal Grandmother        Objective:  Vitals:    08/29/18 1235   BP: 105/66   Patient Position: Sitting   Pulse: 85            Alert, cooperative, no distress Sitting, appears stated age, normocephalic, atraumatic without cyanosis or skin rashes or edema.  There are dystrophic skin changes below the knee.  Thick calluses in the bowl of right foot.  Normal mood.    Ok with mental status, language, but slightly soft speech, CNII-XII significant for hard of hearing worse on the right side, staring and decreased eye blink, mask face. Normal muscle bulk and strength in 4 extremities  with rigidity.  Bilateral hand tremor that is post resting type may be slightly worse on the right. There is no focal sensory difficulties in 4 extremities. Gait wide-based and a little shuffling.  Decreased balance.  No significant stooping.     Slightly sanford and hypokinesia. No  freezing. NO dyskinesia.

## 2021-06-20 NOTE — PROGRESS NOTES
How have you been doing since we last saw you? Most important neurological symptom or concern for this visit (Medication wearing off, hallucinations, freezing, pain, sleep difficulty, constipation etc.) Pt would like to discuss medications and the side effects of them causing cavities. Pt has not been getting good sleep on and off since last visit.     How many falls has the pt. Had over the last year?(if pt. Falls frequently, daily, weekly, monthly?) no    Best explanation of falls (poor balance, fail/recognizing/judgement, trip, dizzy, moving too quickly, carrying too much, poor lighting, any loss of consciousness, confusion, incontinence, tongue biting, or any unusual features of events)? N/A      Any pains? (Locations, frequency, positional factors, time pattern, aggravating factors.) N/A

## 2021-06-20 NOTE — PROGRESS NOTES
ASSESSMENT: Onychomycosis, onychauxis, foot pain.     PLAN: Toenails were debrided manually x8.       SUBJECTIVE: The patient returns to the clinic with toenails that are long, thick and painful. In 2015 the distal portions of the right third and fourth toes were amputated because of ulceration and infection.  Diminished sensation in the feet because of Parkinson's.  He is not diabetic.      OBJECTIVE:  General: Pleasant 68 y.o. male in no acute distress.  Vascular: DP pulses are absent. PT pulses are diminished. Pedal hair is absent. Feet are warm to the touch.  No edema at the ankles.  Neuro: Sensation in the feet is altered. Patient describes paresthesias.  Derm: Toenails are elongated, thickened and dystrophic with discoloration and subungual debris. Skin is thin and shiny but intact.    Musculoskeletal: Partial amputation of the right third and fourth toes.  Hallux valgus bilaterally.

## 2021-06-20 NOTE — PROGRESS NOTES
Chief Complaint   Patient presents with     Cough         HPI:   Silver Zamora is a 68 y.o. male with h/o parkinson's with wife c/o being sick for the last week with coughing.  Productive.  No shortness of breath. No chest pain.  Hard to sleep at night.  Has felt warm with sweats.  No chills  No ear pain.  Stuffy nose with some facial pressure.  Post nasal drainage.  No sore throat.  No new stomach upset.  No vomiting or diarrhea.  No dysuria.  No headache.  No rash.  C/o red, itchy crusty bilateral eyes.    Using honey and lemon juice.    ROS:  A 10 point comprehensive review of systems was negative except as noted.     Medications:  Current Outpatient Prescriptions on File Prior to Visit   Medication Sig Dispense Refill     acetaminophen (TYLENOL) 325 MG tablet Take 2 tablets (650 mg total) by mouth every 4 (four) hours as needed.  0     bisacodyl (DULCOLAX) 10 mg suppository One supp [place] qday prn constipation.  Give if no BM in prior 3 days.  0     carbidopa-levodopa (SINEMET CR)  mg ER tablet Take 1 tablet by mouth at bedtime. At ~2200       carbidopa-levodopa (SINEMET)  mg per tablet Take 3 tablets by mouth 3 (three) times a day. At approx. 0800, 1200, and 1600.       carbidopa-levodopa (SINEMET)  mg per tablet Take 2 tablets by mouth at bedtime. At approx 2200       cholecalciferol, vitamin D3, (VITAMIN D3) 2,000 unit Tab Take 1 tablet (2,000 Units total) by mouth daily.  0     entacapone (COMTAN) 200 mg tablet Take 200 mg by mouth 4 (four) times a day. with each Sinemet dose        gabapentin (NEURONTIN) 300 MG capsule Take 300 mg by mouth. 2 in the evening and 1 tab at bedtime       levothyroxine (SYNTHROID, LEVOTHROID) 137 MCG tablet Take 1 tablet (137 mcg total) by mouth Daily at 6:00 am. 90 tablet 3     melatonin 3 mg Tab tablet Take 3 mg by mouth at bedtime as needed.       meloxicam (MOBIC) 7.5 MG tablet Take 7.5 mg by mouth daily as needed for pain.       metoprolol succinate  "(TOPROL-XL) 25 MG Take 1 tablet (25 mg total) by mouth daily. 90 tablet 3     multivitamin (MULTIVITAMIN) per tablet Take 1 tablet by mouth daily.       omeprazole (PRILOSEC) 20 MG capsule Take 20 mg by mouth daily as needed.       senna-docusate (SENNOSIDES-DOCUSATE SODIUM) 8.6-50 mg tablet Take 2 tablets by mouth daily. Hold for loose stools.  0     venlafaxine 225 mg TR24 Take 1 tablet by mouth daily. 30 each 6     aminocaproic acid (AMICAR) 500 mg tablet Take by mouth every 6 (six) hours.       No current facility-administered medications on file prior to visit.          Social History:  Social History   Substance Use Topics     Smoking status: Former Smoker     Types: Cigarettes     Quit date: 1/1/1980     Smokeless tobacco: Never Used     Alcohol use No      Comment: one drink a week         Physical Exam:   Vitals:    10/10/18 1317   BP: 128/74   Pulse: 66   Resp: 12   Temp: 98.8  F (37.1  C)   TempSrc: Oral   SpO2: 98%   Weight: 214 lb (97.1 kg)   Height: 5' 9\" (1.753 m)       GENERAL:   Alert. Oriented.  EYES: Clear  HENT:  Ears: R TM pearly gray. Normal landmarks. L TM pearly gray.  Normal landmarks  Nose: Clear.  Sinuses: Nontender.  Oropharynx:  No erythema. No exudate.  NECK: Supple. No adenopathy.  LUNGS: Clear to ascultation.  No crackles.  No wheezing  HEART: RRR  SKIN:  No rash.         Assessment/Plan:    1. Acute maxillary sinusitis, recurrence not specified  amoxicillin-clavulanate (AUGMENTIN) 500-125 mg per tablet   2. Screen for colon cancer  Ambulatory referral for Colonoscopy      With parkinsons, will treat with antibiotics.  Lungs are clear at this time  However, May have sinusitis.  Recheck if worsening or not improving.          Yonis Oneill MD      10/10/2018    The following portions of the patient's history were reviewed and updated as appropriate: allergies, current medications, past family history, past medical history, past social history, past surgical history and problem " list.

## 2021-06-20 NOTE — PROGRESS NOTES
Attempt 2: Care Guide called patient.  If this patient is returning my call, please transfer to  [facility] at ext [phone] MWF or [phone] on T/TH.     Next outreach: 10/23/18     Plan:  -Inquire if pt is still attending [facility] [employer] day program 1x week,  Boxing at [place]  -Discuss maintenance

## 2021-06-20 NOTE — PROGRESS NOTES
"Care Guide Stephie spoke with Silver's wife Luz Marina  for standard Clinic Care Coordination outreach.     Pt's wife reported:  -[facility]  -Pt was able to get foot care at [facility] and really enjoyed it; pt is keeping October apt with [facility] provider for foot care as well  -Pt's wife received email from  on legal resources for POA, has not followed up  -Pt's wife Still intends to do POA/ called by \"finance jose\" 2 days ago and will follow up  -Have 13 yo grandson living with them  -Pt intends to start boxing program in [place], wife to coordinate transportation      Next outreach: 09/28/18    Plan:  -Discuss clinical needs for coordination prior to maintenance discussion                "

## 2021-06-20 NOTE — PROGRESS NOTES
Attempt 1: Care Guide called patient.  If this patient is returning my call, please transfer to  [facility] at ext [phone] MWF or [phone] on T/TH.    Next outreach: 08/28/18    Plan:  -Inquire if facing problems getting foot care  -Offer CHF educational materials if indicated  -Offer maintenance

## 2021-06-20 NOTE — LETTER
Letter by Valentín Washington MD at      Author: Valentín Washington MD Service: -- Author Type: --    Filed:  Encounter Date: 1/2/2020 Status: Signed         Silver Zamora  [id number] [facility] [address]             January 2, 2020         Dear Mr. Zamora,    Below are the results from your recent visit:    Resulted Orders   Basic Metabolic Panel   Result Value Ref Range    Sodium 140 136 - 145 mmol/L    Potassium 4.1 3.5 - 5.0 mmol/L    Chloride 107 98 - 107 mmol/L    CO2 22 22 - 31 mmol/L    Anion Gap, Calculation 11 5 - 18 mmol/L    Glucose 100 70 - 125 mg/dL    Calcium 9.2 8.5 - 10.5 mg/dL    BUN 15 8 - 22 mg/dL    Creatinine 0.77 0.70 - 1.30 mg/dL    GFR MDRD Af Amer >60 >60 mL/min/1.73m2    GFR MDRD Non Af Amer >60 >60 mL/min/1.73m2    Narrative    Fasting Glucose reference range is 70-99 mg/dL per  American Diabetes Association (ADA) guidelines.   Glycosylated Hemoglobin A1c   Result Value Ref Range    Hemoglobin A1c 5.9 3.5 - 6.0 %   Hepatic Profile   Result Value Ref Range    Bilirubin, Total 0.9 0.0 - 1.0 mg/dL    Bilirubin, Direct 0.4 <=0.5 mg/dL    Protein, Total 6.5 6.0 - 8.0 g/dL    Albumin 3.7 3.5 - 5.0 g/dL    Alkaline Phosphatase 99 45 - 120 U/L    AST 23 0 - 40 U/L    ALT 13 0 - 45 U/L   HM2(CBC w/o Differential)   Result Value Ref Range    WBC 6.2 4.0 - 11.0 thou/uL    RBC 4.85 4.40 - 6.20 mill/uL    Hemoglobin 14.0 14.0 - 18.0 g/dL    Hematocrit 42.5 40.0 - 54.0 %    MCV 88 80 - 100 fL    MCH 28.9 27.0 - 34.0 pg    MCHC 33.0 32.0 - 36.0 g/dL    RDW 13.3 11.0 - 14.5 %    Platelets 180 140 - 440 thou/uL    MPV 7.4 7.0 - 10.0 fL   Lipid Cascade   Result Value Ref Range    Cholesterol 101 <=199 mg/dL    Triglycerides 96 <=149 mg/dL    HDL Cholesterol 43 >=40 mg/dL    LDL Calculated 39 <=129 mg/dL    Patient Fasting > 8hrs? Yes    Thyroid Cascade   Result Value Ref Range    TSH 2.91 0.30 - 5.00 uIU/mL   PSA (Prostatic-Specific Antigen), Annual Screen   Result Value Ref Range     PSA 0.4 0.0 - 4.5 ng/mL    Narrative    Method is Abbott Prostate-Specific Antigen (PSA)  Standard-WHO 1st International (90:10)       Silver,    Hears a copy of your test results which look very good.  Your kidney and liver tests are normal.  Your cholesterol numbers are excellent.    Your thyroid test and prostate test are normal.  Your blood counts are normal.  Very good.    Please call with questions or contact us using [employer].    Sincerely,        Electronically signed by Valentín Washington MD

## 2021-06-20 NOTE — PROGRESS NOTES
Attempt 1: Care Guide called patient.  If this patient is returning my call, please transfer to  [facility] at ext [phone] MWF or [phone] on T/TH.    Next outreach: 10/08/18    Plan:  -Inquire if pt is still attending [facility] [employer] day program 1x week,  Boxing at [place]  -Discuss maintenance

## 2021-06-21 NOTE — PROGRESS NOTES
ITP ASSESSMENT   Assessment Day: Initial  Session Number: 1  Precautions: Falls  Diagnosis: Stent  Risk Stratification: High Low EF  Referring Provider: Jose Meyer MD  EXERCISE  Exercise Assessment: Initial                         Exercise Plan  Goals Next 30 days  ADL'S: Set up computer, Clean/Declutter room  Leisure: Go to [employer], Start exercising at home/gym    Education Goals: Has system for taking medication.  Education Goals Met: Has system for taking medication.    Exercise Prescription  Exercise Mode: Bike;Nustep;Arm Erg.;Hallway Walking  Frequency: 2x/week  Duration: 20-30 minutes  Intensity / THR: 20-30 beats above resting heart rate  RPE 11-14  Progression / Met level: 2.3-2.5  Resistive Training?: Yes    Current Exercise (mins/week): 1    Interventions  Home Exercise:  Mode: Silver sneakers/Nustep  Frequency: 3 days/week  Duration: 20-30 minutes    Education Material : Educational videos;Provide written material;Individual education and counseling;Offer educational classes    Education Completed  Exercise Education Completed: Cardiac Anatomy;Signs and Symptoms;Medication review;RPE;Emergency Plan;Home Exercise;Warm up/cool down;FITT Principles;BP/HR Reponse to exercise;Benefits of Exercise;End point of exercise            Exercise Follow-up/Discharge  Follow up/Discharge: Encouraged pt to start using gym at [facility] facility NUTRITION  Nutrition Assessment: Initial    Nutrition Risk Factors:  Nutrition Risk Factors: Overweight;Dyslipidemia  Cholesterol: 168  LDL: 104  HDL: 40  Triglycerides: 119    Nutrition Plan  Interventions  Other Nutrition Intervention: Diet Class;Therapist/Pt Discussion;Educational Videos;Provide with Written Material    Education Completed  Nutrition Education Completed: Risk factor overview    Goals  Nutrition Goals (Next 30 days): Patient can identify their risk factors for CAD;Patient will follow a low sodium diet;Patient will follow a low saturated fat diet;Patient knows  "appropriate portion size;Patient will lose weight    Goals Met  Nutrition Goals Met: Patient can identify their risk factors for CAD;Provided Rate your Plate Survey;Reviewed Dietitian schedule    Height, Weight, and  BMI  Weight: 218 lb (98.9 kg)  Height: 5' 11\" (1.803 m)  BMI: 30.42    Nutrition Follow-up  Follow-up/Discharge: Encourage pt and wife to meet with dietician       Other Risk Factors  Other Risk Factor Assessment: Initial    HTN Risk Factor: NA    Pre Exercise BP: 141/78  Post Exercise BP: 116/60    Tobacco Risk Factor: NA    Risk Factor Follow-up   Follow-up/Discharge: Discussed cardiac risk factors and risk factor modifications   PSYCHOSOCIAL  Psychosocial Assessment: Initial     Psychosocial Risk Factor: Stress    Psychosocial Plan  Interventions  If DANIELE-D > 15 send letter to MD  Interventions: Offer Spiritual Care consult;Offer educational videos and classes;Provide written material;Individual education and counseling    Education Completed  Education Completed: Relaxation/Coping Techniques;S/S of depression;Effects of stress on body    Goals  Goals (Next 30 days): Identified Support system;Identify stressors;Practicing stress management skills    Goals Met  Goals Met: Identified Support system;Identify stressors;Oriented to stress management classes    Psychosocial Follow-up  Follow-up/Discharge: Pt stated good support system, manages well.     Patient involved in Goal setting?: Yes    Signature: _____________________________________________________________    Date: __________________    Time: __________________  "

## 2021-06-21 NOTE — PROGRESS NOTES
Stephie KIRAN, Clinic Care Coordination (CCC) Care Guide (CG), spoke with Silver's spouse Luz Marina for standard CCC check in. CG inquired about pt's goal to stay active by attending [facility] [employer] adult day and attending boxing at [place]. Pt's spouse reported they recently learned of blockage in pt's hear that they will investigate further at an apt on Wednesday, 10/31, and that they are not doing the aforementioned activities at this time. Cg and pt's spouse agreed to check in after pt has been seen by cardiology to discuss new priorities for CCC support.    Next outreach: 11/09/18    Plan:  -discuss cardiology results  -Build new goals to support pt

## 2021-06-21 NOTE — PROGRESS NOTES
Preoperative Exam    Scheduled Procedure: Angiogram  Surgery Date:  10/31/18  Surgery Location: [facility], fax [phone]    Surgeon:  Dr. Meyer    Assessment/Plan:     1. Pre-op evaluation     - Comprehensive Metabolic Panel  - Hemoglobin  - Morphology, Path Smear Review (MORP)    2. Abnormal nuclear stress test       3. Fall     - CT Head Without Contrast; Future       Have you had prior anesthesia?: Yes  Have you or any family members had a previous anesthesia reaction:  Slow to wake up  Do you or any family members have a history of a clotting or bleeding disorder?: No  Cardiac Risk Assessment: increased risk for major cardiac complications based on  positive cardiac stress test    Patient approved for surgery with general or local anesthesia.    Patient may have sleep apnea.    I recommended that he not take Mobic anymore.    Functional Status: Independent  Patient plans to recover at home with family.     Subjective:      Silver Zamora is a 68 y.o. male who presents for a preoperative consultation.  Not taking meloxicam.      Falling more often the past 2 weeks.  Has had this off and on for past few years.  More often past 2 weeks.  No dizziness or loc.  Usually has a time where he has difficulty with coordination.  Not doing any physical therapy currently.  Does not want to do physical therapy.  1 week ago fell and hit his forehead, has had a couple headaches.  No new difficulties with moving arms/legs or vision or hearing.  Neck feels ok.      All other systems reviewed and are negative, other than those listed in the HPI.    Pertinent History  Do you have difficulty breathing or chest pain after walking up a flight of stairs: Yes  History of obstructive sleep apnea: Yes:  doesn't use cpap, has been told he doesn't need it  Steroid use in the last 6 months: No  Frequent Aspirin/NSAID use: Yes: baby aspirin daily  Prior Blood Transfusion: Yes:    Prior Blood Transfusion Reaction: No  If for some  reason prior to, during or after the procedure, if it is medically indicated, would you be willing to have a blood transfusion?:  There is no transfusion refusal.       Use alcohol: no  Use tobacco: no  Use illicit drugs: no  History of kidney or liver disease: no  History of  MI or angina: no  History of irregular heartbeat: no  History of CVA: no  History of epilepsy: no  History of CHF: yes  History of asthma or COPD: no  History Diabetes: no    Last tetanus:2016    ______________________________________________________________________    ______________________________________________________________________          Current Outpatient Prescriptions   Medication Sig Dispense Refill     acetaminophen (TYLENOL) 325 MG tablet Take 2 tablets (650 mg total) by mouth every 4 (four) hours as needed.  0     bisacodyl (DULCOLAX) 10 mg suppository One supp VT qday prn constipation.  Give if no BM in prior 3 days.  0     carbidopa-levodopa (SINEMET CR)  mg ER tablet Take 1 tablet by mouth at bedtime. At ~2200       carbidopa-levodopa (SINEMET)  mg per tablet Take 3 tablets by mouth 3 (three) times a day. At approx. 0800, 1200, and 1600.       carbidopa-levodopa (SINEMET)  mg per tablet Take 2 tablets by mouth at bedtime. At approx 2200       cholecalciferol, vitamin D3, (VITAMIN D3) 2,000 unit Tab Take 1 tablet (2,000 Units total) by mouth daily.  0     entacapone (COMTAN) 200 mg tablet Take 200 mg by mouth 4 (four) times a day. with each Sinemet dose        gabapentin (NEURONTIN) 300 MG capsule Take 300 mg by mouth. 2 in the evening and 1 tab at bedtime       levothyroxine (SYNTHROID, LEVOTHROID) 137 MCG tablet Take 1 tablet (137 mcg total) by mouth Daily at 6:00 am. 90 tablet 3     melatonin 3 mg Tab tablet Take 3 mg by mouth at bedtime as needed.       meloxicam (MOBIC) 7.5 MG tablet Take 7.5 mg by mouth daily as needed for pain.       metoprolol succinate (TOPROL-XL) 25 MG Take 1 tablet (25 mg total)  by mouth daily. 90 tablet 3     omeprazole (PRILOSEC) 20 MG capsule Take 20 mg by mouth daily as needed.       senna-docusate (SENNOSIDES-DOCUSATE SODIUM) 8.6-50 mg tablet Take 2 tablets by mouth daily. Hold for loose stools.  0     venlafaxine 225 mg TR24 Take 1 tablet by mouth daily. 30 each 6     No current facility-administered medications for this visit.         Allergies   Allergen Reactions     Clonazepam      Too drowsy       Patient Active Problem List   Diagnosis     Parkinson disease (H)     Back pain     Depression     Anxiety state, unspecified     Hypothyroidism     Pancreatitis     RBD (REM behavioral disorder)     Low vitamin D level     Lower abdominal pain     Parkinsonism, unspecified Parkinsonism type (H)     Calculus of gallbladder with biliary obstruction but without cholecystitis     Weakness     Cholecystitis     Leukocytosis, unspecified type     Choledocholithiasis     Acute encephalopathy     SOB (shortness of breath)     Hypokalemia     Hypomagnesemia     Fluid overload     Delirium     Abnormal echocardiogram     Heart failure with reduced ejection fraction, NYHA class I (H)     Abnormal nuclear stress test       Past Medical History:   Diagnosis Date     Anxiety      Callus      Cardiomyopathy (H)      Chronic back pain      Depression      GERD (gastroesophageal reflux disease)      Hypothyroidism      Pancreatitis 12/31/2015     PD (Parkinson's disease) (H)      RBD (REM behavioral disorder) 4/3/2017     Shingles     hx       Past Surgical History:   Procedure Laterality Date     INGUINAL HERNIA REPAIR Bilateral 10/2013     LAMINECTOMY  10/2013     TX ERCP REMOVE CALCULI/DEBRIS BILIARY/PANCREAS DUCT N/A 5/25/2018    Procedure: ENDOSCOPIC RETROGRADE CHOLANGIOPANCREATOGRAPHY SPINCTEROTOMY BILIARY STONE EXTRACTION;  Surgeon: Curtis Mcclain MD;  Location: [facility];  Service: Gastroenterology     TX LAP,CHOLECYSTECTOMY/GRAPH N/A 5/24/2018    Procedure: LAPAROSCOPIC  "CHOLECYSTECTOMY;  Surgeon: Joyce Melissa MD;  Location: [facility] OR;  Service: General       Social History     Social History     Marital status:      Spouse name: N/A     Number of children: N/A     Years of education: N/A     Occupational History     Not on file.     Social History Main Topics     Smoking status: Former Smoker     Types: Cigarettes     Quit date: 1/1/1980     Smokeless tobacco: Never Used     Alcohol use No      Comment: one drink a week     Drug use: No     Sexual activity: Not on file     Other Topics Concern     Not on file     Social History Narrative             Objective:     Vitals:    10/29/18 0818   BP: 140/74   Pulse: (!) 56   Resp: 12   Temp: 97.7  F (36.5  C)   TempSrc: Oral   Weight: 218 lb (98.9 kg)   Height: 5' 11\" (1.803 m)         Physical Exam:  Gen: NAD, conversant, appears age, well-kempt  Skin: warm, dry, no rash, pallor cyanosis  HENT: normocephalic atraumatic, MMM, no oral lesions, otorrhea, rhinorrhea. TM's normal bilaterally.  Eyes: non-icteric, extra-ocular movements intact, PERRL, conjunctivae not injected. Holding eyes open comfortably, no drainage.  CV: NRRR no m/r/g, no peripheral edema. no JVD.  Resp: CTAB no w/r/r, normal respiratory effort  GI: soft, non-tender, non-distended. No masses.  MSK: no muscle or joint swelling.  Neuro: no dysarthria or gross asymmetry  Psych: full affect, oriented x 3  Lymph: No significant cervical lymphadenopathy  Hematologic: No petechiae or purpura.      There are no Patient Instructions on file for this visit.        Labs:      Immunization History   Administered Date(s) Administered     Influenza high dose, seasonal 02/09/2016, 12/27/2016, 10/03/2017     Influenza,seasonal quad, PF, 36+MOS 10/20/2014     Pneumo Conj 13-V (2010&after) 02/09/2016     Pneumo Polysac 23-V 05/08/2017     Tdap 02/09/2016           Electronically signed by Ayo Rossi MD 10/29/18 8:22 AM  "

## 2021-06-21 NOTE — PROGRESS NOTES
"Cardiac Rehab  Phase II Assessment    Assessment Date: 11-9-18      Procedure: PCI EES X 1 to RCA  Date of Onset: 10-31-18  ICD/Pacemaker: No   Post-op Complications: NA  ECG History: Sinus Bradycardia EF%:30's  Past Medical History:   Patient Active Problem List   Diagnosis     Parkinson disease (H)     Back pain     Depression     Anxiety state, unspecified     Hypothyroidism     Pancreatitis     RBD (REM behavioral disorder)     Low vitamin D level     Lower abdominal pain     Parkinsonism, unspecified Parkinsonism type (H)     Calculus of gallbladder with biliary obstruction but without cholecystitis     Weakness     Cholecystitis     Leukocytosis, unspecified type     Choledocholithiasis     Acute encephalopathy     Hypokalemia     Hypomagnesemia     Fluid overload     Delirium     Abnormal echocardiogram     Heart failure with reduced ejection fraction, NYHA class I (H)     Abnormal nuclear stress test     Coronary artery disease involving native coronary artery of native heart with angina pectoris (H)         Physical Assessment  Precautions/ Physical Limitations: Parkinsons  Oxygen: No  O2 Sats: 97-98% on RA Edema: NA  Sleeping Pattern: \"Could be better\"  Appetite: good   Nutrition Risk Screen: Will follow up with dietician    Pain  Location: \"All over\"  Characteristics:Sore  Intensity: (0-10 scale) 9  Current Pain Management: NA  Intervention: NA  Response: NA    Psychosocial/ Emotional Health  1. In the past 12 months, have you been in a relationship where you have been abused physically, emotionally, sexually or financially? No  notified: No  2. Who do you turn to for emotional support?: Wife  3. Do you have cultural or spiritual needs? No  4. Have there been any major life changes in the past 12 months? Yes Recent heart/health issues    Referral Information  Primary Physician: Valentín Washington MD  Cardiologist: Dr. Barraza      Home exercise/Equipment: Gym, Pool    Patient's long-term " goal(s): Go hiking again, Go to [place] over barber again    1. Living Accommodations: [facility] Steps: Yes      Support people at home: Wife   2. Marital Status:   3. Family is able to assist with cares      Sikhism/Community involvement: NA  4. Recreation/Hobbies: Hiking, camping, outdoors, cook over open fire, Skydiving, Traveling, Family tree, Golfing

## 2021-06-21 NOTE — PROGRESS NOTES
Assessment/ Plan     1. Coronary artery disease involving native coronary artery of native heart with angina pectoris (H)  2. Heart failure with reduced ejection fraction, NYHA class I (H)    Reviewed his recent hospitalization and placement of coronary stent to the right coronary artery  Continue aspirin and Brilinta  Follow-up with cardiology  He will continue metoprolol though the dose will be decreased secondary to his recent dizziness and falls  Cardiac rehabilitation is planned    3. Parkinson disease (H)    Continue carbidopa-levodopa and Comtan  Follow-up with neurology recommended  Favor physical therapy for strengthening and rehabilitation if desired    4. Fall  Secondary to parkinsonism    Patient will decrease metoprolol in the short-term as he may be experiencing some orthostatic blood pressure changes  Follow-up with neurology as recommended    5. Dizziness    Likely secondary to orthostatic hypotension  Decrease metoprolol  Recommend increase fluids next line monitor symptoms  Follow-up as advised    Patient verbalized understanding and agrees the above plan  He is currently followed by Stephie for clinical care coordination as well which is greatly appreciated    Reviewed recent hospitalization as well as cardiology assessment, nuclear cardiac stress test, and coronary catheterization results    Subjective:       Silver Zamora is a 68 y.o. male who presents to the clinic in follow-up after recent coronary catheterization.  He has known history of Parkinsonism, hypothyroidism, and gastroesophageal reflux disease as well as a cardiomyopathy.  Previous echocardiogram was abnormal showing an ejection fraction of 47% and no evidence of significant valvular abnormalities.  He was evaluated by cardiology, Dr. Barraza, and referred for a nuclear cardiac stress test which was abnormal.  This showed an area of ischemia involving the apical and basi lateral area of the left ventricle.  He therefore underwent a  coronary angiogram that showed a 90% stenosis involving the right coronary artery and 50-60% stenosis in the LAD.  He had a coronary stent placed and now has been treated with aspirin and Brilinta.  He tolerated the procedure generally well.    As noted, he has a history of parkinsonism and does follow-up with neurology.  He is treated with carbidopa levodopa.  He has had multiple falls over time.  He states that there are times where his legs do not seem to move very well.  There are times where he will feel somewhat lightheaded and will sink down.  I do have plans to follow-up with neurology in the near future.  He will be undergoing cardiac rehabilitation.    His medical history is also notable for previous laparoscopic cholecystectomy in May 2018.  Following that he was found to have mild cardiomegaly as well as fluid overload and had a reduced ejection fraction noted on echocardiogram.  He may have had fluid overload at the time.  Also, he had an acute encephalopathy with paranoia and hallucinations at the time.  That has improved.  Ambulation can be quite difficult given his Parkinson's related symptoms.  He has been taking metoprolol recently.  He takes levothyroxine as well.    He denies current chest pain or significant shortness of breath, orthopnea, paroxysmal nocturnal dyspnea.  He can feel lightheaded at times.      The following portions of the patient's history were reviewed and updated as appropriate: allergies, current medications, past family history, past medical history, past social history, past surgical history and problem list.    Review of Systems   A 12 point comprehensive review of systems was negative except as noted.      Current Outpatient Medications   Medication Sig Dispense Refill     acetaminophen (TYLENOL) 325 MG tablet Take 2 tablets (650 mg total) by mouth every 4 (four) hours as needed.  0     aspirin 81 mg chewable tablet Chew 1 tablet (81 mg total) daily.  0     atorvastatin  (LIPITOR) 80 MG tablet Take 1 tablet (80 mg total) by mouth daily. 30 tablet 3     bisacodyl (DULCOLAX) 10 mg suppository One supp PA qday prn constipation.  Give if no BM in prior 3 days.  0     carbidopa-levodopa (SINEMET CR)  mg ER tablet Take 1 tablet by mouth at bedtime. At ~2200       carbidopa-levodopa (SINEMET)  mg per tablet Take 3 tablets by mouth 3 (three) times a day. At approx. 0800, 1200, and 1600.       carbidopa-levodopa (SINEMET)  mg per tablet Take 2 tablets by mouth at bedtime. At approx 2200       cholecalciferol, vitamin D3, (VITAMIN D3) 2,000 unit Tab Take 1 tablet (2,000 Units total) by mouth daily.  0     docusate sodium (COLACE) 100 MG capsule Take 100 mg by mouth every other day.       entacapone (COMTAN) 200 mg tablet Take 200 mg by mouth 4 (four) times a day. with each Sinemet dose        gabapentin (NEURONTIN) 300 MG capsule Take 300 mg by mouth. 2 at 6:00pm and 1 at bedtime       levothyroxine (SYNTHROID, LEVOTHROID) 137 MCG tablet Take 1 tablet (137 mcg total) by mouth at bedtime.       melatonin 3 mg Tab tablet Take 3 mg by mouth at bedtime as needed.       metoprolol succinate (TOPROL-XL) 25 MG Take 1 tablet (25 mg total) by mouth daily. (Patient taking differently: Take 12.5 mg by mouth daily. ) 90 tablet 3     nitroglycerin (NITROSTAT) 0.4 MG SL tablet Place 1 tablet (0.4 mg total) under the tongue every 5 (five) minutes as needed for chest pain. 1 Bottle 1     omeprazole (PRILOSEC) 20 MG capsule Take 20 mg by mouth daily as needed.       polyethylene glycol (MIRALAX) 17 gram packet Take 17 g by mouth 2 (two) times a day.       senna-docusate (SENNOSIDES-DOCUSATE SODIUM) 8.6-50 mg tablet Take 2 tablets by mouth daily. Hold for loose stools. (Patient taking differently: Take 1 tablet by mouth every other day. Hold for loose stools.)  0     ticagrelor (BRILINTA) 90 mg Tab Take 1 tablet (90 mg total) by mouth 2 (two) times a day. 60 tablet 11     venlafaxine 225 mg  TR24 Take 1 tablet by mouth daily. 30 each 6     No current facility-administered medications for this visit.        Objective:      /70   Pulse 64   Wt 216 lb 11.2 oz (98.3 kg)   BMI 30.22 kg/m        General appearance: alert, appears stated age and cooperative  Head: Normocephalic, without obvious abnormality, atraumatic  Eyes: conjunctivae/corneas clear. PERRL, EOM's intact.   Ears: normal TM's and external ear canals both ears  Nose: Nares normal. Septum midline. Mucosa normal  Throat: lips, mucosa, and tongue normal; teeth and gums normal  Neck: no adenopathy, supple, symmetrical, trachea midline  Back: symmetric, no curvature. ROM normal. No CVA tenderness.  Lungs: clear to auscultation bilaterally  Heart: regular rate and rhythm, S1, S2 normal, no murmur, click, rub or gallop  Abdomen: soft, non-tender  Extremities: extremities normal, atraumatic, no cyanosis or edema  Skin: Skin color, texture, turgor normal. No rashes or lesions  Lymph nodes: Cervical nodes normal.  Neurologic: Alert and oriented X 3  There is generalized slowness of movement  Gait is unsteady     Cardiology:    Cardiac Catheterization   Order# [id number]   Reading physician: Jose Meyer MD Ordering physician: Roxy Barraza MD Study date: 10/31/18   Patient Information     Patient Name  Silver Zamora MRN  [id number] Sex  Male              Age  1950 (68 y.o.)   Physicians     Panel Physicians Referring Physician Case Authorizing Physician   Jose Meyer MD (Primary) MD Roxy Nielsen MD    PCP     Primary Care Provider Phone   Valentín Washington [phone]      Procedures     Coronary Angiogram   Left Heart Catheterization Without Left Ventriculogram   Percutaneous Coronary Intervention   Fractional Flow Ratio Wire   Panel Information     Panel 1     Provider Role   Jose Meyer MD Primary    Procedure Laterality Anesthesia   Coronary Angiogram N/A Conscious Sedation (RN)   6:30AM ADMIT,  H&P W/PMD, [facility]     Percutaneous Coronary Intervention N/A Conscious Sedation (RN)   Left Heart Catheterization Without Left Ventriculogram Left Conscious Sedation (RN)   Fractional Flow Ratio Wire N/A Conscious Sedation (RN)         Pre-procedure Diagnosis     abn nuc stress test   Indications     Abnormal nuclear stress test [R94.39 (ICD-10-CM)]    Procedure Status     Procedure scheduled as Elective (Outpatient).         Conclusion          Estimated blood loss was <20 ml.    The LM vessel was injected.    The left circumflex was injected.    The RCA was injected.    Mid RCA lesion 90% stenosed.    Mid LAD lesion 60% stenosed.    Pressure wire/FFR was performed on the lesion. pre diagnositic: 0.93. post diagnostic: 0.83.    Pressure wire/FFR was performed on the lesion.     Silver Zamora is a 68 y.o. old male with Parkinson disease, depression, recent pancreatitis/cholecystectomy, cardiomyopathy EF 30's who is here for w/u of cardiomyopathy and abnormal stress test.     Findings:  LM:no obstruction  LAD:long area of 50-60% disease in mid-vessel w/ a focal ectatic segment, PD/PA 0.93, FFR 0.84  Lcx:midlly irregular, occluded OM1 w/ an aneurysm at take off and late-filling branch c/w   RCA:dominant, focal 90% proximal 1/3 narrowing     LVEDP:13     Access:  R Radial artery     PCI:  RCA was engaged w/ a 6F JR4 Guide catheter and the lesion in RCA was wired w/ a Forte wire, ballooned w/ a 2.5x8mm Emerge balloon, and stented (delivered w/ the help of a 6F Guidezilla) w/ a 3.5x16mm Synergy EES at 13 valerie. Final angiography showed no dissection or perforation and a RAY 3 flow.     Closure:   Vasc Band     Impression/plan:  Pt. is a 68 y.o. old male with Parkinson disease, depression, recent pancreatitis/cholecystectomy, cardiomyopathy EF 30's who is here for w/u of cardiomyopathy and abnormal stress test.  - native CAD involving mLAD which is FFR-, occluded OM1 c/w , and severe focal lesion in mRCA s/p  EESx1  - ASA 81mg daily indefinitely, ticagrelor 180mg once, followed by 90mg twice daily for at least 12 months  - atorva 80  - continue aggressive risk factor modification               No results found for this or any previous visit (from the past 168 hour(s)).       This note has been dictated using voice recognition software. Any grammatical or context distortions are unintentional and inherent to the software

## 2021-06-21 NOTE — PROGRESS NOTES
Stephie KIRAN, Clinic Care Coordination (CCC) Care Guide (CG), spoke with Luz Marina briefly for standard outreach. Luz Marina requested a call back tomorrow as she was with her grandchildren.    Next outreach: 10/19/18    Plan:  -Inquire if pt is still attending [facility] day program 1x week,  Boxing at [place]  -Discuss maintenance

## 2021-06-21 NOTE — PROGRESS NOTES
Stephie KIRAN, Clinic Care Coordination (CCC) Care Guide (CG), spoke with Silver's spouse Luz Marina for standard [facility] outreach. CG and pt's spouse reviewed the following priorities:    -Spouse attended Alzheimer's informational meeting, met a fellow caregiver, intends to attend support group with her  -Does not need additional caregiver resources at this time  -Would like recommendations on food services that deliver meals to home that follow pt's cardiac diet; CG to gather resources and send  -Concern for pt with falls, has had home safety eval, would like LIfe Alert info; CG to send in mail today  -Looking into PCA services provided by [facility] SW 07/16/18. Has interviewed [facility], will interview [facility] soon  -Would like transportation resource specifically for handicapped to bring Silver to [employer] Adult day; CG to gather [facility] [place] [facility] resources 11/13 and send  -Would like respite care resources; CG to gather resources and send    CG and pt's spouse agreed to speak after pt begins cardiac rehab (today) to review recommendations and information and build supportive goals      Next outreach: 11/19/18    Plan:  -Review cardiac recommendations/resoruces overview:  Information on how to stay as healthy as possible to avoid going to the hospital  Medications- do you understand them, know when to take them, take them as prescribed; affording?    Have you had weight check discussions?  -Scale at home? Tracking weights? Weigh same time each day    Have you had diet discussions?  -Following doctors on how much to drink  -2,000mg or less of sodium?     -Resources for more information on management  -Resources for caregiver  -Exercises on relaxing and coping  -Stress and depression management

## 2021-06-21 NOTE — PROGRESS NOTES
"Stephie KIRAN, Clinic Care Coordination (CCC) Care Guide (CG), spoke with Silver's spouse Luz Marina for standard [facility] outreach.    Pt's spouse reported:  -She has received but is still reviewing the resources sent by CG for respite, transportation, medical alert devices, etc.  -Pt has attended four cardiac rehab sessions; pt and spouse are understanding of recommendations and have no obstacles at this time  -Pt's spouse will attend nutrition/cooking classes at [facility] for education  -Pt's spouse has \"looked into\" group homes for pt ([facility])  -Reported income values to CG for referral to [facility]; CG to share with [facility] SW    Next outreach: 01/02/19    Plan:  -Build goal for implementing cardiac recommendations  -Follow up on resource recommendations        "

## 2021-06-21 NOTE — LETTER
Letter by Valentín Washington MD at      Author: Valentín Washington MD Service: -- Author Type: --    Filed:  Encounter Date: 11/5/2020 Status: (Other)         Silver Zamora  [address] [place] [id number]             November 5, 2020         Dear Mr. Zamora,    Below are the results from your recent visit:    Resulted Orders   Comprehensive Metabolic Panel   Result Value Ref Range    Sodium 138 136 - 145 mmol/L    Potassium 3.9 3.5 - 5.0 mmol/L    Chloride 104 98 - 107 mmol/L    CO2 26 22 - 31 mmol/L    Anion Gap, Calculation 8 5 - 18 mmol/L    Glucose 96 70 - 125 mg/dL    BUN 17 8 - 28 mg/dL    Creatinine 0.76 0.70 - 1.30 mg/dL    GFR MDRD Af Amer >60 >60 mL/min/1.73m2    GFR MDRD Non Af Amer >60 >60 mL/min/1.73m2    Bilirubin, Total 0.9 0.0 - 1.0 mg/dL    Calcium 9.4 8.5 - 10.5 mg/dL    Protein, Total 6.7 6.0 - 8.0 g/dL    Albumin 3.8 3.5 - 5.0 g/dL    Alkaline Phosphatase 109 45 - 120 U/L    AST 18 0 - 40 U/L    ALT <9 0 - 45 U/L    Narrative    Fasting Glucose reference range is 70-99 mg/dL per  American Diabetes Association (ADA) guidelines.   Lipid [place] FASTING   Result Value Ref Range    Cholesterol 110 <=199 mg/dL    Triglycerides 88 <=149 mg/dL    HDL Cholesterol 40 >=40 mg/dL    LDL Calculated 52 <=129 mg/dL    Patient Fasting > 8hrs? Yes    PSA, Annual Screen (Prostatic-Specific Antigen)   Result Value Ref Range    PSA 0.4 0.0 - 6.5 ng/mL    Narrative    Method is Abbott Prostate-Specific Antigen (PSA)  Standard-WHO 1st International (90:10)   HM2(CBC w/o Differential)   Result Value Ref Range    WBC 5.9 4.0 - 11.0 thou/uL    RBC 5.10 4.40 - 6.20 mill/uL    Hemoglobin 14.5 14.0 - 18.0 g/dL    Hematocrit 44.4 40.0 - 54.0 %    MCV 87 80 - 100 fL    MCH 28.4 27.0 - 34.0 pg    MCHC 32.7 32.0 - 36.0 g/dL    RDW 13.8 11.0 - 14.5 %    Platelets 175 140 - 440 thou/uL    MPV 7.4 7.0 - 10.0 fL   Thyroid Cascade   Result Value Ref Range    TSH 2.88 0.30 - 5.00 uIU/mL       Silver,    Your  laboratory tests look very good.  Your blood counts are normal.  Your thyroid test and prostate test are normal.    The kidney and liver tests are normal.  Finally, your cholesterol numbers are excellent.    Please call with questions or contact us using [employer].    Sincerely,        Electronically signed by Valentín Washington MD

## 2021-06-21 NOTE — PROGRESS NOTES
Stephie KIRAN, Clinic Care Coordination (CCC) Care Guide (CG) called Luz Marina Luis Armando [facility] Elder , and gathered resources for respite care and transportation. CG completed compiling and mailed resources for patient on transportation, respite, waiver programs, and Hammad's devices.    Next outreach: 11/26/18    Plan:  -Inquire about usefulness of resources

## 2021-06-22 NOTE — PROGRESS NOTES
[facility] SW spoke with pt's spouse Luz Marina. SW explained what the [facility] worker Mirlande was needing with regard to finances.     Luz Marina explained that the income and assets break down as follows:    Pension and annuities: $34,678  SSA benefits: $41,346  $150 in savings  Each own a vehicle worth $8,000 each.    JOLIE explained the process after getting referred to the [facility] for waiver, Luz Marina understood.    JOLIE left  nan Nogueira with this info and requested paperwork be mailed to the pt.     Mirlande called back and left a VM, Mirlande informed  she needs to know individual asset and income of Luz Marina and the pt, specifically.    JOLIE called Luz Marina back, was able to reach Luz Marina. Luz Marina said she was driving but would get that info to the  next week.

## 2021-06-22 NOTE — PROGRESS NOTES
"Assessment/ Plan     1. Fall, sequela    Reviewed his recent ED vist as well as significant imaging  His head CT was negative for acute mass, fracture, or hemorrhage  Reviewed the CT scan of the cervical spine showing severe spinal stenosis  The lumbar spine MRI showed multilevel degenerative changes  The CT scan of the abdomen pelvis was negative for acute changes  Reviewed x-rays of the right shoulder and right wrist which were negative for fracture  Recommend symptom medic treatment including Tylenol as needed  Consider further evaluation as warranted    2. Parkinson disease (H)    His recent hospitalization occurred after he \"froze\" on 2 occasions excellent recommend continuing Sinemet  Continue carbidopa-levodopa  Follow-up with neurology    3. Spinal stenosis in cervical region    Reviewed neurosurgical consultation  As noted, this is a challenging situation given severe spinal stenosis  Given his recent coronary stent placement and given the fact that he is on dual antiplatelet therapy which should not be interrupted there are potential risks for surgery  Recommend that he continue to follow-up neurosurgery and cardiology to review options    4. Ulcer of toe of right foot, unspecified ulcer stage (H)    Recommend follow-up with podiatry  Recommend immediate follow-up if developing concerning symptoms including redness, swelling, fever, or pain  - Ambulatory referral to Podiatry    5.  Coronary artery disease  Status post coronary stent placement    Follow-up with cardiology as planned  He will discuss whether to continue cardiac rehabilitation given his cervical spinal stenosis and recent falls  Continue aspirin and Brilinta    Reviewed the extensive emergency department evaluation as noted with imaging notes  Reviewed neurosurgical consultation    40 minutes were spent with the patient and greater than 50% of the time was spent in face to face counseling and coordination of care        Subjective:       Silver " "Noah is a 68 y.o. male who presents to the clinic after recent fall and hospitalization.  He was walking out of a restaurant and reportedly \"froze\" on two occasions.  He fell forward over a curb as he could not manage the steps.  Has had multiple falls recently.  He therefore was taken to the emergency department.  He reported atypical numbness and weakness in his hands and fingers.  He had a hospitalization and an extensive evaluation.  His CT scan of the cervical spine showed severe spinal canal stenosis at multiple levels including C4-C5, C5-C6, and C6-C7.  A CT scan of the abdomen and pelvis revealed sigmoid diverticulosis without acute changes.  The lumbar spine CT showed prominent degenerative changes at multiple levels.  His CT of the head was negative for mass, infarction, or hemorrhage.  During the course of the hospitalization there was a neurosurgical consultation given spinal stenosis.    The assessment was that this is a complicated situation.  He had a cardiac stent placed in October 2018.  His ejection fraction was low at 34%.  He is now on antiplatelet drugs which would be dangerous to stop given the coronary stent placement.  Surgical options were discussed but would need to be reviewed with cardiology as well.    As noted, he has a history of Parkinson's disease, hypothyroidism, gastroesophageal reflux disease as well as cardiomyopathy as noted.  He had his coronary stent placed in the right coronary artery and is now treated with aspirin and Brilinta.  There are times where he has been hypotensive and metoprolol was decreased.    Review of systems is notable for some neck discomfort.  He has had some pain in his right wrist and shoulder.  Also, he developed a total ulcer involving the right third toe.  He denies fever or chills.  He has not had redness or swelling.  His wife Luz Marina is present today.    He denies current chest pain, shortness breath, palpitations, or bowel changes.  There are " plans to follow-up with neurology in the near future.      The following portions of the patient's history were reviewed and updated as appropriate: allergies, current medications, past family history, past medical history, past social history, past surgical history and problem list. Medications have been reconciled    Review of Systems   A 12 point comprehensive review of systems was negative except as noted.      Current Outpatient Medications   Medication Sig Dispense Refill     acetaminophen (TYLENOL) 325 MG tablet Take 2 tablets (650 mg total) by mouth every 4 (four) hours as needed.  0     aspirin 81 mg chewable tablet Chew 1 tablet (81 mg total) daily.  0     atorvastatin (LIPITOR) 80 MG tablet Take 1 tablet (80 mg total) by mouth daily. 30 tablet 3     bisacodyl (DULCOLAX) 10 mg suppository One supp UT qday prn constipation.  Give if no BM in prior 3 days.  0     carbidopa-levodopa (SINEMET CR)  mg ER tablet Take 1 tablet by mouth 4 (four) times a day 0800,1200,1600,2300 (1 hour after last dose of IR).             carbidopa-levodopa (SINEMET CR)  mg ER tablet Take 1 tablet by mouth daily as needed (in the middle of the night).       carbidopa-levodopa (SINEMET)  mg per tablet Take 2 tablets by mouth 3 (three) times a day At approx. 0800, 1200, and 1600..             carbidopa-levodopa (SINEMET)  mg per tablet Take 3 tablets by mouth at bedtime At approx 2200 .             cholecalciferol, vitamin D3, (VITAMIN D3) 2,000 unit Tab Take 1 tablet (2,000 Units total) by mouth daily.  0     entacapone (COMTAN) 200 mg tablet Take 200 mg by mouth 4 (four) times a day. with each Sinemet dose        gabapentin (NEURONTIN) 300 MG capsule Take 600 mg by mouth at bedtime.             gabapentin (NEURONTIN) 300 MG capsule Take 300 mg by mouth 3 (three) times a day.             levothyroxine (SYNTHROID, LEVOTHROID) 137 MCG tablet Take 1 tablet (137 mcg total) by mouth at bedtime.       melatonin 3 mg  Tab tablet Take 3 mg by mouth at bedtime as needed.       metoprolol succinate (TOPROL-XL) 25 MG Take 12.5 mg by mouth at bedtime.       nitroglycerin (NITROSTAT) 0.4 MG SL tablet Place 1 tablet (0.4 mg total) under the tongue every 5 (five) minutes as needed for chest pain. 15 tablet 1     omeprazole (PRILOSEC) 20 MG capsule Take 1 capsule (20 mg total) by mouth daily. (Patient taking differently: Take 20 mg by mouth daily as needed .      ) 90 capsule 3     polyethylene glycol (MIRALAX) 17 gram packet Take 17 g by mouth daily.             senna-docusate (SENNOSIDES-DOCUSATE SODIUM) 8.6-50 mg tablet Take 1 tablet by mouth 2 (two) times a day.             ticagrelor (BRILINTA) 90 mg Tab Take 1 tablet (90 mg total) by mouth 2 (two) times a day. 60 tablet 11     venlafaxine (EFFEXOR-XR) 150 MG 24 hr capsule Take 1 capsule (150 mg total) by mouth daily. 75 mg + 150 mg  = 225 mg 90 capsule 2     venlafaxine (EFFEXOR-XR) 75 MG 24 hr capsule Take 1 capsule (75 mg total) by mouth daily. 75 mg + 150 mg = 225 mg 90 capsule 2     No current facility-administered medications for this visit.        Objective:      /76   Pulse 72   Wt 220 lb 3.2 oz (99.9 kg)   BMI 31.60 kg/m        General appearance: alert, appears stated age and cooperative  Head: Normocephalic, without obvious abnormality, atraumatic  Eyes: conjunctivae/corneas clear. PERRL, EOM's intact.   Ears: normal TM's and external ear canals both ears  Nose: Nares normal. Septum midline. Mucosa normal. No drainage or sinus tenderness.  Throat: lips, mucosa, and tongue normal; teeth and gums normal  Neck: no adenopathy, supple, symmetrical, trachea midline   Lungs: clear to auscultation bilaterally  Heart: regular rate and rhythm, S1, S2 normal, no murmur, click, rub or gallop  Abdomen: soft, non-tender; bowel sounds normal; no masses,  no organomegaly  Extremities: extremities normal, atraumatic, no cyanosis or edema  Examination of the right shoulder reveals  mild discomfort with abduction  There is no tenderness in the area of the anatomical snuffbox area  There is some tenderness with palpation of the distal radius  Skin: There is a small ulcer involving the distal aspect of the right third toe  There is no surrounding erythema  Lymph nodes: Cervical nodes normal.  Neurologic: Alert and oriented X 3  Gait is slow and unsteady  There is generalized slowness in movement         No results found for this or any previous visit (from the past 168 hour(s)).       This note has been dictated using voice recognition software. Any grammatical or context distortions are unintentional and inherent to the software

## 2021-06-22 NOTE — PROGRESS NOTES
Attempt 2: Stephie KIRAN, Clinic Care Coordination (CCC) Care Guide (CG), called patient for CCC outreach.  If this patient is returning my call, please transfer to  [facility] ext [phone], T/TH ext [phone].    Next outreach: 12/19/18    Plan:  -Inquire if income reported is individual or shared:  Taxeable amount: 34,678   SSI taxeable amount before: 41,346   Adjusted gross: 50,372    -Obtain asset information: savings, property value besides house living in

## 2021-06-22 NOTE — TELEPHONE ENCOUNTER
Return call to patient's wife Luz Marina who stated yesterday she noted discoloration (yellowing) of patient's left breast along with purple rash like dots below breast - patient denies pain, swelling but dots itch - patient was seen by PCP yesterday who thought discoloration was related to Lipitor and instructed them to contact Dr. Barraza.    Luz Marina stated patient had fallen 12-7-18 and injured right shoulder/wrist - patient evaluated in ED and no injury seen along left chest wall / breast area at that time.  Luz Marina denied any recent injuries to area.  Patient has appt with neurologist today for Parkinsons and Luz Marina concerned that more medication changes will be recommended which she plans to refuse until patient can be seen by Dr. Barraza 1-18-19.    Explained to Luz Marina that DAPT (Brilinta/ASA) can cause increased bleeding and bruising but should not be stopped unless Dr. Barraza advises - informed her that update would be forwarded to Dr. Barraza and she would be contacted with any new recommendations - instructed her to have patient evaluated in ED if she notes swelling, increased bruising , pain, dyspnea - understanding verbalized.    Please review patient update and PCP 1-3-19 note - any new orders prior to 1-18-19 f/u appt?  mg

## 2021-06-22 NOTE — PROGRESS NOTES
How have you been doing since we last saw you? Most important neurological symptom or concern for this visit (Medication wearing off, hallucinations, freezing, pain, sleep difficulty, constipation etc.) Pt. Says he has been doing okay.    How many falls has the pt. Had over the last year?(if pt. Falls frequently, daily, weekly, monthly?) Pt. Says that he a too many falls since the last time we saw him    Best explanation of falls (poor balance, fail/recognizing/judgement, trip, dizzy, moving too quickly, carrying too much, poor lighting, any loss of consciousness, confusion, incontinence, tongue biting, or any unusual features of events)? Pt. Says that he is loosing his balance a lot and being in different positions.

## 2021-06-22 NOTE — PROGRESS NOTES
[facility] SW called [facility] elderly services to complete AC waiver referral, left VM for Mirlande [phone].    Mirlande called back and connected with SW. JOLIE attempted to complete referral with info pt's spouse Luz Marina had provided for finances, however, Mirlande reported she needed more specific financial info around assets for the pt and spouse. Mirlande informed SW to get this info before SW can complete a referral.    JOLIE called Luz Marina to discuss this, however, had to leave VM. JOLIE informed Luz Marina to call CG and let CG know what the asset values were for the pt and herself, if Luz Marina has any questions SW informed her that CG will connect  with her again to discuss.

## 2021-06-22 NOTE — TELEPHONE ENCOUNTER
----- Message from Le Morse sent at 1/4/2019  9:38 AM CST -----  Contact: WIFE  General phone call:  DEAN STATES THAT PATIENT HAS A YELLOW AND UNDERNEATH PURPLE FE ON HIS LEFT CHEST WALL, STARTED OUT RED , TURNED TO PURPLE, AND IT ITCHES,  WONDERING IF THIS COULD BE FROM ANY CARDIAC MEDICATIONS THAT HE IS ON, PLEASE CALL  Caller: WIFE  Primary cardiologist: DR KING  Detailed reason for call: SEE ABOVE  New or active symptoms? SEE ABOVE  Best phone number: [phone]  Best time to contact: ANY TIME  Ok to leave a detailed message? YES  Device? NO    Additional Info:

## 2021-06-22 NOTE — PATIENT INSTRUCTIONS - HE
Follow-up with orthopedics for your right wrist pain and shoulder pain  Follow-up neurology to review the medication options (you can review the cervical spinal stenosis)  Review the heart history as well  Review the risk of falls

## 2021-06-22 NOTE — PROGRESS NOTES
FOOT AND ANKLE SURGERY/PODIATRY Progress Note        ASSESSMENT:   Blister 3rd digit right foot  Anhidrosis      TREATMENT:  -No open lesions noted on exam today. There appears to be a resolving blister along the distal 3rd digit right foot.   -Moderate callus build-up along the medial right hallux. I will start him on LacHydrin 12% cream, apply two times a day.   -I have encouraged the patient to closely monitor for any skin breakdown and to return if callus tissue build-up is not easily removed with the use of a pumice stone.  -All questions invited and answered today. He will return as symptoms dictate.       Pantera [place], [facility] [place]      HPI: Silver Zamora was seen today for a sore on the 3rd digit right foot. The patient states he had distal toe amputations of digits 3,4 on the right foot in 2015. Since that operation, he has not had complications or developed any foot sores. He has neuropathy in the absence of diabetes.     Past Medical History:   Diagnosis Date     Anxiety      Callus      Cardiomyopathy (H)      Chronic back pain      Delirium      Depression      Fall      GERD (gastroesophageal reflux disease)      Hypothyroidism      Pancreatitis 12/31/2015     PD (Parkinson's disease) (H)      RBD (REM behavioral disorder) 4/3/2017     Shingles     hx       Allergies   Allergen Reactions     Clonazepam      Too drowsy         Current Outpatient Medications:      acetaminophen (TYLENOL) 325 MG tablet, Take 2 tablets (650 mg total) by mouth every 4 (four) hours as needed., Disp: , Rfl: 0     aspirin 81 mg chewable tablet, Chew 1 tablet (81 mg total) daily., Disp: , Rfl: 0     atorvastatin (LIPITOR) 80 MG tablet, Take 1 tablet (80 mg total) by mouth daily., Disp: 30 tablet, Rfl: 3     bisacodyl (DULCOLAX) 10 mg suppository, One supp OK qday prn constipation.  Give if no BM in prior 3 days., Disp: , Rfl: 0     carbidopa-levodopa (SINEMET CR)  mg ER tablet, Take 1 tablet by mouth 4  (four) times a day 0800,1200,1600,2300 (1 hour after last dose of IR).   , Disp: , Rfl:      carbidopa-levodopa (SINEMET CR)  mg ER tablet, Take 1 tablet by mouth daily as needed (in the middle of the night)., Disp: , Rfl:      carbidopa-levodopa (SINEMET)  mg per tablet, Take 2 tablets by mouth 3 (three) times a day At approx. 0800, 1200, and 1600..   , Disp: , Rfl:      carbidopa-levodopa (SINEMET)  mg per tablet, Take 3 tablets by mouth at bedtime At approx 2200 .   , Disp: , Rfl:      cholecalciferol, vitamin D3, (VITAMIN D3) 2,000 unit Tab, Take 1 tablet (2,000 Units total) by mouth daily., Disp: , Rfl: 0     entacapone (COMTAN) 200 mg tablet, Take 200 mg by mouth 4 (four) times a day. with each Sinemet dose , Disp: , Rfl:      gabapentin (NEURONTIN) 300 MG capsule, Take 600 mg by mouth at bedtime.   , Disp: , Rfl:      gabapentin (NEURONTIN) 300 MG capsule, Take 300 mg by mouth 3 (three) times a day.   , Disp: , Rfl:      levothyroxine (SYNTHROID, LEVOTHROID) 137 MCG tablet, Take 1 tablet (137 mcg total) by mouth at bedtime., Disp: , Rfl:      melatonin 3 mg Tab tablet, Take 3 mg by mouth at bedtime as needed., Disp: , Rfl:      metoprolol succinate (TOPROL-XL) 25 MG, Take 12.5 mg by mouth at bedtime., Disp: , Rfl:      nitroglycerin (NITROSTAT) 0.4 MG SL tablet, Place 1 tablet (0.4 mg total) under the tongue every 5 (five) minutes as needed for chest pain., Disp: 15 tablet, Rfl: 1     omeprazole (PRILOSEC) 20 MG capsule, Take 1 capsule (20 mg total) by mouth daily. (Patient taking differently: Take 20 mg by mouth daily as needed .   ), Disp: 90 capsule, Rfl: 3     polyethylene glycol (MIRALAX) 17 gram packet, Take 17 g by mouth daily.   , Disp: , Rfl:      senna-docusate (SENNOSIDES-DOCUSATE SODIUM) 8.6-50 mg tablet, Take 1 tablet by mouth 2 (two) times a day.   , Disp: , Rfl:      ticagrelor (BRILINTA) 90 mg Tab, Take 1 tablet (90 mg total) by mouth 2 (two) times a day., Disp: 60 tablet,  Rfl: 11     venlafaxine (EFFEXOR-XR) 150 MG 24 hr capsule, Take 1 capsule (150 mg total) by mouth daily. 75 mg + 150 mg  = 225 mg, Disp: 90 capsule, Rfl: 2     venlafaxine (EFFEXOR-XR) 75 MG 24 hr capsule, Take 1 capsule (75 mg total) by mouth daily. 75 mg + 150 mg = 225 mg, Disp: 90 capsule, Rfl: 2    Past Surgical History:   Procedure Laterality Date     CV CORONARY ANGIOGRAM N/A 10/31/2018    Procedure: Coronary Angiogram;  Surgeon: Jose Meyer MD;  Location: [facility] Cath Lab;  Service:      CV LEFT HEART CATHETERIZATION WO LEFT VETRICULOGRAM Left 10/31/2018    Procedure: Left Heart Catheterization Without Left Ventriculogram;  Surgeon: Jose Meyer MD;  Location: [facility] Cath Lab;  Service:      INGUINAL HERNIA REPAIR Bilateral 10/2013     LAMINECTOMY  10/2013     SD ERCP REMOVE CALCULI/DEBRIS BILIARY/PANCREAS DUCT N/A 5/25/2018    Procedure: ENDOSCOPIC RETROGRADE CHOLANGIOPANCREATOGRAPHY SPINCTEROTOMY BILIARY STONE EXTRACTION;  Surgeon: Curtis Mcclain MD;  Location: [facility];  Service: Gastroenterology     SD LAP,CHOLECYSTECTOMY/GRAPH N/A 5/24/2018    Procedure: LAPAROSCOPIC CHOLECYSTECTOMY;  Surgeon: Joyce Melissa MD;  Location: [facility];  Service: General       Social History     Social History Narrative     Not on file       Family History   Problem Relation Age of Onset     Kidney failure Mother      Heart disease Father 82     Tremor Father      Parkinsonism Paternal Grandfather      Tremor Sister      Leukemia Maternal Grandmother        Review of Systems - per HPI, all others negative      OBJECTIVE:  Appearance: alert, well appearing, and in no distress.    Vitals:    12/28/18 1547   BP: 138/82   Pulse: 72   Resp: 14   Temp: 97.9  F (36.6  C)   SpO2: 99%       Vascular: Dorsalis pedis palpableRight.  Dermatologic:   VASC Wound 12/28/18 Posterior Right foot 3rd digit (Active)   Description scabbed 12/28/2018  3:00 PM       Incision 05/24/18 Abdomen (Active)   No  erythema right foot. Resolving blister distal 3rd digit right with no open lesions. Callus tissue medial right hallux.  Neurologic: Diminished to light touch Right.  Musculoskeletal: Partial amputation distal digits 3,4 right.     Imaging: None

## 2021-06-22 NOTE — PROGRESS NOTES
Stephie KIRAN, Clinic Care Coordination (CCC) Care Guide (CG), called to follow up on 11/26/18 CCC outreach call to clarify if Silver would like to be referred to Elderly care waiver or Alternative Care waiver. CG spoke with Silver's wife Luz Marina who stated that she didn't know. CG advised that CCC SW could use discretion for referral based on eligibility.    Next outreach: 01/02/18    Plan:  -Build goal for implementing cardiac recommendations  -Follow up on resource recommendations

## 2021-06-22 NOTE — PROGRESS NOTES
[facility] JOLIE called Mirlande with [facility] with the individual breakdown of pt and Luz Marina's income, had to leave .    JOLIE received call back, Mirlande completed the referral and will be mailing the application to Luz Marina--could take up to 2 weeks.    JOLIE called Luz Marina and updated her with this information, informed her to let [facility] know if they need assist with the application. Discussed Luz Marina getting together supporting documents in the meantime for the application--bank statement, proof of SSI incomes, proof of pension, etc. Luz Marina understood.

## 2021-06-22 NOTE — PROGRESS NOTES
How have you been doing since we last saw you? Most important neurological symptom or concern for this visit (Medication wearing off, hallucinations, freezing, pain, sleep difficulty, constipation etc.) Pt. Says he has been doing good since the last time we saw him.    How many falls has the pt. Had over the last year?(if pt. Falls frequently, daily, weekly, monthly?) PT. Says he has had one big fall and another fall this morning at 1am.    Best explanation of falls (poor balance, fail/recognizing/judgement, trip, dizzy, moving too quickly, carrying too much, poor lighting, any loss of consciousness, confusion, incontinence, tongue biting, or any unusual features of events)? PT. Says he fell and stopped on his hands, the big fall he had to go to the hospital for his back.

## 2021-06-22 NOTE — PROGRESS NOTES
Assessment:     1. Parkinson's disease  2. Heart failure with reduced EF/CAD  3. Anxiety  4. Back pain      Plan:     Plan:        8  am     12 pm     4 pm    9 pm  10 pm  2 am2   Entacapone  200 mg  1 tab  1 tab  1 tab  1 tab                      Sinemet ER    25/100  1 tab  1 tab  1 tab         Sinemet IR   25/100   2 tabs   2 tabs  2 tabs   2 tabs                                            Patient returns to the concussion clinic for follow-up appointment, he was last seen by me on 12/27/18, where he was can start the patient on Nuplazid.  Wife did read side effects of medication and does not want him to start until after she consults with cardiology, I am in agreement with this plan.  The patient did show some cognitive deficits today in office.  He was very stuck on telling me how well he drives, even though he was when I gave up driving years ago.  The patient is going to see cardiology on the 18th, I would like the patient to wean off of the Sinemet ER since I am seeing more confusion and the patient.  Wife and patient both report there is been no difference in symptom control since adding the CR, I would like to see if increase in his Sinemet is causing confusion and hallucinations in the patient.  I did give the patient as needed sublingual Sinemet to use if he is freezing or feeling off.  We will wait to make all other medication changes until after cardiac visit.    Subjective:        Parkinson's history: Patient is right-handed.  He was diagnosed with Parkinson's disease in 2004 at the age of about 54.  He feels that the symptoms may have started in about 1994.  Symptoms started with stiffness, slowness, soft speech, walking difficulties, and left hand tremor which spread to the right and about 2015.  Has seen Dr. Suazo in the past.  He has some bruising and tremors occasionally but there is asymmetry.  He tried Sinemet CR during the daytime and at night which did not work well.  He is been off the  pramipexole since 12/2015 due to memory and behavioral concerns.  December 2010 he was taking Sinemet IR 25/100 and increased to 25/250 with a good response which also improved his back pain.  He was able to be more active.  In 2011 he was evaluated at the [facility] by , he was noted to have U PDR S score of 41 after all Parkinson's medications.  It was suggested that he take carbidopa levodopa every 3 hours.  Amantadine was discontinued because he did not have dyskinesia.  It was suggested that he switch his selegiline to Azilect or discontinue all MAO inhibitors.  It is  felt that his increased Sinemet may have exasperated his restless leg syndrome.  Gabapentin was started to control his restless leg symptoms.  He was followed by Dr. Suazo from 2002 - 2011.  He also saw Dr. Zuniga and a previous nurse practitioner at this clinic.  He did transfer care to  in 2013.  He has tried amantadine and selegiline in the past.  He is not able to tolerate Requip, which he tried for several years at a relatively high dose but discontinued due to edema in lower extremities, he also tried Mirapex in 2015 but this caused more freezing and increased tremor.  CAT scan in July 2013 showed absent radiotracer in the putamen with diminished accumulation in the caudate, most consistent with Parkinson's disease.  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     Patient Active Problem List    Diagnosis Date Noted     Ischemic cardiomyopathy 12/14/2018     Heart failure with reduced ejection fraction (H) 12/14/2018     Coronary artery disease involving native coronary artery of native heart with angina pectoris (H)      Abnormal nuclear stress test 10/25/2018     Abnormal echocardiogram 05/29/2018     Leukocytosis, unspecified type      Cholecystitis 05/23/2018     Choledocholithiasis 05/23/2018     Calculus of gallbladder with biliary obstruction but without cholecystitis       Weakness      Low vitamin D level 10/11/2017     RBD (REM behavioral disorder) 04/03/2017     Pancreatitis 12/31/2015     Parkinson disease (H) 07/30/2014     Back pain 07/30/2014     Depression 07/30/2014     Anxiety state, unspecified 07/30/2014     Hypothyroidism 07/30/2014     Past Medical History:   Diagnosis Date     Anxiety      Callus      Cardiomyopathy (H)      Chronic back pain      Delirium      Depression      Fall      GERD (gastroesophageal reflux disease)      Hypothyroidism      Pancreatitis 12/31/2015     PD (Parkinson's disease) (H)      RBD (REM behavioral disorder) 4/3/2017     Shingles     hx     Past Surgical History:   Procedure Laterality Date     CV CORONARY ANGIOGRAM N/A 10/31/2018    Procedure: Coronary Angiogram;  Surgeon: Jose Meyer MD;  Location: [facility] Cath Lab;  Service:      CV LEFT HEART CATHETERIZATION WO LEFT VETRICULOGRAM Left 10/31/2018    Procedure: Left Heart Catheterization Without Left Ventriculogram;  Surgeon: Jose Meyer MD;  Location: [facility] Cath Lab;  Service:      INGUINAL HERNIA REPAIR Bilateral 10/2013     LAMINECTOMY  10/2013     [place] ERCP REMOVE CALCULI/DEBRIS BILIARY/PANCREAS DUCT N/A 5/25/2018    Procedure: ENDOSCOPIC RETROGRADE CHOLANGIOPANCREATOGRAPHY SPINCTEROTOMY BILIARY STONE EXTRACTION;  Surgeon: Curtis Mcclain MD;  Location: [facility] OR;  Service: Gastroenterology     [place] LAP,CHOLECYSTECTOMY/GRAPH N/A 5/24/2018    Procedure: LAPAROSCOPIC CHOLECYSTECTOMY;  Surgeon: Joyce Melissa MD;  Location: [facility] OR;  Service: General     Family History   Problem Relation Age of Onset     Kidney failure Mother      Heart disease Father 82     Tremor Father      Parkinsonism Paternal Grandfather      Tremor Sister      Leukemia Maternal Grandmother      Current Outpatient Medications   Medication Sig Dispense Refill     acetaminophen (TYLENOL) 325 MG tablet Take 2 tablets (650 mg total) by mouth every 4 (four) hours as needed.  0      ammonium lactate (AMLACTIN) 12 % cream Apply two times a day 385 g 2     aspirin 81 mg chewable tablet Chew 1 tablet (81 mg total) daily.  0     atorvastatin (LIPITOR) 80 MG tablet Take 1 tablet (80 mg total) by mouth daily. 30 tablet 3     bisacodyl (DULCOLAX) 10 mg suppository One supp PA qday prn constipation.  Give if no BM in prior 3 days.  0     carbidopa-levodopa (SINEMET CR)  mg ER tablet Take 1 tablet by mouth 4 (four) times a day 0800,1200,1600,2300 (1 hour after last dose of IR).             carbidopa-levodopa (SINEMET CR)  mg ER tablet Take 1 tablet by mouth daily as needed (in the middle of the night).       carbidopa-levodopa (SINEMET)  mg per tablet Take 2 tablets by mouth 3 (three) times a day At approx. 0800, 1200, and 1600..             carbidopa-levodopa (SINEMET)  mg per tablet Take 3 tablets by mouth at bedtime At approx 2200 .             cholecalciferol, vitamin D3, (VITAMIN D3) 2,000 unit Tab Take 1 tablet (2,000 Units total) by mouth daily.  0     entacapone (COMTAN) 200 mg tablet Take 200 mg by mouth 4 (four) times a day. with each Sinemet dose        gabapentin (NEURONTIN) 300 MG capsule Take 600 mg by mouth at bedtime.             gabapentin (NEURONTIN) 300 MG capsule Take 300 mg by mouth 3 (three) times a day.             levothyroxine (SYNTHROID, LEVOTHROID) 137 MCG tablet Take 1 tablet (137 mcg total) by mouth at bedtime.       melatonin 3 mg Tab tablet Take 3 mg by mouth at bedtime as needed.       metoprolol succinate (TOPROL-XL) 25 MG Take 12.5 mg by mouth at bedtime.       nitroglycerin (NITROSTAT) 0.4 MG SL tablet Place 1 tablet (0.4 mg total) under the tongue every 5 (five) minutes as needed for chest pain. 15 tablet 1     omeprazole (PRILOSEC) 20 MG capsule Take 1 capsule (20 mg total) by mouth daily. (Patient taking differently: Take 20 mg by mouth daily as needed .      ) 90 capsule 3     polyethylene glycol (MIRALAX) 17 gram packet Take 17 g by mouth  daily.             senna-docusate (SENNOSIDES-DOCUSATE SODIUM) 8.6-50 mg tablet Take 1 tablet by mouth 2 (two) times a day.             ticagrelor (BRILINTA) 90 mg Tab Take 1 tablet (90 mg total) by mouth 2 (two) times a day. 60 tablet 11     venlafaxine (EFFEXOR-XR) 150 MG 24 hr capsule Take 1 capsule (150 mg total) by mouth daily. 75 mg + 150 mg  = 225 mg 90 capsule 2     venlafaxine (EFFEXOR-XR) 75 MG 24 hr capsule Take 1 capsule (75 mg total) by mouth daily. 75 mg + 150 mg = 225 mg 90 capsule 2     carbidopa-levodopa (PARCOPA)  mg per disintegrating tablet Take 1 tablet by mouth 3 (three) times a day. 90 tablet 1     No current facility-administered medications for this encounter.        Allergies   Allergen Reactions     Clonazepam      Too drowsy     Social History     Socioeconomic History     Marital status:      Spouse name: Not on file     Number of children: Not on file     Years of education: Not on file     Highest education level: Not on file   Social Needs     Financial resource strain: Not on file     Food insecurity - worry: Not on file     Food insecurity - inability: Not on file     Transportation needs - medical: Not on file     Transportation needs - non-medical: Not on file   Occupational History     Not on file   Tobacco Use     Smoking status: Former Smoker     Types: Cigarettes     Last attempt to quit: 1980     Years since quittin.0     Smokeless tobacco: Never Used   Substance and Sexual Activity     Alcohol use: No     Comment: one drink a week     Drug use: No     Sexual activity: Not on file   Other Topics Concern     Not on file   Social History Narrative     Not on file       The following portions of the patient's history were reviewed and updated as appropriate: allergies, current medications, past family history, past medical history, past social history, past surgical history and problem list.    Review of Systems        PD:   see above  RLS-   on Neurontin  300 mg QID   ADL - Independent with all cares, reports only needs assistance when in a hurry, lives at home with wife. Independent with eating, dressing, and medication set up. Wife is currently doing daily finances, he does handle all long term finances.   Driving- has given up driving, wife now does most of the driving  Exercise - reports he really doesn't exercise much  Falls -  Reports multiple falls  Medication Side Effects - Clonazepam - not able to tolerate  Chronic lower back pain - no focal week ness in left LE. 10/2013 had lower back surgery, which has reduced pain. Does have stenosis, is starting to have incontininet issues, surgery will not be performed until heart issues are stablized and patient would be able to be off of blood thinners  Osteomyelitis-third and fourth toe on right foot has been amputated does have a wound on left foot that is not healing well, being monitored by podiatrist  Memory -Completed neuro psych testing in 4/2013 and 5/2015, with mild progression in cognitive impairment. Repeat test in 5/2016 showing no significant changes. MOCA 21/30 pm 4/5/2017  Sleep-  no johnny to change his sleep habits, he was an over the road . [place] [facility] [place] able to function in the am. He has had 3 sleep studies and has seen Dr. Jayesh Michaels and other sleep specialists. He has tried clonazepam and was not able to tolerate. Tested other medications without benefit. Reports also has REM sleep disorder and restless leg syndrom. Sleeps/takes multiple naps throughout the day  Daytime sleepiness- considered trying Provigil, was not able to tolerate Ritalin, which was tried in 2017.  Daughter would like him to consider a nicotine patch.  Psych/Depression-Reports having some anxiety, has had some hallucinations, will sometimes sees mouse running and sees other people in the house. Reprots some depresssion but wife feels this maybe d/t lack of motivation. Hs seen Dr. Hurley and Belkis in  the past. Reports he really doesn't believe in psychotherapies  Autonomic: agustin light headedness, not particularly with position changes, no orthostatic B/P  Dyskinesia- none noted  [place] - but does not wear hearing aids  Tremor -bilateral hand tremor that is post resting type may e slightly worse on the right.    Posture: stooped shoulders  Postural Stability: decreased  Surgery - discussed  Drooling - sometimes at night  Bradykinesia and Hypokinesia -  slight      Objective:     There were no vitals filed for this visit.    Imaging: CT HEAD 12/7/2018 FINDINGS: INTRACRANIAL CONTENTS: No intracranial hemorrhage, extraaxial collection, or mass effect.  No CT evidence of acute infarct. There is minimal scattered low-attenuation within the periventricular and subcortical white matter consistent with minimal small vessel ischemic disease. The ventricles and sulci are normal for age. VISUALIZED ORBITS/SINUSES/MASTOIDS: No significant orbital abnormality. There is almost complete opacification again noted involving the right frontal sinus along with the maxillary sinuses bilaterally. No significant middle ear or mastoid effusion.OSSEOUS STRUCTURES/SOFT TISSUES: No significant abnormality. CONCLUSION: 1.  No CT finding of a mass, acute infarct or hemorrhage. 2.  Prominent sinus disease as described above.    Neuropsychological Assessment 5/23/16 - Mild Neurocognitive Disorder d/t Parkinson's Disease    Review of Systems    Alert, cooperative, no distress Sitting, appears stated age, normocephalic, atraumatic without cyanosis, edema or skin rashes. Normal mood.There are dystrophic skin changes below knee. Thick calluses on right foot      Cranial nerves: . Significant for hard of hearing (worse on right), staring and decreased eye blinking, mask face  Sensory -no focal sensory difficulties  Motor strength - normal  Language - slightly soft speech  Muscle Bulk - normal   Tone - increased  Rigidity -mild  Coordination -  decreased  Balance - decreased   Tremors: Upper extremity worse on right  Dyskinesia:  none  Getting up from chair. Pushes up from chair     I asked him to call with any questions or concerns and will see him again in clinic in about 3 week . We discussed the risks and benefits of the medication including risk of worsening depression with medication adjustments and even the possibility of emergence of suicidality      Total time spent with the patient today was 40 minutes with greater than 50% of the time spent in counseling and care coordination.

## 2021-06-22 NOTE — PROGRESS NOTES
How have you been doing since we last saw you? Most important neurological symptom or concern for this visit (Medication wearing off, hallucinations, freezing, pain, sleep difficulty, constipation etc.) Pt noticed a red/yellow/purple patch on left part of the chest. Wife and pt are unsure what it could be from. Primary dr thinks it could be a reaction to medication.     How many falls has the pt. Had over the last year?(if pt. Falls frequently, daily, weekly, monthly?) 2 December 7, December 31.

## 2021-06-22 NOTE — PROGRESS NOTES
Silver Zamora has participated in 10 sessions of Phase II Cardiac Rehab.    Progress Report:   Cardiac Rehab Treatment Progress Report 11/30/2018 12/3/2018 12/7/2018 12/7/2018 12/10/2018   Weight 212 lbs 212 lbs 217 lbs 217 lbs 216 lbs   Pre Exercise  HR 69 69 66 - 58   Pre Exercise /60 100/74 128/70 - 128/70   Pre Blood Sugar (mg/dl) - - - - -   Nustep Peak Heart Rate 75 81 87 - 69   Nustep Peak Blood Pressure 118/66 142/78 142/62 - 134/66   Heart Rate 61 67 76 - 63   Post Exercise /72 124/72 110/62 - 116/66   ECG SR Sinus Rhythm Sinus Rhythm - SR   Total Exercise Minutes 30 30 40 - 30         Current Status:  Currently exercising without complaints or symptoms.     Pt returned to Cardiac Rehab for session 10 after a hospital visit regarding a recent fall.     If Physician recommends change in treatment plan, please place orders.        __________________________________________________      _____________  Signature                                                                                                  Date

## 2021-06-22 NOTE — PROGRESS NOTES
Silver's spouse Luz Marina left  for Stephie KIRAN Clinic Care Coordination (CCC) Care Guide (CG), reporting on breakdown of individual SSI income to complete referral to [facility] waiver for assist at home. CG has reported to [facility] SW. CG spoke with Luz Marina to confirm that numbers have been relayed to CG.    Next outreach: 01/15/19    Plan:  -Inquire about progress with waiver referral  -Build SMART goal to support heart health education

## 2021-06-22 NOTE — PROGRESS NOTES
MTM Initial Encounter  Assessment & Plan                                                     1. Parkinson disease (H)  Reviewed, Tiffanie medication regimen with regards to Sinemet dosing.  Unfortunately Silver is missing 2 of his doses in the evening.  He continues to have some hallucinations and yelling in early morning and constipation, however he seems to be freezing less commonly.  Sleep is a significant issue for him at this time.  Discussed that we do have a specialty MTM pharmacist and it neurology, and I would like to consult her, and ideally work with Dr. Gomez to see if there is a way to simplify this medication regimen to decrease caretaker burden and improve marked symptoms.  -Educated to increase senna to 1 pill twice daily  -Educated to initiate MiraLAX once daily with meals or when taking pills, to help with adherence, may increase to twice daily  -Referral to neurology MTM pharmacist    2. Coronary artery disease involving native coronary artery of native heart with angina pectoris (H)  Stable, and completing cardiac rehab twice weekly, which is also greatly benefiting his Parkinson's disease.  On aspirin, statin, ticagrelor, beta-blocker.  Take your regular is very expensive for them, may consider asking for a tier reduction to help with cost.  Also provided phone number for [employer] patient assistance program to identify if there are any programs that could help with medication costs.  -Apply for tier reduction on brillinta     3. Spinal stenosis, unspecified spinal region  Stable per wife's report as he is not requesting pain medications to be administered.  Recommend continue current regimen.    4. Ulcer of foot, unspecified laterality, unspecified ulcer stage (H)  Will be following with new podiatrist, Dr. Hays.    Follow Up  Return in about 1 week (around 12/25/2018) for By phone.      Subjective & Objective                                                     Silver Zamora is a 68 y.o. male coming in  for an initial visit for Medication Therapy Management. He was referred to me from [facility], Valentín Retana MD.  This visit is for [place], however due to difficulties with mobility his wife is here facilitating this appointment.    Chief Complaint: Medication management    Medication Adherence/Access: Due to complicated medication regimen, it is difficult to be completely adherent to this based on marks memory and Luz Marina's ability to monitor his medication use.  It is clear that there is a lot of caregiver stress, and would benefit from simplification of current medication regimen.    Parkinson's: for 14 years, sees Dr. Gomez. Most recently seen 11/28/18. Now using a walker, and falls a lot. Will follow up more closely on a monthly basis. Most recent changes below as of 11.28.18. No issues with swallowing. Now drinking more water. Not realistic for her to administer the 2am dose. Previously had worked with Dr. Tamayo, ritalin did not work for him. Dr. Gomez had considered using provigil. Sleep is an issue. As a profession he was a  and was driving late hours. She has seen improvement and less freezing with the adjustment however he does continue to have some hallucinations. Early morning yelling, again. He has very large stools and is constipated. Entacapone is very expensive has tried a tier reduction.   Medication changes as below:    8 am noon 4 pm 9pm 10 pm 2 am     Sinemet 25/100 2 tabs 2 tabs 2 tabs 3 tabs         entacapone 1 tab 1 tab 1 tab 1 tab         Sinemet CR 25/100 1 tab 1 tab 1 tab   1 tab (not getting) 1 tab (not getting)       CAD: new stent, now with cardiac rehab twice weekly. Recent follow up with cardiology. He did not have symptoms prior to this, was identified while he was getting his gallbladder out. He is no longer sweating. Brillinta is very expensive for him.     Spinal Stenosis: recent evaluation in the ED. He doesn't seem to be in much pain he does not frequently ask for pain medications.  Has been to spine care.     Foot ulcers: has removed two toes, will be seeing Dr. Hays who is vascular/podiatry.     PMH: reviewed in EPIC   Allergies/ADRs: reviewed in EPIC   Alcohol: reviewed in EPIC   Tobacco:   Social History     Tobacco Use   Smoking Status Former Smoker     Types: Cigarettes     Last attempt to quit: 1980     Years since quittin.0   Smokeless Tobacco Never Used   ----------------    Much or all of the text in this note was generated through the use of Dragon Dictate voice-to-text software. Errors in spelling or words which seem out of context are unintentional. Sound alike errors, in particular, may have escaped editing.    The patient was given a summary of these recommendations as an after visit summary    I spent 90 minutes with this patient today.   All changes were made via collaborative practice agreement with Valentín Washington MD. A copy of the visit note was provided to the patient's provider.     Antoni Joshi, PharmD, BCACP  Medication Management (MTM) Pharmacist  Counts include 234 beds at the [facility]         Current Outpatient Medications   Medication Sig Dispense Refill     acetaminophen (TYLENOL) 325 MG tablet Take 2 tablets (650 mg total) by mouth every 4 (four) hours as needed.  0     aspirin 81 mg chewable tablet Chew 1 tablet (81 mg total) daily.  0     atorvastatin (LIPITOR) 80 MG tablet Take 1 tablet (80 mg total) by mouth daily. 30 tablet 3     bisacodyl (DULCOLAX) 10 mg suppository One supp AR qday prn constipation.  Give if no BM in prior 3 days.  0     carbidopa-levodopa (SINEMET CR)  mg ER tablet Take 1 tablet by mouth 4 (four) times a day 0800,1200,1600,2300 (1 hour after last dose of IR).             carbidopa-levodopa (SINEMET CR)  mg ER tablet Take 1 tablet by mouth daily as needed (in the middle of the night).       carbidopa-levodopa (SINEMET)  mg per tablet Take 2 tablets by mouth 3 (three) times a day At approx. 0800,  1200, and 1600..             carbidopa-levodopa (SINEMET)  mg per tablet Take 3 tablets by mouth at bedtime At approx 2200 .             cholecalciferol, vitamin D3, (VITAMIN D3) 2,000 unit Tab Take 1 tablet (2,000 Units total) by mouth daily.  0     entacapone (COMTAN) 200 mg tablet Take 200 mg by mouth 4 (four) times a day. with each Sinemet dose        gabapentin (NEURONTIN) 300 MG capsule Take 600 mg by mouth at bedtime.             gabapentin (NEURONTIN) 300 MG capsule Take 300 mg by mouth 3 (three) times a day.             levothyroxine (SYNTHROID, LEVOTHROID) 137 MCG tablet Take 1 tablet (137 mcg total) by mouth at bedtime.       melatonin 3 mg Tab tablet Take 3 mg by mouth at bedtime as needed.       metoprolol succinate (TOPROL-XL) 25 MG Take 12.5 mg by mouth at bedtime.       nitroglycerin (NITROSTAT) 0.4 MG SL tablet Place 1 tablet (0.4 mg total) under the tongue every 5 (five) minutes as needed for chest pain. 15 tablet 1     omeprazole (PRILOSEC) 20 MG capsule Take 1 capsule (20 mg total) by mouth daily. (Patient taking differently: Take 20 mg by mouth daily as needed .      ) 90 capsule 3     polyethylene glycol (MIRALAX) 17 gram packet Take 17 g by mouth daily.             senna-docusate (SENNOSIDES-DOCUSATE SODIUM) 8.6-50 mg tablet Take 1 tablet by mouth 2 (two) times a day.             ticagrelor (BRILINTA) 90 mg Tab Take 1 tablet (90 mg total) by mouth 2 (two) times a day. 60 tablet 11     venlafaxine (EFFEXOR-XR) 150 MG 24 hr capsule Take 1 capsule (150 mg total) by mouth daily. 75 mg + 150 mg  = 225 mg 90 capsule 2     venlafaxine (EFFEXOR-XR) 75 MG 24 hr capsule Take 1 capsule (75 mg total) by mouth daily. 75 mg + 150 mg = 225 mg 90 capsule 2     No current facility-administered medications for this visit.

## 2021-06-22 NOTE — PROGRESS NOTES
Assessment /Plan:  Came wife wife, Luz Marina and sister. Right handed.   Parkinson's disease: On sinemet 25/100 3 tabs 4 times daily: 8am, noon, 4pm, 9pm, with entacapone.  Sinemet CR 25/100, 1 tabs.   Chronic back pain and restless leg syndrome: on Neurontin 300 mg 4 times daily to 300 mg, 1 tablet in the morning and 3 tablets nightly.  Depression: Follow-up with Dr. Joe Tamayo.  Daytime hypersomnia: Consider to try Provigil.  Was not able to tolerate Ritalin tried in 2017.  Daughter would like to try nicotine patch.      Plans:  Medication changes as below:   8 am noon 4 pm 9pm 10 pm 2 am    Sinemet 25/100 2 tabs 2 tabs 2 tabs 3 tabs      entacapone 1 tab 1 tab 1 tab 1 tab      Sinemet CR 25/100 1 tab 1 tab 1 tab  1 tab 1 tab                        Please see NP Kelli monthly, and follow up with me in about 3 months.   Recommend to journal symptoms for 2 weeks and bring back for our nurse practitioner to review to further fine tune his medication if indicated.  Watch out for increased hallucinations.  Consider repeat memory test.  If fails medication modulation with Sinemet, consider trial of Rytary.  Cost can be an issue.      Subjectively and objectively observed by family:  Had question about Duopa, and looking for any cure for Parkinson disease, and talked about current available new treatment for Parkinson disease.  Feeling some random on and off.  His balance is more a problem.  Discussed about various factors contributing to it.      Psychiatry: Some hallucination, rarely seeing mouse running and seeing people in the house.  Some depression but wife feels may be due to lack of motivation.  He has seen Dr. Llanos and Dr. Tamayo for this condition.      Has had stiff neck for many  Years, goes into the shoulders. Therapies, massage  Helps.       Not exercising and sleeping a lot during the daytime but more awake at nighttime.      Autonomic: Some light headed. no particularly positional changes.  No orthostasis  during clinical visit.      Sleep disorder. Not able to change his habit. Goes to sleep very late and not able function in am. He has had 3 sleep studies and has seen Dr. Jayesh Michaels and other doctors. He has tried clonazepam not able to tolerate. Tested other multiple medications to without benefit. Also may have REM sleep disorders.       Memory: had neuro psych test in 4/2013. Neuro psych testing 5/2015: a mild progression in cognitive impairment, repeat test 5/2016 showing no significant changes.  MOCA: 21/30 on April 5, 2017  .   Psychiatric: Some anxieties.      [place]: no hearing aids.       Therapy: He has had morales a lot of exercises and doing some therapy or exercises on his own.      Chronic lower back pain.  NO focal weakness in left LE. Will continue therapy and weight reduction. 10/2013 had lower back surgery, which has reduce the lower back chronic pain significantly..       RLS: no worsening.     Small fiber neuropathy: most likely relating to PD   Leg edema and poor leg circulation and has seen a vascular surgeon.            PD summary: Right handed.   diagnosed in 2004 at the age about 54.  But he feels the symptoms may have started in 1994.  Symptoms: stiffness, slow, soft speech, walking difficulty. Left hand tremor, spreading to right started 2015. He feels symptoms started in 1995. Has seen Dr. Suazo in the past. He has some freezing tremors occasionally, but asymmetry. Tried the Sinemet CR during day time or at night, didn't work well. Off pramipexole since 2015 in Dec due to memory and behavior concern.  In December 2010 he was taking Sinemet 25/100 up to 225/250 with a good response improving back pain.  He was able to be more active.  In 2011 he was evaluated at [facility] by Dr. Sifuentes.  He was noted to have U PDR as part 3 score of 41 after of all Parkinson medication for 4 hours.  It was suggested to take carbidopa levodopa every 3/2 hours.  Amantadine was discontinued because he  did not have dyskinesia.  It was suggested to change selegiline to Azilect or discontinue all MAO inhibitor altogether.  It was felt that increased Sinemet may have exacerbated his left restless leg syndrome.  Gabapentin was started for control of the symptoms.  He was followed up with Dr. Ellis Suazo from 2011, so Dr Zuniga periodically as well as nurse practitioner Sonal Rowe.  [facility] to me in 2013.      He has tried amantadine and selegiline in the past.  Not able to tolerate Requip (which he tried feel a few years at relatively high dose but discontinued due to edema in lower extremities significantly) and Mirapex tried in  but caused more freezing and lots of shakes..     For sleep, he has seen Dr. Jayesh Michaels in  with a diagnosis of REM sleep behavior, delayed sleep phase syndrome, sleep apnea.  Had sleep studies.  Tried clonazepam, trazodone, CPAP machine.     Had osteomyelitis, now amputated the 3rd and 4th on right.  Did have a crack in the left food that is not healing..       Neuro psych testin2016 As compared to testing in , subtle declines in basic attention, cognitive efficiency, nonverbal learning and memory, visuospatial judgement and aspects of executive functioning (deductive reasoning and complex attention). A significant improvement (e.g., from severely impaired to superior delayed recall on a list learning task) was evident on two measures of verbal memory. He exhibited stable performance on measures of inhibition as well as verbal and nonverbal reasoning.      DaTscan in 2013 showing absent radiotracer in the putamen with diminished accumulation in the caudate, most consistent with Parkinson disease.     EMG study in 2013 showing mild demyelinating neuropathy in lower extremities.     He is a retired  and has very supportive wife and children.     Review of system   otherwise unremarkable            Patient Active  Problem List   Diagnosis     Parkinson disease (H)     Back pain     Depression     Anxiety state, unspecified     Hypothyroidism     Pancreatitis     RBD (REM behavioral disorder)     Low vitamin D level     Calculus of gallbladder with biliary obstruction but without cholecystitis     Weakness     Cholecystitis     Leukocytosis, unspecified type     Choledocholithiasis     Abnormal echocardiogram     Abnormal nuclear stress test     Coronary artery disease involving native coronary artery of native heart with angina pectoris (H)             Current Outpatient Medications   Medication Sig Dispense Refill     acetaminophen (TYLENOL) 325 MG tablet Take 2 tablets (650 mg total) by mouth every 4 (four) hours as needed.  0     aspirin 81 mg chewable tablet Chew 1 tablet (81 mg total) daily.  0     atorvastatin (LIPITOR) 80 MG tablet Take 1 tablet (80 mg total) by mouth daily. 30 tablet 3     bisacodyl (DULCOLAX) 10 mg suppository One supp [place] qday prn constipation.  Give if no BM in prior 3 days.  0     carbidopa-levodopa (SINEMET CR)  mg ER tablet Take 1 tablet by mouth at bedtime. At ~2200       carbidopa-levodopa (SINEMET)  mg per tablet Take 3 tablets by mouth 3 (three) times a day. At approx. 0800, 1200, and 1600.       carbidopa-levodopa (SINEMET)  mg per tablet Take 2 tablets by mouth at bedtime. At approx 2200       cholecalciferol, vitamin D3, (VITAMIN D3) 2,000 unit Tab Take 1 tablet (2,000 Units total) by mouth daily.  0     docusate sodium (COLACE) 100 MG capsule Take 100 mg by mouth every other day.       entacapone (COMTAN) 200 mg tablet Take 200 mg by mouth 4 (four) times a day. with each Sinemet dose        gabapentin (NEURONTIN) 300 MG capsule Take 300 mg by mouth. 2 at 6:00pm and 1 at bedtime       gabapentin (NEURONTIN) 300 MG capsule TAKE 1 CAPSULE (300 MG TOTAL) BY MOUTH 4 (FOUR) TIMES A DAY. 360 capsule 0     levothyroxine (SYNTHROID, LEVOTHROID) 137 MCG tablet Take 1 tablet (137  mcg total) by mouth at bedtime.       melatonin 3 mg Tab tablet Take 3 mg by mouth at bedtime as needed.       metoprolol succinate (TOPROL-XL) 25 MG Take 1 tablet (25 mg total) by mouth daily. (Patient taking differently: Take 12.5 mg by mouth daily. ) 90 tablet 3     nitroglycerin (NITROSTAT) 0.4 MG SL tablet Place 1 tablet (0.4 mg total) under the tongue every 5 (five) minutes as needed for chest pain. 15 tablet 1     omeprazole (PRILOSEC) 20 MG capsule Take 20 mg by mouth daily as needed.       polyethylene glycol (MIRALAX) 17 gram packet Take 17 g by mouth 2 (two) times a day.       senna-docusate (SENNOSIDES-DOCUSATE SODIUM) 8.6-50 mg tablet Take 2 tablets by mouth daily. Hold for loose stools. (Patient taking differently: Take 1 tablet by mouth every other day. Hold for loose stools.)  0     ticagrelor (BRILINTA) 90 mg Tab Take 1 tablet (90 mg total) by mouth 2 (two) times a day. 60 tablet 11     venlafaxine 225 mg TR24 Take 1 tablet by mouth daily. 30 each 6     No current facility-administered medications for this encounter.        Clonazepam    Past Medical History:   Diagnosis Date     Anxiety      Callus      Cardiomyopathy (H)      Chronic back pain      Delirium      Depression      Fall      GERD (gastroesophageal reflux disease)      Hypothyroidism      Pancreatitis 12/31/2015     PD (Parkinson's disease) (H)      RBD (REM behavioral disorder) 4/3/2017     Shingles     hx       Social History     Socioeconomic History     Marital status:      Spouse name: Not on file     Number of children: Not on file     Years of education: Not on file     Highest education level: Not on file   Social Needs     Financial resource strain: Not on file     Food insecurity - worry: Not on file     Food insecurity - inability: Not on file     Transportation needs - medical: Not on file     Transportation needs - non-medical: Not on file   Occupational History     Not on file   Tobacco Use     Smoking status:  Former Smoker     Types: Cigarettes     Last attempt to quit: 1980     Years since quittin.9     Smokeless tobacco: Never Used   Substance and Sexual Activity     Alcohol use: No     Comment: one drink a week     Drug use: No     Sexual activity: Not on file   Other Topics Concern     Not on file   Social History Narrative     Not on file       Family History   Problem Relation Age of Onset     Kidney failure Mother      Heart disease Father 82     Tremor Father      Parkinsonism Paternal Grandfather      Tremor Sister      Leukemia Maternal Grandmother        Objective:  Vitals:    18 1204   BP: 131/62   Pulse: 69         Alert, cooperative, no distress Sitting, appears stated age, normocephalic, atraumatic without cyanosis or skin rashes or edema.  There are dystrophic skin changes below the knee.  Thick calluses in the bowl of right foot.  Normal mood.    Ok with mental status, language, but slightly soft speech, CNII-XII significant for hard of hearing worse on the right side, staring and decreased eye blink, mask face. Normal muscle bulk and strength in 4 extremities with rigidity.  Bilateral hand tremor that is post resting type may be slightly worse on the right. There is no focal sensory difficulties in 4 extremities. Gait wide-based and a little shuffling.  Decreased balance.  No significant stooping.     Slightly [place] and hypokinesia. No  freezing. NO dyskinesia.
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
## 2021-06-22 NOTE — PROGRESS NOTES
Silver Zamora has participated in 10 sessions of Phase II Cardiac Rehab.    Progress Report:   Cardiac Rehab Treatment Progress Report 11/30/2018 12/3/2018 12/7/2018 12/7/2018 12/10/2018   Weight 212 lbs 212 lbs 217 lbs 217 lbs 216 lbs   Pre Exercise  HR 69 69 66 - 58   Pre Exercise /60 100/74 128/70 - 128/70   Pre Blood Sugar (mg/dl) - - - - -   Nustep Peak Heart Rate 75 81 87 - 69   Nustep Peak Blood Pressure 118/66 142/78 142/62 - 134/66   Heart Rate 61 67 76 - 63   Post Exercise /72 124/72 110/62 - 116/66   ECG SR Sinus Rhythm Sinus Rhythm - SR   Total Exercise Minutes 30 30 40 - 30         Current Status:  Currently exercising without complaints or symptoms.     Pt attended session 10 today, resuming after hospital visit regarding a fall at home.    If Physician recommends change in treatment plan, please place orders.        __________________________________________________      _____________  Signature                                                                                                  Date

## 2021-06-22 NOTE — TELEPHONE ENCOUNTER
Phone call to patient's wife Luz Marina - informed her of Dr. Barraza's response - Luz Marina verbalized understanding that patient should not stop Brilinta or ASA for any reason until he is evaluated @ 1-18-19 appt with Dr. Barraza, including dental work.   mg

## 2021-06-22 NOTE — PROGRESS NOTES
NEUROSURGERY CONSULTATION NOTE:    Assessment:    1. Parkinson disease (H)     2. Falls frequently     3. Gait instability     4. Urinary urgency     5. Cord compression myelopathy (H)     6. H/O heart artery stent     7. Vascular insufficiency          Plan: The plan for this patient is very complicated.  He had a cardiac stent placed on the 31st of October.  This was to the right coronary artery.  He has cardiac ejection fraction is 34%.  He has a 60% stenosis of 1 of the left coronary arteries.  He is on antiplatelet drugs.  It would be dangerous to stop his antiplatelet drugs, at this time.  He is at risk for significant problems with his motor system.  He has severe spinal stenosis at C4-5, C5-6 and C6-7.  He has no changes in the cord at these levels, i.e., no myelomalacia.  He now has an ulcer at the base of 1 of his toes that was previously amputated.  He must have some vascular insufficiency to the lower extremities.  I am not aware of his vascular compromise, because this was done at a different hospital system.      I spoke with the cardiologist on call.  He will send an email to the attending cardiologist, who is off today.  He has appropriate management for his Parkinson's disease.  He is managed by Dr. Elyssa Gomez.  His wife describes the freeze episodes followed by motor activation, that causes him to fall.    I have encouraged him to use his walker at all times.  He should use canes in the house.  He should not try to carry items up or down the stairs.  He should be very careful in his activities, to prevent falls.  His primary doctor, Dr. Valentín Monique, felt he had hypotension with the beta-blockers.  The falls have reduced in frequency, since he went to 12.5 mg.  This was reported by his wife.    He will see Dr. Monique in the morning.  He has a cardiac care visit in the afternoon and an NP, cardiac, visit.  He is between a rock and a hard place.  I would need to do a cervical laminoplasty.  This  would require that he be off antiplatelet medications for 10 days.  This would be after the surgery and he would be required to be off antiplatelet medications for 10 days prior to the surgery.  This would be a total of 3 weeks.  Not such a good option.    I await the opinion by cardiology.  May be that being careful is the best option.  I still wonder about this ulcer at the base of his amputated toe.  I spent 90 minutes in personal interpretation of imaging, review of pertinent information, coordination of care, waiting for multiple physicians to call me, dictation and face-to-face discussion.    Silver Zamora   [id number] [place] [id number]  68 y.o. male is sent to me in consultation   by Valentín Washington MD     CC:    Chief Complaint   Patient presents with     Follow-up     Was seen in ED       HPI: This is a very complicated patient.  He was seen in the ER after a fall.  He had extensive imaging.    He was noted on the ER visit, CT, to have severe stenosis in his cervical spine.  He has had multiple falls related to his Parkinson's disease.  He has gait instability.    He was noted to have significant cardiac disease and underwent stenting on 31 October.    He was seen in the emergency room after a freeze episode, related to his Parkinson's disease, and a fall.  His CT shows significant cord compression and an MRI showed severe spinal stenosis at C4-5, C5-6 and C6-7.  He has hand numbness, resolved, and he was seen today as an outpatient.    He has urinary urgency, constipation, weakness of all 4 extremities with increased tone that certainly can be explained by his Parkinson's disease.  He has multiple medical issues.  He has some mentation issues that are alluded to by his wife.  He will try to carry things up the steps and has trouble and is prone to fall.  She keeps reminding him.      PROBLEM LIST:  1. Parkinson disease (H)     2. Falls frequently     3. Gait instability     4. Urinary urgency      5. Cord compression myelopathy (H)     6. H/O heart artery stent     7. Vascular insufficiency            REVIEW OF SYSTEMS:  Please review to the chart.  He has multiple complaints involving multiple systems.  He has the neurological review listed above      Past Medical History:   Diagnosis Date     Anxiety      Callus      Cardiomyopathy (H)      Chronic back pain      Delirium      Depression      Fall      GERD (gastroesophageal reflux disease)      Hypothyroidism      Pancreatitis 12/31/2015     PD (Parkinson's disease) (H)      RBD (REM behavioral disorder) 4/3/2017     Shingles     hx         Past Surgical History:   Procedure Laterality Date     CV CORONARY ANGIOGRAM N/A 10/31/2018    Procedure: Coronary Angiogram;  Surgeon: Jose Meyer MD;  Location: [facility];  Service:      CV LEFT HEART CATHETERIZATION WO LEFT VETRICULOGRAM Left 10/31/2018    Procedure: Left Heart Catheterization Without Left Ventriculogram;  Surgeon: Jose Meyer MD;  Location: [facility];  Service:      INGUINAL HERNIA REPAIR Bilateral 10/2013     LAMINECTOMY  10/2013     ME ERCP REMOVE CALCULI/DEBRIS BILIARY/PANCREAS DUCT N/A 5/25/2018    Procedure: ENDOSCOPIC RETROGRADE CHOLANGIOPANCREATOGRAPHY SPINCTEROTOMY BILIARY STONE EXTRACTION;  Surgeon: Curtis Mcclain MD;  Location: [facility];  Service: Gastroenterology     ME LAP,CHOLECYSTECTOMY/GRAPH N/A 5/24/2018    Procedure: LAPAROSCOPIC CHOLECYSTECTOMY;  Surgeon: Joyce Melissa MD;  Location: [facility];  Service: General         MEDICATIONS:  Current Outpatient Medications   Medication Sig Dispense Refill     acetaminophen (TYLENOL) 325 MG tablet Take 2 tablets (650 mg total) by mouth every 4 (four) hours as needed.  0     aspirin 81 mg chewable tablet Chew 1 tablet (81 mg total) daily.  0     atorvastatin (LIPITOR) 80 MG tablet Take 1 tablet (80 mg total) by mouth daily. 30 tablet 3     bisacodyl (DULCOLAX) 10 mg suppository One  supp ND qday prn constipation.  Give if no BM in prior 3 days.  0     carbidopa-levodopa (SINEMET CR)  mg ER tablet Take 1 tablet by mouth 4 (four) times a day 0800,1200,1600,2300 (1 hour after last dose of IR).             carbidopa-levodopa (SINEMET CR)  mg ER tablet Take 1 tablet by mouth daily as needed (in the middle of the night).       carbidopa-levodopa (SINEMET)  mg per tablet Take 2 tablets by mouth 3 (three) times a day At approx. 0800, 1200, and 1600..             carbidopa-levodopa (SINEMET)  mg per tablet Take 3 tablets by mouth at bedtime At approx 2200 .             cholecalciferol, vitamin D3, (VITAMIN D3) 2,000 unit Tab Take 1 tablet (2,000 Units total) by mouth daily.  0     entacapone (COMTAN) 200 mg tablet Take 200 mg by mouth 4 (four) times a day. with each Sinemet dose        gabapentin (NEURONTIN) 300 MG capsule Take 600 mg by mouth daily with supper 2 capsules at dinner, 1 capsule at bedtime.             gabapentin (NEURONTIN) 300 MG capsule Take 300 mg by mouth at bedtime 2 capsules at dinner, 1 capsule at bedtime .       levothyroxine (SYNTHROID, LEVOTHROID) 137 MCG tablet Take 1 tablet (137 mcg total) by mouth at bedtime.       melatonin 3 mg Tab tablet Take 3 mg by mouth at bedtime as needed.       metoprolol succinate (TOPROL-XL) 25 MG Take 12.5 mg by mouth at bedtime.       nitroglycerin (NITROSTAT) 0.4 MG SL tablet Place 1 tablet (0.4 mg total) under the tongue every 5 (five) minutes as needed for chest pain. 15 tablet 1     omeprazole (PRILOSEC) 20 MG capsule Take 1 capsule (20 mg total) by mouth daily. (Patient taking differently: Take 20 mg by mouth daily as needed .      ) 90 capsule 3     polyethylene glycol (MIRALAX) 17 gram packet Take 17 g by mouth daily .             senna-docusate (SENNOSIDES-DOCUSATE SODIUM) 8.6-50 mg tablet Take 1 tablet by mouth daily with lunch.       ticagrelor (BRILINTA) 90 mg Tab Take 1 tablet (90 mg total) by mouth 2 (two)  "times a day. 60 tablet 11     venlafaxine (EFFEXOR-XR) 150 MG 24 hr capsule Take 150 mg by mouth daily 75 mg + 150 mg  = 225 mg .       venlafaxine (EFFEXOR-XR) 75 MG 24 hr capsule Take 75 mg by mouth daily 75 mg + 150 mg = 225 mg .       No current facility-administered medications for this visit.          ALLERGIES/SENSITIVITIES:     Allergies   Allergen Reactions     Clonazepam      Too drowsy       PERTINENT SOCIAL HISTORY:   Social History     Socioeconomic History     Marital status:      Spouse name: None     Number of children: None     Years of education: None     Highest education level: None   Social Needs     Financial resource strain: None     Food insecurity - worry: None     Food insecurity - inability: None     Transportation needs - medical: None     Transportation needs - non-medical: None   Occupational History     None   Tobacco Use     Smoking status: Former Smoker     Types: Cigarettes     Last attempt to quit: 1980     Years since quittin.9     Smokeless tobacco: Never Used   Substance and Sexual Activity     Alcohol use: No     Comment: one drink a week     Drug use: No     Sexual activity: None       FAMILY HISTORY:  Family History   Problem Relation Age of Onset     Kidney failure Mother      Heart disease Father 82     Tremor Father      Parkinsonism Paternal Grandfather      Tremor Sister      Leukemia Maternal Grandmother         PHYSICAL EXAM:   Constitutional: /66   Pulse 79   Ht 5' 10\" (1.778 m)   Wt 216 lb (98 kg)   SpO2 98%   BMI 30.99 kg/m      General appearance: Appropriately groomed.  No acute distress.  Interactive.     Mental Status: Mental status: Alert and oriented, mood and affect appear appropriate, but he does not grasp the gravity of the situation.  Language reception and expression appears to be normal.  Attention span, concentration and ability to follow commands is normal.       Cranial Nerves: Face is symmetric.  Extraocular movements are " full, conjugate and without nystagmus.  Hearing is reduced.  Shoulder position is symmetric.  Tongue is midline with normal motion.       Motor: Exam is difficult but appears to be intact to confrontation testing in the upper and lower extremities.    Sensory: Sensory deficits are present in appreciation of position.  He does not complain of any loss of sensation in particular radicular distributions.     Station and Gait: Very unstable and requires assistive devices.    IMAGING:  I have personally reviewed the images and discussed the findings with Silver Zamora and his wife.  He has a developmentally small spinal canal.  He has severe spinal stenosis at C4-5, C5-6 and C6-7.  There is no specific evidence of a cord injury.  He has foraminal stenosis at multiple levels.    CC:     Valentín Washington [id number] [address]on [address] [place] [id number]

## 2021-06-22 NOTE — PROGRESS NOTES
ITP ASSESSMENT   Assessment Day: 30 Day    Session Number: 8  Precautions: Falls    Diagnosis: Stent    Risk Stratification: High    Referring Provider: Valentín Washington*  EXERCISE  Exercise Assessment: Reassessment     Pt able to exercise at 2.4 METS up to 40 minutes.                       Exercise Plan  Goals Next 30 days  ADL'S: Set up computer, Declutter room    Leisure: Go to "[employer] Start home exercise/gym    Education Goals: Has system for taking medication.    Education Goals Met: Has system for taking medication.                        Goals Met  Initial ADL's goals met: Has not set up computer or decluttered room    Initial Leisure goals met: Has not gone [place] [employer], or started home exercise    Initial Progression: Pt said he has not completed any goals d/t them being big projects and holidays coming up    Exercise Prescription  Exercise Mode: Hallway Walking;Nustep;Bike    Frequency: 2x/week    Duration: 30-40 minutes    Intensity / THR: 20-30 beats above resting heart rate    RPE 11-14  Progression / Met level: 2.3-2.8    Resistive Training?: Yes    Current Exercise (mins/week): 60    Interventions  Home Exercise:  Mode: Walking    Frequency: 2-3 x/week    Duration: 20-30 minutes    Education Material : Educational videos;Provide written material;Individual education and counseling;Offer educational classes    Education Completed  Exercise Education Completed: Cardiac Anatomy;Signs and Symptoms;Medication review;RPE;Emergency Plan;Home Exercise;Warm up/cool down;FITT Principles;BP/HR Reponse to exercise;Benefits of Exercise;End point of exercise            Exercise Follow-up/Discharge  Follow up/Discharge: Encouraged to start home exercise, goes to adult day care and does physical activities there           NUTRITION  Nutrition Assessment: Reassessment    Nutrition Risk Factors:  Nutrition Risk Factors: Overweight;Dyslipidemia  Cholesterol: 168  LDL: 104  HDL: 40  Triglycerides: 119    Nutrition  "Plan  Interventions  Diet Consult: Completed    Other Nutrition Intervention: Diet Class;Therapist/Pt Discussion;Educational Videos;Provide with Written Material    Education Completed  Nutrition Education Completed: Low Saturated fat diet;Low sodium diet    Goals  Nutrition Goals (Next 30 days): Patient will follow a low sodium diet;Patient will follow a low saturated fat diet    Goals Met  Nutrition Goals Met: Patient follows a low sodium diet;Patient states following a low saturated fat diet    Height, Weight, and  BMI  Weight: 217 lb (98.4 kg)  Height: 5' 11\" (1.803 m)  BMI: 30.28    Nutrition Follow-up  Follow-up/Discharge: Met with patient's wife; she is cooking low sodium foods majority of the time.  Also follows a low saturated fat diet.        Other Risk Factors  Other Risk Factor Assessment: Reassessment    HTN Risk Factor: NA    Pre Exercise BP: 128/70  Post Exercise BP: 124/72    Tobacco Risk Factor: NA    Risk Factor Follow-up   Follow-up/Discharge: Discussed cardiac risk factors and risk factor modification         PSYCHOSOCIAL  Psychosocial Assessment: Reassessment    DANIELE-D Score: 19    Psychosocial Risk Factor: Stress    Psychosocial Plan  Interventions  Interventions: Offer Spiritual Care consult;Offer educational videos and classes;Provide written material;Individual education and counseling    Education Completed  Education Completed: Relaxation/Coping Techniques;S/S of depression;Effects of stress on body    Goals  Goals (Next 30 days): Identified Support system;Identify stressors;Practicing stress management skills    Goals Met  Goals Met: Identified Support system;Identify stressors;Oriented to stress management classes    Psychosocial Follow-up  Follow-up/Discharge: Stated stress is managed well.        Patient involved in Goal setting?: Yes      Signature: _____________________________________________________________    Date: __________________    Time: __________________  "

## 2021-06-22 NOTE — PROGRESS NOTES
(12/10) Silver's spouse Luz Marina LMTCB for CG regarding call from [facility] SW requesting clarification of family assets for referral to [facility] for waiver services assessment.    (12/11) CG LMTCB for Luz Marina clarifying that only income was gathered by CG during 11/26 phone outreach and that assets are needed as well. Attempt 1: Stephie KIRAN, Clinic Care Coordination (CCC) Care Guide (CG), called patient.  If this patient is returning my call, please transfer to  Stephie at ext [phone] MWF or [phone] on T/TH.    Next outreach: 11/13/18    Plan:  -Inquire if income reported is individual or shared:  Taxeable amount: 34,678   SSI taxeable amount before: 41,346   Adjusted gross: 50,372    -Obtain asset information: savings, property value besides house living in

## 2021-06-22 NOTE — PROGRESS NOTES
ITP ASSESSMENT   Assessment Day: 60 Day    Session Number: 13  Precautions: Falls    Diagnosis: Stent    Risk Stratification: High    Referring Provider: Mitch STEWART  Exercise Assessment: Reassessment    Pt is tolerating 40 min of exercise in CR without cardiac sx's. Met level is 2.7                       Exercise Plan  Goals Next 30 days  ADL'S: Pt not in CR today(1-4-19)- will reassess goals when pt returns    Leisure: Go to [employer], Start home exercise/gym      Education Goals: Has system for taking medication.    Education Goals Met: Has system for taking medication.                          Goals Met    Initial ADL's goals met: Has not set up computer or decluttered room    Initial Leisure goals met: Has not gone to [employer], or started home exercise    No Data Recorded  Initial Progression: Pt said he has not completed any goals d/t them being big projects and holidays coming up      Exercise Prescription  Exercise Mode: Hallway Walking;Nustep;Bike    Frequency: 2 x week    Duration: 30-40 min    Intensity / THR: 20-30 beats above resting heart rate    RPE 11-14  Progression / Met level: 2.3-2.8    Resistive Training?: Yes      Current Exercise (mins/week): 120      Interventions  Home Exercise:  Mode: Will review home exercise program with pt when he returns to CR    Frequency: 2-3 x/week    Duration: 20-30 minutes      Education Material : Educational videos;Provide written material;Individual education and counseling;Offer educational classes      Education Completed  Exercise Education Completed: Cardiac Anatomy;Signs and Symptoms;Medication review;RPE;Emergency Plan;Home Exercise;Warm up/cool down;FITT Principles;BP/HR Reponse to exercise;Benefits of Exercise;End point of exercise              Exercise Follow-up/Discharge  Follow up/Discharge: Encouraged to start home exercise, goes to adult day care and does physical activities there   NUTRITION  Nutrition Assessment: Reassessment      Nutrition  "Risk Factors:  Nutrition Risk Factors: Overweight;Dyslipidemia  Cholesterol: 168  LDL: 104  HDL: 40  Triglycerides: 119      Nutrition Plan  Interventions  Diet Consult: Completed    Other Nutrition Intervention: Diet Class;Therapist/Pt Discussion;Educational Videos;Provide with Written Material        Education Completed  Nutrition Education Completed: Low Saturated fat diet;Low sodium diet      Goals  Nutrition Goals (Next 30 days): Patient will follow a low sodium diet;Patient will follow a low saturated fat diet      Goals Met  Nutrition Goals Met: Patient follows a low sodium diet;Patient states following a low saturated fat diet      Height, Weight, and  BMI  Weight: 214 lb (97.1 kg)  Height: 5' 11\" (1.803 m)  BMI: 29.86      Nutrition Follow-up  Follow-up/Discharge: Met with patient's wife; she is cooking low sodium foods majority of the time.  Also follows a low saturated fat diet.          Other Risk Factors  Other Risk Factor Assessment: Reassessment      HTN Risk Factor: NA      Pre Exercise BP: 128/70  Post Exercise BP: 124/76        Tobacco Risk Factor: NA          Risk Factor Follow-up   Follow-up/Discharge: Discussed cardiac risk factors and risk factor modification     PSYCHOSOCIAL  Psychosocial Assessment: Reassessment        Psychosocial Risk Factor: Stress      Psychosocial Plan  Interventions  Interventions: Offer Spiritual Care consult;Offer educational videos and classes;Provide written material;Individual education and counseling      Education Completed  Education Completed: Relaxation/Coping Techniques;S/S of depression;Effects of stress on body      Goals  Goals (Next 30 days): Identified Support system;Identify stressors;Practicing stress management skills      Goals Met  Goals Met: Identified Support system;Identify stressors;Oriented to stress management classes      Psychosocial Follow-up  Follow-up/Discharge: Stated stress is managed well.              Patient involved in Goal " setting?: No- Pt absent from CR today (1-4-19) will reassess when pt returns.      Signature: _____________________________________________________________    Date: __________________    Time: __________________See Doc Flowsheet

## 2021-06-23 NOTE — PROGRESS NOTES
ITP ASSESSMENT   Assessment Day: 90 Day    Session Number: 19  Precautions: Falls Precautions    Diagnosis: Stent    Risk Stratification: High    Referring Provider: Valentín Washington*  EXERCISE  Exercise Assessment: Reassessment       Tolerates very Low level exercise;  Needs Close SBA with all                         Exercise Plan  Goals Next 30 days  ADL'S: Pt and wife to buy a Pedex for Home Exercise    Leisure: Pt to join [facility] group at [place]      Education Goals: Has system for taking medication.    Education Goals Met: Has system for taking medication.;Medication review.                          Goals Met  60 day ADL'S goals met: Has started decluttering his room    60 day Leisure goals met: Went to the [employer] with wife;  Very limited on home exercise program      60 Day Progression: Has been limited by weakness and Parkinson's SX;  Uses a walker, and needs close SBA / Supervision          Initial ADL's goals met: Has not set up computer or decluttered room    Initial Leisure goals met: Has not gone to [employer], or started home exercise  Initial Progression: Pt said he has not completed any goals d/t them being big projects and holidays coming up      Exercise Prescription  Exercise Mode: Hallway Walking;Nustep;Arm Erg.    Frequency: 2 x week    Duration: 30-40    Intensity / THR: 20-30 beats above resting heart rate    RPE 11-14  Progression / Met level: 1.8-2.5    Resistive Training?: Yes      Current Exercise (mins/week): 80      Interventions  Home Exercise:  Mode: Walk Pedes    Frequency: 5-7 days per week    Duration: 10-15'      Education Material : Educational videos;Provide written material;Individual education and counseling;Offer educational classes      Education Completed  Exercise Education Completed: Cardiac Anatomy;Signs and Symptoms;Medication review;RPE;Emergency Plan;Home Exercise;Warm up/cool down;FITT Principles;BP/HR Reponse to exercise;Benefits of Exercise;End  "point of exercise              Exercise Follow-up/Discharge  Follow up/Discharge: Encouraged pt and wife to get a Pedex for home exercise;  Pt to Join Parkinsons group at [facility].  May Benefit from that more than Cardiac Rehab.  Will see 2 x more and then D/C from Cardiac Rehab   NUTRITION  Nutrition Assessment: Reassessment      Nutrition Risk Factors:  Nutrition Risk Factors: Overweight;Dyslipidemia  Cholesterol: 168  LDL: 104  HDL: 40  Triglycerides: 119      Nutrition Plan  Interventions  Diet Consult: Completed    Other Nutrition Intervention: Diet Class;Therapist/Pt Discussion;Provide with Written Material;Educational Videos      Education Completed  Nutrition Education Completed: Low Saturated fat diet;Low sodium diet      Goals  Nutrition Goals (Next 30 days): Review Dietitian schedule      Goals Met  Nutrition Goals Met: Patient follows a low sodium diet;Patient states following a low saturated fat diet      Height, Weight, and  BMI  Weight: 217 lb (98.4 kg)  Height: 5' 11\" (1.803 m)  BMI: 30.28      Nutrition Follow-up  Follow-up/Discharge: Pt and Wife want to meet with the Dietician 2/8/19         Other Risk Factors  Other Risk Factor Assessment: Reassessment      HTN Risk Factor: NA      Pre Exercise BP: 140/60  Post Exercise BP: 118/60        Tobacco Risk Factor: NA            Risk Factor Follow-up   Follow-up/Discharge: Will provide risk factor education as needed     PSYCHOSOCIAL  Psychosocial Assessment: Reassessment       Psychosocial Risk Factor: Stress      Psychosocial Plan  Interventions  Interventions: Offer Spiritual Care consult;Offer educational videos and classes;Provide written material;Individual education and counseling      Education Completed  Education Completed: Relaxation/Coping Techniques;S/S of depression;Effects of stress on body      Goals  Goals (Next 30 days): Identified Support system;Identify stressors;Practicing stress management skills      Goals Met  Goals " Met: Identified Support system;Identify stressors;Oriented to stress management classes      Psychosocial Follow-up  Follow-up/Discharge: Reports he manages stress well             Patient involved in Goal setting?: Yes      Signature: _____________________________________________________________    Date: __________________    Time: __________________

## 2021-06-23 NOTE — PROGRESS NOTES
ITP ASSESSMENT   Assessment Day: 120 Day    Session Number: 20  Precautions: Falls precaution    Diagnosis: Stent    Risk Stratification: High    Referring Provider: Valentín Washington*  EXERCISE  Exercise Assessment: Discharge  He is tolerating low level exercise without symptoms. Discharge today to focus on Parkinsons management.                            Exercise Plan  Goals Next 30 days  ADL'S: Pt and wife to buy a Pedex for Home Exercise    Leisure: Pt to join Parkinson's group    No Data Recorded    Education Goals: Has system for taking medication.    Education Goals Met: Has system for taking medication.;Medication review.                          Goals Met  90 day ADL'S goals met: Plans to purchase Pedex     90 day Leisure goals met: Plans to join Parkinson's group      60 day ADL'S goals met: Has started decluttering his room    60 day Leisure goals met: Went to the "[employer]" with wife;  Very limited on home exercise program      60 Day Progression: Has been limited by weakness and Parkinson's SX;  Uses a walker, and needs close SBA / Supervision      Initial ADL's goals met: Has not set up computer or decluttered room    Initial Leisure goals met: Has not gone to [employer], or started home exercise    Initial Progression: Pt said he has not completed any goals d/t them being big projects and holidays coming up      Exercise Prescription  Exercise Mode: Hallway Walking;Nustep;Arm Erg.    Frequency: 2 x week    Duration: 30-40    Intensity / THR: 20-30 beats above resting heart rate    RPE 11-14  Progression / Met level: 1.8-2.5    Resistive Training?: Yes      Current Exercise (mins/week): 80      Interventions  Home Exercise:  Mode: Walk, Peds    Frequency: 5-7 days per week    Duration: 10-15'      Education Material : Educational videos;Provide written material;Individual education and counseling;Offer educational classes      Education Completed  Exercise Education Completed: Cardiac Anatomy;Signs and  "Symptoms;Medication review;RPE;Emergency Plan;Home Exercise;Warm up/cool down;FITT Principles;BP/HR Reponse to exercise;Benefits of Exercise;End point of exercise              Exercise Follow-up/Discharge  Follow up/Discharge: Discharged today to focus on Parkinson's management   NUTRITION  Nutrition Assessment: Discharge      Nutrition Risk Factors:  Nutrition Risk Factors: Overweight;Dyslipidemia      Nutrition Plan  Interventions  Diet Consult: Completed    Other Nutrition Intervention: Diet Class;Therapist/Pt Discussion      Education Completed  Nutrition Education Completed: Low Saturated fat diet;Low sodium diet      Goals  Nutrition Goals (Next 30 days): Review Dietitian schedule      Goals Met  Nutrition Goals Met: Patient follows a low sodium diet;Patient states following a low saturated fat diet      Height, Weight, and  BMI  Weight: 217 lb (98.4 kg)  Height: 5' 11\" (1.803 m)  BMI: 30.28      Nutrition Follow-up  Follow-up/Discharge: Met with dietitican today         Other Risk Factors  Other Risk Factor Assessment: Discharge      HTN Risk Factor: NA      Pre Exercise BP: 130/70  Post Exercise BP: 118/60      Tobacco Risk Factor: NA      Risk Factor Follow-up   Follow-up/Discharge: Will provide risk factor education as needed     PSYCHOSOCIAL  Psychosocial Assessment: Discharge       Psychosocial Risk Factor: Stress      Psychosocial Plan  Interventions  Interventions: Offer Spiritual Care consult;Offer educational videos and classes;Provide written material;Individual education and counseling      Education Completed  Education Completed: Relaxation/Coping Techniques;S/S of depression;Effects of stress on body      Goals  Goals (Next 30 days): Identified Support system;Identify stressors;Practicing stress management skills      Goals Met  Goals Met: Identified Support system;Identify stressors;Oriented to stress management classes      Psychosocial Follow-up  Follow-up/Discharge: Is managing stress " ok             Patient involved in Goal setting?: Yes      Signature: _____________________________________________________________    Date: __________________    Time: __________________See Doc Flowsheet

## 2021-06-23 NOTE — PATIENT INSTRUCTIONS - HE
Heart Care Rehabilitation Home Exercise Program    Exercise Goal: Continue to exercise as tolerated on Pedex or short walks at home with walker.    Modality Duration(min) Intensity(RPE)   Warm-up 5 8-10   Walk 5 10-13   Bike Pedex 5-10 (as tolerated) 10-13   Cool Down 5 8-10     Target Heart Rate: 20-30 BPM above resting    Range of Perceived Exertion:10-13    Perceived exertion  (RPE)  Kaushal Scale   6  7      Very, very light  8  9      Very light  10  11    Fairly light  12  13    Somewhat hard  14  15    Hard  16  17    Very hard  18  19    Very, very hard  20     Special Comments/ Recommendations:  -Lipid Profile with Physician  -Continue to follow heart healthy diet  -    Stop Exercise!!! If any of the following occur:    Angina/chest pain    Dizziness    Excessive perspiration/cold sweats    Abnormal shortness of breath    Changes in heart rate (slow, fast, irregular)    Sudden fatigue or numbness    Nausea      Also...    Avoid extreme temperatures - exercise indoors if necessary    Wait at least 1 hour after a meal before strenuous activity    Do not exercise if you have a fever or are ill    Wear comfortable, supportive athletic clothing

## 2021-06-23 NOTE — PROGRESS NOTES
How have you been doing since we last saw you? Most important neurological symptom or concern for this visit (Medication wearing off, hallucinations, freezing, pain, sleep difficulty, constipation etc.) Since stopping the ER medication pt has slowed down. Here today to discuss medications moving forward from today including Nuplazid.     How many falls has the pt. Had over the last year?(if pt. Falls frequently, daily, weekly, monthly?) None
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
## 2021-06-23 NOTE — TELEPHONE ENCOUNTER
Patient started rytary yesterday.  He said he's not moving as well.  More freezing. Went to cardiac rehab today and was a little slower on bike and exercise.  Hard to get out of bed at night but wife thinks that's partially from nuplazid. The patient feels more rested and thinking more clear. Should he notice differences right away or does it take a few days?  Can he take with food or is it like the sinemet?   Any changes? He has appt on Tuesday.

## 2021-06-23 NOTE — PROGRESS NOTES
"Stephie KIRAN, Clinic Care Coordination (CCC) Care Guide (CG), spoke with Silver's spouse morales for standard [facility] outreach. CG and pt's spouse discussed action steps beign taken to support pt's goals to get supportive services in the home and to manage heart failure. Pt's spouse reported she has just called back the [facility] nurse who called her this week to schedule an assessment for in home services. CG encourage pt's spouse to contact CCC with questions or needs for advocacy. CG reviewd [facility] heart failure outreach with pt's spouse who reported that he continues to attend cardiac rehab two times per week and does not have symptoms. Pt's spouse reported medication changes that were recommended and intent to clarify with cariology. CG advised to follow up with [facility] if needed.  Clinic Care Coordination Heart Failure Assessment:    State to patient: \"To help you, I'm going to ask you a series of questions that will assist the nurse in assessing your condition.  I'm unable to provide medical advice.\"    Yes or No answers or quote patient's response below (add free text if needed)      Date of hospital discharge:      Do you have a [facility] Heart Care Patient Education Binder?  Yes    Home scale available?  Yes    Home scale weight this morning:  o Weight prior to hospitalization:    o Hospital discharge weight:            Has education been provided on heart failure/low sodium diet?  Yep     Is the Heart Failure Zones sheet on refrigerator or available? No, will look for it    What Heart Failure Zone currently in? (Zone Color, Advise patient to see CHF Binder under \"Managing Heart Failure\" tab)    Any increased shortness of breath since hospital discharge?  No    Any increased edema/swelling since hospital discharge? No, just checked today    Have you had a cough with pink, frothy sputum since last phone call? No    Have your felt dizzy or weak with activity or even at rest? No    Have you fainted or passed out since our last " "phone call? No    Remind patient of what number to call if in the YELLOW zone: (refer to binder) Done  [facility] Patients: [phone] or 24 hour heart failure line is [phone]  Medications:   - How many new medications are you on since your hospitalization?\" Recently changed, clarifying with cardiology  How many of your current medicines changed (dose, timing, name, etc.) while you were in the hospital?\"  - Do you have questions about your medications? Yes, will speak with cardiologist. Has spoken with [facility] pharm d Antoni Joshi  - For patients on insulin:  Did you start on insulin in the hospital or did you have your insulin dose changed? No  - Medication reconciliation completed by RN at clinic or at hospital discharge? Yes  Was MTM referral placed? (If needed, make sure to put transitions as reason for referral) Yes    When is the first appointment with the CHF clinic: Past    When is the appointment with PCP: Past    Care Guide and RN following: Yes    Hospital summary: (add free text)     Follow up Plan: (add free text)    State to patient: \"A nurse will call you with any concerns or further questions if needed.\"    Next outreach: 02/18/19    Plan:  -Heart failure outreach  -F/u on waiver assessment outcome    "

## 2021-06-23 NOTE — TELEPHONE ENCOUNTER
"\" We waited until after Silver saw his Cardiologist to get the okay to start Nuplaza.  Silver was also started on 3 heart medications at that appointment.     Silver has taken 2 doses of Nuplaza so far; my concern is he is sleeping so much!  The first night he did really good, he took it at 10pm ; slept the night and was up for the day which was Wednesday, he went to [facility]. He came home at 5pm and slept until 10 pm. I woke him up to take his medication and he slept until 8:15 in the morning. I had to wake him up to take his morning meds. He didn't wake up to go to bathroom, so he was messy in the morning. But cleaning himself up kind of woke him up a bit.      Consider taking the Nuplaza an hour earlier to encourage sleep at  10pm.   Use the blood pressure kit to monitor a baseline for the next week. Talk with cardiologist if low blood pressures seem to be correlated with sleepiness. Call the clinic on Monday to update. Thank you for keeping us updated; we care about both of you.   "

## 2021-06-23 NOTE — TELEPHONE ENCOUNTER
This writer called Luz Marina to give her directions for starting the Rytary 23.75-95mg 4 caps three times a day. Discontinue entacapone. Keep 1 tab of sinemet CR 25-100mg at bedtime. She has received a voucher for a free 14 day supply and has gotten confirmation of funding through [employer]. She will pick the Rytary today and start tomorrow morning. She agrees to call our office with any concerns, he does not have MyChart.

## 2021-06-23 NOTE — PATIENT INSTRUCTIONS - HE
Continue current medications  Continue to follow-up with neurology and cardiology as planned  Follow-up with the wound care team

## 2021-06-23 NOTE — PROGRESS NOTES
"Assessment/ Plan     1. Right wrist pain  Injury, status post fall  Possible ligamentous injury    Reviewed x-rays of the right wrist which are abnormal and show some widening of the scapholunate joint.  X-rays were personally reviewed and will be reviewed by radiology  Discussed that it is possible that there is a ligament tear    Recommend consideration for right wrist mobilization    Recommend follow-up with orthopedics for further evaluation  - XR Wrist Right 3 or More VWS  - Ambulatory referral to Orthopedics    2. Acute pain of right shoulder  Consider possible rotator cuff injury    Reviewed x-ray report from December, 2018 with no evidence of acute fracture.    Refer to orthopedics for further evaluation  - Ambulatory referral to Orthopedics    3. Parkinson disease (H)    Recommend that he continue to follow the plan of neurology  He has been reluctant to start Nuplazid given his heart concerns as side effects mention potential dizziness and rapid heartbeat  He has had multiple falls recently next and he will continue Sinemet and Comtan and follow-up with neurology to discuss his concerns      4. Chronic systolic heart failure (H)  Status post coronary stent placement the right coronary artery    Reviewed his echocardiogram and angiogram results  Continue aspirin and Brilinta  Continue with cardiac rehabilitation under the guidance of cardiology    Patient and his wife for follow-up as planned        Subjective:       Silver Zamora is a 68 y.o. male who presents to the clinic in follow-up with his wife.  He has a complex medical history including Parkinson's disease, hypothyroidism, gastresophageal reflux disease, as well as a cardiomyopathy.  He had been seen in the clinic in December 2018 following a hospitalization.  Hospitalization occurred after he was walking out of a restaurant and reportedly \"froze\" on 2 occasions.  He fell forward over a curb as he could not manage the steps.  He presented to the " emergency department and had an extensive evaluation.  A CT scan of the cervical spine showed severe spinal canal stenosis at multiple levels.  The lumbar spine CT showed prominent degenerative changes at multiple sites.  A CT scan of the head was negative for mass, infarction, or hemorrhage.    Given the findings he was evaluated by neurosurgery.  Given that he had a cardiac stent placed in October 2018 he has required antiplatelet therapy which cannot be discontinued.  Therefore, any surgery has been deferred for now to evaluate risk.  He has a known history of cardiomyopathy and is treated with aspirin as well as Brilinta.  He did have an echocardiogram showing an ejection fraction of 44%.    He continues to have concerns regarding his Parkinson's disease.  He has been treated with Sinemet and Comtan.  At his last visit there were concerns about hallucinations and possible delusions.  He also has had very poor sleep so they discussed adding Nuplazid.  However, he and his wife read the potential side effects and were very concerned given that this may contribute to dizziness and he already is at risk for falling.  They are worried about a potential rapid heartbeat.  They will plan on following up to discuss this in greater detail.  He can be quite stiff and has had multiple falls.  He can have depression symptoms as well.    Concerns continue to be right wrist pain since his fall.  The right wrist was not x-rayed previously.  He did have x-rays of the right shoulder which were negative for acute fracture.  He has had some pain when elevating his right shoulder.    The following portions of the patient's history were reviewed and updated as appropriate: allergies, current medications, past family history, past medical history, past social history, past surgical history and problem list. Medications have been reconciled    Review of Systems   A 12 point comprehensive review of systems was negative except as noted.       Current Outpatient Medications   Medication Sig Dispense Refill     acetaminophen (TYLENOL) 325 MG tablet Take 2 tablets (650 mg total) by mouth every 4 (four) hours as needed.  0     ammonium lactate (AMLACTIN) 12 % cream Apply two times a day 385 g 2     aspirin 81 mg chewable tablet Chew 1 tablet (81 mg total) daily.  0     atorvastatin (LIPITOR) 80 MG tablet Take 1 tablet (80 mg total) by mouth daily. 30 tablet 3     bisacodyl (DULCOLAX) 10 mg suppository One supp NM qday prn constipation.  Give if no BM in prior 3 days.  0     carbidopa-levodopa (SINEMET CR)  mg ER tablet Take 1 tablet by mouth 4 (four) times a day 0800,1200,1600,2300 (1 hour after last dose of IR).             carbidopa-levodopa (SINEMET CR)  mg ER tablet Take 1 tablet by mouth daily as needed (in the middle of the night).       carbidopa-levodopa (SINEMET)  mg per tablet Take 2 tablets by mouth 3 (three) times a day At approx. 0800, 1200, and 1600..             carbidopa-levodopa (SINEMET)  mg per tablet Take 3 tablets by mouth at bedtime At approx 2200 .             cholecalciferol, vitamin D3, (VITAMIN D3) 2,000 unit Tab Take 1 tablet (2,000 Units total) by mouth daily.  0     entacapone (COMTAN) 200 mg tablet Take 200 mg by mouth 4 (four) times a day. with each Sinemet dose        gabapentin (NEURONTIN) 300 MG capsule Take 600 mg by mouth at bedtime.             gabapentin (NEURONTIN) 300 MG capsule Take 300 mg by mouth 3 (three) times a day.             levothyroxine (SYNTHROID, LEVOTHROID) 137 MCG tablet Take 1 tablet (137 mcg total) by mouth at bedtime.       melatonin 3 mg Tab tablet Take 3 mg by mouth at bedtime as needed.       metoprolol succinate (TOPROL-XL) 25 MG Take 12.5 mg by mouth at bedtime.       nitroglycerin (NITROSTAT) 0.4 MG SL tablet Place 1 tablet (0.4 mg total) under the tongue every 5 (five) minutes as needed for chest pain. 15 tablet 1     omeprazole (PRILOSEC) 20 MG capsule Take 1  "capsule (20 mg total) by mouth daily. (Patient taking differently: Take 20 mg by mouth daily as needed .      ) 90 capsule 3     polyethylene glycol (MIRALAX) 17 gram packet Take 17 g by mouth daily.             senna-docusate (SENNOSIDES-DOCUSATE SODIUM) 8.6-50 mg tablet Take 1 tablet by mouth 2 (two) times a day.             ticagrelor (BRILINTA) 90 mg Tab Take 1 tablet (90 mg total) by mouth 2 (two) times a day. 60 tablet 11     venlafaxine (EFFEXOR-XR) 150 MG 24 hr capsule Take 1 capsule (150 mg total) by mouth daily. 75 mg + 150 mg  = 225 mg 90 capsule 2     venlafaxine (EFFEXOR-XR) 75 MG 24 hr capsule Take 1 capsule (75 mg total) by mouth daily. 75 mg + 150 mg = 225 mg 90 capsule 2     carbidopa-levodopa (PARCOPA)  mg per disintegrating tablet Take 1 tablet by mouth 3 (three) times a day. 90 tablet 1     No current facility-administered medications for this visit.        Objective:      /74   Pulse 76   Temp 98.9  F (37.2  C)   Resp 16   Ht 5' 10\" (1.778 m)   Wt 214 lb (97.1 kg)   BMI 30.71 kg/m        General appearance: alert, appears stated age and cooperative  There are features of bradykinesia and masked facies  Head: Normocephalic, without obvious abnormality, atraumatic  Eyes: conjunctivae/corneas clear. PERRL, EOM's intact.   Ears: normal TM's and external ear canals both ears  Nose: Nares normal. Septum midline. Mucosa normal. No drainage or sinus tenderness.  Throat: lips, mucosa, and tongue normal; teeth and gums normal  Neck: no adenopathy, supple, symmetrical, trachea midline   Lungs: clear to auscultation bilaterally  Heart: regular rate and rhythm, S1, S2 normal, no murmur, click, rub or gallop  Extremities: extremities normal, atraumatic, no cyanosis or edema  There is some tenderness to palpation with extension of the right wrist at the distal radius and ulna  There is no anatomical snuffbox tenderness  Examination of the right shoulder reveals some discomfort with " abduction  Skin: Skin color, texture, turgor normal. No rashes or lesions  Lymph nodes: Cervical nodes normal.  Neurologic: Alert and oriented X 3. Normal coordination and gait     Radiology:    XR Wrist Right 3 or More VWS (Order [id number])   Imaging   Date: 1/3/2019 Department: [facility] Ordering/Authorizing: Valentín Washington MD   Study Result     [facility] RADIOLOGY     EXAM: XR WRIST RIGHT 3 OR MORE VWS  LOCATION: [facility]  DATE/TIME: 1/3/2019 2:20 PM     INDICATION: Pain in right wrist  COMPARISON: 12/07/2018     FINDINGS: Widened scapholunate joint space can represent a scapholunate ligament tear. No fracture or arthropathy seen.     Radiology:    XR Wrist Right 3 or More VWS (Order [id number])   Imaging   Date: 1/3/2019 Department: [facility] Ordering/Authorizing: Valentín Washington MD   Study Result     [facility] RADIOLOGY     EXAM: XR WRIST RIGHT 3 OR MORE VWS  LOCATION: [facility]  DATE/TIME: 1/3/2019 2:20 PM     INDICATION: Pain in right wrist  COMPARISON: 12/07/2018     FINDINGS: Widened scapholunate joint space can represent a scapholunate ligament tear. No fracture or arthropathy seen.     XR Shoulder Right 2 or More VWS (Order [id number])   Imaging   Date: 12/7/2018 Department: [facility] Released By/Authorizing: Ellis Davis DO (auto-released)   Study Result     XR SHOULDER RIGHT 2 OR MORE VWS  12/7/2018 8:13 PM     INDICATION: Trauma  COMPARISON: None.     FINDINGS: Small intraosseous cyst superior lateral humeral head. Exam otherwise negative. No evidence for fracture or dislocation.      Cardiology:  Echocardiogram:  Summary       When compared to the previous study dated 5/27/2018, no significant change.    Normal left ventricular size and wall thickness. Hypokinesis in inferior and inferolateral wall. Left ventricle ejection fraction is mildly decreased. The calculated left ventricular ejection  fraction is 44%.    Normal right ventricular size and systolic function.    No obvious valvular disease.         No results found for this or any previous visit (from the past 168 hour(s)).       This note has been dictated using voice recognition software. Any grammatical or context distortions are unintentional and inherent to the software

## 2021-06-23 NOTE — TELEPHONE ENCOUNTER
Wife called with an update. She said he is still sleeping a lot.  He slept until noon today and only got up because he had to take his medications.  He can't walk he's frozen when carb/levo wears off.  She has been giving SL which is effective but takes a little while to kick in.  He didn't want to go to cardiac rehab today.  Blood pressures have been fine.      Marco Pereira add a carb/levo ER 1 in am and 1 at 2pm until starts on rytary.

## 2021-06-24 NOTE — PROGRESS NOTES
"Assessment/ Plan     1. Parkinson disease (H)    Continue plan per neurology  He has had recent adjustments given that he has experienced freezing episodes  Continue Sinemet.  He was changed to extended release Rytary  He will continue to pursue made in the [employer] program  Follow-up with neurology      2. Coronary artery disease involving native coronary artery of native heart with angina pectoris (H)  3. Chronic systolic heart failure (H)    Status post coronary artery placement in the right coronary artery  Currently stable  He has been participating in cardiac rehabilitation  Continue plan per cardiology  Continue aspirin and Brilinta  Patient understands he will not discontinue aspirin or Brilinta in the short-term  Continue atorvastatin and metoprolol    4. Cervical stenosis of spinal canal  He has severe spinal stenosis of the cervical spine    He has been evaluated by neurosurgery  This is a complicated issue as it would be a very high risk surgery.  He recently had a stent placed and should continue on dual antiplatelet therapy without cessation  He will continue current regimen  Have reviewed falls risk  Follow-up with neurosurgery in the future to discuss whether surgery should be considered in the future  The main concern is that he is at significant risk for falling given Parkinson's disease  He will continue to follow-up with neurology, neurosurgery, cardiology    5. Open wound    Reviewed foot care and will refer to the wound care clinic  - Ambulatory referral to Wound Clinic      Subjective:       Silver Zamora is a 69 y.o. male who presents to the clinic in follow-up.  He has a complex medical history including Parkinson's disease, hypothyroidism, gas esophageal reflux disease, as well as a cardiomyopathy.  He recently experienced 2 \"freezing\" episodes and sustained a fall.  As noted at a previous visit he required hospital visit where he had a CT scan of the cervical spine which showed severe " "spinal canal stenosis at multiple levels.  There are prominent degenerative changes of the lumbar spine noted as well.  A CT scan of the head was negative for mass, infarction, or hemorrhage.  He had fallen when he was walking out of a restaurant when he \"froze \".  Since then he has follow-up with cardiology given his history of cardiomyopathy and also has followed up with neurology for a change in his medications.    Essentially, neurology has changed him to an extended release carbidopa levodopa and they discussed the use of Nuplazad given concern for some hallucinations previously.  He also has participated in the [employer] program for patients with Parkinson's.  He feels like there has been an improvement in his movement though he still suffers from advanced Parkinson's disease.    As noted, he had ongoing pain in the right wrist and right shoulder and has since followed up with orthopedics.  Symptoms have been improving over time.    Additionally, he has followed up with cardiology as he does have a cardiomyopathy.  He had a cardiac stent placed in the right coronary artery in October 2018 is on dual antiplatelet therapy which should not be discontinued.  He has been treated with aspirin as well as Brilinta.  An echocardiogram showed an ejection fraction of 44%.  He has followed up with cardiology and has had cardiac rehabilitation which he has tolerated.  He denies current chest pain or shortness of breath.    One recent concern has been a source for that has been able to heal and he has a crack in the skin.  This can cause some tenderness.    He reports his mood is been stable.  He has the support of his wife Luz Marina who is present today.      The following portions of the patient's history were reviewed and updated as appropriate: allergies, current medications, past family history, past medical history, past social history, past surgical history and problem list. Medications have been reconciled    Review " of Systems   A 12 point comprehensive review of systems was negative except as noted.      Current Outpatient Medications   Medication Sig Dispense Refill     acetaminophen (TYLENOL) 325 MG tablet Take 2 tablets (650 mg total) by mouth every 4 (four) hours as needed.  0     ammonium lactate (AMLACTIN) 12 % cream Apply two times a day 385 g 2     aspirin 81 mg chewable tablet Chew 1 tablet (81 mg total) daily.  0     atorvastatin (LIPITOR) 80 MG tablet Take 1 tablet (80 mg total) by mouth daily. 30 tablet 3     bisacodyl (DULCOLAX) 10 mg suppository One supp [place] qday prn constipation.  Give if no BM in prior 3 days.  0     carbidopa-levodopa (RYTARY) 23.75-95 mg CpER ER capsule Take 4 capsules by mouth daily.       carbidopa-levodopa (SINEMET CR)  mg ER tablet Take 1 tablet by mouth 2 (two) times a day. In am and 2pm (Patient taking differently: Take 1 tablet by mouth at bedtime. In am and 2pm      ) 60 tablet 3     cholecalciferol, vitamin D3, (VITAMIN D3) 2,000 unit Tab Take 1 tablet (2,000 Units total) by mouth daily.  0     gabapentin (NEURONTIN) 300 MG capsule Take 600 mg by mouth at bedtime.             gabapentin (NEURONTIN) 300 MG capsule Take 300 mg by mouth daily.              levothyroxine (SYNTHROID, LEVOTHROID) 137 MCG tablet Take 1 tablet (137 mcg total) by mouth at bedtime.       metoprolol succinate (TOPROL-XL) 25 MG Take 12.5 mg by mouth at bedtime.       nitroglycerin (NITROSTAT) 0.4 MG SL tablet Place 1 tablet (0.4 mg total) under the tongue every 5 (five) minutes as needed for chest pain. 15 tablet 1     omeprazole (PRILOSEC) 20 MG capsule Take 1 capsule (20 mg total) by mouth daily. (Patient taking differently: Take 20 mg by mouth daily as needed .      ) 90 capsule 3     pimavanserin 10 mg Tab Take 20 tablets by mouth at bedtime. 60 tablet 0     polyethylene glycol (MIRALAX) 17 gram packet Take 17 g by mouth daily.             senna-docusate (SENNOSIDES-DOCUSATE SODIUM) 8.6-50 mg tablet  "Take 1 tablet by mouth 2 (two) times a day.             ticagrelor (BRILINTA) 90 mg Tab Take 1 tablet (90 mg total) by mouth 2 (two) times a day. 60 tablet 11     venlafaxine (EFFEXOR-XR) 150 MG 24 hr capsule Take 1 capsule (150 mg total) by mouth daily. 75 mg + 150 mg  = 225 mg 90 capsule 2     venlafaxine (EFFEXOR-XR) 75 MG 24 hr capsule Take 1 capsule (75 mg total) by mouth daily. 75 mg + 150 mg = 225 mg 90 capsule 2     carbidopa-levodopa (PARCOPA)  mg per disintegrating tablet Take 1 tablet by mouth as needed.       No current facility-administered medications for this visit.        Objective:      /78   Pulse 60   Temp 97.6  F (36.4  C)   Resp 16   Ht 5' 10\" (1.778 m)   Wt 217 lb (98.4 kg)   BMI 31.14 kg/m        General appearance: alert, appears stated age and cooperative  Head: Normocephalic, without obvious abnormality, atraumatic  Eyes: conjunctivae/corneas clear. PERRL, EOM's intact.   Ears: normal TM's and external ear canals both ears  Nose: Nares normal. Septum midline. Mucosa normal. No drainage or sinus tenderness.  Throat: lips, mucosa, and tongue normal; teeth and gums normal  Neck: no adenopathy, supple, symmetrical, trachea midline   Back: symmetric, no curvature. ROM normal. No CVA tenderness.  Lungs: clear to auscultation bilaterally  Heart: regular rate and rhythm, S1, S2 normal, no murmur, click, rub or gallop  Abdomen: soft, non-tender; bowel sounds normal; no masses,  no organomegaly  Extremities: extremities normal, atraumatic, no cyanosis or edema  There is a wound on his right foot characterized by a deep fissure  There is no surrounding erythema but there is significant scaliness of his feet  Skin: Skin color, texture, turgor normal. No rashes or lesions  Lymph nodes: Cervical nodes normal.  Neurologic: Alert and oriented X 3  There is generalized slowness of movement  He moves with a shuffling gait         No results found for this or any previous visit (from the " past 168 hour(s)).       This note has been dictated using voice recognition software. Any grammatical or context distortions are unintentional and inherent to the software

## 2021-06-24 NOTE — PROGRESS NOTES
"(02/12) Silver's spouse Luz Marina LMTCB for Stephie KIRAN, Clinic Care Coordination (CCC) Care Guide (CG), reporting that the couple will be moving to [place], that they have questions regarding what waiver services are available in the community, and that she would like to know if  has recommendations for \"questions to ask during the waiver assessment Thursday.\"     (02/13) CG consulted with [facility] on information to relay to patient's spouse. CG called and spoke with Silver's spouse Luz Marina briefly, but she was driving and so CG left  with information. CG outlined services available through  waiver and clarified that they are available in all [facility]; also advised that there are no recommended questions to ask waiver  except to mention if patient would benefit from more supervised care.    Next outreach: 02/27/19    Plan:  -Follow up on how waiver assessment went  "

## 2021-06-24 NOTE — TELEPHONE ENCOUNTER
Wife and patient called. She was supposed to bring him for appt today but could not get him moving out the door.  He is taking rytary 3 caps at 08, 2pm, 8pm.  He is having freezing episodes. About 2 hours after breakfast the last 2 mornings he was frozen and stiff.  He is moving slower. Has some tremors.  He does have good times too, in the morning right after he gets his meds he's up and doing well.  He is also struggling in the middle of the night when he has to get up to use bathroom.  They want to know what they should do when he freezes.

## 2021-06-24 NOTE — PROGRESS NOTES
Pt. Is here for a follow up and mentioned that things has been a little rough since he started the new medication.

## 2021-06-24 NOTE — TELEPHONE ENCOUNTER
Notified patient to increase 4pm dose by 1 tab.  Luz Marina also mentioned that the patient isn't sleeping as well lately.  He gets up in the middle of the night and it takes about an hour to go to the bathroom and get back to sleep.
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
## 2021-06-24 NOTE — TELEPHONE ENCOUNTER
Spoke with Luz Marina. She will try giving him 6 caps in the morning and keep the 4 caps at 2pm and 4 caps at bedtime. She will call us with update.

## 2021-06-24 NOTE — TELEPHONE ENCOUNTER
"Luz Marina called today to update us. She reports the Rytary is just not doing the job for him. He gets tremors/shakes after the first dose of 5 caps at 8am. He stays in bed because he is afraid of falling until he takes his 2pm dose of 4 caps. Then he seems to get around alittle but it is short lived. He is tired all the time and moving so slow with his walker. He states \"this is the worst my parkinsons has ever been\" he wanted to call an ambulance this morning. She had the neurologist pharmacy from the [facility] (Juliana) call me too. Juliana thinks he may be getting to low of a dose. Please advise any changes or do you want to go back to the sinemet ?  "
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
## 2021-06-24 NOTE — TELEPHONE ENCOUNTER
Patient called with an update. He is doing much better since increasing the 12 and 4pm up a tab.  He is still having a couple episodes of freezing around supper time before he takes his 5 pills and then another one around 7pm an hour before bedtime dose. But he is doing a lot better.

## 2021-06-24 NOTE — PROGRESS NOTES
Stephie KIRAN, Clinic Care Coordination (CCC) Care Guide (CG), spoke with Silver's spouse Morales briefly who requested call back later today.    CG spoke with morales for CCC outreach. Morales explained that she and the patient were just completing their move to a new home in [place], [place] today. CG assisted in updating address. Morales reported they are looking forward to new [place] in [facility] because there is a senior center nearby with activities for Silver to engage in and with respite services 2nd and 4th Monday of each month. CG answered questions about transfer of care and Morales stated intent for Silver to keep PCP in [place]. CG and Morales reviewed waiver assessment which took place and Morales reported they are waiting to hear back on eligibility. Morales did not find it necessary to provide CM information for CCC SW to coordinate with.    Next outreach: 04/11/19    Plan:  -Standard outreach

## 2021-06-24 NOTE — TELEPHONE ENCOUNTER
Spoke with wife and I guess he already is taking 4 caps three times a day. Can she give him 1 extra cap when he needs it. Or they still have the parcopa?   Today he is doing better and went to day care today. They are moving and are looking to get [employer] up at the [facility]. Do you know if they have it there.  Also have you heard of any discounts from the [employer] for ?

## 2021-06-24 NOTE — TELEPHONE ENCOUNTER
----- Message from Ester Aquino sent at 3/1/2019 12:51 PM CST -----  Contact: Patient  Patient has already contacted their pharmacy. The medication or refill issue is below:    Primary Cardiologist: Dr. Barraza    Medication: atorvastatin (LIPITOR) 80 MG tablet    Issue / Concern: Silver is out of the Rx and would like to  refill on 3/2/19. Please call back if questions.     Preferred Pharmacy: [facility] #[id number] [address] Phone Number for Patient: [phone]    Additional Info: Silver has appt jaiden LUNDY on 3/13/19 and Dr. Barraza on 4/23/19.

## 2021-06-24 NOTE — TELEPHONE ENCOUNTER
Patient called with an update. He said he is taking the 4 pills at 8am, 12, 4pm, 8pm. He said things are better. He is waking up really well. He said there are times where he is freezing. He has taking a sublingual once or twice a day about an hour before his next dose mostly in evening between 5-8pm.  He feels like when the medicine kicks in he does great it is just a matter of when it kicks in. This morning he had to start with his walker and then when the med kicked in he was walking around without it.  He feels like he is on the right track but not perfect. Any changes?  I told him if not to call again on Monday.

## 2021-06-25 NOTE — TELEPHONE ENCOUNTER
Silver and Luz Marina called today to give an update about the Rytary. They report it is going well so far. They have not gotten the Gocovri yet as they don't know if he needs this. They would like to talk to you about this on next weeks visit 3/29/19. On a side note, I did send PA request for Gocovri to [facility] which was denied. Then, I sent PA request to Medicare but they report they don't have him on file. After visit on 3/29/19, will resend to Gocovri care coordinator if needed. FYI only.

## 2021-06-26 NOTE — PROGRESS NOTES
Progress Notes by Kelli Webb CNP at 8/22/2018  9:50 AM     Author: Kelli Webb CNP Service: -- Author Type: Nurse Practitioner    Filed: 8/22/2018 10:47 AM Encounter Date: 8/22/2018 Status: Signed    : Kelli Webb CNP (Nurse Practitioner)           Click to link to [facility] HEART CARE NOTE      Assessment/Recommendations   Assessment:    1. Cardiomyopathy with systolic dysfunction, NYHA class II: Well compensated.  He has mild dyspnea on exertion and fatigue.  He states his energy has improved since I saw him last in May.  His weights have remained stable.  He is following a low-sodium diet.  He and his wife are very hesitant and starting metoprolol as he is improved overall with his Parkinson's.  He is more stable and has more energy.  We discussed the importance of medications.  We discussed following up with one of our heart failure physicians which they will do.    Plan:  1.  No changes to medications.  I recommended metoprolol 25 mg daily but they are in insistent on holding this at this time.  2.  Continue daily weights and low-sodium diet  3.  Continue regular exercise    Silver Zamora will follow up follow-up with one of our heart failure physicians in 4 weeks and in the heart failure clinic in 3 months.     History of Present Illness    Mr. Silver Zamora is a 68 y.o. male seen at [facility] heart failure clinic today for continued follow-up.  His wife accompanies him today.  He follows up for heart failure with reduced ejection fraction.  Most recent echocardiogram was done July 30, 2018 which showed an ejection fraction of 44%.  He was hospitalized in May 2018 with abdominal pain and acute pancreatitis.  He had cholelithiasis and cholecystitis.  He had a laparoscopic cholecystectomy on May 24, 2018.  At that time his ejection fraction was 45%.  He had fluid overload from IV fluids from surgery.  He has a past medical history  significant for Parkinson's disease.  He was not started on any medications due to orthostatic hypotension and unsteadiness due to his Parkinson's disease.    Today, he comes in with mild dyspnea on exertion and fatigue.  He states that his energy level has improved and he is doing more activities.  He denies orthopnea or PND.  He denies abdominal bloating or lower extremity edema.  Denies lightheadedness, shortness of breath, orthopnea, PND, palpitations, chest pain, abdominal fullness/bloating and lower extremity edema.      He is monitoring home weights which are stable between 209-215 pounds.  He is following a low sodium diet.      ECHO 7/30/2018:   Summary     When compared to the previous study dated 5/27/2018, no significant change.    Normal left ventricular size and wall thickness. Hypokinesis in inferior and inferolateral wall. Left ventricle ejection fraction is mildly decreased. The calculated left ventricular ejection fraction is 44%.    Normal right ventricular size and systolic function.    No obvious valvular disease.              Physical Examination Review of Systems   Vitals:    08/22/18 1006   BP: 124/72   Pulse: 68   Resp: 16     Body mass index is 31.9 kg/(m^2).  Wt Readings from Last 3 Encounters:   08/22/18 216 lb (98 kg)   08/02/18 216 lb (98 kg)   07/13/18 209 lb 6.4 oz (95 kg)       General Appearance:     Alert, cooperative and in no acute distress.   ENT/Mouth: membranes moist, no oral lesions or bleeding gums.      EYES:  no scleral icterus, normal conjunctivae   Neck: no carotid bruits or thyromegaly   Chest/Lungs:   lungs are clear to auscultation, no rales or wheezing, respirations unlabored   Cardiovascular:   Regular. Normal first and second heart sounds with no murmurs, rubs, or gallops; the carotid, radial and posterior tibial pulses are intact, Jugular venous pressure normal, no edema     Abdomen:  Soft, nontender, nondistended, bowel sounds present   Extremities: no cyanosis  or clubbing   Skin: warm, dry.    Neurologic: mood and affect are appropriate, alert and oriented x3      General: Night Sweats  Eyes: WNL  Ears/Nose/Throat: Hearing Loss  Lungs: WNL  Heart: WNL  Stomach: Constipation, Heartburn  Bladder: WNL  Muscle/Joints: WNL  Skin: WNL  Nervous System: Daytime Sleepiness, Loss of Balance  Mental Health: WNL     Blood: WNL     Medical History  Surgical History Family History Social History   Past Medical History:   Diagnosis Date   ? Anxiety    ? Callus    ? Chronic back pain    ? Depression    ? GERD (gastroesophageal reflux disease)    ? Hypothyroidism    ? Pancreatitis 12/31/2015   ? PD (Parkinson's disease) (H)    ? RBD (REM behavioral disorder) 4/3/2017   ? Shingles     hx    Past Surgical History:   Procedure Laterality Date   ? INGUINAL HERNIA REPAIR Bilateral 10/2013   ? LAMINECTOMY  10/2013   ? [place] ERCP REMOVE CALCULI/DEBRIS BILIARY/PANCREAS DUCT N/A 5/25/2018    Procedure: ENDOSCOPIC RETROGRADE CHOLANGIOPANCREATOGRAPHY SPINCTEROTOMY BILIARY STONE EXTRACTION;  Surgeon: Curtis Mcclain MD;  Location: [facility];  Service: Gastroenterology   ? NH LAP,CHOLECYSTECTOMY/GRAPH N/A 5/24/2018    Procedure: LAPAROSCOPIC CHOLECYSTECTOMY;  Surgeon: Joyce Melissa MD;  Location: [facility];  Service: General    Family History   Problem Relation Age of Onset   ? Kidney failure Mother    ? Heart disease Father    ? Tremor Father    ? Parkinsonism Paternal Grandfather    ? Tremor Sister    ? Leukemia Maternal Grandmother     Social History     Social History   ? Marital status:      Spouse name: N/A   ? Number of children: N/A   ? Years of education: N/A     Occupational History   ? Not on file.     Social History Main Topics   ? Smoking status: Former Smoker     Types: Cigarettes     Quit date: 1/1/1980   ? Smokeless tobacco: Never Used   ? Alcohol use Yes      Comment: one drink a week   ? Drug use: No   ? Sexual activity: Not on file     Other Topics Concern   ?  Not on file     Social History Narrative          Medications  Allergies   Current Outpatient Prescriptions   Medication Sig Dispense Refill   ? acetaminophen (TYLENOL) 325 MG tablet Take 2 tablets (650 mg total) by mouth every 4 (four) hours as needed.  0   ? aminocaproic acid (AMICAR) 500 mg tablet Take by mouth every 6 (six) hours.     ? bisacodyl (DULCOLAX) 10 mg suppository One supp NM qday prn constipation.  Give if no BM in prior 3 days.  0   ? carbidopa-levodopa (SINEMET CR)  mg ER tablet Take 1 tablet by mouth at bedtime. At ~2200     ? carbidopa-levodopa (SINEMET)  mg per tablet Take 3 tablets by mouth 3 (three) times a day. At approx. 0800, 1200, and 1600.     ? cholecalciferol, vitamin D3, (VITAMIN D3) 2,000 unit Tab Take 1 tablet (2,000 Units total) by mouth daily.  0   ? entacapone (COMTAN) 200 mg tablet Take 200 mg by mouth 4 (four) times a day. with each Sinemet dose      ? gabapentin (NEURONTIN) 300 MG capsule Take 300 mg by mouth. 2 in the evening and 1 tab at bedtime     ? levothyroxine (SYNTHROID, LEVOTHROID) 137 MCG tablet Take 1 tablet (137 mcg total) by mouth Daily at 6:00 am. 90 tablet 3   ? melatonin 3 mg Tab tablet Take 3 mg by mouth at bedtime as needed.     ? meloxicam (MOBIC) 7.5 MG tablet Take 7.5 mg by mouth daily as needed for pain.     ? omeprazole (PRILOSEC) 20 MG capsule Take 20 mg by mouth daily as needed.     ? senna-docusate (SENNOSIDES-DOCUSATE SODIUM) 8.6-50 mg tablet Take 2 tablets by mouth daily. Hold for loose stools.  0   ? venlafaxine 225 mg TR24 Take 1 tablet by mouth daily. 30 each 6   ? carbidopa-levodopa (SINEMET)  mg per tablet Take 2 tablets by mouth at bedtime. At approx 2200     ? metoprolol succinate (TOPROL-XL) 25 MG Take 1 tablet (25 mg total) by mouth daily. 90 tablet 3   ? multivitamin (MULTIVITAMIN) per tablet Take 1 tablet by mouth daily.       No current facility-administered medications for this visit.       Allergies   Allergen  Reactions   ? Clonazepam      Too drowsy         Lab Results    Chemistry CBC BNP   Lab Results   Component Value Date    CREATININE 0.70 05/28/2018    BUN 10 05/28/2018     05/28/2018    K 4.1 05/29/2018     (H) 05/28/2018    CO2 21 (L) 05/28/2018     Creatinine (mg/dL)   Date Value   05/28/2018 0.70   05/27/2018 0.82   05/26/2018 0.80   05/25/2018 0.78    Lab Results   Component Value Date    WBC 19.3 (H) 06/01/2018    HGB 12.9 (L) 05/28/2018    HCT 40.6 05/28/2018    MCV 93 05/28/2018     05/28/2018    No results found for: BNP  No results found for: BNP       25 minutes were spent with the patient with greater than 50% spent on education and counseling.      Kelli Webb, [facility]   Heart Failure Clinic

## 2021-06-26 NOTE — PROGRESS NOTES
Progress Notes by Aleta Rojas NP at 11/15/2018  9:10 AM     Author: Aleta Rojas NP Service: -- Author Type: Nurse Practitioner    Filed: 11/15/2018 10:53 AM Encounter Date: 11/15/2018 Status: Signed    : Aleta Rojas NP (Nurse Practitioner)                 Click to link to [facility] Heart Care       [facility] HEART CARE NOTE      Assessment/Recommendations   1. Coronary artery disease: Recent abnormal stress test with large area of ischemia in the apical to basal lateral segment of the LV extending to the basal inferior wall with an EF of 34% and at high risk for future cardiac ischemic event.  Elective coronary angiogram on 10/31/2018 showed 80% stenosis in mid RCA which was successfully treated with the drug-eluting stent.  Also noted to have 60% stenosis in mid LAD. Dual antiplatelet therapy is being used with aspirin indefinitely and ticagrelor for 1 year.  We discussed the importance of antiplatelet therapy and talking with his cardiologist prior to stopping these medications for any reason.    Patient denies any further sweating or falls since recent stent placement.  He continues to have same amount of fatigue (chronic) and some mild shortness of breath on exertion. Risk factor modification and lifestyle management topics were discussed including managing comorbidities, weight loss, heart healthy diet, exercise and stress reduction. Cardiac rehab has been ordered-initiated    2.  Dyslipidemia: Silver Zamora is on high intensity statin therapy with atorvastatin 80 mg daily. Most recent LDL is 104.  We discussed a diet low in saturated fat, weight loss, and exercise along with medication for better control of cholesterol.     3.  Ischemic cardiomyopathy with systolic dysfunction, NYH Class III with EF of 34%: EF has worsened since last echo in May 2018 was 45%.  He is well compensated today except continues to have fatigue (unchanged) and mild shortness of breath on exertion.  Reports  following low-sodium diet.  His PCP reduced his metoprolol recently due to frequent falls with low blood pressure-this has improved since he is on a lower dose of metoprolol succinate 12.5 mg daily.    No medication changes made today given recent frequent falls with higher dose of beta-blocker.  Blood pressure today is 128/70 and heart rate 64.  Reinforced low-sodium diet, daily weight, and stay active as tolerated.    4. Follow up with Kelli Webb CNP in 3 weeks and Dr. Barraza in 6-8 weeks.     History of Present Illness    Silver Zamora is 68 y.o. with a past medical history of Parkinson disease, hypothyroidism, dyslipidemia, ischemic cardiomyopathy with systolic dysfunction, and recent diagnosis of coronary artery disease who is seen at [facility] for post coronary intervention follow up.  Patient recently saw Dr. Barraza for ongoing shortness of breath, fatigue, and sweating.  He underwent stress test showed large area of ischemia in the apical to basal lateral segment of the LV extending to the basal inferior wall with an EF of 34% and at high risk for future cardiac ischemic event.  Patient underwent elective coronary angiogram on 10/31/2018 showed 80% stenosis in mid RCA which was successfully treated with the drug-eluting stent.  Also noted to have occluded OM1 c/w . Dual antiplatelet therapy is being used with aspirin indefinitely and ticagrelor for 1 year.     Today, patient presented to the heart care clinic accompanied by his wife and one of his sisters.  Patient reports he feels about the same since recent stent placement.  However, wife and sister noted that patient seem to sleep better during the night and have no persistent sweating. He continues to have fatigue and mild shortness of breath on exertion.  Wife and sister also reported that he did have chronic fatigue due to other multiple chronic disease and side effect from the medications. He denies lightheadedness, shortness of  breath, orthopnea, PND, palpitations, chest pain, abdominal fullness/bloating and lower extremity edema.  Patient's wife and sister has reported that patient had several falls with low blood pressure before recent stent placement.  His PCP reduced his metoprolol dose prior to stent placement.  He has not had any further falls since the metoprolol dose was reduced.  Patient has just initiated his cardiac rehab.  He reports following low-sodium diet.    Coronary Angiogram 10/31/2018-reviewed    Conclusion        Estimated blood loss was <20 ml.    The LM vessel was injected.    The left circumflex was injected.    The RCA was injected.    Mid RCA lesion 90% stenosed.    Mid LAD lesion 60% stenosed.    Pressure wire/FFR was performed on the lesion. pre diagnositic: 0.93. post diagnostic: 0.83.    Pressure wire/FFR was performed on the lesion.     Silver Zamora is a 68 y.o. old male with Parkinson disease, depression, recent pancreatitis/cholecystectomy, cardiomyopathy EF 30's who is here for w/u of cardiomyopathy and abnormal stress test.     Findings:  LM:no obstruction  LAD:long area of 50-60% disease in mid-vessel w/ a focal ectatic segment, PD/PA 0.93, FFR 0.84  Lcx:midlly irregular, occluded OM1 w/ an aneurysm at take off and late-filling branch c/w   RCA:dominant, focal 90% proximal 1/3 narrowing        NM pharmacologic stress test on 10/22/2018- reviewed:  Conclusion       1.The pharmacologic nuclear stress test is abnormal.    2.There is a large area of ischemia in the apical to base lateral segment(s) of the left ventricle extending to the basilar inferior wall. No scar suggested.    3.The left ventricular ejection fraction is 34%.    4.The patient is at a high risk of future cardiac ischemic events.    5.There is no prior study available.    6.The findings of this examination were communicated to Dr. Roxy Barraza.        Physical Examination Review of Systems   Vitals:    11/15/18 0915   BP: 128/70   Pulse: 64    Resp: 16     Body mass index is 30.13 kg/m .  Wt Readings from Last 3 Encounters:   11/15/18 216 lb (98 kg)   11/09/18 218 lb (98.9 kg)   11/05/18 216 lb 11.2 oz (98.3 kg)       General Appearance:     Alert, cooperative and in no acute distress.   ENT/Mouth: membranes moist, no oral lesions or bleeding gums.      EYES:  no scleral icterus, normal conjunctivae   Neck: no carotid bruits or thyromegaly   Chest/Lungs:   lungs are clear to auscultation, no rales or wheezing, respirations unlabored   Cardiovascular:    Heart rate regular. Normal first and second heart sounds with no murmurs, rubs, or gallops; the carotid, radial and posterior tibial pulses are intact, Jugular venous pressure flat with no HJR, no edema bilateral lower extremities    Abdomen:  Soft, nontender, nondistended, bowel sounds present   Extremities: no cyanosis or clubbing   Skin:  Neurologic: warm, dry.  mood and affect are appropriate, alert and oriented x3     Puncture Site: Right radial site is intact.  Radial pulses and Pedal pulses intact and symmetrical.  CMS intact.                                                Medical History  Surgical History Family History Social History   Past Medical History:   Diagnosis Date   ? Anxiety    ? Callus    ? Cardiomyopathy (H)    ? Chronic back pain    ? Delirium    ? Depression    ? Fall    ? GERD (gastroesophageal reflux disease)    ? Hypothyroidism    ? Pancreatitis 12/31/2015   ? PD (Parkinson's disease) (H)    ? RBD (REM behavioral disorder) 4/3/2017   ? Shingles     hx    Past Surgical History:   Procedure Laterality Date   ? CV CORONARY ANGIOGRAM N/A 10/31/2018    Procedure: Coronary Angiogram;  Surgeon: Jose Meyer MD;  Location: [facility] Cath Lab;  Service:    ? CV LEFT HEART CATHETERIZATION WO LEFT VETRICULOGRAM Left 10/31/2018    Procedure: Left Heart Catheterization Without Left Ventriculogram;  Surgeon: Jose Meyer MD;  Location: [facility] Lab;  Service:    ?  INGUINAL HERNIA REPAIR Bilateral 10/2013   ? LAMINECTOMY  10/2013   ? [place] ERCP REMOVE CALCULI/DEBRIS BILIARY/PANCREAS DUCT N/A 2018    Procedure: ENDOSCOPIC RETROGRADE CHOLANGIOPANCREATOGRAPHY SPINCTEROTOMY BILIARY STONE EXTRACTION;  Surgeon: Curtis Mcclain MD;  Location: [facility];  Service: Gastroenterology   ? [place] LAP,CHOLECYSTECTOMY/GRAPH N/A 2018    Procedure: LAPAROSCOPIC CHOLECYSTECTOMY;  Surgeon: Joyce Melissa MD;  Location: [facility];  Service: General    Family History   Problem Relation Age of Onset   ? Kidney failure Mother    ? Heart disease Father 82   ? Tremor Father    ? Parkinsonism Paternal Grandfather    ? Tremor Sister    ? Leukemia Maternal Grandmother     Social History     Socioeconomic History   ? Marital status:      Spouse name: Not on file   ? Number of children: Not on file   ? Years of education: Not on file   ? Highest education level: Not on file   Social Needs   ? Financial resource strain: Not on file   ? Food insecurity - worry: Not on file   ? Food insecurity - inability: Not on file   ? Transportation needs - medical: Not on file   ? Transportation needs - non-medical: Not on file   Occupational History   ? Not on file   Tobacco Use   ? Smoking status: Former Smoker     Types: Cigarettes     Last attempt to quit: 1980     Years since quittin.8   ? Smokeless tobacco: Never Used   Substance and Sexual Activity   ? Alcohol use: No     Comment: one drink a week   ? Drug use: No   ? Sexual activity: Not on file   Other Topics Concern   ? Not on file   Social History Narrative   ? Not on file          Medications  Allergies   Current Outpatient Medications   Medication Sig Dispense Refill   ? acetaminophen (TYLENOL) 325 MG tablet Take 2 tablets (650 mg total) by mouth every 4 (four) hours as needed.  0   ? aspirin 81 mg chewable tablet Chew 1 tablet (81 mg total) daily.  0   ? atorvastatin (LIPITOR) 80 MG tablet Take 1 tablet (80 mg total) by  mouth daily. 30 tablet 3   ? bisacodyl (DULCOLAX) 10 mg suppository One supp NH qday prn constipation.  Give if no BM in prior 3 days.  0   ? carbidopa-levodopa (SINEMET CR)  mg ER tablet Take 1 tablet by mouth at bedtime. At ~2200     ? carbidopa-levodopa (SINEMET)  mg per tablet Take 3 tablets by mouth 3 (three) times a day. At approx. 0800, 1200, and 1600.     ? carbidopa-levodopa (SINEMET)  mg per tablet Take 2 tablets by mouth at bedtime. At approx 2200     ? cholecalciferol, vitamin D3, (VITAMIN D3) 2,000 unit Tab Take 1 tablet (2,000 Units total) by mouth daily.  0   ? docusate sodium (COLACE) 100 MG capsule Take 100 mg by mouth every other day.     ? entacapone (COMTAN) 200 mg tablet Take 200 mg by mouth 4 (four) times a day. with each Sinemet dose      ? gabapentin (NEURONTIN) 300 MG capsule Take 300 mg by mouth. 2 at 6:00pm and 1 at bedtime     ? levothyroxine (SYNTHROID, LEVOTHROID) 137 MCG tablet Take 1 tablet (137 mcg total) by mouth at bedtime.     ? melatonin 3 mg Tab tablet Take 3 mg by mouth at bedtime as needed.     ? metoprolol succinate (TOPROL-XL) 25 MG Take 1 tablet (25 mg total) by mouth daily. (Patient taking differently: Take 12.5 mg by mouth daily. ) 90 tablet 3   ? omeprazole (PRILOSEC) 20 MG capsule Take 20 mg by mouth daily as needed.     ? polyethylene glycol (MIRALAX) 17 gram packet Take 17 g by mouth 2 (two) times a day.     ? senna-docusate (SENNOSIDES-DOCUSATE SODIUM) 8.6-50 mg tablet Take 2 tablets by mouth daily. Hold for loose stools. (Patient taking differently: Take 1 tablet by mouth every other day. Hold for loose stools.)  0   ? ticagrelor (BRILINTA) 90 mg Tab Take 1 tablet (90 mg total) by mouth 2 (two) times a day. 60 tablet 11   ? venlafaxine 225 mg TR24 Take 1 tablet by mouth daily. 30 each 6   ? nitroglycerin (NITROSTAT) 0.4 MG SL tablet Place 1 tablet (0.4 mg total) under the tongue every 5 (five) minutes as needed for chest pain. 15 tablet 1     No  current facility-administered medications for this visit.       Allergies   Allergen Reactions   ? Clonazepam      Too drowsy         Lab Results    Chemistry CBC BNP   Lab Results   Component Value Date    CREATININE 0.83 11/01/2018    BUN 15 11/01/2018     11/01/2018    K 4.0 11/01/2018     11/01/2018    CO2 23 11/01/2018     Creatinine (mg/dL)   Date Value   11/01/2018 0.83   10/31/2018 0.79   10/29/2018 0.83   05/28/2018 0.70    Lab Results   Component Value Date    WBC 6.7 10/31/2018    HGB 14.0 11/01/2018    HCT 47.0 10/31/2018    MCV 91 10/31/2018     10/31/2018    No results found for: BNP  No results found for: BNP       Aleta Rojas [facility]        This note has been dictated using voice recognition software. Any grammatical, typographical, or context distortions are unintentional and inherent to the software

## 2021-06-26 NOTE — PROGRESS NOTES
Progress Notes by Roxy Barraza MD at 9/28/2018 10:50 AM     Author: Roxy Barraza MD Service: -- Author Type: Physician    Filed: 9/28/2018  1:50 PM Encounter Date: 9/28/2018 Status: Signed    : Roxy Barraza MD (Physician)           Click to link to [facility]     [facility] HEART CARE NOTE    Thank you, Dr. Washington, for asking the [facility] team to see Mr. Silver Zamora to evaluate left ventricular dysfunction.    Assessment/Recommendations   Assessment:    1.  Mild to moderate left ventricular dysfunction.  This was documented on an echocardiogram performed in May 2018 when the patient was hospitalized for acute gallbladder pancreatitis.  He did subsequently undergo laparoscopic cholecystectomy without cardiac complications.  He was subsequently seen by 1 of our heart failure nurse practitioners who repeated an echocardiogram in July which continued to show similar dysfunction.  At that time, she recommended initiation of low-dose beta-blocker which the patient has not done.  The most recent echocardiogram does suggest inferolateral hypokinesis raising the question of whether could be due to underlying coronary artery disease, although the patient no significant risk factors.  I did recommend a nuclear stress study for further evaluation.  In the interim, I did encourage him to try taking the metoprolol succinate 25 mg daily as prescribed by Kelli.  2.  Parkinson's disease  3.  Depression    Plan:  1.  Patient to try metoprolol succinate 25 mg daily.  He has been asked to call if any side effects.  2.  Schedule outpatient pharmacologic nuclear stress study to evaluate for occult coronary artery disease       History of Present Illness    Mr. Silver Zamora is a 68 y.o. male with history of Parkinson's disease, depression but no history of hypertension, diabetes or hypercholesterolemia was admitted to the hospital in May 2018 for evaluation of abdominal pain.  He was found to have  acute gallbladder pancreatitis.  He was treated conservatively but ultimately underwent laparoscopic cholecystectomy without complication.  During his hospitalization, an echocardiogram was performed for bradycardia which actually demonstrated mild to moderate global left ventricular dysfunction.  His bradycardia did ultimately resolve.  Following his discharge, he was referred to the heart failure clinic and seen in consultation by 1 of our nurse practitioners.  At that time, he had no symptoms of congestive heart failure.  Because it was unclear whether his LV dysfunction was related to his acute infection, repeat echocardiogram was performed in July which continued to show left ventricular dysfunction.  He was subsequently seen in follow-up and advised to begin low-dose beta-blocker.    According to the patient's wife, patient elected not to begin beta-blocker therapy because of concerns of side effects.  Currently the patient denies symptoms of orthopnea, PND, lower extremity edema or weight gain.  His activity is extremely limited because of his Parkinson's disease.  He does have occasional falls related to his gait issues.  No  reports of near syncope or true syncope.    ECG (personally reviewed): Previous ECG demonstrated marked sinus bradycardia without ischemic changes    Cardiac Imaging Studies (personally reviewed): Most recent echocardiogram reveals moderate left ventricular dysfunction with hypokinesis of the inferior and inferolateral walls.  No valve disease.       Physical Examination Review of Systems   Vitals:    09/28/18 1118   BP: 128/78   Pulse: 70   Resp: 18     Body mass index is 31.9 kg/(m^2).  Wt Readings from Last 3 Encounters:   09/28/18 216 lb (98 kg)   08/22/18 216 lb (98 kg)   08/02/18 216 lb (98 kg)     General Appearance:   Awake, Alert, No acute distress.   HEENT:  No scleral icterus; the mucous membranes were pink and moist.   Neck: No cervical bruits or jugular venous distention     Chest: The spine was straight. The chest was symmetric.   Lungs:   Respirations unlabored; the lungs are clear to auscultation. No wheezing   Cardiovascular:    Regular rate and rhythm.  S1, S2 normal.  No murmur, gallop or rub   Abdomen:  No organomegaly, masses, bruits, or tenderness. Bowels sounds are present   Extremities:  No peripheral edema bilaterally.   Skin: No xanthelasma. Warm, Dry.   Musculoskeletal: No tenderness.   Neurologic: Mood and affect are appropriate.    General: Night Sweats  Eyes: WNL  Ears/Nose/Throat: Hearing Loss  Lungs: WNL  Heart: WNL  Stomach: Constipation  Bladder: WNL  Muscle/Joints: WNL  Skin: WNL  Nervous System: Daytime Sleepiness, Dizziness, Loss of Balance  Mental Health: WNL     Blood: WNL     Medical History  Surgical History Family History Social History   Past Medical History:   Diagnosis Date   ? Anxiety    ? Callus    ? Cardiomyopathy (H)    ? Chronic back pain    ? Depression    ? GERD (gastroesophageal reflux disease)    ? Hypothyroidism    ? Pancreatitis 12/31/2015   ? PD (Parkinson's disease) (H)    ? RBD (REM behavioral disorder) 4/3/2017   ? Shingles     hx    Past Surgical History:   Procedure Laterality Date   ? INGUINAL HERNIA REPAIR Bilateral 10/2013   ? LAMINECTOMY  10/2013   ? MI ERCP REMOVE CALCULI/DEBRIS BILIARY/PANCREAS DUCT N/A 5/25/2018    Procedure: ENDOSCOPIC RETROGRADE CHOLANGIOPANCREATOGRAPHY SPINCTEROTOMY BILIARY STONE EXTRACTION;  Surgeon: Curtis Mcclain MD;  Location: [facility] [place];  Service: Gastroenterology   ? MI LAP,CHOLECYSTECTOMY/GRAPH N/A 5/24/2018    Procedure: LAPAROSCOPIC CHOLECYSTECTOMY;  Surgeon: Joyce Melissa MD;  Location: [facility] [place];  Service: General    Family History   Problem Relation Age of Onset   ? Kidney failure Mother    ? Heart disease Father 82   ? Tremor Father    ? Parkinsonism Paternal Grandfather    ? Tremor Sister    ? Leukemia Maternal Grandmother     Social History     Social History   ? Marital  status:      Spouse name: N/A   ? Number of children: N/A   ? Years of education: N/A     Occupational History   ? Not on file.     Social History Main Topics   ? Smoking status: Former Smoker     Types: Cigarettes     Quit date: 1/1/1980   ? Smokeless tobacco: Never Used   ? Alcohol use Yes      Comment: one drink a week   ? Drug use: No   ? Sexual activity: Not on file     Other Topics Concern   ? Not on file     Social History Narrative          Medications  Allergies   Current Outpatient Prescriptions   Medication Sig Dispense Refill   ? acetaminophen (TYLENOL) 325 MG tablet Take 2 tablets (650 mg total) by mouth every 4 (four) hours as needed.  0   ? aminocaproic acid (AMICAR) 500 mg tablet Take by mouth every 6 (six) hours.     ? bisacodyl (DULCOLAX) 10 mg suppository One supp LA qday prn constipation.  Give if no BM in prior 3 days.  0   ? carbidopa-levodopa (SINEMET CR)  mg ER tablet Take 1 tablet by mouth at bedtime. At ~2200     ? carbidopa-levodopa (SINEMET)  mg per tablet Take 3 tablets by mouth 3 (three) times a day. At approx. 0800, 1200, and 1600.     ? carbidopa-levodopa (SINEMET)  mg per tablet Take 2 tablets by mouth at bedtime. At approx 2200     ? cholecalciferol, vitamin D3, (VITAMIN D3) 2,000 unit Tab Take 1 tablet (2,000 Units total) by mouth daily.  0   ? entacapone (COMTAN) 200 mg tablet Take 200 mg by mouth 4 (four) times a day. with each Sinemet dose      ? gabapentin (NEURONTIN) 300 MG capsule Take 300 mg by mouth. 2 in the evening and 1 tab at bedtime     ? levothyroxine (SYNTHROID, LEVOTHROID) 137 MCG tablet Take 1 tablet (137 mcg total) by mouth Daily at 6:00 am. 90 tablet 3   ? melatonin 3 mg Tab tablet Take 3 mg by mouth at bedtime as needed.     ? meloxicam (MOBIC) 7.5 MG tablet Take 7.5 mg by mouth daily as needed for pain.     ? omeprazole (PRILOSEC) 20 MG capsule Take 20 mg by mouth daily as needed.     ? senna-docusate (SENNOSIDES-DOCUSATE SODIUM) 8.6-50  mg tablet Take 2 tablets by mouth daily. Hold for loose stools.  0   ? venlafaxine 225 mg TR24 Take 1 tablet by mouth daily. 30 each 6   ? metoprolol succinate (TOPROL-XL) 25 MG Take 1 tablet (25 mg total) by mouth daily. 90 tablet 3   ? multivitamin (MULTIVITAMIN) per tablet Take 1 tablet by mouth daily.       No current facility-administered medications for this visit.       Allergies   Allergen Reactions   ? Clonazepam      Too drowsy         Lab Results    Chemistry/lipid CBC Cardiac Enzymes/BNP/TSH/INR   Lab Results   Component Value Date    CHOL 169 05/22/2017    HDL 35 (L) 05/22/2017    LDLCALC 106 05/22/2017    TRIG 142 05/22/2017    CREATININE 0.70 05/28/2018    BUN 10 05/28/2018    K 4.1 05/29/2018     05/28/2018     (H) 05/28/2018    CO2 21 (L) 05/28/2018    Lab Results   Component Value Date    WBC 19.3 (H) 06/01/2018    HGB 12.9 (L) 05/28/2018    HCT 40.6 05/28/2018    MCV 93 05/28/2018     05/28/2018    Lab Results   Component Value Date    TROPONINI 0.03 05/27/2018    TSH 1.08 05/23/2018    INR 1.01 03/21/2013

## 2021-06-26 NOTE — PROGRESS NOTES
Progress Notes by Kelli Webb CNP at 6/20/2018  1:30 PM     Author: Kelli Webb CNP Service: -- Author Type: Nurse Practitioner    Filed: 6/20/2018  2:39 PM Encounter Date: 6/20/2018 Status: Signed    : Kelli Webb CNP (Nurse Practitioner)           Click to link to [facility] HEART CARE NOTE      Assessment/Recommendations   Assessment:    1. Heart failure with preserved ejection fraction, NYHA class II: Well compensated.  He has mild shortness of breath with activity and fatigue.  We discussed monitoring heart failure symptoms, monitoring daily weights, following a low-sodium diet, and heart failure treatment.  We discussed in long length that his diminished LVEF could be due to stress from the surgery and fluid overload was due to IV fluids with surgery.  He did not have any acute heart failure symptoms during hospitalization.  I am not certain he should be diagnosed with heart failure at this point.  I will recheck an echocardiogram in a few months to reassess heart function after recovery from surgery and fluid stable.    Plan:  1.  No changes to medications  2. Echocardiogram in 2 months  3. Monitor weights and follow low-sodium diet    Silver Zamora will follow up in the heart clinic as needed.     History of Present Illness    Silver Zamora is seen at [facility] heart failure clinic today for post-hospitalization follow-up. His wife Luz Marina accompanies him today. He was hospitalized at [facility] from May 23 - May 29, 2018 with abdominal pain and acute pancreatitis.  He had cholelithiasis with cholecystitis.  He ended up having a laparoscopic cholecystectomy on May 24, 2018. He had fluid overload due to IV fluids from the surgery.  The most recent evaluation of His ejection fraction was 45% from an  echo on 5/27/2018. His echocardiogram showed a mild diminished left ventricular ejection fraction. This could have been due to stress induced  from surgery. He was not started on any heart failure medications due to history of orthostatic hypotension and his Parkinson's disease.  Past medical history is also significant for Parkinson's disease.      Since being discharged from the hospital, Silver feels that he is improving.  He denies lightheadedness, shortness of breath, orthopnea, PND, palpitations, chest pain, abdominal fullness/bloating and lower extremity edema.         ECHO 5/27/2018:   Summary     1.Left ventricle ejection fraction is mildly decreased. The estimated left ventricular ejection fraction is 45%.    2.TAPSE is normal, which is consistent with normal right ventricular systolic function.    3.The aortic root is mildly dilated.    4.No hemodynamically significant valvular heart abnormalities.    5.No previous study for comparison.              Physical Examination Review of Systems   Vitals:    06/20/18 1333   BP: 138/78   Pulse: 68   Resp: 16     Body mass index is 30.86 kg/(m^2).  Wt Readings from Last 3 Encounters:   06/20/18 209 lb (94.8 kg)   05/29/18 208 lb 14.4 oz (94.8 kg)   03/27/18 218 lb (98.9 kg)       General Appearance:     Alert, cooperative and in no acute distress.   ENT/Mouth: membranes moist, no oral lesions or bleeding gums.      EYES:  no scleral icterus, normal conjunctivae   Neck: no carotid bruits or thyromegaly   Chest/Lungs:   lungs are clear to auscultation, no rales or wheezing, respirations unlabored   Cardiovascular:   Regular. Normal first and second heart sounds with no murmurs, rubs, or gallops; the carotid, radial and posterior tibial pulses are intact, Jugular venous pressure normal, no edema     Abdomen:  Soft, nontender, nondistended, bowel sounds present   Extremities: no cyanosis or clubbing   Skin: warm, dry.    Neurologic: mood and affect are appropriate, alert and oriented x3      General: Night Sweats  Eyes: WNL  Ears/Nose/Throat: Hearing Loss  Lungs: WNL  Heart: WNL  Stomach: Constipation,  Heartburn  Bladder: Frequent Urination at Night  Muscle/Joints: WNL  Skin: WNL  Nervous System: Daytime Sleepiness, Loss of Balance, Falls  Mental Health: WNL     Blood: WNL     Medical History  Surgical History Family History Social History   Past Medical History:   Diagnosis Date   ? Anxiety    ? Callus    ? Chronic back pain    ? Depression    ? GERD (gastroesophageal reflux disease)    ? Hypothyroidism    ? Pancreatitis 12/31/2015   ? PD (Parkinson's disease) (H)    ? RBD (REM behavioral disorder) 4/3/2017   ? Shingles     hx    Past Surgical History:   Procedure Laterality Date   ? INGUINAL HERNIA REPAIR Bilateral 10/2013   ? LAMINECTOMY  10/2013   ? [place] ERCP REMOVE CALCULI/DEBRIS BILIARY/PANCREAS DUCT N/A 5/25/2018    Procedure: ENDOSCOPIC RETROGRADE CHOLANGIOPANCREATOGRAPHY SPINCTEROTOMY BILIARY STONE EXTRACTION;  Surgeon: Curtis Mcclain MD;  Location: [facility] [place];  Service: Gastroenterology   ? IL LAP,CHOLECYSTECTOMY/GRAPH N/A 5/24/2018    Procedure: LAPAROSCOPIC CHOLECYSTECTOMY;  Surgeon: Joyce Melissa MD;  Location: [facility] [place];  Service: General    Family History   Problem Relation Age of Onset   ? Kidney failure Mother    ? Heart disease Father    ? Tremor Father    ? Parkinsonism Paternal Grandfather    ? Tremor Sister    ? Leukemia Maternal Grandmother     Social History     Social History   ? Marital status:      Spouse name: N/A   ? Number of children: N/A   ? Years of education: N/A     Occupational History   ? Not on file.     Social History Main Topics   ? Smoking status: Former Smoker     Types: Cigarettes     Quit date: 1/1/1980   ? Smokeless tobacco: Never Used   ? Alcohol use Yes      Comment: one drink a week   ? Drug use: No   ? Sexual activity: Not on file     Other Topics Concern   ? Not on file     Social History Narrative          Medications  Allergies   Current Outpatient Prescriptions   Medication Sig Dispense Refill   ? acetaminophen (TYLENOL) 325 MG tablet Take  2 tablets (650 mg total) by mouth every 4 (four) hours as needed.  0   ? bisacodyl (DULCOLAX) 10 mg suppository One supp MA qday prn constipation.  Give if no BM in prior 3 days.  0   ? carbidopa-levodopa (SINEMET CR)  mg ER tablet Take 1 tablet by mouth at bedtime. At ~2200     ? carbidopa-levodopa (SINEMET)  mg per tablet Take 3 tablets by mouth 3 (three) times a day. At approx. 0800, 1200, and 1600.     ? carbidopa-levodopa (SINEMET)  mg per tablet Take 2 tablets by mouth at bedtime. At approx 2200     ? cholecalciferol, vitamin D3, (VITAMIN D3) 2,000 unit Tab Take 1 tablet (2,000 Units total) by mouth daily.  0   ? entacapone (COMTAN) 200 mg tablet Take 200 mg by mouth 4 (four) times a day. with each Sinemet dose      ? gabapentin (NEURONTIN) 300 MG capsule Take 300 mg by mouth daily with supper.     ? gabapentin (NEURONTIN) 300 MG capsule Take 600 mg by mouth at bedtime.     ? levothyroxine (SYNTHROID, LEVOTHROID) 137 MCG tablet Take 137 mcg by mouth Daily at 6:00 am.     ? meloxicam (MOBIC) 7.5 MG tablet Take 7.5 mg by mouth daily as needed for pain.     ? multivitamin (MULTIVITAMIN) per tablet Take 1 tablet by mouth daily.     ? omeprazole (PRILOSEC) 20 MG capsule Take 20 mg by mouth daily as needed.     ? senna-docusate (SENNOSIDES-DOCUSATE SODIUM) 8.6-50 mg tablet Take 2 tablets by mouth daily. Hold for loose stools.  0   ? venlafaxine (EFFEXOR-XR) 150 MG 24 hr capsule Take 1 capsule (150 mg total) by mouth daily. Take with 75mg cap (225mg) 90 capsule 3   ? venlafaxine (EFFEXOR-XR) 75 MG 24 hr capsule Take 1 capsule (75 mg total) by mouth daily. Take with 150mg cap 90 capsule 3     No current facility-administered medications for this visit.       Allergies   Allergen Reactions   ? Clonazepam      Too drowsy         Lab Results    Chemistry CBC BNP   Lab Results   Component Value Date    CREATININE 0.70 05/28/2018    BUN 10 05/28/2018     05/28/2018    K 4.1 05/29/2018     (H)  05/28/2018    CO2 21 (L) 05/28/2018     Creatinine (mg/dL)   Date Value   05/28/2018 0.70   05/27/2018 0.82   05/26/2018 0.80   05/25/2018 0.78    Lab Results   Component Value Date    WBC 19.3 (H) 06/01/2018    HGB 12.9 (L) 05/28/2018    HCT 40.6 05/28/2018    MCV 93 05/28/2018     05/28/2018    No results found for: BNP  No results found for: BNP       30 minutes were spent with the patient with greater than 50% spent on education and counseling.      Kelli Webb, [facility]   Heart Failure Clinic

## 2021-06-27 NOTE — PROGRESS NOTES
Progress Notes by Pantera Hays DPM at 3/1/2019 10:20 AM     Author: Pantera Hays DPM Service: -- Author Type: Physician    Filed: 3/1/2019 12:46 PM Encounter Date: 3/1/2019 Status: Signed    : Pantera Hays DPM (Physician)       FOOT AND ANKLE SURGERY/PODIATRY Progress Note        ASSESSMENT:   Fissure right foot  Hammertoe      TREATMENT:  -Superficial fissure along the right foot. I have asked him to iIncrease use of LacHydrin 12% cream to two times a day. Sharp debridement of callus tissue medial 1st MPJ right foot. No open lesions. He will likely continue to build callus tissue at this location and may require serial debridement.   -He is having pain along the left foot hammertoes. I recommend he begin use of a gel spacer to reduce friction. Referred to [facility] for gel spacers.   -The patient is moving to [place] and will continue to follow with [facility] at this location. He was encouraged to see me as symptoms dictate.      Pantera Hays DPM  [facility]      HPI: Silver Zamora was seen again today for a fissure on his right foot. The patient has AmLactin and LacHydrin but has not been applying two times a day. He also complains of a painful hammertoe on his left foot.      Past Medical History:   Diagnosis Date   ? Anxiety    ? Callus    ? Cardiomyopathy (H)    ? Chronic back pain    ? Delirium    ? Depression    ? Fall    ? GERD (gastroesophageal reflux disease)    ? Hypothyroidism    ? Pancreatitis 12/31/2015   ? PD (Parkinson's disease) (H)    ? RBD (REM behavioral disorder) 4/3/2017   ? Shingles     hx       Past Surgical History:   Procedure Laterality Date   ? CV CORONARY ANGIOGRAM N/A 10/31/2018    Procedure: Coronary Angiogram;  Surgeon: Jose Meyer MD;  Location: [facility];  Service:    ? CV LEFT HEART CATHETERIZATION WO LEFT VETRICULOGRAM Left 10/31/2018    Procedure: Left Heart Catheterization Without Left Ventriculogram;   Surgeon: Jose Meyer MD;  Location: [facility];  Service:    ? INGUINAL HERNIA REPAIR Bilateral 10/2013   ? LAMINECTOMY  10/2013   ? PA ERCP REMOVE CALCULI/DEBRIS BILIARY/PANCREAS DUCT N/A 5/25/2018    Procedure: ENDOSCOPIC RETROGRADE CHOLANGIOPANCREATOGRAPHY SPINCTEROTOMY BILIARY STONE EXTRACTION;  Surgeon: Curtis Mcclain MD;  Location: [facility] [place];  Service: Gastroenterology   ? PA LAP,CHOLECYSTECTOMY/GRAPH N/A 5/24/2018    Procedure: LAPAROSCOPIC CHOLECYSTECTOMY;  Surgeon: Joyce Melissa MD;  Location: [facility] [place];  Service: General       Allergies   Allergen Reactions   ? Clonazepam      Too drowsy         Current Outpatient Medications:   ?  acetaminophen (TYLENOL) 325 MG tablet, Take 2 tablets (650 mg total) by mouth every 4 (four) hours as needed., Disp: , Rfl: 0  ?  ammonium lactate (AMLACTIN) 12 % cream, Apply two times a day, Disp: 385 g, Rfl: 2  ?  aspirin 81 mg chewable tablet, Chew 1 tablet (81 mg total) daily., Disp: , Rfl: 0  ?  atorvastatin (LIPITOR) 80 MG tablet, Take 1 tablet (80 mg total) by mouth daily., Disp: 30 tablet, Rfl: 3  ?  bisacodyl (DULCOLAX) 10 mg suppository, One supp PA qday prn constipation.  Give if no BM in prior 3 days., Disp: , Rfl: 0  ?  carbidopa-levodopa (PARCOPA)  mg per disintegrating tablet, Take 1 tablet by mouth as needed., Disp: , Rfl:   ?  carbidopa-levodopa (RYTARY) 23.75-95 mg CpER ER capsule, Take 4 capsules by mouth daily. Take 4 caps at 8am, 5 caps at 12pm, and 5 caps at 4pm and 4 caps at 8pm, Disp: 640 capsule, Rfl: 3  ?  carbidopa-levodopa (SINEMET CR)  mg ER tablet, Take 1 tablet by mouth 2 (two) times a day. In am and 2pm (Patient taking differently: Take 1 tablet by mouth at bedtime. In am and 2pm   ), Disp: 60 tablet, Rfl: 3  ?  cholecalciferol, vitamin D3, (VITAMIN D3) 2,000 unit Tab, Take 1 tablet (2,000 Units total) by mouth daily., Disp: , Rfl: 0  ?  gabapentin (NEURONTIN) 300 MG capsule, Take 600 mg by mouth at  bedtime.   , Disp: , Rfl:   ?  gabapentin (NEURONTIN) 300 MG capsule, Take 300 mg by mouth daily.    , Disp: , Rfl:   ?  levothyroxine (SYNTHROID, LEVOTHROID) 137 MCG tablet, Take 1 tablet (137 mcg total) by mouth at bedtime., Disp: , Rfl:   ?  metoprolol succinate (TOPROL-XL) 25 MG, Take 12.5 mg by mouth at bedtime., Disp: , Rfl:   ?  nitroglycerin (NITROSTAT) 0.4 MG SL tablet, Place 1 tablet (0.4 mg total) under the tongue every 5 (five) minutes as needed for chest pain., Disp: 15 tablet, Rfl: 1  ?  omeprazole (PRILOSEC) 20 MG capsule, Take 1 capsule (20 mg total) by mouth daily. (Patient taking differently: Take 20 mg by mouth daily as needed .   ), Disp: 90 capsule, Rfl: 3  ?  pimavanserin (NUPLAZID) 34 mg cap capsule, Take 1 capsule by mouth at bedtime.    , Disp: , Rfl:   ?  polyethylene glycol (MIRALAX) 17 gram packet, Take 17 g by mouth daily.   , Disp: , Rfl:   ?  senna-docusate (SENNOSIDES-DOCUSATE SODIUM) 8.6-50 mg tablet, Take 1 tablet by mouth 2 (two) times a day.   , Disp: , Rfl:   ?  ticagrelor (BRILINTA) 90 mg Tab, Take 1 tablet (90 mg total) by mouth 2 (two) times a day., Disp: 60 tablet, Rfl: 11  ?  venlafaxine (EFFEXOR-XR) 150 MG 24 hr capsule, Take 1 capsule (150 mg total) by mouth daily. 75 mg + 150 mg  = 225 mg, Disp: 90 capsule, Rfl: 2  ?  venlafaxine (EFFEXOR-XR) 75 MG 24 hr capsule, Take 1 capsule (75 mg total) by mouth daily. 75 mg + 150 mg = 225 mg, Disp: 90 capsule, Rfl: 2  No current facility-administered medications for this visit.     Review of Systems - per HPI, all others negative      OBJECTIVE:  Vitals:    03/01/19 1101   Resp: 16   Temp: 98.5  F (36.9  C)     General appearance: Patient is alert and fully cooperative with history & exam.  No sign of distress is noted during the visit.   Vascular: Dorsalis pedis non-palpable bilateral.  Dermatologic:    VASC Wound 03/01/19 right medial foot (Active)   Pre Size Length 0.1 3/1/2019 10:00 AM   Pre Size Width 2.3 3/1/2019 10:00 AM    Pre Size Depth 0.2 3/1/2019 10:00 AM   Pre Total Sq cm 0.23 3/1/2019 10:00 AM       Incision 05/24/18 Abdomen (Active)   Superficial fissure right foot. No open lesions bilateral. No erythema bilateral.  Neurologic: Diminished to light touch bilateral.   Musculoskeletal: Contracted digits noted bilateral. Severe bunion deformity right foot.     Imaging:   none    No results found.       Picture:

## 2021-06-27 NOTE — PROGRESS NOTES
Progress Notes by Kelli Webb CNP at 12/14/2018 12:50 PM     Author: Kelli Webb CNP Service: -- Author Type: Nurse Practitioner    Filed: 12/14/2018  3:01 PM Encounter Date: 12/14/2018 Status: Signed    : Kelli Webb CNP (Nurse Practitioner)           Click to link to [facility] HEART CARE NOTE      Assessment/Recommendations   Assessment:    1. Ischemic cardiomyopathy with systolic dysfunction, NYHA class II: Well compensated.  He has mild fatigue.  He denies dyspnea on exertion, orthopnea, or PND.  He has trace lower extremity edema.  He is unable to weigh himself on a daily basis due to his Parkinson's disease.  His clinic weight is stable.  He has been working with cardiac rehab which I highly recommended he continue even with his falls.  I stated he is in close contact with nurses when working with cardiac rehab which will help lessen the chance of falls during this.    2.  Coronary artery disease: He denies chest pain.  Status post PCI to mid RCA.  He has 60% stenosis in his mid LAD. He is on dual antiplatelet therapy with aspirin 81 mg and ticagrelor 90 mg twice a day.  Continue cardiac rehab    3.  Frequent falls: Most recent on December 7, 2018.  This could be due to his Parkinson's disease or his severe spinal stenosis.  His wife states that his falls have decreased with the decrease of metoprolol that his primary doctor did.  He has been using a walker now as this has been recommended.    Plan:  1.  Continue current medications  2.  Continue cardiac rehab  3.  Continue low-sodium diet and diet low in saturated fats    Silver Zamora will follow up with Dr. Barraza January 18 and in the heart failure clinic in 3 months or sooner if needed.     History of Present Illness    Mr. Silver Zamora is a 68 y.o. male seen at [facility] heart failure clinic today for continued follow-up.  His wife and sister accompany him today.  He follows up for  ischemic cardiomyopathy with systolic dysfunction.  His most recent evaluation of his ejection fraction is 34% from a stress test in October 2018.  His stress test with positive and he underwent a coronary angiogram October 31, 2018.  He had a PCI placed to his mid RCA.  It was also noted that he had 60% stenosis in his mid LAD.  He will be on dual antiplatelet therapy for 1 year.  He has started cardiac rehab which he has tolerated.  The past medical history significant for Parkinson's disease.  His wife and sister had many questions in regards to his care and spinal stenosis and coronary artery disease.  I answered the questions to the best of my ability.    He had a fall on December 7, 2018 which she was evaluated in the emergency department.  He had multiple imaging completed and which he was found to have severe spinal stenosis.  He saw Dr. Carty in regards to this in which she recommended having a cervical laminoplasty.  She understands he is on dual antiplatelet therapy for 1 year and he would be at a risk to take him off.  She states that he would need to be off of anti-platelet medications for a total of 3 weeks.    Today, he comes in with fatigue.  He denies dyspnea on exertion, orthopnea, or PND.  He denies chest pain.  He states his shortness of breath has improved.  He recently had a fall where he was evaluated in the emergency department.  He had multiple imaging done and was found to have spinal stenosis.  He denies shortness of breath, dyspnea on exertion, orthopnea, PND, palpitations, chest pain and abdominal fullness/bloating.      He is not monitoring home weights. His clinic weight is stable.  He is following a low sodium diet.  He participates in regular physical activity including cardiac rehab.         Physical Examination Review of Systems   Vitals:    12/14/18 1310   BP: 116/66   Pulse: 60   Resp: 16     Body mass index is 31.57 kg/m .  Wt Readings from Last 3 Encounters:   12/14/18 220 lb  (99.8 kg)   12/14/18 220 lb 3.2 oz (99.9 kg)   12/13/18 216 lb (98 kg)       General Appearance:     Alert, cooperative and in no acute distress.   ENT/Mouth: membranes moist, no oral lesions or bleeding gums.      EYES:  no scleral icterus, normal conjunctivae   Neck: no carotid bruits or thyromegaly   Chest/Lungs:   lungs are clear to auscultation, no rales or wheezing, respirations unlabored   Cardiovascular:   Regular. Normal first and second heart sounds with no murmurs, rubs, or gallops; the carotid, radial and posterior tibial pulses are intact, Jugular venous pressure normal, trace edema bilateral lower extremities    Abdomen:  Soft, nontender, nondistended, bowel sounds present   Extremities: no cyanosis or clubbing   Skin: warm, dry.    Neurologic: mood and affect are appropriate, alert and oriented x3      General: Night Sweats  Eyes: WNL  Ears/Nose/Throat: Hearing Loss  Lungs: WNL  Heart: Fainting  Stomach: Constipation, Heartburn  Bladder: Frequent Urination at Night  Muscle/Joints: Muscle Weakness, Muscle Pain  Skin: WNL  Nervous System: Falls, Daytime Sleepiness, Dizziness, Loss of Balance  Mental Health: Depression     Blood: WNL     Medical History  Surgical History Family History Social History   Past Medical History:   Diagnosis Date   ? Anxiety    ? Callus    ? Cardiomyopathy (H)    ? Chronic back pain    ? Delirium    ? Depression    ? Fall    ? GERD (gastroesophageal reflux disease)    ? Hypothyroidism    ? Pancreatitis 12/31/2015   ? PD (Parkinson's disease) (H)    ? RBD (REM behavioral disorder) 4/3/2017   ? Shingles     hx    Past Surgical History:   Procedure Laterality Date   ? CV CORONARY ANGIOGRAM N/A 10/31/2018    Procedure: Coronary Angiogram;  Surgeon: Jose Meyer MD;  Location: [facility] Cath Lab;  Service:    ? CV LEFT HEART CATHETERIZATION WO LEFT VETRICULOGRAM Left 10/31/2018    Procedure: Left Heart Catheterization Without Left Ventriculogram;  Surgeon: Jose Meyer  MD;  Location: [facility] Cath Lab;  Service:    ? INGUINAL HERNIA REPAIR Bilateral 10/2013   ? LAMINECTOMY  10/2013   ? [place] ERCP REMOVE CALCULI/DEBRIS BILIARY/PANCREAS DUCT N/A 2018    Procedure: ENDOSCOPIC RETROGRADE CHOLANGIOPANCREATOGRAPHY SPINCTEROTOMY BILIARY STONE EXTRACTION;  Surgeon: Curtis Mcclain MD;  [place] [facility] [place]  Service: Gastroenterology   ? MD LAP,CHOLECYSTECTOMY/GRAPH N/A 2018    Procedure: LAPAROSCOPIC CHOLECYSTECTOMY;  Surgeon: Joyce Melissa MD;  [place] [facility] [place];  Service: General    Family History   Problem Relation Age of Onset   ? Kidney failure Mother    ? Heart disease Father 82   ? Tremor Father    ? Parkinsonism Paternal Grandfather    ? Tremor Sister    ? Leukemia Maternal Grandmother     Social History     Socioeconomic History   ? Marital status:      Spouse name: Not on file   ? Number of children: Not on file   ? Years of education: Not on file   ? Highest education level: Not on file   Social Needs   ? Financial resource strain: Not on file   ? Food insecurity - worry: Not on file   ? Food insecurity - inability: Not on file   ? Transportation needs - medical: Not on file   ? Transportation needs - non-medical: Not on file   Occupational History   ? Not on file   Tobacco Use   ? Smoking status: Former Smoker     Types: Cigarettes     Last attempt to quit: 1980     Years since quittin.9   ? Smokeless tobacco: Never Used   Substance and Sexual Activity   ? Alcohol use: No     Comment: one drink a week   ? Drug use: No   ? Sexual activity: Not on file   Other Topics Concern   ? Not on file   Social History Narrative   ? Not on file          Medications  Allergies   Current Outpatient Medications   Medication Sig Dispense Refill   ? acetaminophen (TYLENOL) 325 MG tablet Take 2 tablets (650 mg total) by mouth every 4 (four) hours as needed.  0   ? aspirin 81 mg chewable tablet Chew 1 tablet (81 mg total) daily.  0   ? atorvastatin  (LIPITOR) 80 MG tablet Take 1 tablet (80 mg total) by mouth daily. 30 tablet 3   ? bisacodyl (DULCOLAX) 10 mg suppository One supp CT qday prn constipation.  Give if no BM in prior 3 days.  0   ? carbidopa-levodopa (SINEMET CR)  mg ER tablet Take 1 tablet by mouth 4 (four) times a day 0800,1200,1600,2300 (1 hour after last dose of IR).           ? carbidopa-levodopa (SINEMET CR)  mg ER tablet Take 1 tablet by mouth daily as needed (in the middle of the night).     ? carbidopa-levodopa (SINEMET)  mg per tablet Take 2 tablets by mouth 3 (three) times a day At approx. 0800, 1200, and 1600..           ? carbidopa-levodopa (SINEMET)  mg per tablet Take 3 tablets by mouth at bedtime At approx 2200 .           ? cholecalciferol, vitamin D3, (VITAMIN D3) 2,000 unit Tab Take 1 tablet (2,000 Units total) by mouth daily.  0   ? entacapone (COMTAN) 200 mg tablet Take 200 mg by mouth 4 (four) times a day. with each Sinemet dose      ? gabapentin (NEURONTIN) 300 MG capsule Take 600 mg by mouth daily with supper 2 capsules at dinner, 1 capsule at bedtime.           ? gabapentin (NEURONTIN) 300 MG capsule Take 300 mg by mouth at bedtime 2 capsules at dinner, 1 capsule at bedtime .     ? levothyroxine (SYNTHROID, LEVOTHROID) 137 MCG tablet Take 1 tablet (137 mcg total) by mouth at bedtime.     ? melatonin 3 mg Tab tablet Take 3 mg by mouth at bedtime as needed.     ? metoprolol succinate (TOPROL-XL) 25 MG Take 12.5 mg by mouth at bedtime.     ? nitroglycerin (NITROSTAT) 0.4 MG SL tablet Place 1 tablet (0.4 mg total) under the tongue every 5 (five) minutes as needed for chest pain. 15 tablet 1   ? omeprazole (PRILOSEC) 20 MG capsule Take 1 capsule (20 mg total) by mouth daily. (Patient taking differently: Take 20 mg by mouth daily as needed .      ) 90 capsule 3   ? polyethylene glycol (MIRALAX) 17 gram packet Take 17 g by mouth daily .           ? senna-docusate (SENNOSIDES-DOCUSATE SODIUM) 8.6-50 mg tablet  Take 1 tablet by mouth daily with lunch.     ? ticagrelor (BRILINTA) 90 mg Tab Take 1 tablet (90 mg total) by mouth 2 (two) times a day. 60 tablet 11   ? venlafaxine (EFFEXOR-XR) 150 MG 24 hr capsule Take 150 mg by mouth daily 75 mg + 150 mg  = 225 mg .     ? venlafaxine (EFFEXOR-XR) 75 MG 24 hr capsule Take 75 mg by mouth daily 75 mg + 150 mg = 225 mg .       No current facility-administered medications for this visit.       Allergies   Allergen Reactions   ? Clonazepam      Too drowsy         Lab Results    Chemistry CBC BNP   Lab Results   Component Value Date    CREATININE 0.82 12/07/2018    BUN 15 12/07/2018     12/07/2018    K 3.7 12/07/2018     12/07/2018    CO2 29 12/07/2018     Creatinine (mg/dL)   Date Value   12/07/2018 0.82   11/01/2018 0.83   10/31/2018 0.79   10/29/2018 0.83    Lab Results   Component Value Date    WBC 6.2 12/07/2018    HGB 14.2 12/07/2018    HCT 43.6 12/07/2018    MCV 90 12/07/2018     12/07/2018    No results found for: BNP  No results found for: BNP       30 minutes were spent with the patient with greater than 50% spent on education and counseling.      Kelli Webb, [facility] Care   Heart Failure Clinic

## 2021-06-27 NOTE — PROGRESS NOTES
Progress Notes by Roxy Barraza MD at 1/18/2019  9:50 AM     Author: Roxy Barraza MD Service: -- Author Type: Physician    Filed: 1/18/2019  1:30 PM Encounter Date: 1/18/2019 Status: Signed    : Roxy Barraza MD (Physician)           Click to link to [facility] HEART CARE NOTE    Thank you, Dr. Washington, for asking the [facility] Heart Care team to see Mr. Silver Zamora to follow-up on coronary artery disease and ischemic cardiomyopathy.    Assessment/Recommendations   Assessment:    1.  Coronary artery disease, status post drug-eluting stent placement to the right coronary artery in November 2018 following abnormal nuclear stress study.  He was noted to have a 60% stenosis in the mid LAD which was not flow limiting.  At this point he remains on dual antiplatelet therapy which was recommended for a total of 12 months.  At a minimum, he will need to remain on this for 6 months before interruption and thus I would not recommend treatment of his cervical spinal stenosis at this point.  2.  Moderate to severe ischemic cardiomyopathy, ejection fraction 34% by nuclear stress testing.  We will plan to pursue limited echo in the next several weeks to reassess LV function.  If his ejection fraction remains low, he may need to be considered for prophylactic ICD implant.  3.  Parkinson's disease, currently under medical treatment.  He has seen a new Parkinson physician who has recommended changing his medications.  Although there is mention of potential rhythm issues with 1 of the medications, there appears to be no contraindication in using it in patients with coronary artery disease or left ventricular dysfunction.  At this point, I told the patient, his wife and his sister that I see no contraindication and utilizing it.  4.  Hypercholesterolemia, on high-dose atorvastatin.  Goal LDL should be less than 70.    Plan:  1.  Continue current medications  2.  Follow-up with me in 3 months        History of Present Illness    Mr. Silver Zamora is a 68 y.o. male with history of gallstone pancreatitis, cardiomyopathy with chronic systolic heart failure, Parkinson's disease and cervical spinal stenosis who returns to [facility] for follow-up visit after his recent stent procedure.  I saw him initially several months ago and recommended a nuclear stress study given persistent left ventricular dysfunction on an echocardiogram.  This demonstrated a large area of ischemia in the lateral wall extending into the inferior wall.  He subsequently underwent coronary angiography demonstrating severe stenosis in the right coronary artery as well as a moderate stenosis in the left anterior descending.  He was also noted to have total occlusion of an obtuse marginal branch.  He underwent successful drug-eluting stent placement to the right coronary artery.  The stenosis in the left anterior descending was not flow limiting and thus no intervention was performed.    Since his procedure several months ago, he has done well from a cardiac standpoint.  He denied any chest discomfort but did report epigastric/chest pain when walking out of the clinic for which he was brought back in for an ECG which showed no acute changes.  He does report ease riding the stationary bike which is new.  He also tells me that intermittent sweating episodes appeared to resolve for a month after his intervention but have since resurfaced.    ECG (personally reviewed): ECG obtained for chest pain demonstrated normal sinus rhythm and T wave inversions in the lateral leads possibly consistent with ischemia.  No change when compared to previous ECGs.    Cardiac Imaging Studies (personally reviewed): No new cardiac imaging     Physical Examination Review of Systems   Vitals:    01/18/19 0950   BP: 122/78   Pulse: 60   Resp: 16     Body mass index is 31.42 kg/m .  Wt Readings from Last 3 Encounters:   01/18/19 219 lb (99.3 kg)   01/14/19 215  lb (97.5 kg)   01/11/19 215 lb (97.5 kg)     General Appearance:   Awake, Alert, No acute distress.   HEENT:  No scleral icterus; the mucous membranes were pink and moist.   Neck: No cervical bruits or jugular venous distention    Chest: The spine was straight. The chest was symmetric.   Lungs:   Respirations unlabored; the lungs are clear to auscultation. No wheezing   Cardiovascular:    Regular rate and rhythm.  S1, S2 normal.  No murmur or gallop   Abdomen:  No organomegaly, masses, bruits, or tenderness. Bowels sounds are present   Extremities:  No peripheral edema bilaterally   Skin: No xanthelasma. Warm, Dry.   Musculoskeletal: No tenderness.   Neurologic: Mood and affect are appropriate.    General: WNL  Eyes: WNL  Ears/Nose/Throat: Hearing Loss  Lungs: WNL  Heart: WNL  Stomach: Constipation, Heartburn  Bladder: Frequent Urination at Night  Muscle/Joints: WNL  Skin: WNL  Nervous System: Falls, Dizziness  Mental Health: Depression     Blood: WNL     Medical History  Surgical History Family History Social History   Past Medical History:   Diagnosis Date   ? Anxiety    ? Callus    ? Cardiomyopathy (H)    ? Chronic back pain    ? Delirium    ? Depression    ? Fall    ? GERD (gastroesophageal reflux disease)    ? Hypothyroidism    ? Pancreatitis 12/31/2015   ? PD (Parkinson's disease) (H)    ? RBD (REM behavioral disorder) 4/3/2017   ? Shingles     hx    Past Surgical History:   Procedure Laterality Date   ? CV CORONARY ANGIOGRAM N/A 10/31/2018    Procedure: Coronary Angiogram;  Surgeon: Jose Meyer MD;  Location: [facility] Cath Lab;  Service:    ? CV LEFT HEART CATHETERIZATION WO LEFT VETRICULOGRAM Left 10/31/2018    Procedure: Left Heart Catheterization Without Left Ventriculogram;  Surgeon: Jose Meyer MD;  Location: [facility] Cath Lab;  Service:    ? INGUINAL HERNIA REPAIR Bilateral 10/2013   ? LAMINECTOMY  10/2013   ? [place] ERCP REMOVE CALCULI/DEBRIS BILIARY/PANCREAS DUCT N/A 5/25/2018     Procedure: ENDOSCOPIC RETROGRADE CHOLANGIOPANCREATOGRAPHY SPINCTEROTOMY BILIARY STONE EXTRACTION;  Surgeon: Curtis Mcclain MD;  Location: [facility] OR;  Service: Gastroenterology   ? ME LAP,CHOLECYSTECTOMY/GRAPH N/A 2018    Procedure: LAPAROSCOPIC CHOLECYSTECTOMY;  Surgeon: Joyce Melissa MD;  Location: [facility] OR;  Service: General    Family History   Problem Relation Age of Onset   ? Kidney failure Mother    ? Heart disease Father 82   ? Tremor Father    ? Parkinsonism Paternal Grandfather    ? Tremor Sister    ? Leukemia Maternal Grandmother     Social History     Socioeconomic History   ? Marital status:      Spouse name: Not on file   ? Number of children: Not on file   ? Years of education: Not on file   ? Highest education level: Not on file   Social Needs   ? Financial resource strain: Not on file   ? Food insecurity - worry: Not on file   ? Food insecurity - inability: Not on file   ? Transportation needs - medical: Not on file   ? Transportation needs - non-medical: Not on file   Occupational History   ? Not on file   Tobacco Use   ? Smoking status: Former Smoker     Types: Cigarettes     Last attempt to quit: 1980     Years since quittin.0   ? Smokeless tobacco: Never Used   Substance and Sexual Activity   ? Alcohol use: No     Comment: one drink a week   ? Drug use: No   ? Sexual activity: Not on file   Other Topics Concern   ? Not on file   Social History Narrative   ? Not on file          Medications  Allergies   Current Outpatient Medications   Medication Sig Dispense Refill   ? acetaminophen (TYLENOL) 325 MG tablet Take 2 tablets (650 mg total) by mouth every 4 (four) hours as needed.  0   ? ammonium lactate (AMLACTIN) 12 % cream Apply two times a day 385 g 2   ? aspirin 81 mg chewable tablet Chew 1 tablet (81 mg total) daily.  0   ? atorvastatin (LIPITOR) 80 MG tablet Take 1 tablet (80 mg total) by mouth daily. 30 tablet 3   ? bisacodyl (DULCOLAX) 10 mg suppository  One supp [place] qday prn constipation.  Give if no BM in prior 3 days.  0   ? carbidopa-levodopa (PARCOPA)  mg per disintegrating tablet Take 1 tablet by mouth 3 (three) times a day. 90 tablet 1   ? cholecalciferol, vitamin D3, (VITAMIN D3) 2,000 unit Tab Take 1 tablet (2,000 Units total) by mouth daily.  0   ? entacapone (COMTAN) 200 mg tablet Take 200 mg by mouth 4 (four) times a day. with each Sinemet dose      ? gabapentin (NEURONTIN) 300 MG capsule Take 600 mg by mouth at bedtime.           ? gabapentin (NEURONTIN) 300 MG capsule Take 300 mg by mouth 3 (three) times a day.           ? levothyroxine (SYNTHROID, LEVOTHROID) 137 MCG tablet Take 1 tablet (137 mcg total) by mouth at bedtime.     ? metoprolol succinate (TOPROL-XL) 25 MG Take 12.5 mg by mouth at bedtime.     ? nitroglycerin (NITROSTAT) 0.4 MG SL tablet Place 1 tablet (0.4 mg total) under the tongue every 5 (five) minutes as needed for chest pain. 15 tablet 1   ? omeprazole (PRILOSEC) 20 MG capsule Take 1 capsule (20 mg total) by mouth daily. (Patient taking differently: Take 20 mg by mouth daily as needed .      ) 90 capsule 3   ? polyethylene glycol (MIRALAX) 17 gram packet Take 17 g by mouth daily.           ? senna-docusate (SENNOSIDES-DOCUSATE SODIUM) 8.6-50 mg tablet Take 1 tablet by mouth 2 (two) times a day.           ? ticagrelor (BRILINTA) 90 mg Tab Take 1 tablet (90 mg total) by mouth 2 (two) times a day. 60 tablet 11   ? venlafaxine (EFFEXOR-XR) 150 MG 24 hr capsule Take 1 capsule (150 mg total) by mouth daily. 75 mg + 150 mg  = 225 mg 90 capsule 2   ? venlafaxine (EFFEXOR-XR) 75 MG 24 hr capsule Take 1 capsule (75 mg total) by mouth daily. 75 mg + 150 mg = 225 mg 90 capsule 2   ? carbidopa-levodopa (SINEMET CR)  mg ER tablet Take 1 tablet by mouth 4 (four) times a day 0800,1200,1600,2300 (1 hour after last dose of IR).           ? carbidopa-levodopa (SINEMET CR)  mg ER tablet Take 1 tablet by mouth daily as needed (in the  middle of the night).     ? carbidopa-levodopa (SINEMET)  mg per tablet Take 2 tablets by mouth 3 (three) times a day At approx. 0800, 1200, and 1600..           ? carbidopa-levodopa (SINEMET)  mg per tablet Take 3 tablets by mouth at bedtime At approx 2200 .           ? melatonin 3 mg Tab tablet Take 3 mg by mouth at bedtime as needed.       No current facility-administered medications for this visit.       Allergies   Allergen Reactions   ? Clonazepam      Too drowsy         Lab Results    Chemistry/lipid CBC Cardiac Enzymes/BNP/TSH/INR   Lab Results   Component Value Date    CHOL 168 10/31/2018    HDL 40 10/31/2018    LDLCALC 104 10/31/2018    TRIG 119 10/31/2018    CREATININE 0.82 12/07/2018    BUN 15 12/07/2018    K 3.7 12/07/2018     12/07/2018     12/07/2018    CO2 29 12/07/2018    Lab Results   Component Value Date    WBC 6.2 12/07/2018    HGB 14.2 12/07/2018    HCT 43.6 12/07/2018    MCV 90 12/07/2018     12/07/2018    Lab Results   Component Value Date    TROPONINI 0.02 12/07/2018    TSH 1.97 12/07/2018    INR 1.01 03/21/2013

## 2021-06-27 NOTE — PROGRESS NOTES
Progress Notes by Roxy Barraza MD at 6/11/2019  2:30 PM     Author: Roxy Barraza MD Service: -- Author Type: Physician    Filed: 6/11/2019  3:21 PM Encounter Date: 6/11/2019 Status: Signed    : Roxy Barraza MD (Physician)           Click to link to [facility] Heart [facility] HEART CARE NOTE    Thank you, Dr. Washington, for asking the [facility] Heart Care team to see Mr. Silver Zamora to follow-up on coronary artery disease and ischemic cardiomyopathy.    Assessment/Recommendations   Assessment:    1.  Coronary artery disease, status post stenting of the mid RCA in late October 2018 with documented moderate disease in the right coronary artery.  Patient reports no anginal symptoms.  At this point, continue dual antiplatelet therapy and other medical management.  Because of the 60% stenosis in the mid LAD, I would favor continuation of low-dose beta-blocker.  2.  Ischemic cardiomyopathy, markedly improved based on recent echocardiogram in February showing an ejection fraction of 51%.  3.  Parkinson's disease, fairly stable  4.  Essential hypertension, controlled    Plan:  1.  Continue current medications  2.  Follow-up in 6 months       History of Present Illness    Mr. Silver Zamora is a 69 y.o. male with history of coronary artery disease, status post stenting of the mid right coronary artery in October 2018 following an abnormal nuclear stress test with demonstration of a 60% mid LAD, essential hypertension, hyperlipidemia and Parkinson's disease presents today for follow-up visit.  Since I last saw him, he has undergone a limited echocardiogram showing improvement in left ventricular function with an ejection fraction of 51%.  He reports no symptoms of chest pain, orthopnea, PND or lower extremity edema.  His activity continues to be limited by his Parkinson's disease.    ECG (personally reviewed): No ECG today    Cardiac Imaging Studies (personally reviewed): No new imaging     Physical  Examination Review of Systems   Vitals:    06/11/19 1450   BP: 130/72   Pulse: 60   Resp: 20     Body mass index is 30.42 kg/m .  Wt Readings from Last 3 Encounters:   06/11/19 212 lb (96.2 kg)   03/22/19 213 lb (96.6 kg)   02/08/19 217 lb (98.4 kg)     General Appearance:   Awake, Alert, No acute distress.   HEENT:  No scleral icterus; the mucous membranes were pink and moist.   Neck: No cervical bruits or jugular venous distention    Chest: The spine was straight. The chest was symmetric.   Lungs:   Respirations unlabored; the lungs are clear to auscultation. No wheezing   Cardiovascular:    Regular rate and rhythm.  S1, S2 normal.  No murmur or gallop   Abdomen:  No organomegaly, masses, bruits, or tenderness. Bowels sounds are present   Extremities:  No peripheral edema bilaterally   Skin: No xanthelasma. Warm, Dry.   Musculoskeletal: No tenderness.   Neurologic: Mood and affect are appropriate.    General: WNL  Eyes: WNL  Ears/Nose/Throat: WNL  Lungs: WNL  Heart: WNL  Stomach: Constipation, Diarrhea, Heartburn, Nausea  Bladder: Frequent Urination at Night  Muscle/Joints: WNL  Skin: WNL  Nervous System: Daytime Sleepiness, Dizziness, Loss of Balance  Mental Health: WNL     Blood: WNL     Medical History  Surgical History Family History Social History   Past Medical History:   Diagnosis Date   ? Anxiety    ? Callus    ? Cardiomyopathy (H)    ? Chronic back pain    ? Delirium    ? Depression    ? Fall    ? GERD (gastroesophageal reflux disease)    ? Hypothyroidism    ? Pancreatitis 12/31/2015   ? PD (Parkinson's disease) (H)    ? RBD (REM behavioral disorder) 4/3/2017   ? Shingles     hx    Past Surgical History:   Procedure Laterality Date   ? CV CORONARY ANGIOGRAM N/A 10/31/2018    Procedure: Coronary Angiogram;  Surgeon: Jose Meyer MD;  Location: [facility] Cath Lab;  Service:    ? CV LEFT HEART CATHETERIZATION WO LEFT VETRICULOGRAM Left 10/31/2018    Procedure: Left Heart Catheterization Without Left  Ventriculogram;  Surgeon: Jose Meyer MD;  Location: [facility] Cath Lab;  Service:    ? INGUINAL HERNIA REPAIR Bilateral 10/2013   ? LAMINECTOMY  10/2013   ? SC ERCP REMOVE CALCULI/DEBRIS BILIARY/PANCREAS DUCT N/A 2018    Procedure: ENDOSCOPIC RETROGRADE CHOLANGIOPANCREATOGRAPHY SPINCTEROTOMY BILIARY STONE EXTRACTION;  Surgeon: Curtis Mcclain MD;  Location: [facility] [place];  Service: Gastroenterology   ? SC LAP,CHOLECYSTECTOMY/GRAPH N/A 2018    Procedure: LAPAROSCOPIC CHOLECYSTECTOMY;  Surgeon: Joyce Melissa MD;  Location: [facility] OR;  Service: General    Family History   Problem Relation Age of Onset   ? Kidney failure Mother    ? Heart disease Father 82   ? Tremor Father    ? Parkinsonism Paternal Grandfather    ? Tremor Sister    ? Leukemia Maternal Grandmother     Social History     Socioeconomic History   ? Marital status:      Spouse name: Not on file   ? Number of children: Not on file   ? Years of education: Not on file   ? Highest education level: Not on file   Occupational History   ? Not on file   Social Needs   ? Financial resource strain: Not on file   ? Food insecurity:     Worry: Not on file     Inability: Not on file   ? Transportation needs:     Medical: Not on file     Non-medical: Not on file   Tobacco Use   ? Smoking status: Former Smoker     Types: Cigarettes     Last attempt to quit: 1980     Years since quittin.4   ? Smokeless tobacco: Never Used   Substance and Sexual Activity   ? Alcohol use: No     Comment: one drink a week   ? Drug use: No   ? Sexual activity: Not on file   Lifestyle   ? Physical activity:     Days per week: Not on file     Minutes per session: Not on file   ? Stress: Not on file   Relationships   ? Social connections:     Talks on phone: Not on file     Gets together: Not on file     Attends [employer] service: Not on file     Active member of club or organization: Not on file     Attends meetings of clubs or organizations: Not  on file     Relationship status: Not on file   ? Intimate partner violence:     Fear of current or ex partner: Not on file     Emotionally abused: Not on file     Physically abused: Not on file     Forced sexual activity: Not on file   Other Topics Concern   ? Not on file   Social History Narrative   ? Not on file          Medications  Allergies   Current Outpatient Medications   Medication Sig Dispense Refill   ? acetaminophen (TYLENOL) 325 MG tablet Take 2 tablets (650 mg total) by mouth every 4 (four) hours as needed.  0   ? ammonium lactate (AMLACTIN) 12 % cream Apply two times a day 385 g 2   ? aspirin 81 mg chewable tablet Chew 1 tablet (81 mg total) daily.  0   ? atorvastatin (LIPITOR) 80 MG tablet Take 1 tablet (80 mg total) by mouth daily. 90 tablet 3   ? bisacodyl (DULCOLAX) 10 mg suppository One supp WA qday prn constipation.  Give if no BM in prior 3 days.  0   ? carbidopa-levodopa (PARCOPA)  mg per disintegrating tablet Take 1 tablet by mouth as needed. 90 tablet 3   ? carbidopa-levodopa (RYTARY) 23.75-95 mg CpER ER capsule Take 4 capsules by mouth daily. Take 5 caps at 8am, 7 caps at 12pm and 4pm and 4 caps at 8pm (Patient taking differently: Take 4 capsules by mouth daily. Take 5 caps at 8am, 6 caps at 12pm and 4pm and 4 caps at 8pm      ) 690 capsule 3   ? cholecalciferol, vitamin D3, (VITAMIN D3) 2,000 unit Tab Take 1 tablet (2,000 Units total) by mouth daily.  0   ? levothyroxine (SYNTHROID, LEVOTHROID) 137 MCG tablet Take 1 tablet (137 mcg total) by mouth at bedtime.     ? metoprolol succinate (TOPROL-XL) 25 MG Take 12.5 mg by mouth at bedtime.     ? nitroglycerin (NITROSTAT) 0.4 MG SL tablet Place 1 tablet (0.4 mg total) under the tongue every 5 (five) minutes as needed for chest pain. 15 tablet 1   ? omeprazole (PRILOSEC) 20 MG capsule Take 1 capsule (20 mg total) by mouth daily. (Patient taking differently: Take 20 mg by mouth daily as needed .      ) 90 capsule 3   ? pimavanserin  (NUPLAZID) 34 mg cap capsule Take 1 capsule (34 mg total) by mouth at bedtime. 30 capsule 5   ? polyethylene glycol (MIRALAX) 17 gram packet Take 17 g by mouth daily.           ? senna-docusate (SENNOSIDES-DOCUSATE SODIUM) 8.6-50 mg tablet Take 1 tablet by mouth 2 (two) times a day.           ? ticagrelor (BRILINTA) 90 mg Tab Take 1 tablet (90 mg total) by mouth 2 (two) times a day. 60 tablet 11   ? venlafaxine (EFFEXOR-XR) 150 MG 24 hr capsule Take 1 capsule (150 mg total) by mouth daily. 75 mg + 150 mg  = 225 mg 90 capsule 2   ? venlafaxine (EFFEXOR-XR) 75 MG 24 hr capsule Take 1 capsule (75 mg total) by mouth daily. 75 mg + 150 mg = 225 mg 90 capsule 2   ? carbidopa-levodopa (SINEMET CR)  mg ER tablet Take 1 tablet by mouth 2 (two) times a day. In am and 2pm (Patient taking differently: Take 1 tablet by mouth at bedtime. In am and 2pm      ) 60 tablet 3   ? gabapentin (NEURONTIN) 300 MG capsule Take 600 mg by mouth at bedtime.           ? gabapentin (NEURONTIN) 300 MG capsule Take 300 mg by mouth daily.              No current facility-administered medications for this visit.       Allergies   Allergen Reactions   ? Clonazepam      Too drowsy         Lab Results    Chemistry/lipid CBC Cardiac Enzymes/BNP/TSH/INR   Lab Results   Component Value Date    CHOL 168 10/31/2018    HDL 40 10/31/2018    LDLCALC 104 10/31/2018    TRIG 119 10/31/2018    CREATININE 0.82 12/07/2018    BUN 15 12/07/2018    K 3.7 12/07/2018     12/07/2018     12/07/2018    CO2 29 12/07/2018    Lab Results   Component Value Date    WBC 6.2 12/07/2018    HGB 14.2 12/07/2018    HCT 43.6 12/07/2018    MCV 90 12/07/2018     12/07/2018    Lab Results   Component Value Date    TROPONINI 0.02 12/07/2018    TSH 1.97 12/07/2018    INR 1.01 03/21/2013

## 2021-06-27 NOTE — PROGRESS NOTES
Progress Notes by Kelli Webb CNP at 3/22/2019  3:30 PM     Author: Kelli Webb CNP Service: -- Author Type: Nurse Practitioner    Filed: 3/22/2019  4:12 PM Encounter Date: 3/22/2019 Status: Signed    : Kelli Webb CNP (Nurse Practitioner)           Click to link to [facility] Heart [facility] HEART [facility] NOTE      Assessment/Recommendations   Assessment:    1. Ischemic cardiomyopathy with systolic dysfunction, improved LVEF of 51%, NYHA class II: Compensated.  He continues to have fatigue and mild dyspnea on exertion.  His weights have been stable.  He continues to follow a low-sodium diet.    2.  Hypertension: Controlled.  Blood pressure 130/68    3.  Coronary artery disease: He denies any chest pain.  Status post PCI to mid RCA.  He is on dual antiplatelet therapy with aspirin 81 mg and ticagrelor 90 mg twice a day.    Plan:  1.  Continue current medications  2.  Continue weights and low-sodium diet  3.  Encouraged regular exercise    Silver Zamora will follow up with Dr. Barraza April 23 and in the heart failure clinic in 3 months.     History of Present Illness    Mr. Silver Zaomra is a 69 y.o. male seen at [facility] heart failure clinic today for continued follow-up.  His wife and granddaughter accompany him today.  He follows up for ischemic cardiomyopathy with systolic dysfunction.  He had an echocardiogram February 1, 2019 which showed an improved ejection fraction of 51%.  He had a stress test which was positive and he underwent a coronary angiogram October 31, 2018.  He had a PCI placed to his mid RCA.  He also had 60% stenosis in his mid LAD.  He is a past medical history significant for Parkinson's disease and hypertension.    Today, he comes in with continued fatigue and mild dyspnea on exertion.  He states he feels pretty well for the most part.  He states he started new Parkinson's disease medication which took a while to adjust to but feels it has  benefited him.  He denies orthopnea or PND.  He denies chest pain.  He does have trace lower extremity edema.  He denies lightheadedness, shortness of breath, orthopnea, PND, palpitations, chest pain and abdominal fullness/bloating.      He is monitoring home weights which are stable around 213 pounds.  He is following a low sodium diet.      ECHO 2/1/2019:   Summary       Mild aortic root enlargement    The calculated left ventricular ejection fraction is 51%. This represents a mildly decreased ejection fraction. There is akinesis of the inferolateral wall and dyskinesis of a small area of the lateral wall.. E/e' 8 to 15, which is equivocal for estimating LV filling pressures.    Normal right ventricular size and systolic function.    When compared to the previous study dated 7/30/2018, there is slight improvement in overall left ventricular function. There remains wall motion abnormalities involving the inferolateral and lateral walls.          Physical Examination Review of Systems   Vitals:    03/22/19 1538   BP: 130/68   Pulse: (!) 52   Resp: 18     Body mass index is 30.56 kg/m .  Wt Readings from Last 3 Encounters:   03/22/19 213 lb (96.6 kg)   02/08/19 217 lb (98.4 kg)   02/07/19 217 lb (98.4 kg)       General Appearance:     Alert, cooperative and in no acute distress.   ENT/Mouth: membranes moist, no oral lesions or bleeding gums.      EYES:  no scleral icterus, normal conjunctivae   Neck: no carotid bruits or thyromegaly   Chest/Lungs:   lungs are clear to auscultation, no rales or wheezing, respirations unlabored   Cardiovascular:   Regular. Normal first and second heart sounds with no murmurs, rubs, or gallops; the carotid, radial and posterior tibial pulses are intact, Jugular venous pressure normal, trace edema bilateral lower extremities    Abdomen:  Soft, nontender, nondistended, bowel sounds present   Extremities: no cyanosis or clubbing   Skin: warm, dry.    Neurologic: mood and affect are  appropriate, alert and oriented x3      General: WNL  Eyes: WNL  Ears/Nose/Throat: WNL  Lungs: WNL  Heart: WNL  Stomach: WNL  Bladder: WNL  Muscle/Joints: WNL  Skin: WNL  Nervous System: WNL  Mental Health: WNL     Blood: WNL     Medical History  Surgical History Family History Social History   Past Medical History:   Diagnosis Date   ? Anxiety    ? Callus    ? Cardiomyopathy (H)    ? Chronic back pain    ? Delirium    ? Depression    ? Fall    ? GERD (gastroesophageal reflux disease)    ? Hypothyroidism    ? Pancreatitis 12/31/2015   ? PD (Parkinson's disease) (H)    ? RBD (REM behavioral disorder) 4/3/2017   ? Shingles     hx    Past Surgical History:   Procedure Laterality Date   ? CV CORONARY ANGIOGRAM N/A 10/31/2018    Procedure: Coronary Angiogram;  Surgeon: Jose Meyer MD;  Location: [facility] Cath Lab;  Service:    ? CV LEFT HEART CATHETERIZATION WO LEFT VETRICULOGRAM Left 10/31/2018    Procedure: Left Heart Catheterization Without Left Ventriculogram;  Surgeon: Jose Meyer MD;  Location: [facility] Cath Lab;  Service:    ? INGUINAL HERNIA REPAIR Bilateral 10/2013   ? LAMINECTOMY  10/2013   ? CO ERCP REMOVE CALCULI/DEBRIS BILIARY/PANCREAS DUCT N/A 5/25/2018    Procedure: ENDOSCOPIC RETROGRADE CHOLANGIOPANCREATOGRAPHY SPINCTEROTOMY BILIARY STONE EXTRACTION;  Surgeon: Curtis Mcclain MD;  Location: [facility] [place];  Service: Gastroenterology   ? CO LAP,CHOLECYSTECTOMY/GRAPH N/A 5/24/2018    Procedure: LAPAROSCOPIC CHOLECYSTECTOMY;  Surgeon: Joyce Melissa MD;  Location: [facility] [place];  Service: General    Family History   Problem Relation Age of Onset   ? Kidney failure Mother    ? Heart disease Father 82   ? Tremor Father    ? Parkinsonism Paternal Grandfather    ? Tremor Sister    ? Leukemia Maternal Grandmother     Social History     Socioeconomic History   ? Marital status:      Spouse name: Not on file   ? Number of children: Not on file   ? Years of education: Not on file   ?  Highest education level: Not on file   Occupational History   ? Not on file   Social Needs   ? Financial resource strain: Not on file   ? Food insecurity:     Worry: Not on file     Inability: Not on file   ? Transportation needs:     Medical: Not on file     Non-medical: Not on file   Tobacco Use   ? Smoking status: Former Smoker     Types: Cigarettes     Last attempt to quit: 1980     Years since quittin.2   ? Smokeless tobacco: Never Used   Substance and Sexual Activity   ? Alcohol use: No     Comment: one drink a week   ? Drug use: No   ? Sexual activity: Not on file   Lifestyle   ? Physical activity:     Days per week: Not on file     Minutes per session: Not on file   ? Stress: Not on file   Relationships   ? Social connections:     Talks on phone: Not on file     Gets together: Not on file     Attends Sikh service: Not on file     Active member of club or organization: Not on file     Attends meetings of clubs or organizations: Not on file     Relationship status: Not on file   ? Intimate partner violence:     Fear of current or ex partner: Not on file     Emotionally abused: Not on file     Physically abused: Not on file     Forced sexual activity: Not on file   Other Topics Concern   ? Not on file   Social History Narrative   ? Not on file          Medications  Allergies   Current Outpatient Medications   Medication Sig Dispense Refill   ? acetaminophen (TYLENOL) 325 MG tablet Take 2 tablets (650 mg total) by mouth every 4 (four) hours as needed.  0   ? ammonium lactate (AMLACTIN) 12 % cream Apply two times a day 385 g 2   ? aspirin 81 mg chewable tablet Chew 1 tablet (81 mg total) daily.  0   ? atorvastatin (LIPITOR) 80 MG tablet Take 1 tablet (80 mg total) by mouth daily. 90 tablet 3   ? bisacodyl (DULCOLAX) 10 mg suppository One supp CO qday prn constipation.  Give if no BM in prior 3 days.  0   ? carbidopa-levodopa (PARCOPA)  mg per disintegrating tablet Take 1 tablet by mouth as  needed.     ? carbidopa-levodopa (RYTARY) 23.75-95 mg CpER ER capsule Take 4 capsules by mouth daily. Take 4 caps at 8am, 6 caps at 12pm, and 6 caps at 4pm and 4 caps at 8pm 600 capsule 3   ? carbidopa-levodopa (SINEMET CR)  mg ER tablet Take 1 tablet by mouth 2 (two) times a day. In am and 2pm (Patient taking differently: Take 1 tablet by mouth at bedtime. In am and 2pm      ) 60 tablet 3   ? cholecalciferol, vitamin D3, (VITAMIN D3) 2,000 unit Tab Take 1 tablet (2,000 Units total) by mouth daily.  0   ? gabapentin (NEURONTIN) 300 MG capsule Take 600 mg by mouth at bedtime.           ? gabapentin (NEURONTIN) 300 MG capsule Take 300 mg by mouth daily.            ? levothyroxine (SYNTHROID, LEVOTHROID) 137 MCG tablet Take 1 tablet (137 mcg total) by mouth at bedtime.     ? metoprolol succinate (TOPROL-XL) 25 MG Take 12.5 mg by mouth at bedtime.     ? nitroglycerin (NITROSTAT) 0.4 MG SL tablet Place 1 tablet (0.4 mg total) under the tongue every 5 (five) minutes as needed for chest pain. 15 tablet 1   ? omeprazole (PRILOSEC) 20 MG capsule Take 1 capsule (20 mg total) by mouth daily. (Patient taking differently: Take 20 mg by mouth daily as needed .      ) 90 capsule 3   ? pimavanserin (NUPLAZID) 34 mg cap capsule Take 1 capsule (34 mg total) by mouth at bedtime. 30 capsule 5   ? polyethylene glycol (MIRALAX) 17 gram packet Take 17 g by mouth daily.           ? senna-docusate (SENNOSIDES-DOCUSATE SODIUM) 8.6-50 mg tablet Take 1 tablet by mouth 2 (two) times a day.           ? ticagrelor (BRILINTA) 90 mg Tab Take 1 tablet (90 mg total) by mouth 2 (two) times a day. 60 tablet 11   ? venlafaxine (EFFEXOR-XR) 150 MG 24 hr capsule Take 1 capsule (150 mg total) by mouth daily. 75 mg + 150 mg  = 225 mg 90 capsule 2   ? venlafaxine (EFFEXOR-XR) 75 MG 24 hr capsule Take 1 capsule (75 mg total) by mouth daily. 75 mg + 150 mg = 225 mg 90 capsule 2   ? amantadine HCl 137 mg Cp24 Take 2 capsules by mouth at bedtime. 60  capsule 6     No current facility-administered medications for this visit.       Allergies   Allergen Reactions   ? Clonazepam      Too drowsy         Lab Results    Chemistry CBC BNP   Lab Results   Component Value Date    CREATININE 0.82 12/07/2018    BUN 15 12/07/2018     12/07/2018    K 3.7 12/07/2018     12/07/2018    CO2 29 12/07/2018     Creatinine (mg/dL)   Date Value   12/07/2018 0.82   11/01/2018 0.83   10/31/2018 0.79   10/29/2018 0.83    Lab Results   Component Value Date    WBC 6.2 12/07/2018    HGB 14.2 12/07/2018    HCT 43.6 12/07/2018    MCV 90 12/07/2018     12/07/2018    No results found for: BNP  No results found for: BNP       25 minutes were spent with the patient with greater than 50% spent on education and counseling.      Kelli Webb, [facility]   Heart Failure Clinic

## 2021-06-28 NOTE — PROGRESS NOTES
Progress Notes by Jena Cazares RN at 10/25/2019 11:39 AM     Author: Jena Cazares RN Service: -- Author Type: Registered Nurse    Filed: 10/25/2019  1:49 PM Encounter Date: 10/25/2019 Status: Signed    : Jena Cazares RN (Registered Nurse)       Clinic Care Coordination Contact    Assessment: Care Coordinator [facility] contacted patient for 2 month follow up.  Patient has continued to follow the plan of care and assessment is negative for any new needs or concerns. Patient is currently enrolled with [facility] services.    Enrollment status: Graduated.      Plan: No further outreaches at this time.  Patient will continue to follow the plan of care.  If new needs arise a new Care Coordination referral may be placed.  FYI to PCP    Heart Failure    You have been diagnosed with heart failure. The term heart failure sounds scary because it suggests the heart is no longer working. But it actually means the heart isn't doing its job as well as it should. Heart failure happens when your heart muscle can't keep up with your body's need for blood flow. Symptoms of heart failure can be controlled by changes in your lifestyle and by following your doctor's advice.  When to call your doctor  Call your doctor right away if you have any of these signs of worsening heart failure:    Sudden weight gain (more than 2 pounds in 1 day or 5 pounds in 1 week, or whatever weight gain you were told to report by your doctor)    Trouble breathing not related to being active    New or increased swelling of your legs or ankles    Swelling or pain in your abdomen    Breathing trouble at night (waking up short of breath, needing more pillows to breathe)    Frequent coughing that doesn't go away    Feeling much more tired than usual  When to seek emergency medical attention  Call 911 right away if you have:    Severe shortness of breath, such that you can't catch your breath even while resting    Severe shortness of  breath    Severe chest pain that does not resolve with rest or nitroglycerin    Pink, foamy mucus with cough and shortness of breath    A continuous rapid or irregular heartbeat    Passing out or fainting    Stroke symptoms such as sudden numbness or weakness on one side of your face, arm, or leg or sudden confusion, trouble speaking or vision changes    Understanding Coronary Artery Disease (CAD)  To understand coronary artery disease (CAD), you need to know how your heart works. Your heart is a muscle that pumps blood throughout your body. To work right, your heart needs a steady supply of oxygen. It gets this oxygen from blood supplied by the coronary arteries.   Healthy artery. When a coronary artery is healthy and has no blockages, blood flows through easily. Healthy arteries can easily supply the oxygen-rich blood your heart needs.    Damaged artery. Coronary artery disease begins when damage to the artery lining leads to the buildup of fat-like substances and cholesterol along the artery wall. This is called plaque. This damage could be caused by things like high blood pressure or smoking. This plaque buildup begins to narrow the arteries carrying blood to the heart. This is called atherosclerosis.    Narrowed artery. As more plaque builds up, your artery has trouble supplying blood to your heart muscle when it needs it most, such as during exercise. You may not feel any symptoms when this happens. Or you may feel angina--pressure, tightness, achiness, or pain in your chest, jaw, neck, back, or arm.    Blocked artery. A piece of plaque may break off and completely block the artery. Or a blood clot may plug the narrowed artery. When this happens, blood flow is blocked from reaching the heart. Without oxygen-rich blood, part of the heart muscle becomes damaged and stops working. You may feel crushing pressure or pain in or around your chest. This is a heart attack (acute myocardial infarction, or AMI) and is a  medical emergency.     Your Heart is at Risk Plaque and blood clots in the coronary arteries reduce blood flow to the heart:  When a coronary artery narrows, less blood and oxygen flow to the heart muscle. The decreased blood flow can cause symptoms of angina. This is often felt as temporary pain or pressure in or near the chest. This can also feel like pain in the jaw, neck, and shoulder.  When a coronary artery narrows too much, very little blood and oxygen reach the heart muscle beyond the site of narrowing. If a clot forms, blood flow in the artery may stop. This can result in a heart attack. If the muscle goes without oxygen for too long, that part of the heart muscle dies.  Your whole body is at risk  Plaque buildup can lead to problems throughout the body. Common sites of artery problems include:  Brain. Arteries in the brain or leading to the brain can become blocked. When this happens, blood flow to that part of the brain is reduced and that part of the brain cant get the oxygen it needs. That portion of the brain is damaged. This is a stroke.  Kidneys. If an artery that carries blood to the kidneys is narrowed, the kidneys have a hard time filtering blood. This can lead to kidney damage.  Aorta. This is the bodys main artery. It connects directly to the heart. If this artery is damaged, the affected section can weaken and balloon out. This is called an aortic aneurysm.  Legs. If arteries in the legs are clogged with plaque, cramping, or aching in the buttocks, thighs, or calves can occur when walking. This is called claudication.    Depression  Everyone feels down at times. The blues are a natural part of life. But an unhappy period thats intense or lasts for more than a couple of weeks can be a sign of depression. Depression is a serious illness. It can disrupt the lives of family and friends. If you know someone you think may be depressed, find out what you can do to help.    Know the serious  signals  Warning signals for suicide include:    Threats or talk of suicide    Statements such as I wont be a problem much longer or Nothing matters    Giving away possessions or making a will or  arrangements    Buying a gun or other weapon    Sudden, unexplained cheerfulness or calm after a period of depression  If you notice any of these signs, get help right away. Call a health care professional, mental health clinic, or suicide hotline and ask what action to take. In an emergency, dont hesitate to call the police.    [facility] Mental Health Crisis Lines:  [facility] [phone]  [facility] [phone]  [facility] [phone]  [facility] [phone]  [facility], Adults [phone]  [facility], Children [phone]  [facility], Adults [phone]  [facility], Children [phone]

## 2021-06-28 NOTE — PROGRESS NOTES
Progress Notes by Roxy Barraza MD at 11/20/2019  4:10 PM     Author: Roxy Barraza MD Service: -- Author Type: Physician    Filed: 11/20/2019  5:21 PM Encounter Date: 11/20/2019 Status: Signed    : Roxy Barraza MD (Physician)           Thank you, Dr. Washington, for asking the [facility] Care team to see Mr. Silver Zamora to follow-up on coronary artery disease and hypercholesterolemia.    Assessment/Recommendations   Assessment:    1.  Coronary artery disease, status post stenting of the right coronary artery in October 2018.  At that time, he was noted to have a 60% stenosis in the mid LAD which was not flow-limiting.  He has done well over the intervening year without anginal symptoms although his activity has become quite limited due to his Parkinson's disease.  At this point, given potential need for neurosurgical treatment of his cervical spinal stenosis, I have recommended pharmacologic nuclear stress study to better assess his cardiovascular risk.  If his stress study shows no evidence of ischemia, I will have him discontinue his Brilinta but remain on baby aspirin indefinitely.  2.  Ischemic cardiomyopathy, improved.  His last echocardiogram demonstrated ejection fraction of 51%.  No evidence of any heart failure symptoms on current management.  3.  Hypercholesterolemia, well controlled on current statin therapy.      Plan:  1.  Continue current medications for now  2.  Pursue pharmacologic nuclear stress study.  If no evidence of ischemia, will discontinue Brilinta       History of Present Illness    Mr. Silver Zamora is a 69 y.o. male with history of Parkinson's disease, cervical spinal stenosis and coronary artery disease status post stenting of the right coronary artery in October 2018 presents today for follow-up visit.  He reports no symptoms of chest discomfort or shortness of breath except when climbing stairs.  No orthopnea, PND or lower extremity edema.  When I questioned whether  he was doing any exercises, he did not answer directly but it appears that he is not doing any formal exercise.  He is questioning whether he can come off Brilinta and potentially undergo neurosurgical treatment of his cervical spinal stenosis.  Given his lack of activity, I suggested that we pursue an imaging stress study to verify that his LAD stenosis is not clinically significant.    ECG (personally reviewed): No ECG today    Cardiac Imaging Studies (personally reviewed): No recent imaging     Physical Examination Review of Systems   Vitals:    11/20/19 1626   BP: 124/70   Pulse: 68   Resp: 14     Body mass index is 34.05 kg/m .  Wt Readings from Last 3 Encounters:   11/20/19 219 lb (99.3 kg)   09/06/19 214 lb (97.1 kg)   06/11/19 212 lb (96.2 kg)     General Appearance:   Awake, Alert, No acute distress.   HEENT:  No scleral icterus; the mucous membranes were pink and moist.   Neck: No cervical bruits or jugular venous distention    Chest: The spine was straight. The chest was symmetric.   Lungs:   Respirations unlabored; the lungs are clear to auscultation. No wheezing   Cardiovascular:    Regular rate and rhythm.  S1, S2 normal.  No murmur or gallop   Abdomen:  No organomegaly, masses, bruits, or tenderness. Bowels sounds are present   Extremities:  No peripheral edema bilaterally.   Skin: No xanthelasma. Warm, Dry.   Musculoskeletal: No tenderness.   Neurologic: Mood and affect are appropriate.    General: WNL  Eyes: WNL  Ears/Nose/Throat: Hearing Loss  Lungs: WNL  Heart: Shortness of Breath with activity  Stomach: Constipation  Bladder: Frequent Urination at Night  Muscle/Joints: Joint Pain, Muscle Weakness, Muscle Pain  Skin: Poor Wound Healing  Nervous System: Loss of Balance, Falls, Daytime Sleepiness, Dizziness  Mental Health: Depression, Anxiety     Blood: Easy Bleeding, Easy Bruising     Medical History  Surgical History Family History Social History   Past Medical History:   Diagnosis Date   ?  Anxiety    ? Callus    ? Cardiomyopathy (H)    ? Chronic back pain    ? Delirium    ? Depression    ? Fall    ? GERD (gastroesophageal reflux disease)    ? Hypothyroidism    ? Pancreatitis 12/31/2015   ? PD (Parkinson's disease) (H)    ? RBD (REM behavioral disorder) 4/3/2017   ? Shingles     hx    Past Surgical History:   Procedure Laterality Date   ? CV CORONARY ANGIOGRAM N/A 10/31/2018    Procedure: Coronary Angiogram;  Surgeon: Jose Meyer MD;  Location: [facility];  Service:    ? CV LEFT HEART CATHETERIZATION WO LEFT VETRICULOGRAM Left 10/31/2018    Procedure: Left Heart Catheterization Without Left Ventriculogram;  Surgeon: Jose Meyer MD;  Location: [facility];  Service:    ? INGUINAL HERNIA REPAIR Bilateral 10/2013   ? LAMINECTOMY  10/2013   ? PA ERCP REMOVE CALCULI/DEBRIS BILIARY/PANCREAS DUCT N/A 5/25/2018    Procedure: ENDOSCOPIC RETROGRADE CHOLANGIOPANCREATOGRAPHY SPINCTEROTOMY BILIARY STONE EXTRACTION;  Surgeon: Curtis Mcclain MD;  Location: [facility];  Service: Gastroenterology   ? PA LAP,CHOLECYSTECTOMY/GRAPH N/A 5/24/2018    Procedure: LAPAROSCOPIC CHOLECYSTECTOMY;  Surgeon: Joyce Melissa MD;  Location: [facility] OR;  Service: General    Family History   Problem Relation Age of Onset   ? Kidney failure Mother    ? Heart disease Father 82   ? Tremor Father    ? Parkinsonism Paternal Grandfather    ? Tremor Sister    ? Leukemia Maternal Grandmother     Social History     Socioeconomic History   ? Marital status:      Spouse name: Not on file   ? Number of children: Not on file   ? Years of education: Not on file   ? Highest education level: Not on file   Occupational History   ? Not on file   Social Needs   ? Financial resource strain: Not on file   ? Food insecurity:     Worry: Not on file     Inability: Not on file   ? Transportation needs:     Medical: Not on file     Non-medical: Not on file   Tobacco Use   ? Smoking status: Former Smoker      Types: Cigarettes     Last attempt to quit: 1980     Years since quittin.9   ? Smokeless tobacco: Never Used   Substance and Sexual Activity   ? Alcohol use: No     Comment: one drink a week   ? Drug use: No   ? Sexual activity: Not on file   Lifestyle   ? Physical activity:     Days per week: Not on file     Minutes per session: Not on file   ? Stress: Not on file   Relationships   ? Social connections:     Talks on phone: Not on file     Gets together: Not on file     Attends Hoahaoism service: Not on file     Active member of club or organization: Not on file     Attends meetings of clubs or organizations: Not on file     Relationship status: Not on file   ? Intimate partner violence:     Fear of current or ex partner: Not on file     Emotionally abused: Not on file     Physically abused: Not on file     Forced sexual activity: Not on file   Other Topics Concern   ? Not on file   Social History Narrative   ? Not on file          Medications  Allergies   Current Outpatient Medications   Medication Sig Dispense Refill   ? acetaminophen (TYLENOL) 325 MG tablet Take 2 tablets (650 mg total) by mouth every 4 (four) hours as needed.  0   ? ammonium lactate (AMLACTIN) 12 % cream Apply two times a day 385 g 2   ? aspirin 81 mg chewable tablet Chew 1 tablet (81 mg total) daily.  0   ? atorvastatin (LIPITOR) 80 MG tablet Take 1 tablet (80 mg total) by mouth daily. 90 tablet 3   ? bisacodyl (DULCOLAX) 10 mg suppository One supp KS qday prn constipation.  Give if no BM in prior 3 days.  0   ? carbidopa-levodopa (PARCOPA)  mg per disintegrating tablet Take 1 tablet by mouth as needed. 90 tablet 3   ? carbidopa-levodopa (RYTARY) 23.75-95 mg CpER ER capsule Take 6 capsules by mouth 4 (four) times a day. 690 capsule 3   ? carbidopa-levodopa (SINEMET CR)  mg ER tablet Take 1 tablet by mouth 2 (two) times a day. In am and 2pm (Patient taking differently: Take 1 tablet by mouth at bedtime. One in the am and at  bedtime      ) 60 tablet 3   ? cholecalciferol, vitamin D3, (VITAMIN D3) 2,000 unit Tab Take 1 tablet (2,000 Units total) by mouth daily.  0   ? levothyroxine (SYNTHROID, LEVOTHROID) 137 MCG tablet TAKE ONE TABLET BY MOUTH ONCE DAILY AT 6 A.M. 90 tablet 0   ? metoprolol succinate (TOPROL-XL) 25 MG Take 0.5 tablets (12.5 mg total) by mouth at bedtime. 45 tablet 2   ? nitroglycerin (NITROSTAT) 0.4 MG SL tablet Place 1 tablet (0.4 mg total) under the tongue every 5 (five) minutes as needed for chest pain. 15 tablet 1   ? NUPLAZID 34 mg cap capsule TAKE 1 CAPSULE BY MOUTH AT BEDTIME. 30 capsule 4   ? polyethylene glycol (GLYCOLAX) 17 gram/dose powder Take 17 g by mouth daily. 1530 g 6   ? senna-docusate (SENNOSIDES-DOCUSATE SODIUM) 8.6-50 mg tablet Take 1 tablet by mouth 2 (two) times a day.           ? ticagrelor (BRILINTA) 90 mg Tab Take 1 tablet (90 mg total) by mouth 2 (two) times a day. 180 tablet 0   ? venlafaxine (EFFEXOR-XR) 150 MG 24 hr capsule Take 1 capsule (150 mg total) by mouth daily. 75 mg + 150 mg  = 225 mg 90 capsule 2   ? venlafaxine (EFFEXOR-XR) 75 MG 24 hr capsule Take 1 capsule (75 mg total) by mouth daily. 75 mg + 150 mg = 225 mg 90 capsule 2   ? omeprazole (PRILOSEC) 20 MG capsule Take 1 capsule (20 mg total) by mouth daily. (Patient taking differently: Take 20 mg by mouth daily as needed .      ) 90 capsule 3     No current facility-administered medications for this visit.       Allergies   Allergen Reactions   ? Clonazepam      Too drowsy         Lab Results    Chemistry/lipid CBC Cardiac Enzymes/BNP/TSH/INR   Lab Results   Component Value Date    CHOL 101 09/06/2019    HDL 43 09/06/2019    LDLCALC 39 09/06/2019    TRIG 96 09/06/2019    CREATININE 0.77 09/06/2019    BUN 15 09/06/2019    K 4.1 09/06/2019     09/06/2019     09/06/2019    CO2 22 09/06/2019    Lab Results   Component Value Date    WBC 6.2 09/06/2019    HGB 14.0 09/06/2019    HCT 42.5 09/06/2019    MCV 88 09/06/2019      09/06/2019    Lab Results   Component Value Date    TROPONINI 0.02 12/07/2018    TSH 2.91 09/06/2019    INR 1.01 03/21/2013

## 2021-06-28 NOTE — PROGRESS NOTES
Progress Notes by Madison Martinez CHW at 11/6/2019  8:41 AM     Author: Madison Martinez CHW Service: -- Author Type: Community Health Worker    Filed: 11/6/2019  8:48 AM Encounter Date: 11/6/2019 Status: Signed    : Madison Martinez CHW (Community Health Worker)       My Clinic Care Coordination Wellness Plan  This Graduation Wellness Plan provides private information in regard to the work I have done with my Care Team at my Primary Care Clinic.  This document provides insight on the goals I have accomplished.  My Care Team congratulates me on my journey to maintain wellness.  This document will help guide me on my journey to maintain a healthy lifestyle.  I will use this to help me overcome any barriers I may encounter.  If I should have any questions or concerns, I will contact the members of my Care Team or contact my Primary Care Clinic for assistance.     [facility]  [address], [place]  [id number] [phone]    My Preferred Method of Contact:  Phone: [phone]    My Primary/Preferred Language:  English    Emergency Contact: Extended Emergency Contact Information  Primary Emergency Contact: Luz Marina Zamora  Address: [address] [place] [address] [address] [place] [address] [facility]  Home Phone: [phone]  Mobile Phone: [phone]  Relation: Spouse  Secondary Emergency Contact: Cookie Wiseman   [facility]  Home Phone: [phone]  Mobile Phone: [phone]  Relation: Child     PCP:  Valentín Washington MD  Specialists:    Care Team            Valentín Washington MD   [phone] PCP - General, Family Medicine    Elyssa Dolan PA-C   [phone] Physician Assistant, Orthopedic Surgery    Elyssa Gomez MD   [phone] Physician, Neurology    Leif Washington, DPYASH   [phone] Physician, Podiatry    Naresh Nails, PharmD   [phone] Pharmacist, Pharmacist    Valentín Washington MD   [phone] Assigned PCP         Accomplishments:  Goals        Patient Stated    ? COMPLETED: CG GOAL: (Wife stated) I want resources to support Silver's care needs in the next three weeks. (pt-stated)      Action steps to achieve this goal  1.  I will continue to contact the resources provided by the Clinic Care Coordination , Tiffanie VEGA, like [facility] [place] and [facility] to arrange in home care.  2.  I will provide financial information to Stephie, the Care Guide, so that the Clinic Care Coordination , Tiffanie VEGA Can refer us to [facility] for a waiver assessment (DONE: 01/18/19).  3.  I will follow up with the [facility] nurse who called me today to schedule a waiver assessment time (DONE: 01/18/19). I will continue to follow up and let Stephie know what Silver's eligibility is (Continuous: 03/11/19).  4. I will consider the respite care resources that Stephie, the Care Guide, has sent to me in the mail (DONE: 11/26/18). I have accessed the [facility] , Luz Marina Chaves, and have received even more information on respite care and therapy Silver and I can attend together (DONE: 01/18/19).  5. I will consider the health alert device recommendations made by Stephie, the Care Guide (Continuous: 11/26/18).  6. I will consider taking Silver to the senior center in [place] and utilizing their respite care services (Continuous: 03/11/19).    Completed: 04/18/19  Date goal set:  11/09/18      ? COMPLETED: I want my wife to have a better understanding of the financial resources available to manage my health care. (pt-stated)      Action steps to achieve this goal  1. My wife Luz Marina attended a telephone appointment with the Clinic Care Coordination , Tiffanie, on 07/16/18 8am to discuss financial considerations including insurance spendowns (DONE: 07/11/18).  2. My wife will fill out the necessary paperwork, if needed, to assist with our finances.     Updated: 08/07/18  Date goal set:  07/03/18      ? COMPLETED:  I would like information on resources for in home assistance. (pt-stated)      Action steps to achieve this goal  1.  My wife and I  Have already been given resources including [facility] [employer], [employer], and [facility] [place] by [facility] that we've begun looking into (DONE: 07/11/18). We'll report our findings to my Care Guide, Stephie, when she calls for standard Clinic Care Coordination check in.  2.  My wife and I will shared our financial information with the [facility] Care Coordination , Tiffanie, during my wife's 07/16/18 8am telephone visit with her if we want to learn more about financing/obtaining in home resources (DONE: 07/16/18).  3. My wife and I know that if I need to consider moving into a  Residential facility at any time, we may ask Tiffanie the  for a referral to be place to the [facility] to begin the process.     Updated: 08/07/18  Date goal set:  07/03/18      ? COMPLETED: Other (pt-stated)      Goal: (Wife Luz Marina stated) I have gathered information about palliative care and the referral process.  Personal plan: (Wife Luz Marina stated) We will establish and follow through with [facility].  1. We will have the Palliative Care RN come into the home on 08/06/19 to start services.  2. We will consider and explore Palliative Care recommendations.  3. I will call Stephie, the [facility] Coordination Community Health Worker, if we need to address any barriers.  4. I will speak with Stephie, the [facility] Community Health Worker, in two months when she calls for outreach check in.    Updated: 08/02/19  Goal created: 07/18/19      ? COMPLETED: RN Goal:  I have ongoing health resources and information for all of my physicans who are involved in my Care Team. (pt-stated)      Action steps to achieve this goal  1.  Silver will attend cardiac rehab two times each week and will start today (Continuous: 01/18/19).  2.  I will speak with Stephie, the Care Guide, when she calls to  follow up on heart failure symptom reported (Continuous; 01/18/19).  3. I will encourage Silver to take his weight and record it each day (Continuous: 01/18/19).  4. I will continue to support diet change to low sodium for Silver (Continuous: 01/18/19).  5. I will look for and post the [facility][id number]-[id number]-When  Emergency phone number sheet (Continuous: 01/18/19).      Updated: 01/18/19  Date goal set:  11/09/18         Other    ? COMPLETED: My wife would like better understanding of planning for the future including reconciling my healthcare directive, POLST, and knowing when to contact the Elder Law  to discsuss POA.      Action steps to achieve this goal  1.  We attended our appointment with Dr. Washington to discuss revising healthcare directive and POLST to ensure they are complimentary. I know we can meet with my Care Guide, Stephie, for facilitated advanced care planning of my healthcare directive, if we need assistance (DONE: )8/02/18).  2. My Care Guide, Stephie, scheduled Luz Marina to speak with the Clinic Care Coordination , Tiffanie, on 07/16/18 8am to discuss the will/estate planning/power of  and when to meet with the elder law  (DONE: 07/11/18).  3. My Care Guide, Stephie, provided Luz Marina with the links to Elder Law resources through the [email or web address] website, as provided by Tiffanie the Clinic Care Coordination  (DONE: 08/07/18).    Updated: 08/07/18  Date goal set:  07/03/18            Advanced Directive/Living Will: On file with the clinic    Clinical Emergency Plan    Heart Failure    You have been diagnosed with heart failure. The term heart failure sounds scary because it suggests the heart is no longer working. But it actually means the heart isn't doing its job as well as it should. Heart failure happens when your heart muscle can't keep up with your body's need for blood flow. Symptoms of heart failure can be controlled by changes in your lifestyle and by following your  doctor's advice.  When to call your doctor  Call your doctor right away if you have any of these signs of worsening heart failure:    Sudden weight gain (more than 2 pounds in 1 day or 5 pounds in 1 week, or whatever weight gain you were told to report by your doctor)    Trouble breathing not related to being active    New or increased swelling of your legs or ankles    Swelling or pain in your abdomen    Breathing trouble at night (waking up short of breath, needing more pillows to breathe)    Frequent coughing that doesn't go away    Feeling much more tired than usual  When to seek emergency medical attention  Call 911 right away if you have:    Severe shortness of breath, such that you can't catch your breath even while resting    Severe shortness of breath    Severe chest pain that does not resolve with rest or nitroglycerin    Pink, foamy mucus with cough and shortness of breath    A continuous rapid or irregular heartbeat    Passing out or fainting    Stroke symptoms such as sudden numbness or weakness on one side of your face, arm, or leg or sudden confusion, trouble speaking or vision changes     Understanding Coronary Artery Disease (CAD)  To understand coronary artery disease (CAD), you need to know how your heart works. Your heart is a muscle that pumps blood throughout your body. To work right, your heart needs a steady supply of oxygen. It gets this oxygen from blood supplied by the coronary arteries.    Healthy artery. When a coronary artery is healthy and has no blockages, blood flows through easily. Healthy arteries can easily supply the oxygen-rich blood your heart needs.    Damaged artery. Coronary artery disease begins when damage to the artery lining leads to the buildup of fat-like substances and cholesterol along the artery wall. This is called plaque. This damage could be caused by things like high blood pressure or smoking. This plaque buildup begins to narrow the arteries carrying blood to  the heart. This is called atherosclerosis.    Narrowed artery. As more plaque builds up, your artery has trouble supplying blood to your heart muscle when it needs it most, such as during exercise. You may not feel any symptoms when this happens. Or you may feel angina--pressure, tightness, achiness, or pain in your chest, jaw, neck, back, or arm.    Blocked artery. A piece of plaque may break off and completely block the artery. Or a blood clot may plug the narrowed artery. When this happens, blood flow is blocked from reaching the heart. Without oxygen-rich blood, part of the heart muscle becomes damaged and stops working. You may feel crushing pressure or pain in or around your chest. This is a heart attack (acute myocardial infarction, or AMI) and is a medical emergency.      Your Heart is at Risk Plaque and blood clots in the coronary arteries reduce blood flow to the heart:    When a coronary artery narrows, less blood and oxygen flow to the heart muscle. The decreased blood flow can cause symptoms of angina. This is often felt as temporary pain or pressure in or near the chest. This can also feel like pain in the jaw, neck, and shoulder.    When a coronary artery narrows too much, very little blood and oxygen reach the heart muscle beyond the site of narrowing. If a clot forms, blood flow in the artery may stop. This can result in a heart attack. If the muscle goes without oxygen for too long, that part of the heart muscle dies.  Your whole body is at risk  Plaque buildup can lead to problems throughout the body. Common sites of artery problems include:    Brain. Arteries in the brain or leading to the brain can become blocked. When this happens, blood flow to that part of the brain is reduced and that part of the brain cant get the oxygen it needs. That portion of the brain is damaged. This is a stroke.    Kidneys. If an artery that carries blood to the kidneys is narrowed, the kidneys have a hard time  filtering blood. This can lead to kidney damage.    Aorta. This is the bodys main artery. It connects directly to the heart. If this artery is damaged, the affected section can weaken and balloon out. This is called an aortic aneurysm.    Legs. If arteries in the legs are clogged with plaque, cramping, or aching in the buttocks, thighs, or calves can occur when walking. This is called claudication.     Depression  Everyone feels down at times. The blues are a natural part of life. But an unhappy period thats intense or lasts for more than a couple of weeks can be a sign of depression. Depression is a serious illness. It can disrupt the lives of family and friends. If you know someone you think may be depressed, find out what you can do to help.     Know the serious signals  Warning signals for suicide include:    Threats or talk of suicide    Statements such as I wont be a problem much longer or Nothing matters    Giving away possessions or making a will or  arrangements    Buying a gun or other weapon    Sudden, unexplained cheerfulness or calm after a period of depression  If you notice any of these signs, get help right away. Call a health care professional, mental health clinic, or suicide hotline and ask what action to take. In an emergency, dont hesitate to call the police.     [facility] Mental Health Crisis Lines:  [facility] [phone]  [facility] [phone]  [facility] [phone]  [facility] [phone]  [facility], Adults [phone]  [facility], Children [phone]  [facility], Adults [phone]  [facility], Children [phone]      All [facility] clinic patients have access to a Nurse 24 hours a day, 7 days a week.  If you have questions or want advice from a Nurse, please know [facility] is here for you.  You can call your clinic and they will connect you or you can call [facility] at [phone].  [facility] also has Walk In Care  clinics in multiple locations.  Call the number listed above for more information about our Walk In Care clinics or visit the [employer] website at [email or web address].

## 2021-06-28 NOTE — PROGRESS NOTES
Progress Notes by Valentín Washington MD at 1/2/2020  3:20 PM     Author: Valentín Washington MD Service: -- Author Type: Physician    Filed: 1/5/2020  6:40 PM Encounter Date: 1/2/2020 Status: Signed    : Valentín Washington MD (Physician)       Assessment/ Plan     1. Parkinson disease (H)    Continue plan per neurology  He has been treated with various formulations of carbidopa-levodopa  He is aware of potential falls risk    2. Ulcer of right foot, unspecified ulcer stage (H)  3. Acquired absence of other right toe(s) (H)    Recommend follow-up with podiatry and wound care team for aggressive treatment of his right foot wound    4. Cord compression myelopathy (H)    Have reviewed his abnormal MRI of the cervical spine  Surgery was postponed as he was requiring dual antiplatelet therapy and has different cardiovascular risk  He recently followed up with cardiology and had a pharmacologic induced stress test in anticipation of possible upcoming surgery  Will seek the input of Dr. Barraza if patient will have upcoming surgery for cervical spine    Recommend scheduled Tylenol for his discomfort  He may take gabapentin as well reduce potential risk of sedation  Consider changing to Cymbalta as noted       IMPRESSION:   CONCLUSION:  CERVICAL SPINE:  1.  Developmentally narrow spinal canal.  2.  Severe C4-5, severe C5-6 and moderate to severe C6-7 spinal canal stenoses.  3.  While the patient is prone to spinal cord injury, no MRI evidence of a cord injury is identified. Clinical exam is more sensitive than MRI for subtle cord injury.  4.  Moderate left C3-4, moderate bilateral C4-5, moderate left C5-6 and moderate left C6-7 neural foraminal stenoses.      5. Moderate major depression (H)    He has been treated with Effexor  Given his advanced Parkinson's disease and multiple other health concerns he has been feeling more depressed  We discussed that given his pain could consider weaning Effexor  "and consider a trial of Cymbalta    6. Chronic systolic heart failure (H)  7. Coronary artery disease involving native coronary artery of native heart with angina pectoris (H)    Reviewed his recent cardiovascular evaluation including a pharmacologic induced stress test showed ejection rest 48%  There is an area of severe hypokinesis of the lateral wall  Continue current medications including aspirin, atorvastatin, and metoprolol  Continue to follow-up with cardiology    8. Acute sinusitis, recurrence not specified, unspecified location    Given ongoing symptoms we will treat with doxycycline as noted.  Reviewed potential side effects  Recommend fluticasone nasal spray  Recommend nasal irrigation  Follow-up if not improving  - doxycycline (MONODOX) 100 MG capsule; Take 1 capsule (100 mg total) by mouth 2 (two) times a day for 10 days.  Dispense: 20 capsule; Refill: 0  - fluticasone propionate (FLONASE) 50 mcg/actuation nasal spray; 2 sprays into each nostril daily.  Dispense: 16 g; Refill: 3      Subjective:       Silver Zamora is a 69 y.o. male who presents to the clinic with his wife Luz Marina today to review multiple medical concerns.  He has a complex medical history including advanced Parkinson's disease, hypothyroidism, gastroeesophageal reflux disease, coronary artery disease, as well as an ischemic cardiomyopathy.  More recently he has required treatment of a right foot wound.  He continues to follow-up with neurology    Regarding Parkinson's disease which is quite advanced.  In the past he has experienced \"freezing \"episodes and he has sustained multiple falls.  He has now treated with various formulations of carbidopa-levodopa.  At one point during hospitalization following a fall he had a CT scan of the cervical spine which shows severe spinal canal stenosis at multiple levels.  There was evidence of neural foraminal narrowing as well as prominent degenerative changes.  Given his history of heart disease he was " deemed not to be a candidate for spinal surgery at this time.  There was also concerned about his risk of falling which may put him at risk.    Regarding his cardiac history he had a coronary stent placed in the right coronary artery in October of 2018 and required dual antiplatelet therapy for period of time.  He was treated with aspirin as well as Brilinta.  An echocardiogram showed ejection fraction of 44%.  He has followed up with cardiology and had a recent pharmacologic induced stress test anticipation of a possible upcoming cervical spine surgery.  This revealed ejection fraction of 48% and his severe area of lateral wall hypokinesis.    He states his foot he has had significant nasal congestion recently and has had a sense of pressure in his cheeks and forehead.  The mucus has been quite thick.  Over-the-counter therapies have not been helpful.    He continues to have some pain involving the neck and upper back.  As noted, he has severe spinal canal stenosis and may require surgery in the future.    He denies recent headache, visual changes, chest pain, shortness of breath, palpitations.        The following portions of the patient's history were reviewed and updated as appropriate: allergies, current medications, past family history, past medical history, past social history, past surgical history and problem list. Medications have been reconciled    Review of Systems   A 12 point comprehensive review of systems was negative except as noted.      Current Outpatient Medications   Medication Sig Dispense Refill   ? acetaminophen (TYLENOL) 325 MG tablet Take 2 tablets (650 mg total) by mouth every 4 (four) hours as needed.  0   ? ammonium lactate (AMLACTIN) 12 % cream Apply two times a day 385 g 2   ? aspirin 81 mg chewable tablet Chew 1 tablet (81 mg total) daily.  0   ? atorvastatin (LIPITOR) 80 MG tablet Take 1 tablet (80 mg total) by mouth daily. 90 tablet 3   ? bisacodyl (DULCOLAX) 10 mg suppository One  supp MT qday prn constipation.  Give if no BM in prior 3 days.  0   ? carbidopa-levodopa (PARCOPA)  mg per disintegrating tablet Take 1 tablet by mouth as needed. 90 tablet 3   ? carbidopa-levodopa (RYTARY) 61. mg CpER ER capsule Take 2 capsules by mouth 4 (four) times a day. 250 capsule 2   ? carbidopa-levodopa (SINEMET CR)  mg ER tablet Take 1 tablet by mouth 2 (two) times a day. In am and 2pm (Patient taking differently: Take 1 tablet by mouth at bedtime. One in the am and at bedtime      ) 60 tablet 3   ? cholecalciferol, vitamin D3, (VITAMIN D3) 2,000 unit Tab Take 1 tablet (2,000 Units total) by mouth daily.  0   ? gabapentin (NEURONTIN) 100 MG capsule Take 100 mg by mouth 2 (two) times a day. 60 capsule 2   ? levothyroxine (SYNTHROID, LEVOTHROID) 137 MCG tablet TAKE ONE TABLET BY MOUTH ONCE DAILY AT 6 A.M. 90 tablet 3   ? metoprolol succinate (TOPROL-XL) 25 MG Take 0.5 tablets (12.5 mg total) by mouth at bedtime. 45 tablet 2   ? nitroglycerin (NITROSTAT) 0.4 MG SL tablet Place 1 tablet (0.4 mg total) under the tongue every 5 (five) minutes as needed for chest pain. 15 tablet 1   ? NUPLAZID 34 mg cap capsule TAKE 1 CAPSULE BY MOUTH AT BEDTIME. 30 capsule 4   ? polyethylene glycol (GLYCOLAX) 17 gram/dose powder Take 17 g by mouth daily. 1530 g 6   ? senna-docusate (SENNOSIDES-DOCUSATE SODIUM) 8.6-50 mg tablet Take 1 tablet by mouth 2 (two) times a day.           ? triamcinolone (KENALOG) 0.025 % ointment Apply topically 2 (two) times a day. 15 g 3   ? venlafaxine (EFFEXOR-XR) 150 MG 24 hr capsule Take 1 capsule (150 mg total) by mouth daily. 75 mg + 150 mg  = 225 mg 90 capsule 2   ? venlafaxine (EFFEXOR-XR) 75 MG 24 hr capsule Take 1 capsule (75 mg total) by mouth daily. 75 mg + 150 mg = 225 mg 90 capsule 2   ? doxycycline (MONODOX) 100 MG capsule Take 1 capsule (100 mg total) by mouth 2 (two) times a day for 10 days. 20 capsule 0   ? fluticasone propionate (FLONASE) 50 mcg/actuation nasal  spray 2 sprays into each nostril daily. 16 g 3     No current facility-administered medications for this visit.        Objective:      /60   Pulse 64   Temp 97.9  F (36.6  C) (Oral)   Resp 16   Wt 221 lb (100.2 kg)   BMI 31.71 kg/m        General appearance: alert, appears stated age and cooperative  Head: Normocephalic, without obvious abnormality, atraumatic  Eyes: conjunctivae/corneas clear. PERRL, EOM's intact.   Nose: Nares normal. Septum midline. Mucosa normal. No drainage or sinus tenderness.  Throat: lips, mucosa, and tongue normal; teeth and gums normal  Neck: no adenopathy, supple, symmetrical, trachea midline  Back: symmetric, no curvature. ROM normal. No CVA tenderness.  Lungs: clear to auscultation bilaterally  Heart: regular rate and rhythm, S1, S2 normal, no murmur, click, rub or gallop  Abdomen: soft, non-tender; bowel sounds normal; no masses,  no organomegaly  Extremities: extremities normal, atraumatic,   Examination of the feet reveals a callused wound along the medial aspect of the first MTP joint area  Skin: Skin color, texture, turgor normal. No rashes or lesions  Lymph nodes: Cervical nodes normal.  Neurologic: Alert and oriented X 3  Speech is slow  There is a slight resting tremor     Cardiology:    NM Pharmacologic Stress Test   Order# [id number]   Reading physician: Roxy Barraza MD Ordering physician: Roxy Barraza MD Study date: 19   Patient Information     Patient Name  Silver Zamora MRN  [id number] Sex  Male  1  1950 (69 y.o.)   EKG Scan       Stress Test Data - Scan on 19 11:09 AM by    Indications     Coronary artery disease involving native heart, angina presence unspecified, unspecified vessel or lesion type   Ischemic cardiomyopathy   Conclusion        ?  The nuclear stress test is abnormal.  ?  Stress and rest nuclear images demonstrate a large area of nontransmural infarction involving the lateral to inferolateral wall.  There is a small area of mild  ramon-infarct ischemia in the mid inferolateral wall.  ?  The left ventricular ejection fraction at stress is 48% with severe hypokinesis of the lateral wall..  ?  A prior study was conducted on 10/22/2018.  Previous area of ischemia now appears to be an area of nontransmural infarction.  ?  The patient is at a low risk of future cardiac ischemic events.         No results found for this or any previous visit (from the past 168 hour(s)).       This note has been dictated using voice recognition software. Any grammatical or context distortions are unintentional and inherent to the software

## 2021-06-29 NOTE — PROGRESS NOTES
Progress Notes by Roxy Barraza MD at 11/3/2020 11:30 AM     Author: Roxy Barraza MD Service: -- Author Type: Physician    Filed: 11/3/2020  3:55 PM Encounter Date: 11/3/2020 Status: Signed    : Roxy Barraza MD (Physician)           Thank you, Dr. Washington, for asking the [facility] team to see Mr. Silver Zamora to follow-up on coronary artery disease and hypercholesterolemia.    Assessment/Recommendations   Assessment:    1.  Coronary artery disease, status post stenting of the right coronary artery in October 2018 with demonstration of a 60% stenosis in the mid LAD which was not flow-limiting.  He reports no symptoms of chest discomfort or shortness of breath although activity has become extremely limited due to his Parkinson's disease.  Last nuclear stress study a year ago demonstrated no ischemia but a large area of nontransmural infarction involving the lateral to inferolateral wall.  At this point we will continue with medical management and risk factor modification.  2.  Mild ischemic cardiomyopathy, EF 48% by stress testing, well compensated.  No history of orthopnea, PND or lower extremity edema.  3.  Hypercholesterolemia, well controlled on statin therapy  4.  Bradycardia.  Patient noted to have heart rate of 53 by pulse.  He reports recent increase in falls which in the past appear to improve with decrease metoprolol.  At this point recommend discontinuation of metoprolol as he is on the lowest dose possible.      Plan:  1.  Continue current medications except discontinue metoprolol given low heart rate  2.  Follow-up in 1 year or sooner if new symptoms of chest discomfort or shortness of breath       History of Present Illness    Mr. Silver Zamora is a 70 y.o. male with history of coronary artery disease, mild ischemic cardiomyopathy, hypercholesterolemia and Parkinson's disease who presents today for routine yearly visit.  From a cardiac standpoint, he reports no symptoms of  chest discomfort, shortness of breath, orthopnea, PND or lower extremity edema.  He continues to have a decline in physical abilities due to the progression of his Parkinson's disease.  More recently, he has had several falls again due to feelings of lightheadedness and imbalance. He does not recall any loss of consciousness. I do note his heart rate low again today at 53.  2 or 3 years ago, he had several falls and was noted to be bradycardic; with decrease in the beta-blocker, his falls seemed to decrease.  As he is on the lowest dose of metoprolol currently, I recommended we discontinue it entirely as I do not know if it is contributing to his falling.      ECG (personally reviewed): No ECG today    Cardiac Imaging Studies (personally reviewed): No new imaging     Physical Examination Review of Systems   Vitals:    11/03/20 1139   BP: 138/68   Pulse: (!) 53   Resp: 16     Body mass index is 30.42 kg/m .  Wt Readings from Last 3 Encounters:   11/03/20 212 lb (96.2 kg)   08/21/20 217 lb 12.8 oz (98.8 kg)   02/25/20 (!) 223 lb 8 oz (101.4 kg)     General Appearance:   Awake, Alert, No acute distress.   HEENT:  No scleral icterus; the mucous membranes were pink and moist.   Neck: No cervical bruits or jugular venous distention    Chest: The spine was straight. The chest was symmetric.   Lungs:   Respirations unlabored; the lungs are clear to auscultation. No wheezing   Cardiovascular:    Bradycardic rhythm which is regular.  S1, S2 normal.  No murmur or gallop.   Abdomen:  No organomegaly, masses, bruits, or tenderness. Bowels sounds are present   Extremities:  No peripheral edema, clubbing or cyanosis.   Skin: No xanthelasma. Warm, Dry.   Musculoskeletal: No tenderness.   Neurologic: Mood and affect are appropriate.    General: WNL  Eyes: WNL  Ears/Nose/Throat: Hearing Loss  Lungs: WNL  Heart: WNL  Stomach: Constipation  Bladder: Frequent Urination at Night  Muscle/Joints: Joint Pain, Muscle Weakness, Muscle  Pain  Skin: Poor Wound Healing  Nervous System: Daytime Sleepiness, Dizziness, Loss of Balance, Falls  Mental Health: WNL     Blood: WNL     Medical History  Surgical History Family History Social History   Past Medical History:   Diagnosis Date   ? Anxiety    ? Callus    ? Cardiomyopathy (H)    ? Chronic back pain    ? Delirium    ? Depression    ? Fall    ? GERD (gastroesophageal reflux disease)    ? Hypothyroidism    ? Pancreatitis 12/31/2015   ? PD (Parkinson's disease) (H)    ? RBD (REM behavioral disorder) 4/3/2017   ? Shingles     hx    Past Surgical History:   Procedure Laterality Date   ? CV CORONARY ANGIOGRAM N/A 10/31/2018    Procedure: Coronary Angiogram;  Surgeon: Jose Meyer MD;  Location: [facility] Cath Lab;  Service:    ? CV LEFT HEART CATHETERIZATION WO LEFT VETRICULOGRAM Left 10/31/2018    Procedure: Left Heart Catheterization Without Left Ventriculogram;  Surgeon: Jose Meyer MD;  Location: [facility] Cath Lab;  Service:    ? INGUINAL HERNIA REPAIR Bilateral 10/2013   ? LAMINECTOMY  10/2013   ? DE ERCP REMOVE CALCULI/DEBRIS BILIARY/PANCREAS DUCT N/A 5/25/2018    Procedure: ENDOSCOPIC RETROGRADE CHOLANGIOPANCREATOGRAPHY SPINCTEROTOMY BILIARY STONE EXTRACTION;  Surgeon: Curtis Mcclain MD;  Location: [facility] [place];  Service: Gastroenterology   ? DE LAP,CHOLECYSTECTOMY/GRAPH N/A 5/24/2018    Procedure: LAPAROSCOPIC CHOLECYSTECTOMY;  Surgeon: Joyce Melissa MD;  Location: [facility] OR;  Service: General    Family History   Problem Relation Age of Onset   ? Kidney failure Mother    ? Heart disease Father 82   ? Tremor Father    ? Parkinsonism Paternal Grandfather    ? Tremor Sister    ? Leukemia Maternal Grandmother     Social History     Socioeconomic History   ? Marital status:      Spouse name: Not on file   ? Number of children: Not on file   ? Years of education: Not on file   ? Highest education level: Not on file   Occupational History   ? Not on file   Social  Needs   ? Financial resource strain: Not on file   ? Food insecurity     Worry: Not on file     Inability: Not on file   ? Transportation needs     Medical: Not on file     Non-medical: Not on file   Tobacco Use   ? Smoking status: Former Smoker     Types: Cigarettes     Quit date: 1980     Years since quittin.8   ? Smokeless tobacco: Never Used   Substance and Sexual Activity   ? Alcohol use: No     Comment: one drink a week   ? Drug use: No   ? Sexual activity: Not on file   Lifestyle   ? Physical activity     Days per week: Not on file     Minutes per session: Not on file   ? Stress: Not on file   Relationships   ? Social connections     Talks on phone: Not on file     Gets together: Not on file     Attends [employer] service: Not on file     Active member of club or organization: Not on file     Attends meetings of clubs or organizations: Not on file     Relationship status: Not on file   ? Intimate partner violence     Fear of current or ex partner: Not on file     Emotionally abused: Not on file     Physically abused: Not on file     Forced sexual activity: Not on file   Other Topics Concern   ? Not on file   Social History Narrative    Patient lives with his wife Luz Marina          Medications  Allergies   Current Outpatient Medications   Medication Sig Dispense Refill   ? acetaminophen (TYLENOL) 325 MG tablet Take 2 tablets (650 mg total) by mouth every 4 (four) hours as needed.  0   ? ammonium lactate (AMLACTIN) 12 % cream Apply two times a day 385 g 2   ? aspirin 81 mg chewable tablet Chew 1 tablet (81 mg total) daily.  0   ? atorvastatin (LIPITOR) 80 MG tablet Take 1 tablet (80 mg total) by mouth daily. 90 tablet 1   ? bisacodyl (DULCOLAX) 10 mg suppository One supp OK qday prn constipation.  Give if no BM in prior 3 days.  0   ? carbidopa-levodopa (PARCOPA)  mg per disintegrating tablet Take 1 tablet by mouth as needed. 90 tablet 3   ? cholecalciferol, vitamin D3, (VITAMIN D3) 2,000 unit Tab  Take 1 tablet (2,000 Units total) by mouth daily.  0   ? diaper,brief,adult,disposable (BRIEFS) Misc Use 1 Units As Directed 2 (two) times a day as needed. 45 each 6   ? DULoxetine (CYMBALTA) 20 MG capsule Take 1 capsule (20 mg total) by mouth 2 (two) times a day. 60 capsule 3   ? fluticasone propionate (FLONASE) 50 mcg/actuation nasal spray 2 sprays into each nostril daily. 16 g 3   ? gabapentin (NEURONTIN) 100 MG capsule TAKE ONE CAPSULE BY MOUTH TWICE A DAY 60 capsule 2   ? levothyroxine (SYNTHROID, LEVOTHROID) 137 MCG tablet TAKE ONE TABLET BY MOUTH ONCE DAILY AT 6 A.M. 90 tablet 3   ? nitroglycerin (NITROSTAT) 0.4 MG SL tablet Place 1 tablet (0.4 mg total) under the tongue every 5 (five) minutes as needed for chest pain. 20 tablet 1   ? NUPLAZID 34 mg cap capsule TAKE 1 CAPSULE BY MOUTH ONE TIME A DAY AT BEDTIME 30 capsule 4   ? nystatin (MYCOSTATIN) cream Apply to affected area of groin three times a day x 10 days 30 g 1   ? polyethylene glycol (GLYCOLAX) 17 gram/dose powder Take 17 g by mouth daily. 1530 g 6   ? RYTARY 61. mg CpER ER capsule TAKE TWO CAPSULES BY MOUTH FOUR TIMES A  capsule 2   ? senna-docusate (SENNOSIDES-DOCUSATE SODIUM) 8.6-50 mg tablet Take 1 tablet by mouth 2 (two) times a day.           ? triamcinolone (KENALOG) 0.025 % ointment Apply topically 2 (two) times a day. 15 g 3   ? amoxicillin-clavulanate (AUGMENTIN) 875-125 mg per tablet        No current facility-administered medications for this visit.       Allergies   Allergen Reactions   ? Clonazepam      Too drowsy         Lab Results    Chemistry/lipid CBC Cardiac Enzymes/BNP/TSH/INR   Lab Results   Component Value Date    CHOL 101 09/06/2019    HDL 43 09/06/2019    LDLCALC 39 09/06/2019    TRIG 96 09/06/2019    CREATININE 0.74 02/25/2020    BUN 21 02/25/2020    K 4.1 02/25/2020     02/25/2020     02/25/2020    CO2 27 02/25/2020    Lab Results   Component Value Date    WBC 5.7 02/25/2020    HGB 14.3 02/25/2020     HCT 43.2 02/25/2020    MCV 89 02/25/2020     02/25/2020    Lab Results   Component Value Date    TROPONINI 0.02 12/07/2018    TSH 2.91 09/06/2019    INR 1.01 03/21/2013

## 2021-07-03 NOTE — ADDENDUM NOTE
Addendum Note by Lizeth Franco RN at 4/5/2017 11:59 PM     Author: Lizeth Franco RN Service: -- Author Type: Registered Nurse    Filed: 4/12/2017 10:55 AM Date of Service: 4/5/2017 11:59 PM Status: Signed    : Lizeth Franco RN (Registered Nurse)    Encounter addended by: Lizeth Franco RN on: 4/12/2017 10:55 AM<BR>     Actions taken: Visit diagnoses modified, Charge Capture section accepted

## 2021-07-03 NOTE — ADDENDUM NOTE
Addendum Note by Candis Joshi CMA at 4/22/2019 11:59 PM     Author: Candis Joshi CMA Service: -- Author Type: Certified Medical Assistant    Filed: 4/30/2019  2:11 PM Date of Service: 4/22/2019 11:59 PM Status: Signed    : Candis Joshi CMA (Certified Medical Assistant)    Encounter addended by: Candis Joshi CMA on: 4/30/2019  2:11 PM      Actions taken: Charge Capture section accepted

## 2021-07-03 NOTE — ADDENDUM NOTE
Addendum Note by Lizeth Franco RN at 11/1/2017 12:44 PM     Author: Lizeth Franco RN Service: -- Author Type: Registered Nurse    Filed: 11/6/2017 11:19 AM Date of Service: 11/1/2017 12:44 PM Status: Signed    : Lizeth Franco RN (Registered Nurse)    Encounter addended by: Lizeth Franco RN on: 11/6/2017 11:19 AM<BR>     Actions taken: Charge Capture section accepted

## 2021-07-03 NOTE — ADDENDUM NOTE
Addendum Note by Lizeth Franco RN at 3/7/2018 12:55 PM     Author: Lizeth Franco RN Service: -- Author Type: Registered Nurse    Filed: 4/4/2018 10:53 AM Date of Service: 3/7/2018 12:55 PM Status: Signed    : Lizeth Franco RN (Registered Nurse)    Encounter addended by: Lizeth Franco RN on: 4/4/2018 10:53 AM<BR>     Actions taken: Charge Capture section accepted

## 2021-07-03 NOTE — ADDENDUM NOTE
Addendum Note by Candis Joshi CMA at 8/29/2018 11:59 PM     Author: Candis Joshi CMA Service: -- Author Type: Certified Medical Assistant    Filed: 9/4/2018 11:18 AM Date of Service: 8/29/2018 11:59 PM Status: Signed    : Candis Joshi CMA (Certified Medical Assistant)    Encounter addended by: Candis Joshi CMA on: 9/4/2018 11:18 AM<BR>     Actions taken: Charge Capture section accepted

## 2021-07-03 NOTE — ADDENDUM NOTE
Addendum Note by Candis Joshi CMA at 3/12/2019 11:59 PM     Author: Candis Joshi CMA Service: -- Author Type: Certified Medical Assistant    Filed: 3/13/2019  3:31 PM Date of Service: 3/12/2019 11:59 PM Status: Signed    : Candis Joshi CMA (Certified Medical Assistant)    Encounter addended by: Candis Joshi CMA on: 3/13/2019  3:31 PM      Actions taken: Charge Capture section accepted

## 2021-07-03 NOTE — ADDENDUM NOTE
Addendum Note by De Rossi MD at 10/29/2018 10:43 AM     Author: De Rossi MD Service: -- Author Type: Physician    Filed: 10/29/2018 10:43 AM Encounter Date: 10/29/2018 Status: Signed    : De Rossi MD (Physician)    Addended by: DE ROSSI on: 10/29/2018 10:43 AM        Modules accepted: Orders

## 2021-07-03 NOTE — ADDENDUM NOTE
Addendum Note by Lizeth Franco RN at 11/1/2017 11:59 PM     Author: Lizeth Franco RN Service: -- Author Type: Registered Nurse    Filed: 11/6/2017 11:21 AM Date of Service: 11/1/2017 11:59 PM Status: Signed    : Lizeth Franco RN (Registered Nurse)    Encounter addended by: Lizeth Frnaco RN on: 11/6/2017 11:21 AM<BR>     Actions taken: Visit diagnoses modified, Charge Capture section accepted

## 2021-07-03 NOTE — ADDENDUM NOTE
Addendum Note by Candis Joshi CMA at 3/29/2019 11:59 PM     Author: Candis Joshi CMA Service: -- Author Type: Certified Medical Assistant    Filed: 4/1/2019  3:24 PM Date of Service: 3/29/2019 11:59 PM Status: Signed    : Candis Joshi CMA (Certified Medical Assistant)    Encounter addended by: Candis Joshi CMA on: 4/1/2019  3:24 PM      Actions taken: Charge Capture section accepted

## 2021-07-03 NOTE — ADDENDUM NOTE
Addendum Note by De Rossi MD at 10/29/2018  8:30 PM     Author: De Rossi MD Service: -- Author Type: Physician    Filed: 10/29/2018  8:30 PM Encounter Date: 10/29/2018 Status: Signed    : De Rossi MD (Physician)    Addended by: DE ROSSI on: 10/29/2018 08:30 PM        Modules accepted: Orders

## 2021-07-03 NOTE — ADDENDUM NOTE
Addendum Note by Carol Mccormick RN at 3/7/2018 11:59 PM     Author: Carol Mccormick RN Service: -- Author Type: Registered Nurse    Filed: 3/8/2018  6:05 AM Date of Service: 3/7/2018 11:59 PM Status: Signed    : Carol Mccormick RN (Registered Nurse)    Encounter addended by: Carol Mccormick RN on: 3/8/2018  6:05 AM<BR>     Actions taken: Sign clinical note

## 2021-07-03 NOTE — ADDENDUM NOTE
Addendum Note by Candis Joshi CMA at 1/22/2019 11:59 PM     Author: Candis Joshi CMA Service: -- Author Type: Certified Medical Assistant    Filed: 1/28/2019  3:13 PM Date of Service: 1/22/2019 11:59 PM Status: Signed    : Candis Joshi CMA (Certified Medical Assistant)    Encounter addended by: Candis Joshi CMA on: 1/28/2019  3:13 PM      Actions taken: Charge Capture section accepted

## 2021-07-03 NOTE — ADDENDUM NOTE
Addendum Note by Beatriz Durán at 8/2/2017 11:59 PM     Author: Beatriz Durán Service: -- Author Type: Medical Assistant    Filed: 8/3/2017 11:53 AM Date of Service: 8/2/2017 11:59 PM Status: Signed    : Beatriz Durán    Encounter addended by: Beatriz Durán MA on: 8/3/2017 11:53 AM<BR>     Actions taken: Charge Capture section accepted

## 2021-07-03 NOTE — ADDENDUM NOTE
Addendum Note by Jennie Richardson MD at 7/26/2019 12:27 PM     Author: Jennie Richardson MD Service: -- Author Type: Physician    Filed: 7/26/2019 12:27 PM Encounter Date: 7/26/2019 Status: Signed    : Jennie Richardson MD (Physician)    Addended by: JENNIE RICHARDSON on: 7/26/2019 12:27 PM        Modules accepted: Orders

## 2021-07-03 NOTE — ADDENDUM NOTE
Addendum Note by Lizeth Franco RN at 3/7/2018 11:59 PM     Author: Lizeth Franco RN Service: -- Author Type: Registered Nurse    Filed: 4/4/2018 10:54 AM Date of Service: 3/7/2018 11:59 PM Status: Signed    : Lizeth Franco RN (Registered Nurse)    Encounter addended by: Lizeth Franco RN on: 4/4/2018 10:54 AM<BR>     Actions taken: Visit diagnoses modified, Charge Capture section accepted

## 2021-07-03 NOTE — ADDENDUM NOTE
Addendum Note by Candis Joshi CMA at 1/4/2019 11:59 PM     Author: Candis Joshi CMA Service: -- Author Type: Certified Medical Assistant    Filed: 1/7/2019  3:04 PM Date of Service: 1/4/2019 11:59 PM Status: Signed    : Candis Joshi CMA (Certified Medical Assistant)    Encounter addended by: Candis Joshi CMA on: 1/7/2019  3:04 PM      Actions taken: Charge Capture section accepted

## 2021-07-03 NOTE — ADDENDUM NOTE
Addendum Note by Candis Joshi CMA at 12/27/2018 11:59 PM     Author: Candis Joshi CMA Service: -- Author Type: Certified Medical Assistant    Filed: 1/2/2019  3:01 PM Date of Service: 12/27/2018 11:59 PM Status: Signed    : Candis Joshi CMA (Certified Medical Assistant)    Encounter addended by: Candis Joshi CMA on: 1/2/2019  3:01 PM      Actions taken: Charge Capture section accepted

## 2021-07-03 NOTE — ADDENDUM NOTE
Addendum Note by Lizeth Franco RN at 3/7/2018 12:55 PM     Author: Lizeth Franco RN Service: -- Author Type: Registered Nurse    Filed: 4/4/2018 10:51 AM Date of Service: 3/7/2018 12:55 PM Status: Signed    : Lizeth Franco RN (Registered Nurse)    Encounter addended by: Lizeth Franco RN on: 4/4/2018 10:51 AM<BR>     Actions taken: Visit diagnoses modified, Charge Capture section accepted

## 2021-07-03 NOTE — ADDENDUM NOTE
Addendum Note by Candis Joshi CMA at 5/23/2018 10:56 AM     Author: Candis Joshi CMA Service: -- Author Type: Certified Medical Assistant    Filed: 5/24/2018 10:48 AM Date of Service: 5/23/2018 10:56 AM Status: Signed    : Candis Joshi CMA (Certified Medical Assistant)    Encounter addended by: Candis Joshi CMA on: 5/24/2018 10:48 AM<BR>     Actions taken: Charge Capture section accepted

## 2021-07-03 NOTE — ADDENDUM NOTE
Addendum Note by Candis Joshi CMA at 2/26/2019 11:59 PM     Author: Candis Joshi CMA Service: -- Author Type: Certified Medical Assistant    Filed: 3/5/2019 12:46 PM Date of Service: 2/26/2019 11:59 PM Status: Signed    : Candis Joshi CMA (Certified Medical Assistant)    Encounter addended by: Candis Joshi CMA on: 3/5/2019 12:46 PM      Actions taken: Charge Capture section accepted

## 2021-07-04 NOTE — TELEPHONE ENCOUNTER
Telephone Encounter by Kaylynn Magallanes MA at 7/1/2021 12:47 PM     Author: Kaylynn Magallaens MA Service: -- Author Type: Certified Medical Assistant    Filed: 7/1/2021 12:47 PM Encounter Date: 7/1/2021 Status: Signed    : Kaylynn Magallanes MA (Certified Medical Assistant)       Spoke to pharmacy and they said Rx was sent to [facility] so they are not sure why it came here.

## 2021-07-04 NOTE — TELEPHONE ENCOUNTER
Telephone Encounter by Antonia Harrison RN at 7/1/2021 11:10 AM     Author: Antonia Harrison RN Service: -- Author Type: Registered Nurse    Filed: 7/1/2021 11:10 AM Encounter Date: 7/1/2021 Status: Signed    : Antonia Harrison RN (Registered Nurse)       RN cannot approve Refill Request    RN can NOT refill this medication med is not covered by policy/route to provider. Last office visit: Visit date not found Last Physical: Visit date not found Last MTM visit: Visit date not found Last visit same specialty: Visit date not found.  Next visit within 3 mo: Visit date not found  Next physical within 3 mo: Visit date not found      [facility] Connection Triage/Med Refill 7/1/2021    Requested Prescriptions   Pending Prescriptions Disp Refills   ? RYTARY 23.75-95 mg CpER ER capsule [Pharmacy Med Name: RYTARY 23.75-95MG CPCR] 60 capsule 2     Sig: TAKE ONE CAPSULE BY MOUTH TWICE A DAY       There is no refill protocol information for this order

## 2021-07-04 NOTE — TELEPHONE ENCOUNTER
Telephone Encounter by Valentín Washington MD at 7/1/2021 12:34 PM     Author: Valentín Washington MD Service: -- Author Type: Physician    Filed: 7/1/2021 12:35 PM Encounter Date: 7/1/2021 Status: Signed    : Valentín Washington MD (Physician)       This prescription keeps coming back to me but should be prescribed by his neurologist. My need to notify patient to make sure they are getting this from neurology.

## 2021-07-07 NOTE — TELEPHONE ENCOUNTER
There is a prescription refill for her Rytary that came to me.  This should be prescribed/managed by neurology

## 2021-07-07 NOTE — TELEPHONE ENCOUNTER
RN cannot approve Refill Request    RN can NOT refill this medication med is not covered by policy/route to provider. Last office visit: Visit date not found Last Physical: Visit date not found Last MTM visit: Visit date not found Last visit same specialty: Visit date not found.  Next visit within 3 mo: Visit date not found  Next physical within 3 mo: Visit date not found      Antonia Harrison, [facility] Connection Triage/Med Refill 6/28/2021    Requested Prescriptions   Pending Prescriptions Disp Refills     RYTARY 23.75-95 mg CpER ER capsule [Pharmacy Med Name: RYTARY 23.75-95MG CPCR] 60 capsule 2     Sig: TAKE ONE CAPSULE BY MOUTH TWICE A DAY       There is no refill protocol information for this order            No

## 2021-07-07 NOTE — TELEPHONE ENCOUNTER
RN cannot approve Refill Request    RN can NOT refill this medication med is not covered by policy/route to provider. Last office visit: 1/2/2020 Valentín Washington MD Last Physical: 11/4/2020 Last MTM visit: Visit date not found Last visit same specialty: 8/21/2020 Taylor Jackson MD.  Next visit within 3 mo: Visit date not found  Next physical within 3 mo: Visit date not found      Antonia Harrison, [facility] Connection Triage/Med Refill 6/28/2021    Requested Prescriptions   Pending Prescriptions Disp Refills     nystatin (MYCOSTATIN) cream [Pharmacy Med Name: NYSTATIN [id number]/GM CREA] 30 g 1     Sig: APPLY TO AFFECTED AREA OF GROIN THREE TIMES A DAY FOR 10 DAYS       There is no refill protocol information for this order

## 2021-07-12 NOTE — LETTER
"    7/12/2021         RE: Silver Zamora  [address] [place] [id number]        Dear Colleague,    Thank you for referring your patient, Silver Zamora, to the [facility]. Please see a copy of my visit note below.    Video Visit  Silver Zamora is a 71 year old male who is being evaluated via a billable video visit in light of the ongoing global health crisis (COVID-19) that requires us to abide by social distancing mandates in order to reduce the risk of COVID-19 exposure.       The patient has been notified of following:     \"This Video visit will be conducted via a virtual call between you and your physician/provider. We have found that certain health care needs can be provided without the need for a physical exam.  This service lets us provide the care you need with a short video conversation.  If a prescription is necessary we can send it directly to your pharmacy.  If lab work is needed we can place an order for that and you can then stop by our lab to have the test done at a later time.    If during the course of the call the physician/provider feels a video visit is not appropriate, you will not be charged for this service.\"     Patient has given verbal consent to a Video visit? Yes    Silver Zamora chief complaint is Parkinson's Disease    ALLERGIES  Clonazepam    Neuropsychological assessment completed    Yes   Currently doing PT  No   Completed No   Currently doing OT  No   Completed No    Currently doing ST   No   Completed No   Psychology  No     Any new medication (other provider):   No   Meds started at last appointment  No   Meds increased at last appointment    No     Any concerns you would like to be addressed at this appointment?  Pt had a few falls at the facility.  RN comes twice a week, massage therapy comes to their home.                                                        Phone Start Time: 2:20pm    Phone End Time:  2:25pm    Total time of phone " conversation: 5 minutes    Patient would like the video invitation sent by: [employer]   Number/e-mail address:[phone]    Candis Gardner [facility]     There were no vitals filed for this visit.    Outpatient Follow up Parkinson's Dx Evaluation     Pertinent History:   Patient is right-handed.  He was diagnosed with Parkinson's disease in 2004 at the age of about 54.  He feels that the symptoms may have started in about 1994.  Symptoms started with stiffness, slowness, soft speech, walking difficulties, and left hand tremor which spread to the right and about 2015.  Has seen Dr. Suazo in the past.  He has some bruising and tremors occasionally but there is asymmetry.  He tried Sinemet CR during the daytime and at night which did not work well.  He is been off the pramipexole since 12/2015 due to memory and behavioral concerns.  December 2010 he was taking Sinemet IR 25/100 and increased to 25/250 with a good response which also improved his back pain.  He was able to be more active.  In 2011 he was evaluated at the [facility] by , he was noted to have U PDR S score of 41 after all Parkinson's medications.  It was suggested that he take carbidopa levodopa every 3 hours.  Amantadine was discontinued because he did not have dyskinesia.  It was suggested that he switch his selegiline to Azilect or discontinue all MAO inhibitors.  It is  felt that his increased Sinemet may have exasperated his restless leg syndrome.  Gabapentin was started to control his restless leg symptoms.  He was followed by Dr. Suazo from 2002 - 2011.  He also saw Dr. Zuniga and a previous nurse practitioner at this clinic.  He did transfer care to  in 2013.  He has tried amantadine and selegiline in the past.  He is not able to tolerate Requip, which he tried for several years at a relatively high dose but discontinued due to edema in lower extremities, he also tried Mirapex in 2015 but this caused more freezing and  increased tremor.  CAT scan in July 2013 showed absent radiotracer in the putamen with diminished accumulation in the caudate, most consistent with Parkinson's disease.  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      Is patient on a controlled substance that I prescribe? No      Subjective:          HPI:   The patient returns to the Parkinson's clinic for follow-up visit, he was last seen by me on 4/8/2021, no medication changes were made at that appointment.  Wife reports that the patient continues to be on hospice.  Wife and patient also state that the hallucinations are much better.  The patient has been having a lot of falls, patient is unable to do therapy since he is on hospice.  Patient lacks motivation to exercise and is becoming weaker each day.  A long discussion was held with the patient and wife about Parkinson's and treatment plans.  The patient must exercise in order to reduce his Parkinson symptoms, patient and wife verbalized understanding    We discussed some treatment options and have elected to continue with current therapies.      Current Medications: Please see chart. Medications personally reviewed.     Medication Compliance: Yes    Patient Active Problem List    Diagnosis Date Noted     Parkinson disease (H) 07/30/2014     Priority: Medium     Past Medical History:   Diagnosis Date     Anxiety      Callus      Cardiomyopathy (H)      Chronic back pain      Delirium      Depression      Fall      GERD (gastroesophageal reflux disease)      Hypothyroidism      Pancreatitis 12/31/2015     PD (Parkinson's disease) (H)      RBD (REM behavioral disorder) 4/3/2017     Shingles     hx     Past Surgical History:   Procedure Laterality Date     AMPUTATE TOE(S) Left 2/27/2020    Procedure: Left Foot 2nd Toe Amputation,foot/toe off- loading procedures;  Surgeon: Hakeem Markham DPM;  Location: [facility] OR     CV CORONARY ANGIOGRAM N/A 10/31/2018    Procedure:  Coronary Angiogram;  Surgeon: Jose Meyer MD;  Location: [facility];  Service:      CV LEFT HEART CATHETERIZATION WITHOUT LEFT VENTRICULOGRAM Left 10/31/2018    Procedure: Left Heart Catheterization Without Left Ventriculogram;  Surgeon: Jose Meyer MD;  Location: [facility] Cath Lab;  Service:      INGUINAL HERNIA REPAIR Bilateral 10/2013     LAMINECTOMY  10/2013     NM ERCP REMOVE CALCULI/DEBRIS BILIARY/PANCREAS DUCT N/A 5/25/2018    Procedure: ENDOSCOPIC RETROGRADE CHOLANGIOPANCREATOGRAPHY SPINCTEROTOMY BILIARY STONE EXTRACTION;  Surgeon: Curtis Mcclain MD;  Location: [facility];  Service: Gastroenterology     NM LAP,CHOLECYSTECTOMY/GRAPH N/A 5/24/2018    Procedure: LAPAROSCOPIC CHOLECYSTECTOMY;  Surgeon: Joyce Melissa MD;  Location: [facility];  Service: General     Family History   Problem Relation Age of Onset     Kidney failure Mother      Heart Disease Father 82.00     Tremor Father      Parkinsonism Paternal Grandfather      Tremor Sister      Leukemia Maternal Grandmother      Current Outpatient Medications   Medication Sig Dispense Refill     acetaminophen (TYLENOL) 325 MG tablet Take 650 mg by mouth       ammonium lactate (AMLACTIN) 12 % external cream Apply two times a day       aspirin 81 MG EC tablet Take 81 mg by mouth daily       atorvastatin (LIPITOR) 80 MG tablet Take 80 mg by mouth daily       bisacodyl (DULCOLAX) 10 MG suppository One supp NM qday prn constipation.  Give if no BM in prior 3 days.       calcium carbonate (TUMS) 500 MG chewable tablet Take 1 chew tab by mouth as needed for heartburn       carbidopa-levodopa (PARCOPA)  MG ODT Take 1 tablet by mouth as needed       Cholecalciferol (VITAMIN D3) 2000 units TABS Take 1 tablet by mouth daily        docusate sodium (COLACE) 100 MG tablet Take 200 mg by mouth daily       DULoxetine (CYMBALTA) 20 MG capsule At hs       fluticasone (FLONASE) 50 MCG/ACT nasal spray Spray 2 sprays in nostril        gabapentin (NEURONTIN) 100 MG capsule At hs       levothyroxine (SYNTHROID/LEVOTHROID) 137 MCG tablet Take 137 mcg by mouth At Bedtime       nitroGLYcerin (NITROSTAT) 0.4 MG sublingual tablet Place 0.4 mg under the tongue every 5 minutes as needed for chest pain For chest pain place 1 tablet under the tongue every 5 minutes for 3 doses. If symptoms persist 5 minutes after 1st dose call 911.       polyethylene glycol (MIRALAX) powder Take 1 capful by mouth daily       RYTARY 61. MG CPCR per CR capsule        SENNA-docusate sodium (SENNA S) 8.6-50 MG tablet Take 1 tablet by mouth       triamcinolone (KENALOG) 0.025 % external ointment          Allergies   Allergen Reactions     Clonazepam      Rash      Social History     Socioeconomic History     Marital status:      Spouse name: Not on file     Number of children: Not on file     Years of education: Not on file     Highest education level: Not on file   Occupational History     Not on file   Tobacco Use     Smoking status: Former Smoker     Packs/day: 1.00     Years: 5.00     Pack years: 5.00     Types: Cigars     Quit date:      Years since quittin.5     Smokeless tobacco: Never Used   Substance and Sexual Activity     Alcohol use: Never     Drug use: Never     Sexual activity: Not Currently   Other Topics Concern     Not on file   Social History Narrative     Not on file     Social Determinants of Health     Financial Resource Strain:      Difficulty of Paying Living Expenses:    Food Insecurity:      Worried About Running Out of Food in the Last Year:      Ran Out of Food in the Last Year:    Transportation Needs:      Lack of Transportation (Medical):      Lack of Transportation (Non-Medical):    Physical Activity:      Days of Exercise per Week:      Minutes of Exercise per Session:    Stress:      Feeling of Stress :    Social Connections:      Frequency of Communication with Friends and Family:      Frequency of Social Gatherings with  Friends and Family:      Attends Hindu Services:      Active Member of Clubs or Organizations:      Attends Club or Organization Meetings:      Marital Status:    Intimate Partner Violence:      Fear of Current or Ex-Partner:      Emotionally Abused:      Physically Abused:      Sexually Abused:        Review of Systems  A comprehensive review of systems was negative except for:what is noted above    Mental Status Exam:   Appearance: Patient is properly groomed and appropriately dressed and seated for appointment  Behavior:   .cooperative and pleasant, no apparent agitation, no irritability, denies any recent behavioral difficulties  Speech: .slow and soft speech, able to participate in conversation  Mood/Affect: .flat, no obvious significant depression or anxiety  Thought Content: .no psychotic symptoms were evident, patient and wife do state that the patient did have some hallucinations    Suicidal or Homicidal Thoughts:   .no evidence of suicidal or homicidal ideations, patient denies any suicidal ideations  Thought Process/Formulation:  .adequate, able to process information, no evidence of racing thoughts  Associations: .adequate, processing information and participating in conversation  Fund of Knowledge:  .intact, following conversation, adequate depth of understanding  Attention/Concentration: .slightly distractible, concentration is poor  Insight: .slightly impaired  Judgement:  .slightly impaired  Memory:   . .slight impairment   Motor Status:  .no noted tremor or dyskinesia   Orientation: .oriented to person, place, time and situation .  .  Diagnosis managed and treated at today's visit   Parkinson's disease  Anxiety d/t a medical condition  Chronic pain  Mild cognitive impairment  High risk for falls  Tremor  Foot ulcer  Physical deconditioning  Insomnia     Plan:  Medication Adjustment:  No medication changes    Total time spent with the patient today was 40 minutes with greater than 50% of the time  spent in counseling and care coordination. The patient will call before then with any questions, concerns or problems. We will assess for the appropriateness of possible psychotropic medication trials/changes. The patient will seek out appropriate emergency services should that become necessary.    Other:   Patient will return to clinic in 3 months. They agree to call or return sooner with any questions or concerns.  Risks and benefits were discussed.  Continue with his individual therapist.     Continue with the support of the clinic, reassurance, and redirection. Staff monitoring and ongoing assessments per team plan. Current psychotropic medication appears to represent the minimum effective dosage and appears medically necessary. We will continue to monitor and reassess. This team will utilize appropriate emergency services if necessary. I will make myself available if concerns or problems arise.    Consent was obtained for this service by one of our care team members    Video Visit Details    Type of service: Video Visit    Video Start Time: 1430    Video End Time:  1500    Total time for Video:  30 minutes    Originating Location: Patient's home    Distant Location:  [facility]     Mode of Communication: Video Conference via netFactor.    10 minutes spent on the date of the encounter doing chart review, review of outside records, documentation.    General Information:  Today you had your appointment with Kelli Walker CNP     If lab work was done today as part of your evaluation you will generally be contacted via My Chart, mail, or phone with the results within 1-5 days. If there is an alarming result we will contact you by phone. Lab results come back at varying times, I generally wait until all labs are resulted before making comments on results. Please note labs are automatically released to My Chart once available.     If you need refills please contact your pharmacist.  They will send a refill request to me to review. Please allow 3 business days for us to process all refill requests.     Please call or send a medical message through My Chart, with any questions or concerns    If you need any paperwork completed please fax forms to [phone]. Please state if you would like a copy of the completed paperwork, mailed or faxed back to the patient and a fax number to fax the paperwork to. Please allow up to 10 days for paperwork to be completed.    Kelli Walker CNP      Again, thank you for allowing me to participate in the care of your patient.        Sincerely,        KASHIF Rosenthal CNP

## 2021-07-12 NOTE — PROGRESS NOTES
"Video Visit  Silver Zamora is a 71 year old male who is being evaluated via a billable video visit in light of the ongoing global health crisis (COVID-19) that requires us to abide by social distancing mandates in order to reduce the risk of COVID-19 exposure.       The patient has been notified of following:     \"This Video visit will be conducted via a virtual call between you and your physician/provider. We have found that certain health care needs can be provided without the need for a physical exam.  This service lets us provide the care you need with a short video conversation.  If a prescription is necessary we can send it directly to your pharmacy.  If lab work is needed we can place an order for that and you can then stop by our lab to have the test done at a later time.    If during the course of the call the physician/provider feels a video visit is not appropriate, you will not be charged for this service.\"     Patient has given verbal consent to a Video visit? Yes    Silver Zamora chief complaint is Parkinson's Disease    ALLERGIES  Clonazepam    Neuropsychological assessment completed    Yes   Currently doing PT  No   Completed No   Currently doing OT  No   Completed No    Currently doing ST   No   Completed No   Psychology  No     Any new medication (other provider):   No   Meds started at last appointment  No   Meds increased at last appointment    No     Any concerns you would like to be addressed at this appointment?  Pt had a few falls at the facility.  RN comes twice a week, massage therapy comes to their home.                                                        Phone Start Time: 2:20pm    Phone End Time:  2:25pm    Total time of phone conversation: 5 minutes    Patient would like the video invitation sent by: [employer]   Number/e-mail address:[phone]    Candis Gardner CMA     There were no vitals filed for this visit.    Outpatient Follow up Parkinson's Dx Evaluation     Pertinent " History:   Patient is right-handed.  He was diagnosed with Parkinson's disease in 2004 at the age of about 54.  He feels that the symptoms may have started in about 1994.  Symptoms started with stiffness, slowness, soft speech, walking difficulties, and left hand tremor which spread to the right and about 2015.  Has seen Dr. Suazo in the past.  He has some bruising and tremors occasionally but there is asymmetry.  He tried Sinemet CR during the daytime and at night which did not work well.  He is been off the pramipexole since 12/2015 due to memory and behavioral concerns.  December 2010 he was taking Sinemet IR 25/100 and increased to 25/250 with a good response which also improved his back pain.  He was able to be more active.  In 2011 he was evaluated at the [facility] by , he was noted to have U PDR S score of 41 after all Parkinson's medications.  It was suggested that he take carbidopa levodopa every 3 hours.  Amantadine was discontinued because he did not have dyskinesia.  It was suggested that he switch his selegiline to Azilect or discontinue all MAO inhibitors.  It is  felt that his increased Sinemet may have exasperated his restless leg syndrome.  Gabapentin was started to control his restless leg symptoms.  He was followed by Dr. Suazo from 2002 - 2011.  He also saw Dr. Zuniga and a previous nurse practitioner at this clinic.  He did transfer care to  in 2013.  He has tried amantadine and selegiline in the past.  He is not able to tolerate Requip, which he tried for several years at a relatively high dose but discontinued due to edema in lower extremities, he also tried Mirapex in 2015 but this caused more freezing and increased tremor.  CAT scan in July 2013 showed absent radiotracer in the putamen with diminished accumulation in the caudate, most consistent with Parkinson's disease.  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      Is  patient on a controlled substance that I prescribe? No      Subjective:          HPI:   The patient returns to the Parkinson's clinic for follow-up visit, he was last seen by me on 4/8/2021, no medication changes were made at that appointment.  Wife reports that the patient continues to be on hospice.  Wife and patient also state that the hallucinations are much better.  The patient has been having a lot of falls, patient is unable to do therapy since he is on hospice.  Patient lacks motivation to exercise and is becoming weaker each day.  A long discussion was held with the patient and wife about Parkinson's and treatment plans.  The patient must exercise in order to reduce his Parkinson symptoms, patient and wife verbalized understanding    We discussed some treatment options and have elected to continue with current therapies.      Current Medications: Please see chart. Medications personally reviewed.     Medication Compliance: Yes    Patient Active Problem List    Diagnosis Date Noted     Parkinson disease (H) 07/30/2014     Priority: Medium     Past Medical History:   Diagnosis Date     Anxiety      Callus      Cardiomyopathy (H)      Chronic back pain      Delirium      Depression      Fall      GERD (gastroesophageal reflux disease)      Hypothyroidism      Pancreatitis 12/31/2015     PD (Parkinson's disease) (H)      RBD (REM behavioral disorder) 4/3/2017     Shingles     hx     Past Surgical History:   Procedure Laterality Date     AMPUTATE TOE(S) Left 2/27/2020    Procedure: Left Foot 2nd Toe Amputation,foot/toe off- loading procedures;  Surgeon: Hakeem Markham DPM;  Location: [facility] OR     CV CORONARY ANGIOGRAM N/A 10/31/2018    Procedure: Coronary Angiogram;  Surgeon: Jose Meyer MD;  Location: [facility] Cath Lab;  Service:      CV LEFT HEART CATHETERIZATION WITHOUT LEFT VENTRICULOGRAM Left 10/31/2018    Procedure: Left Heart Catheterization Without Left Ventriculogram;  Surgeon: Jsoe  MD Mitch;  Location: [facility] Cath Lab;  Service:      INGUINAL HERNIA REPAIR Bilateral 10/2013     LAMINECTOMY  10/2013     [place] ERCP REMOVE CALCULI/DEBRIS BILIARY/PANCREAS DUCT N/A 5/25/2018    Procedure: ENDOSCOPIC RETROGRADE CHOLANGIOPANCREATOGRAPHY SPINCTEROTOMY BILIARY STONE EXTRACTION;  Surgeon: Curtis Mcclain MD;  Location: [facility];  Service: Gastroenterology     [place] LAP,CHOLECYSTECTOMY/GRAPH N/A 5/24/2018    Procedure: LAPAROSCOPIC CHOLECYSTECTOMY;  Surgeon: Joyce Melissa MD;  Location: [facility];  Service: General     Family History   Problem Relation Age of Onset     Kidney failure Mother      Heart Disease Father 82.00     Tremor Father      Parkinsonism Paternal Grandfather      Tremor Sister      Leukemia Maternal Grandmother      Current Outpatient Medications   Medication Sig Dispense Refill     acetaminophen (TYLENOL) 325 MG tablet Take 650 mg by mouth       ammonium lactate (AMLACTIN) 12 % external cream Apply two times a day       aspirin 81 MG EC tablet Take 81 mg by mouth daily       atorvastatin (LIPITOR) 80 MG tablet Take 80 mg by mouth daily       bisacodyl (DULCOLAX) 10 MG suppository One supp [place] qday prn constipation.  Give if no BM in prior 3 days.       calcium carbonate (TUMS) 500 MG chewable tablet Take 1 chew tab by mouth as needed for heartburn       carbidopa-levodopa (PARCOPA)  MG ODT Take 1 tablet by mouth as needed       Cholecalciferol (VITAMIN D3) 2000 units TABS Take 1 tablet by mouth daily        docusate sodium (COLACE) 100 MG tablet Take 200 mg by mouth daily       DULoxetine (CYMBALTA) 20 MG capsule At hs       fluticasone (FLONASE) 50 MCG/ACT nasal spray Spray 2 sprays in nostril       gabapentin (NEURONTIN) 100 MG capsule At hs       levothyroxine (SYNTHROID/LEVOTHROID) 137 MCG tablet Take 137 mcg by mouth At Bedtime       nitroGLYcerin (NITROSTAT) 0.4 MG sublingual tablet Place 0.4 mg under the tongue every 5 minutes as needed for chest  pain For chest pain place 1 tablet under the tongue every 5 minutes for 3 doses. If symptoms persist 5 minutes after 1st dose call 911.       polyethylene glycol (MIRALAX) powder Take 1 capful by mouth daily       RYTARY 61. MG CPCR per CR capsule        SENNA-docusate sodium (SENNA S) 8.6-50 MG tablet Take 1 tablet by mouth       triamcinolone (KENALOG) 0.025 % external ointment          Allergies   Allergen Reactions     Clonazepam      Rash      Social History     Socioeconomic History     Marital status:      Spouse name: Not on file     Number of children: Not on file     Years of education: Not on file     Highest education level: Not on file   Occupational History     Not on file   Tobacco Use     Smoking status: Former Smoker     Packs/day: 1.00     Years: 5.00     Pack years: 5.00     Types: Cigars     Quit date:      Years since quittin.5     Smokeless tobacco: Never Used   Substance and Sexual Activity     Alcohol use: Never     Drug use: Never     Sexual activity: Not Currently   Other Topics Concern     Not on file   Social History Narrative     Not on file     Social Determinants of Health     Financial Resource Strain:      Difficulty of Paying Living Expenses:    Food Insecurity:      Worried About Running Out of Food in the Last Year:      Ran Out of Food in the Last Year:    Transportation Needs:      Lack of Transportation (Medical):      Lack of Transportation (Non-Medical):    Physical Activity:      Days of Exercise per Week:      Minutes of Exercise per Session:    Stress:      Feeling of Stress :    Social Connections:      Frequency of Communication with Friends and Family:      Frequency of Social Gatherings with Friends and Family:      Attends Jewish Services:      Active Member of Clubs or Organizations:      Attends Club or Organization Meetings:      Marital Status:    Intimate Partner Violence:      Fear of Current or Ex-Partner:      Emotionally Abused:       Physically Abused:      Sexually Abused:        Review of Systems  A comprehensive review of systems was negative except for:what is noted above    Mental Status Exam:   Appearance: Patient is properly groomed and appropriately dressed and seated for appointment  Behavior:   .cooperative and pleasant, no apparent agitation, no irritability, denies any recent behavioral difficulties  Speech: .slow and soft speech, able to participate in conversation  Mood/Affect: .flat, no obvious significant depression or anxiety  Thought Content: .no psychotic symptoms were evident, patient and wife do state that the patient did have some hallucinations    Suicidal or Homicidal Thoughts:   .no evidence of suicidal or homicidal ideations, patient denies any suicidal ideations  Thought Process/Formulation:  .adequate, able to process information, no evidence of racing thoughts  Associations: .adequate, processing information and participating in conversation  Fund of Knowledge:  .intact, following conversation, adequate depth of understanding  Attention/Concentration: .slightly distractible, concentration is poor  Insight: .slightly impaired  Judgement:  .slightly impaired  Memory:   . .slight impairment   Motor Status:  .no noted tremor or dyskinesia   Orientation: .oriented to person, place, time and situation .  .  Diagnosis managed and treated at today's visit   Parkinson's disease  Anxiety d/t a medical condition  Chronic pain  Mild cognitive impairment  High risk for falls  Tremor  Foot ulcer  Physical deconditioning  Insomnia     Plan:  Medication Adjustment:  No medication changes    Total time spent with the patient today was 40 minutes with greater than 50% of the time spent in counseling and care coordination. The patient will call before then with any questions, concerns or problems. We will assess for the appropriateness of possible psychotropic medication trials/changes. The patient will seek out appropriate emergency  services should that become necessary.    Other:   Patient will return to clinic in 3 months. They agree to call or return sooner with any questions or concerns.  Risks and benefits were discussed.  Continue with his individual therapist.     Continue with the support of the clinic, reassurance, and redirection. Staff monitoring and ongoing assessments per team plan. Current psychotropic medication appears to represent the minimum effective dosage and appears medically necessary. We will continue to monitor and reassess. This team will utilize appropriate emergency services if necessary. I will make myself available if concerns or problems arise.    Consent was obtained for this service by one of our care team members    Video Visit Details    Type of service: Video Visit    Video Start Time: 1430    Video End Time:  1500    Total time for Video:  30 minutes    Originating Location: Patient's home    Distant Location:  [facility]     Mode of Communication: Video Conference via [employer].    10 minutes spent on the date of the encounter doing chart review, review of outside records, documentation.    General Information:  Today you had your appointment with Kelli Walker CNP     If lab work was done today as part of your evaluation you will generally be contacted via My Chart, mail, or phone with the results within 1-5 days. If there is an alarming result we will contact you by phone. Lab results come back at varying times, I generally wait until all labs are resulted before making comments on results. Please note labs are automatically released to My Chart once available.     If you need refills please contact your pharmacist. They will send a refill request to me to review. Please allow 3 business days for us to process all refill requests.     Please call or send a medical message through My Chart, with any questions or concerns    If you need any paperwork completed please fax  forms to [phone]. Please state if you would like a copy of the completed paperwork, mailed or faxed back to the patient and a fax number to fax the paperwork to. Please allow up to 10 days for paperwork to be completed.    Kelli Walker, CNP

## 2021-07-14 PROBLEM — I50.20 HEART FAILURE WITH REDUCED EJECTION FRACTION, NYHA CLASS I (H): Status: RESOLVED | Noted: 2018-07-31 | Resolved: 2018-11-15

## 2021-07-14 PROBLEM — I50.30 HEART FAILURE WITH PRESERVED EJECTION FRACTION, NYHA CLASS I (H): Status: RESOLVED | Noted: 2018-06-20 | Resolved: 2018-07-31

## 2021-07-19 PROBLEM — F32.5 MAJOR DEPRESSION IN COMPLETE REMISSION (H): Status: ACTIVE | Noted: 2021-01-01

## 2021-07-27 NOTE — PROGRESS NOTES
Assessment & Plan     Left shoulder pain, unspecified chronicity  Differentials discussed in detail including symptoms related to osteoarthritis.  X-ray findings reviewed independently, consistent with advanced glenohumeral osteoarthritis.  Suggested over-the-counter oral/topical analgesia analgesia, activity as tolerated.  Will consider steroid injection and Ortho consult if symptoms persist.  - XR Shoulder Left G/E 3 Views; Future    Pain of left forearm  X-ray findings reviewed, consistent with olecranon process spur, small corticated ossicle representing old triquetral injury.  Suggested over-the-counter oral/topical analgesia  - XR Forearm Left 2 Views; Future    Right wrist pain  X-ray findings consistent with chronic scapholunate ligamentous injury along with degenerative changes.  Recommended over-the-counter oral/topical analgesia, will consider Ortho consult  - XR Wrist Right G/E 3 Views; Future    Chronic low back pain, unspecified back pain laterality, unspecified whether sciatica present  X-ray findings negative for acute fracture, hardware appears grossly intact.  Recommended to continue over-the-counter oral/topical analgesia  - XR Lumbar Spine 2/3 Views; Future        Norm Hernandez MD  [facility]    Jessica Sheppard is a 71 year old who presents for the following health issues accompanied by his spouse:    HPI     Concern - Fall - Parkinson's   Onset: couple weeks   Description: fell a couple weeks ago. Hit his back, left shoulder  Intensity: moderate  Progression of Symptoms:  same  Accompanying Signs & Symptoms: has had multiple falls.   Previous history of similar problem: has parkinson. Had had Cortizone shot in his shoulder in the past   Therapies tried and outcome: tylenol, tylenol PM,       Review of Systems    ROS: 10 point ROS neg other than the symptoms noted above in the HPI.      Objective    /82 (Cuff Size: Adult Regular)   Pulse 91   Temp 97.5  F  "(36.4  C) (Tympanic)   Resp 18   Ht 1.778 m (5' 10\")   Wt 92.1 kg (203 lb)   SpO2 98%   BMI 29.13 kg/m    Body mass index is 29.13 kg/m .  Physical Exam   GENERAL: alert and no distress  EYES: Eyes grossly normal to inspection, PERRL and conjunctivae and sclerae normal  NECK: no adenopathy, no asymmetry, masses, or scars and thyroid normal to palpation  RESP: lungs clear to auscultation - no rales, rhonchi or wheezes  CV: regular rates and rhythm, normal S1 S2, no S3 or S4 and no murmur, click or rub  MS: limited left shoulder abduction in all direction, no joint swelling, skin discoloration, tenderness or warmth noted, subjective left upper forearm pain, normal supination and pronation, right wrist septal swelling, peripheral pulses 3+, sensation to touch and pressure intact, no spinal/paraspinal tenderness noted, able to transfer with assistance, gait slow and unsteady  SKIN: no suspicious lesions or rashes  NEURO: No focal neurology noted, cranial nerves II to XII intact            "

## 2021-07-27 NOTE — NURSING NOTE
"Chief Complaint   Patient presents with     Musculoskeletal Problem     Parkinson     /82 (Cuff Size: Adult Regular)   Pulse 91   Temp 97.5  F (36.4  C) (Tympanic)   Resp 18   Ht 1.778 m (5' 10\")   Wt 92.1 kg (203 lb)   SpO2 98%   BMI 29.13 kg/m   Estimated body mass index is 29.13 kg/m  as calculated from the following:    Height as of this encounter: 1.778 m (5' 10\").    Weight as of this encounter: 92.1 kg (203 lb).  Patient presents to the clinic using Walker      Health Maintenance that is potentially due pending provider review:    Health Maintenance Due   Topic Date Due     HF ACTION PLAN  Never done     ANNUAL REVIEW OF HM ORDERS  Never done     DEPRESSION ACTION PLAN  Never done     HEPATITIS C SCREENING  Never done     ZOSTER IMMUNIZATION (1 of 2) Never done     BMP  05/04/2021     PHQ-9  05/04/2021                "

## 2021-08-03 PROBLEM — I50.9 CHF (CONGESTIVE HEART FAILURE) (H): Status: ACTIVE | Noted: 2021-01-01

## 2021-08-03 NOTE — NURSING NOTE
"Chief Complaint   Patient presents with     Musculoskeletal Problem     Hernia     /80 (Cuff Size: Adult Regular)   Pulse 70   Temp 97.8  F (36.6  C) (Tympanic)   Resp 18   Ht 1.778 m (5' 10\")   Wt 92.1 kg (203 lb)   BMI 29.13 kg/m   Estimated body mass index is 29.13 kg/m  as calculated from the following:    Height as of this encounter: 1.778 m (5' 10\").    Weight as of this encounter: 92.1 kg (203 lb).  Patient presents to the clinic using No DME      Health Maintenance that is potentially due pending provider review:    Health Maintenance Due   Topic Date Due     HF ACTION PLAN  Never done     ANNUAL REVIEW OF HM ORDERS  Never done     DEPRESSION ACTION PLAN  Never done     HEPATITIS C SCREENING  Never done     ZOSTER IMMUNIZATION (1 of 2) Never done     BMP  05/04/2021     PHQ-9  05/04/2021                "

## 2021-08-03 NOTE — PROGRESS NOTES
"  Assessment & Plan     Primary osteoarthritis of left shoulder  Treatment options discussed.  Steroid injection administered in office today, see procedure note for detail.  Continue gentle exercises, Tylenol and topical analgesia  - Large Joint/Bursa injection and/or drainage (Shoulder, Knee)  - triamcinolone (KENALOG-40) injection 40 mg    Left lower quadrant abdominal pain  History of back surgery, surgical scar C/D/I and physical examination unremarkable otherwise.  Differentials discussed in detail including incisional hernia, constipation.  Recommended conservative management for now and return directly discussed in detail.      Norm Hernandez MD  [facility]    Jessica Sheppard is a 71 year old who presents for the following health issues  accompanied by his spouse:    HPI     Musculoskeletal problem/pain  Onset/Duration: recheck   Description  Location: Left shoulder.   Had many xrays taken. Would like to go over the results and discuss getting an injection in the shoulder. Has had an injection in his left shoulder in the past- but has been about 10 years now.     Would also like to check for hernia. Getting in and out of bed puts a lot of pressure on that part of his body. Causes some sharp pains.  Has had hernia's repaired in the past.         Review of Systems   Constitutional, HEENT, cardiovascular, pulmonary, gi and gu systems are negative, except as otherwise noted.      Objective    /80 (Cuff Size: Adult Regular)   Pulse 70   Temp 97.8  F (36.6  C) (Tympanic)   Resp 18   Ht 1.778 m (5' 10\")   Wt 92.1 kg (203 lb)   BMI 29.13 kg/m    Body mass index is 29.13 kg/m .  Physical Exam   GENERAL: alert and no distress  EYES: Eyes grossly normal to inspection, PERRL and conjunctivae and sclerae normal  NECK: no adenopathy, no asymmetry, masses, or scars and thyroid normal to palpation  RESP: lungs clear to auscultation - no rales, rhonchi or wheezes  CV: regular rates " and rhythm, normal S1 S2, no S3 or S4 and no murmur, click or rub  ABDOMEN: soft, nontender, left lower quadrant surgical scar C/D/I, no obvious swelling noted  MS: Chronic OA changes involving multiple joints, limited left shoulder range of movement, no tenderness or skin discoloration noted  NEURO: slow and stiff gait, resting tremors involving both hands,

## 2021-08-06 NOTE — PATIENT INSTRUCTIONS - HE
Patient Instructions by Valentín Washington MD at 9/6/2019  1:20 PM     Author: Valentín Washington MD Service: -- Author Type: Physician    Filed: 9/6/2019  2:25 PM Encounter Date: 9/6/2019 Status: Addendum    : Valentín Washington MD (Physician)    Related Notes: Original Note by Valentín Washington MD (Physician) filed at 9/6/2019  2:12 PM       You received the flu shot  You can consider the Shingrix vaccine/shingles vaccine. Check with insurance regarding coverage  Follow-up with cardiology as planned  Follow-up with the wound care team. Please let me know if you need a referral    Patient Education     Your Health Risk Assessment indicates you feel you are not in good physical health.    A healthy lifestyle helps keep the body fit and the mind alert. It helps protect you from disease, helps you fight disease, and helps prevent chronic disease (disease that doesn't go away) from getting worse. This is important as you get older and begin to notice twinges in muscles and joints and a decline in the strength and stamina you once took for granted. A healthy lifestyle includes good healthcare, good nutrition, weight control, recreation, and regular exercise. Avoid harmful substances and do what you can to keep safe. Another part of a healthy lifestyle is stay mentally active and socially involved.    Good healthcare     Have a wellness visit every year.     If you have new symptoms, let us know right away. Don't wait until the next checkup.     Take medicines exactly as prescribed and keep your medicines in a safe place. Tell us if your medicine causes problems.   Healthy diet and weight control     Eat 3 or 4 small, nutritious, low-fat, high-fiber meals a day. Include a variety of fruits, vegetables, and whole-grain foods.     Make sure you get enough calcium in your diet. Calcium, vitamin D, and exercise help prevent osteoporosis (bone thinning).     If you live alone, try eating  with others when you can. That way you get a good meal and have company while you eat it.     Try to keep a healthy weight. If you eat more calories than your body uses for energy, it will be stored as fat and you will gain weight.     Recreation   Recreation is not limited to sports and team events. It includes any activity that provides relaxation, interest, enjoyment, and exercise. Recreation provides an outlet for physical, mental, and social energy. It can give a sense of worth and achievement. It can help you stay healthy.       Patient Education     Exercise for a Healthier Heart  You may wonder how you can improve the health of your heart. If youre thinking about exercise, youre on the right track. You dont need to become an athlete, but you do need a certain amount of brisk exercise to help strengthen your heart. If you have been diagnosed with a heart condition, your doctor may recommend exercise to help stabilize your condition. To help make exercise a habit, choose safe, fun activities.       Be sure to check with your health care provider before starting an exercise program.    Why exercise?  Exercising regularly offers many healthy rewards. It can help you do all of the following:    Improve your blood cholesterol levels to help prevent further heart trouble    Lower your blood pressure to help prevent a stroke or heart attack    Control diabetes, or reduce your risk of getting this disease    Improve your heart and lung function    Reach and maintain a healthy weight    Make your muscles stronger and more limber so you can stay active    Prevent falls and fractures by slowing the loss of bone mass (osteoporosis)    Manage stress better  Exercise tips  Ease into your routine. Set small goals. Then build on them.  Exercise on most days. Aim for a total of 150 or more minutes of moderate to  vigorous intensity activity each week. Consider 40 minutes, 3 to 4 times a week. For best results, activity should  last for 40 minutes on average. It is OK to work up to the 40 minute period over time. Examples of moderate-intensity activity is walking one mile in 15 minutes or 30 to 45 minutes of yard work.  Step up your daily activity level. Along with your exercise program, try being more active throughout the day. Walk instead of drive. Do more household tasks or yard work.  Choose one or more activities you enjoy. Walking is one of the easiest things you can do. You can also try swimming, riding a bike, or taking an exercise class.  Stop exercising and call your doctor if you:    Have chest pain or feel dizzy or lightheaded    Feel burning, tightness, pressure, or heaviness in your chest, neck, shoulders, back, or arms    Have unusual shortness of breath    Have increased joint or muscle pain    Have palpitations or an irregular heartbeat      8328-8526 [employer]. [address] [place], [place] [address]. All rights reserved. This information is not intended as a substitute for professional medical care. Always follow your healthcare professional's instructions.         Patient Education   Activities of Daily Living  Your Health Risk Assessment indicates you have difficulties with activities of daily living such as eating, getting dressed, grooming, bathing, walking, or using the toilet. Please make a follow up appointment for us to address this issue in more detail.     Patient Education   Instrumental Activities of Daily Living  Your Health Risk Assessment indicates you have difficulties with instrumental activities of daily living which include laundry, housekeeping, banking, shopping, using the telephone, food preparation, transportation, or taking your own medications. Please make a follow up appointment for us to address this issue in more detail.    [employer] has resources available on the following website: https://www.[facility].org/caregivers.html     Also, here is a local agency that provides  help with meals and other assistance:   [facility] Line: 721.830.8306     Patient Education   Signs of Hearing Loss  Hearing loss is a problem shared by many people. In fact, it is one of the most common health conditions, particularly as people age. Most people over age 65 have some hearing loss, and by age 80, almost everyone does. Because hearing loss usually occurs slowly over the years, you may not realize your hearing ability has gotten worse.       Have your hearing checked  Contact your [facility] care provider if you:    Have to strain to hear normal conversation.    Have to watch other peoples faces very carefully to follow what theyre saying.    Need to ask people to repeat what theyve said.    Often misunderstand what people are saying.    Turn the volume of the television or radio up so high that others complain.    Feel that people are mumbling when theyre talking to you.    Find that the effort to hear leaves you feeling tired and irritated.    Notice, when using the phone, that you hear better with 1 ear than the other.    3012-2385 [employer]. [address] [place] [address]. All rights reserved. This information is not intended as a substitute for professional medical care. Always follow your healthcare professional's instructions.         Patient Education   Urinary Incontinence (Male)    Urinary incontinence means not being able to control the release of urine from the bladder.  Causes  Common causes of urinary incontinence in men include:    Infection    Certain medicines    Aging    Poor pelvic muscle tone    Bladder spasms    Obesity    Urinary retention  Nervous system diseases, diabetes, sleep apnea, urinary tract infections, prostate surgery, and pelvic trauma can also cause incontinence. Constipation and smoking have also been identified as risk factors.  Symptoms    Urge incontinence (also called overactive bladder) is a sudden urge to urinate even though there may  not be much urine in the bladder. The need to urinate often during the night is common. It is due to bladder spasms.    Stress incontinence is involuntary urine leakage that can occur with sneezing, coughing, and other actions that put stress on the bladder.  Treatment  Treatment of urinary incontinence depends on the cause. Infections of the bladder are treated with antibiotics. Urinary retention is treated with a bladder catheter.  Home care  Follow these guidelines when caring for yourself at home:    Don't consume foods and drinks that may irritate the bladder. These include drinks containing alcohol, caffeinate, or carbonation; chocolate; and acidic fruits and juices.    Limit fluid intake to 6 to 8 cups a day.    Lose weight if you are overweight. This will reduce your symptoms.    If needed, wear absorbent pads to catch urine. Change pads frequently to maintain hygiene and prevent skin and bladder infections.    Bathe daily to maintain good hygiene.    If an antibiotic was prescribed to treat a bladder infection, be sure to take it until finished, even if you are feeling better before then. This is to make sure your infection has cleared.    If a catheter was left in place, it is important to keep bacteria from getting into the collection bag. Don't disconnect the catheter from the collection bag.    Use a leg band to secure the catheter drainage tube, so it does not pull on the catheter. Drain the collection bag when it becomes full using the drain spout at the bottom of the bag. Don't disconnect the bag from the catheter.    Don't pull on or try to remove a catheter. The catheter must be removed by a healthcare provider.  Follow-up care  Follow up with your healthcare provider, or as advised.  When to seek medical advice  Call your healthcare provider right away if any of these occur:    Fever over 100.4 F (38 C), or as directed by your healthcare provider    Bladder pain or fullness    Abdominal swelling,  nausea, or vomiting    Back pain    Weakness, dizziness, or fainting    If a catheter was left in place, return if:  ? Catheter falls out  ? Catheter stops draining for 6 hours  Date Last Reviewed: 10/1/2017    0992-8292 [employer]. [address] [place] [address], [place] [address]. All rights reserved. This information is not intended as a substitute for professional medical care. Always follow your healthcare professional's instructions.     Patient Education   Your Health Risk Assessment indicates you feel you are not in good emotional health.    Recreation   Recreation is not limited to sports and team events. It includes any activity that provides relaxation, interest, enjoyment, and exercise. Recreation provides an outlet for physical, mental, and social energy. It can give a sense of worth and achievement. It can help you stay healthy.    Mental Exercise and Social Involvement  Mental and emotional health is as important as physical health. Keep in touch with friends and family. Stay as active as possible. Continue to learn and challenge yourself.   Things you can do to stay mentally active are:    Learn something new, like a foreign language or musical instrument.     Play SCRABBLE or do crossword puzzles. If you cannot find people to play these games with you at home, you can play them with others on your computer through the Internet.     Join a games club--anything from card games to chess or checkers or lawn bowling.     Start a new hobby.     Go back to school.     Volunteer.     Read.     Keep up with world events.       Patient Education   Depression and Suicide in Older Adults  Nearly 2 million older Americans have some type of depression. Sadly, some of them even take their own lives. Yet depression among older adults is often ignored. Learn the warning signs. You may help spare a loved one needless pain. You may also save a life.       What Is Depression?  Depression is a mood disorder that  affects the way you think and feel. The most common symptom is a feeling of deep sadness. People who are depressed also may seem tired and listless. And nothing seems to give them pleasure. Its normal to grieve or be sad sometimes. But sadness lessens or passes with time. Depression rarely goes away or improves on its own. Other symptoms of depression are:    Sleeping more or less than normal    Eating more or less than normal    Having headaches, stomachaches, or other pains that dont go away    Feeling nervous, empty, or worthless    Crying a great deal    Thinking or talking about suicide or death    Feeling confused or forgetful  What Causes It?  The causes of depression arent fully known. Certain chemicals in the brain play a role. Depression does run in families. And life stresses can also trigger depression in some people. That may be the case with older adults. They often face great burdens, such as the death of friends or a spouse. They may have failing health. And they are more likely to be alone, lonely, or poor.  How You Can Help  Often, depressed people may not want to ask for help. When they do, they may be ignored. Or, they may receive the wrong treatment. You can help by showing parents and older friends love and support. If they seem depressed, help them find the right treatment. Talk to your doctor. Or contact a local mental health center, social service agency, or hospital. With modern treatment, no one has to suffer from depression.  Resources:    [employer] of Mental Health  298.877.8170  www.nimh.nih.gov    National [place] on Mental Illness  906.408.7633  www.dagoberto.org    [facility]  408.735.9631  www.[facility].org    National Suicide Hotline  769.924.9904 (800-SUICIDE)      0781-4406 [employer]. [address] [place] [address], [place] [address]. All rights reserved. This information is not intended as a substitute for professional medical care. Always follow your  healthcare professional's instructions.         Patient Education   Preventing Falls in the Home  As you get older, falls are more likely. Thats because your reaction time slows. Your muscles and joints may also get stiffer, making them less flexible. Illness, medications, and vision changes can also affect your balance. A fall could leave you unable to live on your own. To make your home safer, follow these tips:    Floors    Put nonskid pads under area rugs.    Remove throw rugs.    Replace worn floor coverings.    Tack carpets firmly to each step on carpeted stairs. Put nonskid strips on the edges of uncarpeted stairs.    Keep floors and stairs free of clutter and cords.    Arrange furniture so there are clear pathways.    Clean up any spills right away.    Bathrooms    Install grab bars in the tub or shower.    Apply nonskid strips or put a nonskid rubber mat in the tub or shower.    Sit on a bath chair to bathe.    Use bathmats with nonskid backing.    Lighting    Keep a flashlight in each room.    Put a nightlight along the pathway between the bedroom and the bathroom.    8222-4889 [employer]. [address] [place] [address]. All rights reserved. This information is not intended as a substitute for professional medical care. Always follow your healthcare professional's instructions.           Advance Directive  Patients advance directive was discussed and I am comfortable with the patients wishes.  Patient Education   Personalized Prevention Plan  You are due for the preventive services outlined below.  Your care team is available to assist you in scheduling these services.  If you have already completed any of these items, please share that information with your care team to update in your medical record.  Health Maintenance   Topic Date Due   ? DEPRESSION FOLLOW UP  1950   ? HEPATITIS C SCREENING  1950   ? ADVANCE CARE PLANNING  02/16/1968   ? COLONOSCOPY  02/16/2000   ?  ZOSTER VACCINES (1 of 2) 02/16/2000   ? MEDICARE ANNUAL WELLNESS VISIT  05/08/2018   ? FALL RISK ASSESSMENT  06/25/2019   ? INFLUENZA VACCINE RULE BASED (1) 08/01/2019   ? TD 18+ HE  02/09/2026   ? PNEUMOCOCCAL POLYSACCHARIDE VACCINE AGE 65 AND OVER  Completed   ? PNEUMOCOCCAL CONJUGATE VACCINE FOR ADULTS (PCV13 OR PREVNAR)  Completed

## 2021-08-06 NOTE — PATIENT INSTRUCTIONS - HE
Patient Instructions by Valentín Washington MD at 11/4/2020  1:00 PM     Author: Valentín Washington MD Service: -- Author Type: Physician    Filed: 11/4/2020  1:57 PM Encounter Date: 11/4/2020 Status: Addendum    : Valentín Washington MD (Physician)    Related Notes: Original Note by Valentín Washington MD (Physician) filed at 11/4/2020  1:52 PM         Patient Education     Your Health Risk Assessment indicates you feel you are not in good physical health.    A healthy lifestyle helps keep the body fit and the mind alert. It helps protect you from disease, helps you fight disease, and helps prevent chronic disease (disease that doesn't go away) from getting worse. This is important as you get older and begin to notice twinges in muscles and joints and a decline in the strength and stamina you once took for granted. A healthy lifestyle includes good healthcare, good nutrition, weight control, recreation, and regular exercise. Avoid harmful substances and do what you can to keep safe. Another part of a healthy lifestyle is stay mentally active and socially involved.    Good healthcare     Have a wellness visit every year.     If you have new symptoms, let us know right away. Don't wait until the next checkup.     Take medicines exactly as prescribed and keep your medicines in a safe place. Tell us if your medicine causes problems.   Healthy diet and weight control     Eat 3 or 4 small, nutritious, low-fat, high-fiber meals a day. Include a variety of fruits, vegetables, and whole-grain foods.     Make sure you get enough calcium in your diet. Calcium, vitamin D, and exercise help prevent osteoporosis (bone thinning).     If you live alone, try eating with others when you can. That way you get a good meal and have company while you eat it.     Try to keep a healthy weight. If you eat more calories than your body uses for energy, it will be stored as fat and you will gain weight.      Recreation   Recreation is not limited to sports and team events. It includes any activity that provides relaxation, interest, enjoyment, and exercise. Recreation provides an outlet for physical, mental, and social energy. It can give a sense of worth and achievement. It can help you stay healthy.       Patient Education     Exercise for a Healthier Heart  You may wonder how you can improve the health of your heart. If youre thinking about exercise, youre on the right track. You dont need to become an athlete, but you do need a certain amount of brisk exercise to help strengthen your heart. If you have been diagnosed with a heart condition, your doctor may recommend exercise to help stabilize your condition. To help make exercise a habit, choose safe, fun activities.       Be sure to check with your health care provider before starting an exercise program.    Why exercise?  Exercising regularly offers many healthy rewards. It can help you do all of the following:    Improve your blood cholesterol levels to help prevent further heart trouble    Lower your blood pressure to help prevent a stroke or heart attack    Control diabetes, or reduce your risk of getting this disease    Improve your heart and lung function    Reach and maintain a healthy weight    Make your muscles stronger and more limber so you can stay active    Prevent falls and fractures by slowing the loss of bone mass (osteoporosis)    Manage stress better  Exercise tips  Ease into your routine. Set small goals. Then build on them.  Exercise on most days. Aim for a total of 150 or more minutes of moderate to  vigorous intensity activity each week. Consider 40 minutes, 3 to 4 times a week. For best results, activity should last for 40 minutes on average. It is OK to work up to the 40 minute period over time. Examples of moderate-intensity activity is walking one mile in 15 minutes or 30 to 45 minutes of yard work.  Step up your daily activity level.  Along with your exercise program, try being more active throughout the day. Walk instead of drive. Do more household tasks or yard work.  Choose one or more activities you enjoy. Walking is one of the easiest things you can do. You can also try swimming, riding a bike, or taking an exercise class.  Stop exercising and call your doctor if you:    Have chest pain or feel dizzy or lightheaded    Feel burning, tightness, pressure, or heaviness in your chest, neck, shoulders, back, or arms    Have unusual shortness of breath    Have increased joint or muscle pain    Have palpitations or an irregular heartbeat      3800-4137 [employer]. [address] [place] [address]. All rights reserved. This information is not intended as a substitute for professional medical care. Always follow your healthcare professional's instructions.         Patient Education   Activities of Daily Living  Your Health Risk Assessment indicates you have difficulties with activities of daily living such as eating, getting dressed, grooming, bathing, walking, or using the toilet. Please make a follow up appointment for us to address this issue in more detail.     Patient Education   Instrumental Activities of Daily Living  Your Health Risk Assessment indicates you have difficulties with instrumental activities of daily living which include laundry, housekeeping, banking, shopping, using the telephone, food preparation, transportation, or taking your own medications. Please make a follow up appointment for us to address this issue in more detail.    [employer] has resources available on the following website: [email or web address].org/caregivers.[email or web address]     Also, here is a local agency that provides help with meals and other assistance:   [facility] Line: 174.453.7726     Patient Education   Signs of Hearing Loss  Hearing loss is a problem shared by many people. In fact, it is one of the most common health conditions,  particularly as people age. Most people over age 65 have some hearing loss, and by age 80, almost everyone does. Because hearing loss usually occurs slowly over the years, you may not realize your hearing ability has gotten worse.       Have your hearing checked  Contact your [facility] care provider if you:    Have to strain to hear normal conversation.    Have to watch other peoples faces very carefully to follow what theyre saying.    Need to ask people to repeat what theyve said.    Often misunderstand what people are saying.    Turn the volume of the television or radio up so high that others complain.    Feel that people are mumbling when theyre talking to you.    Find that the effort to hear leaves you feeling tired and irritated.    Notice, when using the phone, that you hear better with 1 ear than the other.    9660-0540 [employer]. [address] [place] [address]. All rights reserved. This information is not intended as a substitute for professional medical care. Always follow your healthcare professional's instructions.         Patient Education   Urinary Incontinence (Male)    Urinary incontinence means not being able to control the release of urine from the bladder.  Causes  Common causes of urinary incontinence in men include:    Infection    Certain medicines    Aging    Poor pelvic muscle tone    Bladder spasms    Obesity    Urinary retention  Nervous system diseases, diabetes, sleep apnea, urinary tract infections, prostate surgery, and pelvic trauma can also cause incontinence. Constipation and smoking have also been identified as risk factors.  Symptoms    Urge incontinence (also called overactive bladder) is a sudden urge to urinate even though there may not be much urine in the bladder. The need to urinate often during the night is common. It is due to bladder spasms.    Stress incontinence is involuntary urine leakage that can occur with sneezing, coughing, and other actions  that put stress on the bladder.  Treatment  Treatment of urinary incontinence depends on the cause. Infections of the bladder are treated with antibiotics. Urinary retention is treated with a bladder catheter.  Home care  Follow these guidelines when caring for yourself at home:    Don't consume foods and drinks that may irritate the bladder. These include drinks containing alcohol, caffeinate, or carbonation; chocolate; and acidic fruits and juices.    Limit fluid intake to 6 to 8 cups a day.    Lose weight if you are overweight. This will reduce your symptoms.    If needed, wear absorbent pads to catch urine. Change pads frequently to maintain hygiene and prevent skin and bladder infections.    Bathe daily to maintain good hygiene.    If an antibiotic was prescribed to treat a bladder infection, be sure to take it until finished, even if you are feeling better before then. This is to make sure your infection has cleared.    If a catheter was left in place, it is important to keep bacteria from getting into the collection bag. Don't disconnect the catheter from the collection bag.    Use a leg band to secure the catheter drainage tube, so it does not pull on the catheter. Drain the collection bag when it becomes full using the drain spout at the bottom of the bag. Don't disconnect the bag from the catheter.    Don't pull on or try to remove a catheter. The catheter must be removed by a healthcare provider.  Follow-up care  Follow up with your healthcare provider, or as advised.  When to seek medical advice  Call your healthcare provider right away if any of these occur:    Fever over 100.4 F (38 C), or as directed by your healthcare provider    Bladder pain or fullness    Abdominal swelling, nausea, or vomiting    Back pain    Weakness, dizziness, or fainting    If a catheter was left in place, return if:  ? Catheter falls out  ? Catheter stops draining for 6 hours  Date Last Reviewed: 10/1/2017    0306-4998 The  [employer]. [address] [place] [address], [place] [address]. All rights reserved. This information is not intended as a substitute for professional medical care. Always follow your healthcare professional's instructions.     Patient Education   Your Health Risk Assessment indicates you feel you are not in good emotional health.    Recreation   Recreation is not limited to sports and team events. It includes any activity that provides relaxation, interest, enjoyment, and exercise. Recreation provides an outlet for physical, mental, and social energy. It can give a sense of worth and achievement. It can help you stay healthy.    Mental Exercise and Social Involvement  Mental and emotional health is as important as physical health. Keep in touch with friends and family. Stay as active as possible. Continue to learn and challenge yourself.   Things you can do to stay mentally active are:    Learn something new, like a foreign language or musical instrument.     Play SCR[employer]BLE or do crossword puzzles. If you cannot find people to play these games with you at home, you can play them with others on your computer through the Internet.     Join a games club--anything from card games to chess or checkers or lawn bowling.     Start a new hobby.     Go back to school.     Volunteer.     Read.     Keep up with world events.       Patient Education   Preventing Falls in the Home  As you get older, falls are more likely. Thats because your reaction time slows. Your muscles and joints may also get stiffer, making them less flexible. Illness, medications, and vision changes can also affect your balance. A fall could leave you unable to live on your own. To make your home safer, follow these tips:    Floors    Put nonskid pads under area rugs.    Remove throw rugs.    Replace worn floor coverings.    Tack carpets firmly to each step on carpeted stairs. Put nonskid strips on the edges of uncarpeted stairs.    Keep floors and  stairs free of clutter and cords.    Arrange furniture so there are clear pathways.    Clean up any spills right away.    Bathrooms    Install grab bars in the tub or shower.    Apply nonskid strips or put a nonskid rubber mat in the tub or shower.    Sit on a bath chair to bathe.    Use bathmats with nonskid backing.    Lighting    Keep a flashlight in each room.    Put a nightlight along the pathway between the bedroom and the bathroom.    0085-9322 [employer]. [address] [place] [address]. All rights reserved. This information is not intended as a substitute for professional medical care. Always follow your healthcare professional's instructions.           Advance Directive  Patients advance directive was discussed and I am comfortable with the patients wishes.  Patient Education   Personalized Prevention Plan  You are due for the preventive services outlined below.  Your care team is available to assist you in scheduling these services.  If you have already completed any of these items, please share that information with your care team to update in your medical record.  Health Maintenance   Topic Date Due   ? DEPRESSION ACTION PLAN  1950   ? HEPATITIS C SCREENING  1950   ? HEART FAILURE ACTION PLAN  1950   ? ZOSTER VACCINES (1 of 2) 02/16/2000   ? INFLUENZA VACCINE RULE BASED (1) 08/01/2020   ? BMP  08/25/2020   ? ALT  09/06/2020   ? LIPID  09/06/2020   ? CBC  02/25/2021   ? MEDICARE ANNUAL WELLNESS VISIT  11/04/2021   ? FALL RISK ASSESSMENT  11/04/2021   ? COLORECTAL CANCER SCREENING  09/25/2022   ? ADVANCE CARE PLANNING  11/04/2025   ? TD 18+ HE  02/09/2026   ? TSH  Completed   ? Pneumococcal Vaccine: 65+ Years  Completed   ? Pneumococcal Vaccine: Pediatrics (0 to 5 Years) and At-Risk Patients (6 to 64 Years)  Aged Out   ? HEPATITIS B VACCINES  Aged Out       You received the flu shot  We will check your laboratory testing  You can consider the Shinrix/shingles  vaccine  Continue to follow-up with neurology  It was great to see you!

## 2021-08-13 NOTE — ED PROVIDER NOTES
History     Chief Complaint   Patient presents with     Chest Pain     x 4 days. on hospice. Hospice has been notified, pt cleared for workup. No ST changes in EKG. Left sided chest pain that started 4 days ago that radiates into left shoulder.      History per patient, his wife and review of EMR.    HPI  Silver Zamora is a 71 year old male on with history of Parkinson's disease, depression, coronary artery disease, coronary artery stenting, mild ischemic cardiomyopathy and CHF, on hospice, with 4-5 days of left chest pain and left shoulder pain.  His wife called EMS, against recommendation of Hospice.  He is a poor historian and history is difficult to obtain secondary to deconditioned state with Parkinson's but he is a left posterior thoracic chest pain which he reports is similar to less severe pain in the area since fall with trauma to the area ~ 6 months ago.  The left shoulder pain is chronic, but diagnosis secondary to osteoarthritis, and has been refractory to steroid injection.  No shoulder redness or warmth.  Chest pain mildly improved with TUMS/antacid.  Chest pain is not pleuritic and not associated with shortness of breath, cough or hemoptysis. No URI symptoms.  No leg pain or swelling. Hospice patient since 3/31/21       Previous Records Reviewed:  12/2/2019 Lexiscan nuclear stress test     Narrative & Impression      The nuclear stress test is abnormal.     Stress and rest nuclear images demonstrate a large area of nontransmural infarction involving the lateral to inferolateral wall.  There is a small area of mild ramon-infarct ischemia in the mid inferolateral wall.     The left ventricular ejection fraction at stress is 48% with severe hypokinesis of the lateral wall..     A prior study was conducted on 10/22/2018.  Previous area of ischemia now appears to be an area of nontransmural infarction.     The patient is at a low risk of future cardiac ischemic events.            Allergies:  Allergies    Allergen Reactions     Clonazepam      Rash        Problem List:    Patient Active Problem List    Diagnosis Date Noted     CHF (congestive heart failure) (H) 08/03/2021     Priority: Medium     Major depression in complete remission (H) 07/19/2021     Priority: Medium     Parkinson disease (H) 07/30/2014     Priority: Medium        Past Medical History:    Past Medical History:   Diagnosis Date     Anxiety      Callus      Cardiomyopathy (H)      Chronic back pain      Delirium      Depression      Fall      GERD (gastroesophageal reflux disease)      Hypothyroidism      Pancreatitis 12/31/2015     PD (Parkinson's disease) (H)      RBD (REM behavioral disorder) 4/3/2017     Shingles        Past Surgical History:    Past Surgical History:   Procedure Laterality Date     AMPUTATE TOE(S) Left 2/27/2020    Procedure: Left Foot 2nd Toe Amputation,foot/toe off- loading procedures;  Surgeon: Hakeem Markham DPM;  Location: [facility] OR     CV CORONARY ANGIOGRAM N/A 10/31/2018    Procedure: Coronary Angiogram;  Surgeon: Jose Meyer MD;  Location: [facility];  Service:      CV LEFT HEART CATHETERIZATION WITHOUT LEFT VENTRICULOGRAM Left 10/31/2018    Procedure: Left Heart Catheterization Without Left Ventriculogram;  Surgeon: Jose Meyer MD;  Location: [facility];  Service:      INGUINAL HERNIA REPAIR Bilateral 10/2013     LAMINECTOMY  10/2013     SD ERCP REMOVE CALCULI/DEBRIS BILIARY/PANCREAS DUCT N/A 5/25/2018    Procedure: ENDOSCOPIC RETROGRADE CHOLANGIOPANCREATOGRAPHY SPINCTEROTOMY BILIARY STONE EXTRACTION;  Surgeon: Curtis Mcclain MD;  Location: [facility];  Service: Gastroenterology     SD LAP,CHOLECYSTECTOMY/GRAPH N/A 5/24/2018    Procedure: LAPAROSCOPIC CHOLECYSTECTOMY;  Surgeon: Joyce Melissa MD;  Location: [facility];  Service: General       Family History:    Family History   Problem Relation Age of Onset     Kidney failure Mother      Heart Disease Father 82.00      "Tremor Father      Parkinsonism Paternal Grandfather      Tremor Sister      Leukemia Maternal Grandmother        Social History:  Marital Status:   [2]  Social History     Tobacco Use     Smoking status: Former Smoker     Packs/day: 1.00     Years: 5.00     Pack years: 5.00     Types: Cigars     Quit date:      Years since quittin.6     Smokeless tobacco: Never Used   Substance Use Topics     Alcohol use: Never     Drug use: Never        Medications:    isosorbide mononitrate (IMDUR) 30 MG 24 hr tablet  acetaminophen (TYLENOL) 325 MG tablet  ammonium lactate (AMLACTIN) 12 % external cream  aspirin 81 MG EC tablet  atorvastatin (LIPITOR) 80 MG tablet  bisacodyl (DULCOLAX) 10 MG suppository  calcium carbonate (TUMS) 500 MG chewable tablet  carbidopa-levodopa (PARCOPA)  MG ODT  Cholecalciferol (VITAMIN D3) 2000 units TABS  DULoxetine (CYMBALTA) 20 MG capsule  fluticasone (FLONASE) 50 MCG/ACT nasal spray  levothyroxine (SYNTHROID/LEVOTHROID) 137 MCG tablet  nitroGLYcerin (NITROSTAT) 0.4 MG sublingual tablet  nystatin (MYCOSTATIN) 391930 UNIT/GM external cream  RYTARY 23.75-95 MG CPCR per CR capsule  RYTARY 61. MG CPCR per CR capsule  SENNA-docusate sodium (SENNA S) 8.6-50 MG tablet  triamcinolone (KENALOG) 0.025 % external ointment      Review of Systems   Per he and his wife.  As mentioned above in the history present illness.  All other systems were reviewed and are negative.    Physical Exam   BP: (!) 154/81  Pulse: 61  Temp: 98  F (36.7  C)  Resp: 16  Height: 180.3 cm (5' 11\")  Weight: 90.7 kg (200 lb)  SpO2: 96 %      Physical Exam  Vitals and nursing note reviewed.   Constitutional:       General: He is not in acute distress.     Appearance: Normal appearance. He is well-developed. He is not ill-appearing or diaphoretic.      Comments: Weak, deconditioned, chronically ill-appearing.   HENT:      Head: Normocephalic and atraumatic.      Right Ear: External ear normal.      Left Ear: " External ear normal.      Nose: Nose normal.      Mouth/Throat:      Mouth: Mucous membranes are moist.   Eyes:      General: No scleral icterus.     Extraocular Movements: Extraocular movements intact.      Conjunctiva/sclera: Conjunctivae normal.   Neck:      Trachea: No tracheal deviation.   Cardiovascular:      Rate and Rhythm: Normal rate and regular rhythm.      Heart sounds: Normal heart sounds. No murmur heard.   No friction rub. No gallop.    Pulmonary:      Effort: Pulmonary effort is normal. No tachypnea, accessory muscle usage, prolonged expiration or respiratory distress.      Breath sounds: Normal breath sounds. No stridor or decreased air movement. No decreased breath sounds, wheezing, rhonchi or rales.       Abdominal:      General: There is no distension.      Palpations: Abdomen is soft.      Tenderness: There is no abdominal tenderness.   Musculoskeletal:         General: No tenderness. Normal range of motion.      Cervical back: Normal range of motion and neck supple.      Right lower leg: No tenderness. No edema.      Left lower leg: No tenderness. No edema.   Skin:     General: Skin is warm and dry.      Coloration: Skin is not pale.      Findings: No erythema ( No shoulder erythema, warmth, effusion or crepitance.) or rash.   Neurological:      General: No focal deficit present.      Mental Status: He is alert and oriented to person, place, and time.      Motor: Tremor ( Parkinson's tremor) present.      Coordination: Coordination normal.   Psychiatric:         Mood and Affect: Mood normal.         Behavior: Behavior normal.           ED Course        Procedures              EKG Interpretation:      Interpreted by Magnus Middleton MD  Time reviewed: Upon completion  Symptoms at time of EKG: Left-sided chest pain and shoulder pain  Rhythm: normal sinus   Rate: normal  Axis: normal  Ectopy: none  Conduction: normal  ST Segments/ T Waves: No ST-T wave changes  Q Waves: none  Comparison to prior:  No old EKG available for visual review.  Clinical Impression: normal EKG           Results for orders placed or performed during the hospital encounter of 08/13/21 (from the past 24 hour(s))   CBC with Platelets & Differential    Narrative    The following orders were created for panel order CBC with Platelets & Differential.  Procedure                               Abnormality         Status                     ---------                               -----------         ------                     CBC with platelets and d...[[id number]]                      Final result                 Please view results for these tests on the individual orders.   Basic metabolic panel   Result Value Ref Range    Sodium 139 133 - 144 mmol/L    Potassium 4.0 3.4 - 5.3 mmol/L    Chloride 108 94 - 109 mmol/L    Carbon Dioxide (CO2) 25 20 - 32 mmol/L    Anion Gap 6 3 - 14 mmol/L    Urea Nitrogen 17 7 - 30 mg/dL    Creatinine 0.62 (L) 0.66 - 1.25 mg/dL    Calcium 8.6 8.5 - 10.1 mg/dL    Glucose 108 (H) 70 - 99 mg/dL    GFR Estimate >90 >60 mL/min/1.73m2   Troponin I   Result Value Ref Range    Troponin I 0.076 (H) 0.000 - 0.045 ug/L   [place] Draw    Narrative    The following orders were created for panel order [place] Draw.  Procedure                               Abnormality         Status                     ---------                               -----------         ------                     Extra Blue Top Tube[[id number]]                              Final result               Extra Red Top Tube[[id number]]                               Final result               Extra Green Top (Lithium...[[id number]]                      Final result                 Please view results for these tests on the individual orders.   CBC with platelets and differential   Result Value Ref Range    WBC Count 7.4 4.0 - 11.0 10e3/uL    RBC Count 5.07 4.40 - 5.90 10e6/uL    Hemoglobin 14.2 13.3 - 17.7 g/dL    Hematocrit 44.9 40.0 - 53.0 %    MCV 89 78 - 100  fL    MCH 28.0 26.5 - 33.0 pg    MCHC 31.6 31.5 - 36.5 g/dL    RDW 14.0 10.0 - 15.0 %    Platelet Count 192 150 - 450 10e3/uL    % Neutrophils 69 %    % Lymphocytes 16 %    % Monocytes 11 %    % Eosinophils 2 %    % Basophils 1 %    % Immature Granulocytes 1 %    NRBCs per 100 WBC 0 <1 /100    Absolute Neutrophils 5.3 1.6 - 8.3 10e3/uL    Absolute Lymphocytes 1.2 0.8 - 5.3 10e3/uL    Absolute Monocytes 0.8 0.0 - 1.3 10e3/uL    Absolute Eosinophils 0.1 0.0 - 0.7 10e3/uL    Absolute Basophils 0.0 0.0 - 0.2 10e3/uL    Absolute Immature Granulocytes 0.0 <=0.0 10e3/uL    Absolute NRBCs 0.0 10e3/uL   Extra Blue Top Tube   Result Value Ref Range    Hold Specimen JIC    Extra Red Top Tube   Result Value Ref Range    Hold Specimen JIC    Extra Green Top (Lithium Heparin) Tube   Result Value Ref Range    Hold Specimen JIC        Medications   traMADol (ULTRAM) tablet 50 mg (50 mg Oral Given 8/13/21 1423)       1:31 PM - I reviewed case and consulted with his [facility] manager Tiffanie.    2:30 PM - I reviewed the case consulted with Dr. Barraza, his Cardiologist  at [facility] in [place].    Assessments & Plan (with Medical Decision Making)   71-year-old male with Parkinson's disease, depression, coronary artery disease, coronary artery stenting, ischemic cardiomyopathy and CHF who presents with 4-5 days of left chest pain and left shoulder pain of unclear etiology, ? musculoskeletal pain.  History is difficult to elicit from him due to Parkinson's disease but it appears that this is an acute on chronic pain which he states has been present since a fall 6 months ago.  The shoulder pain is chronic, been attributed to osteoarthritis and was refractory to recent left shoulder steroid injection.  EKG shows no acute ischemic changes, troponin was mildly elevated 0.076.  After consultation with he and his family present with him, his cardiologist and the [facility] clinic manager in agreement with  disposition plan of medical management for possible unstable angina and heart strain with long-acting nitrate Imdur.  I prescribed Imdur 30 mg daily and they will monitor his blood pressure.  This could be decreased to 15 mg daily as needed if his blood pressure does not tolerate this.  He was discharged home with family with plan for a Hospice nurse to visit him at home this evening.  He and his wife were provided instructions for supportive care and will return as needed for worsened condition or worsening symptoms, or new problems or concerns.      I have reviewed the nursing notes.    I have reviewed the findings, diagnosis, plan and need for follow up with the patient and his wife.    New Prescriptions    ISOSORBIDE MONONITRATE (IMDUR) 30 MG 24 HR TABLET    Take 1 tablet (30 mg) by mouth daily       Final diagnoses:   Acute chest pain   Chronic left shoulder pain   Elevated troponin level - Mildly elevated troponin level 0.076 which I suspect is not due to myocardial infarction   Parkinson's disease (H)   Hospice care patient       8/13/2021   [facility] EMERGENCY DEPT     Magnus Middleton MD  08/13/21 2170

## 2021-10-13 NOTE — PROGRESS NOTES
"Video Visit  Silver Zmaora is a 71 year old male who is being evaluated via a billable video visit in light of the ongoing global health crisis (COVID-19) that requires us to abide by social distancing mandates in order to reduce the risk of COVID-19 exposure.       The patient has been notified of following:     \"This Video visit will be conducted via a virtual call between you and your physician/provider. We have found that certain health care needs can be provided without the need for a physical exam.  This service lets us provide the care you need with a short video conversation.  If a prescription is necessary we can send it directly to your pharmacy.  If lab work is needed we can place an order for that and you can then stop by our lab to have the test done at a later time.    If during the course of the call the physician/provider feels a video visit is not appropriate, you will not be charged for this service.\"     Patient has given verbal consent to a Video visit? Yes    Silver Zamora chief complaint is Parkinson's Disease    ALLERGIES  Clonazepam    Neuropsychological assessment completed    Yes   Currently doing PT  No   Completed No   Currently doing OT  No   Completed No    Currently doing ST   No   Completed No   Psychology  No     Any new medication (other provider):   No   Meds started at last appointment  No   Meds increased at last appointment    No     Any concerns you would like to be addressed at this appointment?  Changes in Medication/Hallucinations and Confusion.  Constipation                                                       Ellis Leonides     There were no vitals filed for this visit.    Outpatient Follow up Parkinson's Dx Evaluation     Pertinent History:   Patient is right-handed.  He was diagnosed with Parkinson's disease in 2004 at the age of about 54.  He feels that the symptoms may have started in about 1994.  Symptoms started with stiffness, slowness, soft speech, walking " difficulties, and left hand tremor which spread to the right and about 2015.  Has seen Dr. Suazo in the past.  He has some bruising and tremors occasionally but there is asymmetry.  He tried Sinemet CR during the daytime and at night which did not work well.  He is been off the pramipexole since 12/2015 due to memory and behavioral concerns.  December 2010 he was taking Sinemet IR 25/100 and increased to 25/250 with a good response which also improved his back pain.  He was able to be more active.  In 2011 he was evaluated at the [facility] by , he was noted to have U PDR S score of 41 after all Parkinson's medications.  It was suggested that he take carbidopa levodopa every 3 hours.  Amantadine was discontinued because he did not have dyskinesia.  It was suggested that he switch his selegiline to Azilect or discontinue all MAO inhibitors.  It is  felt that his increased Sinemet may have exasperated his restless leg syndrome.  Gabapentin was started to control his restless leg symptoms.  He was followed by Dr. Suazo from 2002 - 2011.  He also saw Dr. Zuniga and a previous nurse practitioner at this clinic.  He did transfer care to  in 2013.  He has tried amantadine and selegiline in the past.  He is not able to tolerate Requip, which he tried for several years at a relatively high dose but discontinued due to edema in lower extremities, he also tried Mirapex in 2015 but this caused more freezing and increased tremor.  CAT scan in July 2013 showed absent radiotracer in the putamen with diminished accumulation in the caudate, most consistent with Parkinson's disease.  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      Is patient on a controlled substance that I prescribe? No      Subjective:          HPI:   The patient returns to the Parkinson's clinic for follow-up visit, he was last seen by me on 7/12/2021, no medication changes were made at that  appointment. Patient continues to be on hospice for congestive heart failure. He is having some hallucinations but wife does not want to treat at this moment  Patient continues to have help come into the house which is helping the wife greatly. With the patient being on hospice therapies cannot be ordered for the patient. He continues to lack motivation. Wife denies any signs or symptoms of any increasing anxiety or depression or any side effects of medications. He has had some falls with no injury.    We discussed some treatment options and have elected to continue with current therapies.      Current Medications: Please see chart. Medications personally reviewed.     Medication Compliance: Yes    Patient Active Problem List    Diagnosis Date Noted     CHF (congestive heart failure) (H) 08/03/2021     Priority: Medium     Major depression in complete remission (H) 07/19/2021     Priority: Medium     Parkinson disease (H) 07/30/2014     Priority: Medium     Past Medical History:   Diagnosis Date     Anxiety      Callus      Cardiomyopathy (H)      Chronic back pain      Delirium      Depression      Fall      GERD (gastroesophageal reflux disease)      Hypothyroidism      Pancreatitis 12/31/2015     PD (Parkinson's disease) (H)      RBD (REM behavioral disorder) 4/3/2017     Shingles     hx     Past Surgical History:   Procedure Laterality Date     AMPUTATE TOE(S) Left 2/27/2020    Procedure: Left Foot 2nd Toe Amputation,foot/toe off- loading procedures;  Surgeon: Hakeem Markham DPM;  Location: [facility] OR     CV CORONARY ANGIOGRAM N/A 10/31/2018    Procedure: Coronary Angiogram;  Surgeon: Jose Meyer MD;  Location: [facility] Cath Lab;  Service:      CV LEFT HEART CATHETERIZATION WITHOUT LEFT VENTRICULOGRAM Left 10/31/2018    Procedure: Left Heart Catheterization Without Left Ventriculogram;  Surgeon: Jose Meyer MD;  Location: [facility] Cath Lab;  Service:      INGUINAL HERNIA REPAIR Bilateral  10/2013     LAMINECTOMY  10/2013     NM ERCP REMOVE CALCULI/DEBRIS BILIARY/PANCREAS DUCT N/A 5/25/2018    Procedure: ENDOSCOPIC RETROGRADE CHOLANGIOPANCREATOGRAPHY SPINCTEROTOMY BILIARY STONE EXTRACTION;  Surgeon: Curtis Mcclain MD;  Location: [facility];  Service: Gastroenterology     NM LAP,CHOLECYSTECTOMY/GRAPH N/A 5/24/2018    Procedure: LAPAROSCOPIC CHOLECYSTECTOMY;  Surgeon: Joyce Melissa MD;  Location: [facility];  Service: General     Family History   Problem Relation Age of Onset     Kidney failure Mother      Heart Disease Father 82.00     Tremor Father      Parkinsonism Paternal Grandfather      Tremor Sister      Leukemia Maternal Grandmother      Current Outpatient Medications   Medication Sig Dispense Refill     acetaminophen (TYLENOL) 325 MG tablet Take 500 mg by mouth       ammonium lactate (AMLACTIN) 12 % external cream Apply two times a day       aspirin 81 MG EC tablet Take 81 mg by mouth daily       atorvastatin (LIPITOR) 80 MG tablet Take 1 tablet (80 mg) by mouth daily 90 tablet 1     bisacodyl (DULCOLAX) 10 MG suppository One supp NM qday prn constipation.  Give if no BM in prior 3 days.       calcium carbonate (TUMS) 500 MG chewable tablet Take 1 chew tab by mouth as needed for heartburn       carbidopa-levodopa (PARCOPA)  MG ODT Take 1 tablet by mouth as needed       Cholecalciferol (VITAMIN D3) 2000 units TABS Take 1 tablet by mouth daily        DULoxetine (CYMBALTA) 20 MG capsule At hs       fluticasone (FLONASE) 50 MCG/ACT nasal spray Spray 2 sprays in nostril       isosorbide mononitrate (IMDUR) 30 MG 24 hr tablet Take 1 tablet (30 mg) by mouth daily 30 tablet 0     levothyroxine (SYNTHROID/LEVOTHROID) 137 MCG tablet Take 137 mcg by mouth At Bedtime       nitroGLYcerin (NITROSTAT) 0.4 MG sublingual tablet Place 0.4 mg under the tongue every 5 minutes as needed for chest pain For chest pain place 1 tablet under the tongue every 5 minutes for 3 doses. If symptoms  persist 5 minutes after 1st dose call 911.       nystatin (MYCOSTATIN) 854442 UNIT/GM external cream        RYTARY 23.75-95 MG CPCR per CR capsule 1 in the AM and 1 in the PM       RYTARY 61. MG CPCR per CR capsule Take 1 capsule by mouth 2 (two) times a day AND 2 capsules 2 (two) times a day. - Oral       SENNA-docusate sodium (SENNA S) 8.6-50 MG tablet Take 2 tablets by mouth       triamcinolone (KENALOG) 0.025 % external ointment          Allergies   Allergen Reactions     Clonazepam      Rash      Social History     Socioeconomic History     Marital status:      Spouse name: Not on file     Number of children: Not on file     Years of education: Not on file     Highest education level: Not on file   Occupational History     Not on file   Tobacco Use     Smoking status: Former Smoker     Packs/day: 1.00     Years: 5.00     Pack years: 5.00     Types: Cigars     Quit date:      Years since quittin.8     Smokeless tobacco: Never Used   Substance and Sexual Activity     Alcohol use: Never     Drug use: Never     Sexual activity: Not Currently     Partners: Female   Other Topics Concern     Not on file   Social History Narrative     Not on file     Social Determinants of Health     Financial Resource Strain:      Difficulty of Paying Living Expenses:    Food Insecurity:      Worried About Running Out of Food in the Last Year:      Ran Out of Food in the Last Year:    Transportation Needs:      Lack of Transportation (Medical):      Lack of Transportation (Non-Medical):    Physical Activity:      Days of Exercise per Week:      Minutes of Exercise per Session:    Stress:      Feeling of Stress :    Social Connections:      Frequency of Communication with Friends and Family:      Frequency of Social Gatherings with Friends and Family:      Attends Baptism Services:      Active Member of Clubs or Organizations:      Attends Club or Organization Meetings:      Marital Status:    Intimate Partner  Violence:      Fear of Current or Ex-Partner:      Emotionally Abused:      Physically Abused:      Sexually Abused:        Review of Systems  A comprehensive review of systems was negative except for:what is noted above    Mental Status Exam  Alertness:  alert  and slow to respond  Appearance:  adequately groomed  Behavior/Demeanor:  cooperative, pleasant and calm, with fair  eye contact.  Speech:  slowed  Psychomotor:  slowed    Mood:  good  Affect:  flat and was congruent to speech content.  Thought Process/Associations: difficult to follow and disorganized   Thought Content: denies suicidal ideation.   Perception: denies auditory hallucinations and visual hallucinations  Insight:  adequate.  Judgment: appropriate.  Attention/Concentration:  Poor  Language:  Impaired  Fund of Knowledge:  Below average.    Memory:  Immediate recall impaired, Short-term memory impaired and Long-term memory impaired.        .  Diagnosis managed and treated at today's visit   Parkinson's disease  Anxiety d/t a medical condition  Chronic pain  Mild cognitive impairment  High risk for falls  Tremor  Foot ulcer  Physical deconditioning  Insomnia     Plan:  Medication Adjustment:  No medication changes    Total time spent with the patient today was 40 minutes with greater than 50% of the time spent in counseling and care coordination. The patient will call before then with any questions, concerns or problems. We will assess for the appropriateness of possible psychotropic medication trials/changes. The patient will seek out appropriate emergency services should that become necessary.    Other:   Patient will return to clinic in 3 months. They agree to call or return sooner with any questions or concerns.  Risks and benefits were discussed.  Continue with his individual therapist.     Continue with the support of the clinic, reassurance, and redirection. Staff monitoring and ongoing assessments per team plan. Current psychotropic  medication appears to represent the minimum effective dosage and appears medically necessary. We will continue to monitor and reassess. This team will utilize appropriate emergency services if necessary. I will make myself available if concerns or problems arise.    General Information:  Today you had your appointment with Kelli Walker CNP     If lab work was done today as part of your evaluation you will generally be contacted via My Chart, mail, or phone with the results within 1-5 days. If there is an alarming result we will contact you by phone. Lab results come back at varying times, I generally wait until all labs are resulted before making comments on results. Please note labs are automatically released to My Chart once available.     If you need refills please contact your pharmacist. They will send a refill request to me to review. Please allow 3 business days for us to process all refill requests.     Please call or send a medical message through My Chart, with any questions or concerns    If you need any paperwork completed please fax forms to [phone]. Please state if you would like a copy of the completed paperwork, mailed or faxed back to the patient and a fax number to fax the paperwork to. Please allow up to 10 days for paperwork to be completed.    Kelli Walker CNP

## 2021-10-13 NOTE — LETTER
"    10/13/2021         RE: Silver Zamora  [address] [place] [id number]        Dear Colleague,    Thank you for referring your patient, Silver Zamora, to the [facility]. Please see a copy of my visit note below.    Video Visit  Silver Zamora is a 71 year old male who is being evaluated via a billable video visit in light of the ongoing global health crisis (COVID-19) that requires us to abide by social distancing mandates in order to reduce the risk of COVID-19 exposure.       The patient has been notified of following:     \"This Video visit will be conducted via a virtual call between you and your physician/provider. We have found that certain health care needs can be provided without the need for a physical exam.  This service lets us provide the care you need with a short video conversation.  If a prescription is necessary we can send it directly to your pharmacy.  If lab work is needed we can place an order for that and you can then stop by our lab to have the test done at a later time.    If during the course of the call the physician/provider feels a video visit is not appropriate, you will not be charged for this service.\"     Patient has given verbal consent to a Video visit? Yes    Silver Zamora chief complaint is Parkinson's Disease    ALLERGIES  Clonazepam    Neuropsychological assessment completed    Yes   Currently doing PT  No   Completed No   Currently doing OT  No   Completed No    Currently doing ST   No   Completed No   Psychology  No     Any new medication (other provider):   No   Meds started at last appointment  No   Meds increased at last appointment    No     Any concerns you would like to be addressed at this appointment?  Changes in Medication/Hallucinations and Confusion.  Constipation                                                       Ellis Coyle     There were no vitals filed for this visit.    Outpatient Follow up Parkinson's Dx Evaluation   "   Pertinent History:   Patient is right-handed.  He was diagnosed with Parkinson's disease in 2004 at the age of about 54.  He feels that the symptoms may have started in about 1994.  Symptoms started with stiffness, slowness, soft speech, walking difficulties, and left hand tremor which spread to the right and about 2015.  Has seen Dr. uSazo in the past.  He has some bruising and tremors occasionally but there is asymmetry.  He tried Sinemet CR during the daytime and at night which did not work well.  He is been off the pramipexole since 12/2015 due to memory and behavioral concerns.  December 2010 he was taking Sinemet IR 25/100 and increased to 25/250 with a good response which also improved his back pain.  He was able to be more active.  In 2011 he was evaluated at the [facility] by , he was noted to have U PDR S score of 41 after all Parkinson's medications.  It was suggested that he take carbidopa levodopa every 3 hours.  Amantadine was discontinued because he did not have dyskinesia.  It was suggested that he switch his selegiline to Azilect or discontinue all MAO inhibitors.  It is  felt that his increased Sinemet may have exasperated his restless leg syndrome.  Gabapentin was started to control his restless leg symptoms.  He was followed by Dr. Suazo from 2002 - 2011.  He also saw Dr. Zuniga and a previous nurse practitioner at this clinic.  He did transfer care to  in 2013.  He has tried amantadine and selegiline in the past.  He is not able to tolerate Requip, which he tried for several years at a relatively high dose but discontinued due to edema in lower extremities, he also tried Mirapex in 2015 but this caused more freezing and increased tremor.  CAT scan in July 2013 showed absent radiotracer in the putamen with diminished accumulation in the caudate, most consistent with Parkinson's disease.  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      Is patient on a controlled substance that I prescribe? No      Subjective:          HPI:   The patient returns to the Parkinson's clinic for follow-up visit, he was last seen by me on 7/12/2021, no medication changes were made at that appointment. Patient continues to be on hospice for congestive heart failure. He is having some hallucinations but wife does not want to treat at this moment  Patient continues to have help come into the house which is helping the wife greatly. With the patient being on hospice therapies cannot be ordered for the patient. He continues to lack motivation. Wife denies any signs or symptoms of any increasing anxiety or depression or any side effects of medications. He has had some falls with no injury.    We discussed some treatment options and have elected to continue with current therapies.      Current Medications: Please see chart. Medications personally reviewed.     Medication Compliance: Yes    Patient Active Problem List    Diagnosis Date Noted     CHF (congestive heart failure) (H) 08/03/2021     Priority: Medium     Major depression in complete remission (H) 07/19/2021     Priority: Medium     Parkinson disease (H) 07/30/2014     Priority: Medium     Past Medical History:   Diagnosis Date     Anxiety      Callus      Cardiomyopathy (H)      Chronic back pain      Delirium      Depression      Fall      GERD (gastroesophageal reflux disease)      Hypothyroidism      Pancreatitis 12/31/2015     PD (Parkinson's disease) (H)      RBD (REM behavioral disorder) 4/3/2017     Shingles     hx     Past Surgical History:   Procedure Laterality Date     AMPUTATE TOE(S) Left 2/27/2020    Procedure: Left Foot 2nd Toe Amputation,foot/toe off- loading procedures;  Surgeon: Hakeem Markham DPM;  Location: [facility] OR     CV CORONARY ANGIOGRAM N/A 10/31/2018    Procedure: Coronary Angiogram;  Surgeon: Jose Meyer MD;  Location: [facility] Cath Lab;  Service:      CV LEFT  HEART CATHETERIZATION WITHOUT LEFT VENTRICULOGRAM Left 10/31/2018    Procedure: Left Heart Catheterization Without Left Ventriculogram;  Surgeon: Jose Meyer MD;  Location: [facility] Cath Lab;  Service:      INGUINAL HERNIA REPAIR Bilateral 10/2013     LAMINECTOMY  10/2013     AR ERCP REMOVE CALCULI/DEBRIS BILIARY/PANCREAS DUCT N/A 5/25/2018    Procedure: ENDOSCOPIC RETROGRADE CHOLANGIOPANCREATOGRAPHY SPINCTEROTOMY BILIARY STONE EXTRACTION;  Surgeon: Curtis Mcclain MD;  [place] [facility] [place];  Service: Gastroenterology     AR LAP,CHOLECYSTECTOMY/GRAPH N/A 5/24/2018    Procedure: LAPAROSCOPIC CHOLECYSTECTOMY;  Surgeon: Joyce Melissa MD;  [place] [facility] [place];  Service: General     Family History   Problem Relation Age of Onset     Kidney failure Mother      Heart Disease Father 82.00     Tremor Father      Parkinsonism Paternal Grandfather      Tremor Sister      Leukemia Maternal Grandmother      Current Outpatient Medications   Medication Sig Dispense Refill     acetaminophen (TYLENOL) 325 MG tablet Take 500 mg by mouth       ammonium lactate (AMLACTIN) 12 % external cream Apply two times a day       aspirin 81 MG EC tablet Take 81 mg by mouth daily       atorvastatin (LIPITOR) 80 MG tablet Take 1 tablet (80 mg) by mouth daily 90 tablet 1     bisacodyl (DULCOLAX) 10 MG suppository One supp AR qday prn constipation.  Give if no BM in prior 3 days.       calcium carbonate (TUMS) 500 MG chewable tablet Take 1 chew tab by mouth as needed for heartburn       carbidopa-levodopa (PARCOPA)  MG ODT Take 1 tablet by mouth as needed       Cholecalciferol (VITAMIN D3) 2000 units TABS Take 1 tablet by mouth daily        DULoxetine (CYMBALTA) 20 MG capsule At hs       fluticasone (FLONASE) 50 MCG/ACT nasal spray Spray 2 sprays in nostril       isosorbide mononitrate (IMDUR) 30 MG 24 hr tablet Take 1 tablet (30 mg) by mouth daily 30 tablet 0     levothyroxine (SYNTHROID/LEVOTHROID) 137 MCG tablet  Take 137 mcg by mouth At Bedtime       nitroGLYcerin (NITROSTAT) 0.4 MG sublingual tablet Place 0.4 mg under the tongue every 5 minutes as needed for chest pain For chest pain place 1 tablet under the tongue every 5 minutes for 3 doses. If symptoms persist 5 minutes after 1st dose call 911.       nystatin (MYCOSTATIN) 905602 UNIT/GM external cream        RYTARY 23.75-95 MG CPCR per CR capsule 1 in the AM and 1 in the PM       RYTARY 61. MG CPCR per CR capsule Take 1 capsule by mouth 2 (two) times a day AND 2 capsules 2 (two) times a day. - Oral       SENNA-docusate sodium (SENNA S) 8.6-50 MG tablet Take 2 tablets by mouth       triamcinolone (KENALOG) 0.025 % external ointment          Allergies   Allergen Reactions     Clonazepam      Rash      Social History     Socioeconomic History     Marital status:      Spouse name: Not on file     Number of children: Not on file     Years of education: Not on file     Highest education level: Not on file   Occupational History     Not on file   Tobacco Use     Smoking status: Former Smoker     Packs/day: 1.00     Years: 5.00     Pack years: 5.00     Types: Cigars     Quit date:      Years since quittin.8     Smokeless tobacco: Never Used   Substance and Sexual Activity     Alcohol use: Never     Drug use: Never     Sexual activity: Not Currently     Partners: Female   Other Topics Concern     Not on file   Social History Narrative     Not on file     Social Determinants of Health     Financial Resource Strain:      Difficulty of Paying Living Expenses:    Food Insecurity:      Worried About Running Out of Food in the Last Year:      Ran Out of Food in the Last Year:    Transportation Needs:      Lack of Transportation (Medical):      Lack of Transportation (Non-Medical):    Physical Activity:      Days of Exercise per Week:      Minutes of Exercise per Session:    Stress:      Feeling of Stress :    Social Connections:      Frequency of Communication  with Friends and Family:      Frequency of Social Gatherings with Friends and Family:      Attends Restoration Services:      Active Member of Clubs or Organizations:      Attends Club or Organization Meetings:      Marital Status:    Intimate Partner Violence:      Fear of Current or Ex-Partner:      Emotionally Abused:      Physically Abused:      Sexually Abused:        Review of Systems  A comprehensive review of systems was negative except for:what is noted above    Mental Status Exam  Alertness:  alert  and slow to respond  Appearance:  adequately groomed  Behavior/Demeanor:  cooperative, pleasant and calm, with fair  eye contact.  Speech:  slowed  Psychomotor:  slowed    Mood:  good  Affect:  flat and was congruent to speech content.  Thought Process/Associations: difficult to follow and disorganized   Thought Content: denies suicidal ideation.   Perception: denies auditory hallucinations and visual hallucinations  Insight:  adequate.  Judgment: appropriate.  Attention/Concentration:  Poor  Language:  Impaired  Fund of Knowledge:  Below average.    Memory:  Immediate recall impaired, Short-term memory impaired and Long-term memory impaired.        .  Diagnosis managed and treated at today's visit   Parkinson's disease  Anxiety d/t a medical condition  Chronic pain  Mild cognitive impairment  High risk for falls  Tremor  Foot ulcer  Physical deconditioning  Insomnia     Plan:  Medication Adjustment:  No medication changes    Total time spent with the patient today was 40 minutes with greater than 50% of the time spent in counseling and care coordination. The patient will call before then with any questions, concerns or problems. We will assess for the appropriateness of possible psychotropic medication trials/changes. The patient will seek out appropriate emergency services should that become necessary.    Other:   Patient will return to clinic in 3 months. They agree to call or return sooner with any questions  or concerns.  Risks and benefits were discussed.  Continue with his individual therapist.     Continue with the support of the clinic, reassurance, and redirection. Staff monitoring and ongoing assessments per team plan. Current psychotropic medication appears to represent the minimum effective dosage and appears medically necessary. We will continue to monitor and reassess. This team will utilize appropriate emergency services if necessary. I will make myself available if concerns or problems arise.    General Information:  Today you had your appointment with Kelli Walker CNP     If lab work was done today as part of your evaluation you will generally be contacted via My Chart, mail, or phone with the results within 1-5 days. If there is an alarming result we will contact you by phone. Lab results come back at varying times, I generally wait until all labs are resulted before making comments on results. Please note labs are automatically released to My Chart once available.     If you need refills please contact your pharmacist. They will send a refill request to me to review. Please allow 3 business days for us to process all refill requests.     Please call or send a medical message through My Chart, with any questions or concerns    If you need any paperwork completed please fax forms to [phone]. Please state if you would like a copy of the completed paperwork, mailed or faxed back to the patient and a fax number to fax the paperwork to. Please allow up to 10 days for paperwork to be completed.    Kelli Walker CNP      Again, thank you for allowing me to participate in the care of your patient.        Sincerely,        KASHIF Rosenthal CNP

## 2021-10-19 PROBLEM — F32.9 MAJOR DEPRESSION: Status: ACTIVE | Noted: 2021-01-01

## 2021-10-29 NOTE — TELEPHONE ENCOUNTER
Incoming call from patient's wife Luz Marina wondering if Dr. Washington recommends patient getting the J&J booster or can he receive a different COVID booster? Patient had the J&J vaccine 3/4/21.

## 2021-10-29 NOTE — TELEPHONE ENCOUNTER
I do recommend a COVID vaccine booster. Since he received the J&J vaccine I would encourage consideration of getting either Moderna or Pfizer vaccines since they are allowing crossover. He can get the J&J if that is his preference.

## 2021-11-09 PROBLEM — G95.20 CORD COMPRESSION MYELOPATHY (H): Status: ACTIVE | Noted: 2019-09-16

## 2021-11-09 PROBLEM — M48.02 CERVICAL STENOSIS OF SPINAL CANAL: Status: ACTIVE | Noted: 2019-02-07

## 2021-11-09 PROBLEM — I25.119 CORONARY ARTERY DISEASE INVOLVING NATIVE CORONARY ARTERY OF NATIVE HEART WITH ANGINA PECTORIS (H): Status: ACTIVE | Noted: 2021-01-01

## 2021-11-09 PROBLEM — I25.5 ISCHEMIC CARDIOMYOPATHY: Status: ACTIVE | Noted: 2018-12-14

## 2021-11-09 NOTE — TELEPHONE ENCOUNTER
He may continue to take his thyroid medication in the morning. I reviewed with the patient and his wife. Please let me know if a new prescription is needed.

## 2021-11-09 NOTE — PROGRESS NOTES
"    The patient was provided with suggestions to help him develop a healthy physical lifestyle.  He is at risk for lack of exercise and has been provided with information to increase physical activity for the benefit of his well-being.  The patient reports that he has difficulty with activities of daily living. I have asked that the patient make a follow up appointment in 12 weeks where this issue will be further evaluated and addressed.  The patient was provided with written information regarding signs of hearing loss.  Information on urinary incontinence and treatment options given to patient.  The patient was provided with suggestions to help him develop a healthy emotional lifestyle.  He is at risk for falling and has been provided with information to reduce the risk of falling at home.  Answers for HPI/ROS submitted by the patient on 11/9/2021  If you checked off any problems, how difficult have these problems made it for you to do your work, take care of things at home, or get along with other people?: Extremely difficult  PHQ9 TOTAL SCORE: 15  In general, how would you rate your overall physical health?: fair  Frequency of exercise:: None  Do you usually eat at least 4 servings of fruit and vegetables a day, include whole grains & fiber, and avoid regularly eating high fat or \"junk\" foods? : Yes  Taking medications regularly:: Yes  Medication side effects:: Muscle aches  Activities of Daily Living: telephone requires assistance, transportation requires assistance, shopping requires assistance, preparing meals requires assistance, housework requires assistance, bathing requires assistance, laundry requires assistance, medication administration requires assistance, money management requires assistance  Home safety: no safety concerns identified  Hearing Impairment:: difficulty following a conversation in a noisy restaurant or crowded room, feel that people are mumbling or not speaking clearly, difficulty " following dialogue in the theater, difficult to understand a speaker at a public meeting or [employer]regational service, need to ask people to speak up or repeat themselves, find that men's voices are easier to understand than woman's, difficulty understanding soft or whispered speech, difficulty understanding speech on the telephone  In the past 6 months, have you been bothered by leaking of urine?: Yes  In general, how would you rate your overall mental or emotional health?: poor  Additional concerns today:: Yes

## 2021-11-09 NOTE — TELEPHONE ENCOUNTER
Patient has been on levothyroxine in the morning previously and these new directions came over for him to take at bedtime.  Progress note isn't finished so couldn't see if change was intentional.  Please call pharmacy to clarify.    Thank you,  Jacqui Spear, Pharm.D.  [facility]  [phone]

## 2021-11-09 NOTE — LETTER
My Heart Failure Action Plan   Name: Silver Zamora    YOB: 1950   Date: 11/20/2021    My doctor: Valentín [address] [facility] [address] [phone]  My Diagnosis: Reduced (HFr)- EF:See chart%   My Exercise Goal: 30 minutes daily  .     My Weight Plan:   Wt Readings from Last 2 Encounters:   11/09/21 82.5 kg (181 lb 12.8 oz)   08/13/21 90.7 kg (200 lb)     Weigh yourself daily using the same scale. If you gain more than 2 pounds in 24 hours or 5 pounds in a week call the clinic    My Diet Goal: No added salt    Emergency Room Visits:    Our goal is to improve your quality of life and help you avoid a visit to the emergency room or hospital.  If we work together, we can achieve this goal. But, if you feel you need to call 911 or go to the emergency room, please do so.  If you go to the emergency room, please bring your list of medicines and your daily weight chart with you.       GREEN ZONE     Doing well today    Weight gained is no more than 2 pounds a day or 5 pounds a week.    No swelling in feet, ankles, legs or stomach.    No more swelling than usual.    No more trouble breathing than usual.    No change in my sleep.    No other problems. Actions:    I am doing fine.  I will take my medicine, follow my diet, see my doctor, exercise, and watch for symptoms.           YELLOW ZONE         Having a bad day or flare up    Weight gain of more than 2 pounds in one day or 5 pounds in one week.    New swelling in ankle, leg, knee or thigh.    Bloating in belly, pants feel tighter.    Swelling in hands or face.    Coughing or trouble breathing while walking or talking.    Harder to breathe last night.    Have trouble sleeping, wake up short of breath.    Much more tired than usual.    Not eating.    Pain in my chest or bad leg cramps.    Feel weak or dizzy. Actions:    I need to take action and call my doctor or nurse  today.                 RED ZONE         Need medical care now    Weight gain of 5 pounds overnight.    Chest pain or pressure that does not go away.    Feel less alert.    Wheezing or have trouble breathing when at rest.    Cannot sleep lying down.    Cannot take my water pill.    Pass out or faint. Actions:    I need to call my doctor or nurse now!    Call 911 if I have chest pain or cannot breathe.

## 2021-11-09 NOTE — PATIENT INSTRUCTIONS
Patient Education   Personalized Prevention Plan  You are due for the preventive services outlined below.  Your care team is available to assist you in scheduling these services.  If you have already completed any of these items, please share that information with your care team to update in your medical record.  Health Maintenance Due   Topic Date Due     Heart Failure Action Plan  Never done     Depression Action Plan  Never done     Hepatitis C Screening  Never done     Zoster (Shingles) Vaccine (1 of 2) Never done     Depression Assessment  05/04/2021     Flu Vaccine (1) 09/01/2021     Liver Monitoring Lab  11/04/2021     Cholesterol Lab  11/04/2021     FALL RISK ASSESSMENT  11/04/2021     Your Health Risk Assessment indicates you feel you are not in good health    A healthy lifestyle helps keep the body fit and the mind alert. It helps protect you from disease, helps you fight disease, and helps prevent chronic disease (disease that doesn't go away) from getting worse. This is important as you get older and begin to notice twinges in muscles and joints and a decline in the strength and stamina you once took for granted. A healthy lifestyle includes good healthcare, good nutrition, weight control, recreation, and regular exercise. Avoid harmful substances and do what you can to keep safe. Another part of a healthy lifestyle is stay mentally active and socially involved.    Good healthcare     Have a wellness visit every year.     If you have new symptoms, let us know right away. Don't wait until the next checkup.     Take medicines exactly as prescribed and keep your medicines in a safe place. Tell us if your medicine causes problems.   Healthy diet and weight control     Eat 3 or 4 small, nutritious, low-fat, high-fiber meals a day. Include a variety of fruits, vegetables, and whole-grain foods.     Make sure you get enough calcium in your diet. Calcium, vitamin D, and exercise help prevent osteoporosis (bone  thinning).     If you live alone, try eating with others when you can. That way you get a good meal and have company while you eat it.     Try to keep a healthy weight. If you eat more calories than your body uses for energy, it will be stored as fat and you will gain weight.     Recreation   Recreation is not limited to sports and team events. It includes any activity that provides relaxation, interest, enjoyment, and exercise. Recreation provides an outlet for physical, mental, and social energy. It can give a sense of worth and achievement. It can help you stay healthy.    Mental Exercise and Social Involvement  Mental and emotional health is as important as physical health. Keep in touch with friends and family. Stay as active as possible. Continue to learn and challenge yourself.   Things you can do to stay mentally active are:    Learn something new, like a foreign language or musical instrument.     Play SCRABBLE or do crossword puzzles. If you cannot find people to play these games with you at home, you can play them with others on your computer through the Internet.     Join a games club--anything from card games to chess or checkers or lawn bowling.     Start a new hobby.     Go back to school.     Volunteer.     Read.   Keep up with world events.    Exercise for a Healthier Heart  You may wonder how you can improve the health of your heart. If you re thinking about exercise, you re on the right track. You don t need to become an athlete. But you do need a certain amount of brisk exercise to help strengthen your heart. If you have been diagnosed with a heart condition, your healthcare provider may advise exercise to help stabilize your condition. To help make exercise a habit, choose safe, fun activities.      Exercise with a friend. When activity is fun, you're more likely to stick with it.   Before you start  Check with your healthcare provider before starting an exercise program. This is especially  important if you have not been active for a while. It's also important if you have a long-term (chronic) health problem such as heart disease, diabetes, or obesity. Or if you are at high risk for having these problems.   Why exercise?  Exercising regularly offers many healthy rewards. It can help you do all of the following:     Improve your blood cholesterol level to help prevent further heart trouble    Lower your blood pressure to help prevent a stroke or heart attack    Control diabetes, or reduce your risk of getting this disease    Improve your heart and lung function    Reach and stay at a healthy weight    Make your muscles stronger so you can stay active    Prevent falls and fractures by slowing the loss of bone mass (osteoporosis)    Manage stress better    Reduce your blood pressure    Improve your sense of self and your body image  Exercise tips      Ease into your routine. Set small goals. Then build on them. If you are not sure what your activity level should be, talk with your healthcare provider first before starting an exercise routine.    Exercise on most days. Aim for a total of 150 minutes (2 hours and 30 minutes) or more of moderate-intensity aerobic activity each week. Or 75 minutes (1 hour and 15 minutes) or more of vigorous-intensity aerobic activity each week. Or try for a combination of both. Moderate activity means that you breathe heavier and your heart rate increases but you can still talk. Think about doing 40 minutes of moderate exercise, 3 to 4 times a week. For best results, activity should last for about 40 minutes to lower blood pressure and cholesterol. It's OK to work up to the 40-minute period over time. Examples of moderate-intensity activity are walking 1 mile in 15 minutes. Or doing 30 to 45 minutes of yard work.    Step up your daily activity level.  Along with your exercise program, try being more active the whole day. Walk instead of drive. Or park further away so that you  take more steps each day. Do more household tasks or yard work. You may not be able to meet the advised mount of physical activity. But doing some moderate- or vigorous-intensity aerobic activity can help reduce your risk for heart disease. Your healthcare provider can help you figure out what is best for you.    Choose 1 or more activities you enjoy.  Walking is one of the easiest things you can do. You can also try swimming, riding a bike, dancing, or taking an exercise class.    When to call your healthcare provider  Call your healthcare provider if you have any of these:     Chest pain or feel dizzy or lightheaded    Burning, tightness, pressure, or heaviness in your chest, neck, shoulders, back, or arms    Abnormal shortness of breath    More joint or muscle pain    A very fast or irregular heartbeat (palpitations)  [employer]lin[employer] last reviewed this educational content on 7/1/2019 2000-2021 [employer]. All rights reserved. This information is not intended as a substitute for professional medical care. Always follow your healthcare professional's instructions.        Activities of Daily Living    Your Health Risk Assessment indicates you have difficulties with activities of daily living such as housework, bathing, preparing meals, taking medication, etc. Please make a follow up appointment for us to address this issue in more detail.    Signs of Hearing Loss      Hearing much better with one ear can be a sign of hearing loss.   Hearing loss is a problem shared by many people. In fact, it is one of the most common health problems, particularly as people age. Most people age 65 and older have some hearing loss. By age 80, almost everyone does. Hearing loss often occurs slowly over the years. So you may not realize your hearing has gotten worse.  Have your hearing checked  Call your healthcare provider if you:    Have to strain to hear normal conversation    Have to watch other people s faces very  carefully to follow what they re saying    Need to ask people to repeat what they ve said    Often misunderstand what people are saying    Turn the volume of the television or radio up so high that others complain    Feel that people are mumbling when they re talking to you    Find that the effort to hear leaves you feeling tired and irritated    Notice, when using the phone, that you hear better with one ear than the other  [employer] last reviewed this educational content on 1/1/2020 2000-2021 [employer]. All rights reserved. This information is not intended as a substitute for professional medical care. Always follow your healthcare professional's instructions.          Urinary Incontinence (Male)    Urinary incontinence means not being able to control the release of urine from the bladder.   Causes  Common causes of urinary incontinence in men include:    Infection    Certain medicines    Aging    Poor pelvic muscle tone    Bladder spasms    Obesity    Trouble urinating and fully emptying the bladder (urinary retention)  Other things that can cause incontinence are:     Nervous system diseases    Diabetes    Sleep apnea    Urinary tract infections    Prostate surgery    Pelvic injury  Constipation and smoking have also been identified as risk factors.   Symptoms    Urge incontinence (overactive bladder). This is a sudden urge to urinate. It occurs even though there may not be much urine in the bladder. The need to urinate often during the night is common. It's due to bladder spasms.    Stress incontinence. This is urine leakage that you can't control. It can occur with sneezing, coughing, and other actions that put stress on the bladder.    Treatment  Treatment depends on what is causing the condition. Bladder infections are treated with antibiotics. Urinary retention is treated with a bladder catheter.   Home care  Follow these guidelines when caring for yourself at home:    Don't have any foods  and drinks that may irritate the bladder. This includes:  ? Chocolate  ? Alcohol  ? Caffeine  ? Carbonated drinks  ? Acidic fruits and juices    Limit fluids to 6 to 8 cups a day.    Lose weight if you are overweight. This will reduce your symptoms.    If advised, do regular pelvic muscle-strengthening exercises such as Kegel exercises.    If needed, wear absorbent pads to catch urine. Change the pads often. This is for good hygiene and to prevent skin and bladder infections.    Bathe daily for good hygiene.    If an antibiotic was prescribed to treat a bladder infection, take it until it's finished. Keep taking it even if you are feeling better. This is to make sure your infection has cleared.    If a catheter was left in place, keep bacteria from getting into the collection bag. Don't disconnect the catheter from the collection bag.    Use a leg band to secure the catheter drainage tube, so it does not pull on the catheter. Drain the collection bag when it becomes full. To do this, use the drain spout at the bottom of the bag. Don't disconnect the bag from the catheter.    Don't pull on or try to remove a catheter. The catheter must be removed by a healthcare provider.    If you smoke, stop. Ask your provider for help if you can't do this on your own.  Follow-up care  Follow up with your healthcare provider, or as advised.  When to get medical advice  Call your healthcare provider right away if any of these occur:    Fever over 100.4 F (38 C), or as directed by your provider    Bladder pain or fullness    Belly swelling, nausea, or vomiting    Back pain    Weakness, dizziness, or fainting    If a catheter was left in place, return if:  ? The catheter falls out  ? The catheter stops draining for 6 hours  ? Your urine gets cloudy or smells bad  [employer] last reviewed this educational content on 1/1/2020 2000-2021 [employer]. All rights reserved. This information is not intended as a substitute for  professional medical care. Always follow your healthcare professional's instructions.        Your Health Risk Assessment indicates you feel you are not in good emotional health.    Recreation   Recreation is not limited to sports and team events. It includes any activity that provides relaxation, interest, enjoyment, and exercise. Recreation provides an outlet for physical, mental, and social energy. It can give a sense of worth and achievement. It can help you stay healthy.    Mental Exercise and Social Involvement  Mental and emotional health is as important as physical health. Keep in touch with friends and family. Stay as active as possible. Continue to learn and challenge yourself.   Things you can do to stay mentally active are:    Learn something new, like a foreign language or musical instrument.     Play SCRABBLE or do crossword puzzles. If you cannot find people to play these games with you at home, you can play them with others on your computer through the Internet.     Join a games club--anything from card games to chess or checkers or lawn bowling.     Start a new hobby.     Go back to school.     Volunteer.     Read.   Keep up with world events.    Preventing Falls at Home  A person can fall for many reasons. Older adults may fall because reaction time slows down as we age. Your muscles and joints may get stiff, weak, or less flexible because of illness, medicines, or a physical condition.   Other health problems that make falls more likely include:     Arthritis    Dizziness or lightheadedness when you stand up (orthostatic hypotension)    History of a stroke    Dizziness    Anemia    Certain medicines taken for mental illness or to control blood pressure.    Problems with balance or gait    Bladder or urinary problems    History of falling    Changes in vision (vision impairment)    Changes in thinking skills and memory (cognitive impairment)  Injuries from a fall can include serious injuries such as  broken bones, dislocated joints, internal bleeding and cuts. Injuries like these can limit your independence.   Prevention tips  To help prevent falls and fall-related injuries, follow the tips below.    Floors  To make floors safer:     Put nonskid pads under area rugs.    Remove small rugs.    Replace worn floor coverings.    Tack carpets firmly to each step on carpeted stairs. Put nonskid strips on the edges of uncarpeted stairs.    Keep floors and stairs free of clutter and cords.    Arrange furniture so there are clear pathways.    Clean up any spills right away.  Bathrooms    To make bathrooms safer:     Install grab bars in the tub or shower.    Apply nonskid strips or put a nonskid rubber mat in the tub or shower.    Sit on a bath chair to bathe.    Use bathmats with nonskid backing.  Lighting  To improve visibility in your home:      Keep a flashlight in each room. Or put a lamp next to the bed within easy reach.    Put nightlights in the bedrooms, hallways, kitchen, and bathrooms.    Make sure all stairways have good lighting.    Take your time when going up and down stairs.    Put handrails on both sides of stairs and in walkways for more support. To prevent injury to your wrist or arm, don t use handrails to pull yourself up.    Install grab bars to pull yourself up.    Move or rearrange items that you use often. This will make them easier to find or reach.    Look at your home to find any safety hazards. Especially look at doorways, walkways, and the driveway. Remove or repair any safety problems that you find.  Other changes to make    Look around to find any safety hazards. Look closely at doorways, walkways, and the driveway. Remove or repair any safety problems that you find.    Wear shoes that fit well.    Take your time when going up and down stairs.    Put handrails on both sides of stairs and in walkways for more support. To prevent injury to your wrist or arm, don t use handrails to pull  yourself up.    Install grab bars wherever needed to pull yourself up.    Arrange items that you use often. This will make them easier to find or reach.    [employer]less[employer] last reviewed this educational content on 3/1/2020    7349-7319 [employer]. All rights reserved. This information is not intended as a substitute for professional medical care. Always follow your healthcare professional's instructions.

## 2021-11-09 NOTE — LETTER
December 24, 2021      Silver Zamora  [id number] [address]        Dear Mr. Sheppard,    Your recent test results look very good.  Your kidney tests are stable.  Your liver tests are normal.      Your blood counts are normal.  Optimal LDL cholesterol would be less than 70 so continue to work on your diet as you are able.    Please me know if you have questions or concerns.    Resulted Orders   Comprehensive metabolic panel   Result Value Ref Range    Sodium 139 136 - 145 mmol/L    Potassium 4.1 3.5 - 5.0 mmol/L    Chloride 104 98 - 107 mmol/L    Carbon Dioxide (CO2) 24 22 - 31 mmol/L    Anion Gap 11 5 - 18 mmol/L    Urea Nitrogen 17 8 - 28 mg/dL    Creatinine 0.69 (L) 0.70 - 1.30 mg/dL    Calcium 9.4 8.5 - 10.5 mg/dL    Glucose 102 70 - 125 mg/dL    Alkaline Phosphatase 94 45 - 120 U/L    AST 9 0 - 40 U/L    ALT <9 0 - 45 U/L    Protein Total 6.7 6.0 - 8.0 g/dL    Albumin 3.5 3.5 - 5.0 g/dL    Bilirubin Total 0.5 0.0 - 1.0 mg/dL    GFR Estimate >90 >60 mL/min/1.73m2      Comment:      As of July 11, 2021, eGFR is calculated by the CKD-EPI creatinine equation, without race adjustment. eGFR can be influenced by muscle mass, exercise, and diet. The reported eGFR is an estimation only and is only applicable if the renal function is stable.   TSH with free T4 reflex   Result Value Ref Range    TSH 1.72 0.30 - 5.00 uIU/mL   CBC with platelets   Result Value Ref Range    WBC Count 6.8 4.0 - 11.0 10e3/uL    RBC Count 4.78 4.40 - 5.90 10e6/uL    Hemoglobin 13.5 13.3 - 17.7 g/dL    Hematocrit 43.4 40.0 - 53.0 %    MCV 91 78 - 100 fL    MCH 28.2 26.5 - 33.0 pg    MCHC 31.1 (L) 31.5 - 36.5 g/dL    RDW 13.6 10.0 - 15.0 %    Platelet Count 222 150 - 450 10e3/uL   LDL cholesterol direct   Result Value Ref Range    LDL Cholesterol Direct 116 <=129 mg/dL       If you have any questions or concerns, please call the clinic at the number listed above.       Sincerely,      Valentín Washington MD

## 2021-11-09 NOTE — LETTER
My Depression Action Plan  Name: Silver Zamora   Date of Birth 1950  Date: 11/20/2021    My doctor: Valentín Washington   My clinic: [address] [facility] [id number] [phone]          GREEN    ZONE   Good Control    What it looks like:     Things are going generally well. You have normal ups and downs. You may even feel depressed from time to time, but bad moods usually last less than a day.   What you need to do:  1. Continue to care for yourself (see self care plan)  2. Check your depression survival kit and update it as needed  3. Follow your physician s recommendations including any medication.  4. Do not stop taking medication unless you consult with your physician first.           YELLOW         ZONE Getting Worse    What it looks like:     Depression is starting to interfere with your life.     It may be hard to get out of bed; you may be starting to isolate yourself from others.    Symptoms of depression are starting to last most all day and this has happened for several days.     You may have suicidal thoughts but they are not constant.   What you need to do:     1. Call your care team. Your response to treatment will improve if you keep your care team informed of your progress. Yellow periods are signs an adjustment may need to be made.     2. Continue your self-care.  Just get dressed and ready for the day.  Don't give yourself time to talk yourself out of it.    3. Talk to someone in your support network.    4. Open up your Depression Self-Care Plan/Wellness Kit.           RED    ZONE Medical Alert - Get Help    What it looks like:     Depression is seriously interfering with your life.     You may experience these or other symptoms: You can t get out of bed most days, can t work or engage in other necessary activities, you have trouble taking care of basic hygiene, or basic responsibilities, thoughts of suicide or death that  will not go away, self-injurious behavior.     What you need to do:  1. Call your care team and request a same-day appointment. If they are not available (weekends or after hours) call your local crisis line, emergency room or 911.          Depression Self-Care Plan / Wellness Kit    Many people find that medication and therapy are helpful treatments for managing depression. In addition, making small changes to your everyday life can help to boost your mood and improve your wellbeing. Below are some tips for you to consider. Be sure to talk with your medical provider and/or behavioral health consultant if your symptoms are worsening or not improving.     Sleep   Sleep hygiene  means all of the habits that support good, restful sleep. It includes maintaining a consistent bedtime and wake time, using your bedroom only for sleeping or sex, and keeping the bedroom dark and free of distractions like a computer, smartphone, or television.     Develop a Healthy Routine  Maintain good hygiene. Get out of bed in the morning, make your bed, brush your teeth, take a shower, and get dressed. Don t spend too much time viewing media that makes you feel stressed. Find time to relax each day.    Exercise  Get some form of exercise every day. This will help reduce pain and release endorphins, the  feel good  chemicals in your brain. It can be as simple as just going for a walk or doing some gardening, anything that will get you moving.      Diet  Strive to eat healthy foods, including fruits and vegetables. Drink plenty of water. Avoid excessive sugar, caffeine, alcohol, and other mood-altering substances.     Stay Connected with Others  Stay in touch with friends and family members.    Manage Your Mood  Try deep breathing, massage therapy, biofeedback, or meditation. Take part in fun activities when you can. Try to find something to smile about each day.     Psychotherapy  Be open to working with a therapist if your provider  recommends it.     Medication  Be sure to take your medication as prescribed. Most anti-depressants need to be taken every day. It usually takes several weeks for medications to work. Not all medicines work for all people. It is important to follow-up with your provider to make sure you have a treatment plan that is working for you. Do not stop your medication abruptly without first discussing it with your provider.    Crisis Resources   These hotlines are for both adults and children. They and are open 24 hours a day, 7 days a week unless noted otherwise.      National Suicide Prevention Lifeline   5-864-196-LZNE (9411)      Crisis Text Line    www.crisistextline.org  Text HOME to 486777 from anywhere in the [place], anytime, about any type of crisis. A live, trained crisis counselor will receive the text and respond quickly.      [employer] Lifeline for LGBTQ Youth  A national crisis intervention and suicide lifeline for LGBTQ youth under 25. Provides a safe place to talk without judgement. Call 1-154.540.7464; text START to 189075 or visit www.thetrevorproject.org to talk to a trained counselor.      For [facility] crisis numbers, visit the [facility] website at:  https://[place].gov/dhs/people-we-serve/adults/health-care/mental-health/resources/crisis-contacts.jsp

## 2021-11-09 NOTE — PROGRESS NOTES
"SUBJECTIVE:   Silver Zamora is a 71 year old male who presents for Preventive Visit.    This is a pleasant 71-year-old male who presents to the clinic with his wife Luz Marina for annual wellness visit.  He is now on heart failure given advanced Parkinson's disease as well as heart failure.    He has a complex medical history including advanced Parkinson's disease with multiple falls.  He follows up with neurology and has been treated with multiple medications including carbidopa-levodopa as well as right tarry and Nuplazid.  He has a history of hallucinations and Nuplazid previously.  He uses a walker for ambulation.  He does experience double vision and has had weakness.  He has been prone to episodes of \"freezing \".  He continues to follow-up with neurology and has had medical adjustments in the past year.  He does have multiple falls.  He requires help in getting dressed him with bathing.  Sinemet has been decreased and Nuplazid has been discontinued.     He has a known history of ischemic cardiomyopathy and follows up with , cardiology.  He had a previous coronary stent placed in the right coronary artery in 2018 and required dual antiplatelet therapy for a period of time.  An echocardiogram showed an ejection fraction of 44% at the time.  He did have a nuclear stress test that was abnormal in 2019.  His ejection fraction was noted to be 48%.  He has been stable and compensated as noted per cardiology.     With a previous hospitalization he did have a fall and a CT scan of the cervical spine showed severe spinal canal stenosis at multiple levels.  There was neural foraminal narrowing noted as well and prominent degenerative changes.  Given his history of heart disease and medications as well as risk for falling he was deemed not to be a candidate for spinal surgery.  He has been treated with gabapentin and duloxetine and his pain has lessened over time.     Also, he has had problems with a right heel ulcer " "recently that has required treatment.  He does have a history of osteomyelitis and has had partial amputations of multiple toes of both feet.    He continues to have significant assistance with his wife Luz Marina has been caring for him.  Review of systems is notable for bilateral shoulder pain.  He more recently has been started on a fentanyl patch given chronic pain as managed by hospice.  He has had some constipation and therefore is treated with senna as well as Colace.  He can have low back pain.  He is at previous lumbar spinal fusion from L3-S1.       Patient has been advised of split billing requirements and indicates understanding: Yes   Are you in the first 12 months of your Medicare coverage?  No    Healthy Habits:     In general, how would you rate your overall health?  Fair    Frequency of exercise:  None    Do you usually eat at least 4 servings of fruit and vegetables a day, include whole grains    & fiber and avoid regularly eating high fat or \"junk\" foods?  Yes    Taking medications regularly:  Yes    Medication side effects:  Muscle aches    Ability to successfully perform activities of daily living:  Telephone requires assistance, transportation requires assistance, shopping requires assistance, preparing meals requires assistance, housework requires assistance, bathing requires assistance, laundry requires assistance, medication administration requires assistance and money management requires assistance    Home Safety:  No safety concerns identified    Hearing Impairment:  Difficulty following a conversation in a noisy restaurant or crowded room, feel that people are mumbling or not speaking clearly, difficulty following dialogue in the theater, difficult to understand a speaker at a public meeting or [employer] service, need to ask people to speak up or repeat themselves, find that men's voices are easier to understand than woman's, difficulty understanding soft or whispered speech and difficulty " understanding speech on the telephone    In the past 6 months, have you been bothered by leaking of urine? Yes    In general, how would you rate your overall mental or emotional health?  Poor      PHQ-2 Total Score: 5    Additional concerns today:  Yes    Do you feel safe in your environment? Yes    Have you ever done Advance Care Planning? (For example, a Health Directive, POLST, or a discussion with a medical provider or your loved ones about your wishes): Yes, patient states has an Advance Care Planning document and will bring a copy to the clinic.       Fall risk  Fallen 2 or more times in the past year?: Yes (pt has fallen 20 times, per hospice.)  Any fall with injury in the past year?: Yes    Cognitive Screening   1) Repeat 3 items (Leader, Season, Table)    2) Clock draw: unable to draw clock   3) 3 item recall: }Recalls 3 objects  Results: ABNORMAL clock, 1-2 items recalled: PROBABLE COGNITIVE IMPAIRMENT, **INFORM PROVIDER**    Mini-CogTM Copyright [employer]. Licensed by the author for use in [facility]; reprinted with permission (ryan@[facility]). All rights reserved.      Do you have sleep apnea, excessive snoring or daytime drowsiness?: no    Reviewed and updated as needed this visit by clinical staff  Tobacco  Allergies              Reviewed and updated as needed this visit by Provider               Social History     Tobacco Use     Smoking status: Former Smoker     Packs/day: 1.00     Years: 5.00     Pack years: 5.00     Types: Cigars     Quit date:      Years since quittin.8     Smokeless tobacco: Never Used   Substance Use Topics     Alcohol use: Never     If you drink alcohol do you typically have >3 drinks per day or >7 drinks per week? No    Alcohol Use 2021   Prescreen: >3 drinks/day or >7 drinks/week? Not Applicable   No flowsheet data found.        Current providers sharing in care for this patient include:   Patient Care Team:  Valentín Washington MD as PCP - General  (Family Practice)  Roxy Barraza MD as Assigned Heart and Vascular Provider  Valentín Washington MD as Assigned PCP    The following health maintenance items are reviewed in Epic and correct as of today:  Health Maintenance Due   Topic Date Due     HF ACTION PLAN  Never done     ANNUAL REVIEW OF HM ORDERS  Never done     DEPRESSION ACTION PLAN  Never done     HEPATITIS C SCREENING  Never done     ZOSTER IMMUNIZATION (1 of 2) Never done     PHQ-9  05/04/2021     INFLUENZA VACCINE (1) 09/01/2021     ALT  11/04/2021     LIPID  11/04/2021     FALL RISK ASSESSMENT  11/04/2021     MEDICARE ANNUAL WELLNESS VISIT  11/04/2021     Patient Active Problem List   Diagnosis     Parkinson's disease (H)     Major depression in complete remission (H)     CHF (congestive heart failure) (H)     Anxiety state     Cervical stenosis of spinal canal     Cord compression myelopathy (H)     Coronary artery disease involving native coronary artery of native heart with angina pectoris (H)     Hypothyroidism     Ischemic cardiomyopathy     Past Surgical History:   Procedure Laterality Date     AMPUTATE TOE(S) Left 2/27/2020    Procedure: Left Foot 2nd Toe Amputation,foot/toe off- loading procedures;  Surgeon: Hakeem Markham DPM;  Location: [facility]     CV CORONARY ANGIOGRAM N/A 10/31/2018    Procedure: Coronary Angiogram;  Surgeon: Jose Meyer MD;  Location: [facility];  Service:      CV LEFT HEART CATHETERIZATION WITHOUT LEFT VENTRICULOGRAM Left 10/31/2018    Procedure: Left Heart Catheterization Without Left Ventriculogram;  Surgeon: Jose Meyer MD;  Location: [facility];  Service:      INGUINAL HERNIA REPAIR Bilateral 10/2013     LAMINECTOMY  10/2013     RI ERCP REMOVE CALCULI/DEBRIS BILIARY/PANCREAS DUCT N/A 5/25/2018    Procedure: ENDOSCOPIC RETROGRADE CHOLANGIOPANCREATOGRAPHY SPINCTEROTOMY BILIARY STONE EXTRACTION;  Surgeon: Curtis Mcclain MD;  Location: [facility];  Service: Gastroenterology      LA LAP,CHOLECYSTECTOMY/GRAPH N/A 2018    Procedure: LAPAROSCOPIC CHOLECYSTECTOMY;  Surgeon: Joyce Melissa MD;  Location: [facility];  Service: General       Social History     Tobacco Use     Smoking status: Former Smoker     Packs/day: 1.00     Years: 5.00     Pack years: 5.00     Types: Cigars     Quit date:      Years since quittin.9     Smokeless tobacco: Never Used   Substance Use Topics     Alcohol use: Never     Family History   Problem Relation Age of Onset     Kidney failure Mother      Heart Disease Father 82.00     Tremor Father      Parkinsonism Paternal Grandfather      Tremor Sister      Leukemia Maternal Grandmother          Current Outpatient Medications   Medication Sig Dispense Refill     ammonium lactate (AMLACTIN) 12 % external cream Apply two times a day       aspirin 81 MG EC tablet Take 81 mg by mouth daily       bisacodyl (DULCOLAX) 10 MG suppository One supp LA qday prn constipation.  Give if no BM in prior 3 days.       bisacodyl (DULCOLAX) 5 MG EC tablet Take 5 mg by mouth daily as needed for constipation       calcium carbonate (TUMS) 500 MG chewable tablet Take 1 chew tab by mouth as needed for heartburn       Cholecalciferol (VITAMIN D3) 2000 units TABS Take 1 tablet by mouth daily        DULoxetine (CYMBALTA) 20 MG capsule At hs       fentaNYL (DURAGESIC) 25 mcg/hr 72 hr patch Place 1 patch onto the skin every 72 hours remove old patch.       fluticasone (FLONASE) 50 MCG/ACT nasal spray Spray 2 sprays in nostril       morphine 5 MG solu-tab Take 5 mg by mouth every 4 hours as needed for shortness of breath / dyspnea or moderate to severe pain       nitroGLYcerin (NITROSTAT) 0.4 MG sublingual tablet Place 0.4 mg under the tongue every 5 minutes as needed for chest pain For chest pain place 1 tablet under the tongue every 5 minutes for 3 doses. If symptoms persist 5 minutes after 1st dose call 911.       nystatin (MYCOSTATIN) 274520 UNIT/GM external cream     "    RYTARY 23.75-95 MG CPCR per CR capsule Take 1 capsule by mouth 2 times daily 1 in the AM and 1 in the PM 60 capsule 11     RYTARY 61. MG CPCR per CR capsule Take 1 capsule by mouth 2 (two) times a day AND 2 capsules 2 (two) times a day. - Oral       SENNA-docusate sodium (SENNA S) 8.6-50 MG tablet Take 2 tablets by mouth       acetaminophen (TYLENOL) 325 MG tablet Take 500 mg by mouth       levothyroxine (SYNTHROID/LEVOTHROID) 137 MCG tablet Take one PO QAM 90 tablet 3     triamcinolone (KENALOG) 0.025 % external ointment  (Patient not taking: Reported on 11/9/2021)       Allergies   Allergen Reactions     Clonazepam      Rash      See chart        Review of Systems  Constitutional, HEENT, cardiovascular, pulmonary, GI, , musculoskeletal, neuro, skin, endocrine and psych systems are negative, except as otherwise noted.    OBJECTIVE:   BP (!) 153/76 (BP Location: Left arm, Patient Position: Sitting, Cuff Size: Adult Regular)   Pulse 70   Resp 16   Wt 82.5 kg (181 lb 12.8 oz)   BMI 25.36 kg/m   Estimated body mass index is 25.36 kg/m  as calculated from the following:    Height as of 8/13/21: 1.803 m (5' 11\").    Weight as of this encounter: 82.5 kg (181 lb 12.8 oz).  Physical Exam  GENERAL: healthy, alert and no distress  EYES: Eyes grossly normal to inspection, PERRL and conjunctivae and sclerae normal  HENT: ear canals and TM's normal, nose and mouth without ulcers or lesions  NECK: no adenopathy, no asymmetry, masses, or scars and thyroid normal to palpation  RESP: lungs clear to auscultation - no rales, rhonchi or wheezes  CV: regular rate and rhythm, normal S1 S2, no S3 or S4, no murmur, click or rub, no peripheral edema and peripheral pulses strong  ABDOMEN: soft, nontender  MS: no gross musculoskeletal defects noted, no edema  SKIN: no suspicious lesions or rashes  NEURO: Normal strength and tone, mentation intact and speech normal  PSYCH: mentation appears normal, affect " normal/bright    Diagnostic Test Results:  Labs reviewed in Epic    ASSESSMENT / PLAN:   Silver was seen today for wellness visit.    Diagnoses and all orders for this visit:    Medicare annual wellness visit, subsequent    Reviewed the annual wellness visit health risk assessment form    Continue hospice treatment    Parkinson's disease (H)    Continue plan per neurology  Continue Rytary   Continue carbidopa-levodopa    Coronary artery disease involving native coronary artery of native heart with angina pectoris (H)  -     Comprehensive metabolic panel; Future  -     CBC with platelets; Future  -     LDL cholesterol direct; Future  -     Comprehensive metabolic panel  -     CBC with platelets  -     LDL cholesterol direct    Ischemic cardiomyopathy  Heart failure with reduced ejection fraction (H)  Status post stenting of the right coronary artery  60% stenosis of LAD noted     Continue to follow plan of Dr. Barraza, cardiology     Reviewed most recent stress test from 2019:     Stress and rest nuclear images demonstrate a large area of nontransmural infarction involving the lateral to inferolateral wall.  There is a small area of mild ramon-infarct ischemia in the mid inferolateral wall.     The left ventricular ejection fraction at stress is 48% with severe hypokinesis of the lateral wall..     A prior study was conducted on 10/22/2018.        Hypothyroidism, unspecified type    Continue thyroid medication  -     Discontinue: levothyroxine (SYNTHROID/LEVOTHROID) 137 MCG tablet; Take 1 tablet (137 mcg) by mouth At Bedtime  -     TSH with free T4 reflex; Future  -     TSH with free T4 reflex    Encounter for immunization   -     INFLUENZA, QUAD, HIGH DOSE, PF, 65YR + (FLUZONE HD)    Encounter for Medicare annual wellness exam  -     INFLUENZA, QUAD, HIGH DOSE, PF, 65YR + (FLUZONE HD)    Cord compression myelopathy (H)    Given his risk of falling he was assessed to not be a surgical candidate  Recommend surgical  "treatment    Other orders  -     REVIEW OF HEALTH MAINTENANCE PROTOCOL ORDERS  -     Cancel: INFLUENZA, QUAD, HD, PF, 65+ (FLUZONE HD)        Patient has been advised of split billing requirements and indicates understanding: Yes  COUNSELING:  Reviewed preventive health counseling, as reflected in patient instructions       Regular exercise       Vision screening       Bladder control       Fall risk prevention       Alcohol Use        Colon cancer screening       Prostate cancer screening    Estimated body mass index is 25.36 kg/m  as calculated from the following:    Height as of 8/13/21: 1.803 m (5' 11\").    Weight as of this encounter: 82.5 kg (181 lb 12.8 oz).    Weight management plan: Discussed healthy diet and exercise guidelines    He reports that he quit smoking about 49 years ago. His smoking use included cigars. He has a 5.00 pack-year smoking history. He has never used smokeless tobacco.      Appropriate preventive services were discussed with this patient, including applicable screening as appropriate for cardiovascular disease, diabetes, osteopenia/osteoporosis, and glaucoma.  As appropriate for age/gender, discussed screening for colorectal cancer, prostate cancer, breast cancer, and cervical cancer. Checklist reviewing preventive services available has been given to the patient.    Reviewed patients plan of care and provided an AVS. The Basic Care Plan (routine screening as documented in Health Maintenance) for Silver meets the Care Plan requirement. This Care Plan has been established and reviewed with the Patient.    Counseling Resources:  ATP IV Guidelines  Pooled Cohorts Equation Calculator  Breast Cancer Risk Calculator  Breast Cancer: Medication to Reduce Risk  FRAX Risk Assessment  ICSI Preventive Guidelines  Dietary Guidelines for Americans, 2010  [employer]'s MyPlate  ASA Prophylaxis  Lung CA Screening    Valentín Washington MD  [facility] " Risks:  Answers for HPI/ROS submitted by the patient on 11/9/2021  If you checked off any problems, how difficult have these problems made it for you to do your work, take care of things at home, or get along with other people?: Extremely difficult  PHQ9 TOTAL SCORE: 15

## 2021-11-12 NOTE — TELEPHONE ENCOUNTER
Called the spouse of the pt back and got a verbal ok from the patient to talk to her. Luz Marina have questions in regards to the pt's prescription for the FentaNYL patch. She was wondering if he was to be off from the patch, is there something else he can take for pain? And if he was to get a prescription for marijuana, would it help with his pain.     Per Luz Marina, on the first day that the patch was put on him which was Tuesday night, he did not sleep, and it seemed like he was high but then after that, he seemed to calm down. She did also state that he took a fall on Tuesday and scraped his arm. She is concern the way he is reacting and that is why she called asking if he can get something else for pain.    Please advise. Thank you.    Radha Fay on 11/12/2021 at 9:31 AM

## 2021-11-12 NOTE — TELEPHONE ENCOUNTER
Called the spouse and per her, she did not want to do the referral for the pain clinic after talking to the pt's nurse. They will look for alternative ways to manage pain.

## 2021-11-12 NOTE — TELEPHONE ENCOUNTER
Spoke to Luz Marina, Luz Marina does not want Silver to go back on the Gabapentin and if pt sees the pain clinic and they are going to tell the pt to be on the Fentanyl patch then she does not want the pt to see them either. Pt's nurse will be at their house today at noon and she will talk to him and will get back to us.    Luz Marina wants me to ask you that if medical marijuana is an option for pain? And if it is, can the pt get that if hospice agrees with it?    Please advise. Thanks.    Radha Fay on 11/12/2021 at 12:02 PM

## 2021-11-12 NOTE — TELEPHONE ENCOUNTER
Patient's wife, Luz Marina called and has some questions about the medications that he is taking and if there is anything he can do differently. Please call Luz Marina at [phone], thanks.

## 2021-12-16 NOTE — TELEPHONE ENCOUNTER
Voicemail received from spouse about a toe issue and whether the pt needs a new referral to be seen. This writer called back and spoke with the spouse. The pt is now receiving hospice care through [facility]. When she described the issue, it sounds like the pt has an ingrown toenail with purulent fluid developing subcutaneously. This writer told her that the pt was seen by Dr. Almodovar for this issue, not by this clinic. This writer also stated that when someone is receiving hospice care they usually aren't seen by outside providers for these issues but to clarify with hospice. She voiced understanding.    Virgie Smith RN, CWOCN  [phone]

## 2021-12-30 NOTE — TELEPHONE ENCOUNTER
Refill request for Duloxetine 20 mg  Last appt 10/13; Next appt 1/13 With Kelli Davenport for review and signature  ANDREWS Velez ATC on 12/30/2021 at 12:17 PM

## 2022-01-01 ENCOUNTER — TELEPHONE (OUTPATIENT)
Dept: NEUROLOGY | Facility: CLINIC | Age: 72
End: 2022-01-01
Payer: MEDICARE

## 2022-01-01 ENCOUNTER — LAB (OUTPATIENT)
Dept: LAB | Facility: CLINIC | Age: 72
End: 2022-01-01
Payer: MEDICARE

## 2022-01-01 ENCOUNTER — MEDICAL CORRESPONDENCE (OUTPATIENT)
Dept: HEALTH INFORMATION MANAGEMENT | Facility: CLINIC | Age: 72
End: 2022-01-01
Payer: MEDICARE

## 2022-01-01 ENCOUNTER — VIRTUAL VISIT (OUTPATIENT)
Dept: NEUROLOGY | Facility: CLINIC | Age: 72
End: 2022-01-01
Payer: MEDICARE

## 2022-01-01 ENCOUNTER — TELEPHONE (OUTPATIENT)
Dept: FAMILY MEDICINE | Facility: CLINIC | Age: 72
End: 2022-01-01
Payer: MEDICARE

## 2022-01-01 DIAGNOSIS — R35.0 URINARY FREQUENCY: ICD-10-CM

## 2022-01-01 DIAGNOSIS — G20.A1 PARKINSON DISEASE (H): Primary | ICD-10-CM

## 2022-01-01 LAB
ALBUMIN UR-MCNC: NEGATIVE MG/DL
APPEARANCE UR: CLEAR
BILIRUB UR QL STRIP: NEGATIVE
COLOR UR AUTO: YELLOW
GLUCOSE UR STRIP-MCNC: NEGATIVE MG/DL
HGB UR QL STRIP: NEGATIVE
KETONES UR STRIP-MCNC: ABNORMAL MG/DL
LEUKOCYTE ESTERASE UR QL STRIP: NEGATIVE
NITRATE UR QL: NEGATIVE
PH UR STRIP: 7 [PH] (ref 5–7)
RBC #/AREA URNS AUTO: NORMAL /HPF
SP GR UR STRIP: 1.02 (ref 1–1.03)
UROBILINOGEN UR STRIP-ACNC: 1 E.U./DL
WBC #/AREA URNS AUTO: NORMAL /HPF

## 2022-01-01 PROCEDURE — 81001 URINALYSIS AUTO W/SCOPE: CPT

## 2022-01-01 PROCEDURE — 99215 OFFICE O/P EST HI 40 MIN: CPT | Mod: 95 | Performed by: NURSE PRACTITIONER

## 2022-01-01 RX ORDER — DULOXETIN HYDROCHLORIDE 20 MG/1
CAPSULE, DELAYED RELEASE ORAL
Qty: 30 CAPSULE | Refills: 3 | Status: SHIPPED | OUTPATIENT
Start: 2022-01-01

## 2022-01-01 RX ORDER — CARBIDOPA AND LEVODOPA 25; 100 MG/1; MG/1
1 TABLET, ORALLY DISINTEGRATING ORAL 3 TIMES DAILY
Qty: 90 TABLET | Refills: 3 | Status: SHIPPED | OUTPATIENT
Start: 2022-01-01

## 2022-01-13 NOTE — PROGRESS NOTES
"Video Visit  Silver Zamora is a 71 year old male who is being evaluated via a billable video visit in light of the ongoing global health crisis (COVID-19) that requires us to abide by social distancing mandates in order to reduce the risk of COVID-19 exposure.       The patient has been notified of following:     \"This Video visit will be conducted via a virtual call between you and your physician/provider. We have found that certain health care needs can be provided without the need for a physical exam.  This service lets us provide the care you need with a short video conversation.  If a prescription is necessary we can send it directly to your pharmacy.  If lab work is needed we can place an order for that and you can then stop by our lab to have the test done at a later time.    If during the course of the call the physician/provider feels a video visit is not appropriate, you will not be charged for this service.\"     Patient has given verbal consent to a Video visit? Yes    Silver Zamora chief complaint is Parkinson's Disease    ALLERGIES  Clonazepam    Neuropsychological assessment completed    Yes   Currently doing PT  No   Completed No   Currently doing OT  No   Completed No    Currently doing ST   No   Completed No   Psychology  No     Any new medication (other provider):   No   Meds started at last appointment  No   Meds increased at last appointment    No     Any concerns you would like to be addressed at this appointment? Pt is having lots of hallucinations and confusion per wife. Pt's wife feels he may have a UTI, but they are not testing him for him.  Pt was having increased anxiety, but now has left. Pt's wife wondering why this is occurring.  Pt's wife stated \"I never know how he's going to be from day to day\". He has lost between 40-50 pounds, are meds affected by weight? Will Doloxetine cause increase in hallucinations.                                                      Phone Start Time: " 12:50pm    Phone End Time:  1:00pm    Total time of phone conversation: 10 minutes    Patient would like the video invitation sent by: Kandu   Number/e-mail address:[phone]    [facility]    There were no vitals filed for this visit.    Outpatient Follow up Parkinson's Dx Evaluation     Pertinent History:   Patient is right-handed.  He was diagnosed with Parkinson's disease in 2004 at the age of about 54.  He feels that the symptoms may have started in about 1994.  Symptoms started with stiffness, slowness, soft speech, walking difficulties, and left hand tremor which spread to the right and about 2015.  Has seen Dr. Suazo in the past.  He has some bruising and tremors occasionally but there is asymmetry.  He tried Sinemet CR during the daytime and at night which did not work well.  He is been off the pramipexole since 12/2015 due to memory and behavioral concerns.  December 2010 he was taking Sinemet IR 25/100 and increased to 25/250 with a good response which also improved his back pain.  He was able to be more active.  [facility] , he was noted to have U PDR S score of 41 after all Parkinson's medications.  It was suggested that he take carbidopa levodopa every 3 hours.  Amantadine was discontinued because he did not have dyskinesia.  It was suggested that he switch his selegiline to Azilect or discontinue all MAO inhibitors.  It is  felt that his increased Sinemet may have exasperated his restless leg syndrome.  Gabapentin was started to control his restless leg symptoms.  He was followed by Dr. Suazo from 2002 - 2011.  He also saw Dr. Zuniga and a previous nurse practitioner at this clinic.  He did transfer care to  in 2013.  He has tried amantadine and selegiline in the past.  He is not able to tolerate Requip, which he tried for several years at a relatively high dose but discontinued due to edema in lower extremities, he also tried  Mirapex in 2015 but this caused more freezing and increased tremor.  CAT scan in July 2013 showed absent radiotracer in the putamen with diminished accumulation in the caudate, most consistent with Parkinson's disease.  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      Is patient on a controlled substance that I prescribe? No      Subjective:          HPI:   The patient returns to the Parkinson's clinic for follow-up visit, he was last seen by me on 10/13/2021, no medication changes were made at that appointment.  The patient continues to be on hospice.  Wife is worried because the patient is having a really hard time with bowel movements.  He goes over 8 days without having a BM.  Wife also states there is an increase in confusion and hallucinations.  Wife is doing well with the patient.  She does state that the patient does have some falls. The patient could be having side effects from the Sinemet. With the patient's constipation issues the medication may be released to slowly.    We discussed some treatment options and have elected to continue with current therapies, wife will talk with hospice nurse about putting the patient on a prescription strength laxative.     Current Medications: Please see chart. Medications personally reviewed.     Medication Compliance: Yes    Patient Active Problem List    Diagnosis Date Noted     Coronary artery disease involving native coronary artery of native heart with angina pectoris (H) 11/09/2021     Priority: Medium     CHF (congestive heart failure) (H) 08/03/2021     Priority: Medium     Major depression in complete remission (H) 07/19/2021     Priority: Medium     Cord compression myelopathy (H) 09/16/2019     Priority: Medium     Cervical stenosis of spinal canal 02/07/2019     Priority: Medium     Ischemic cardiomyopathy 12/14/2018     Priority: Medium     Parkinson's disease (H) 07/30/2014     Priority: Medium     Anxiety state  07/30/2014     Priority: Medium     Formatting of this note might be different from the original.  Replacement Utility updated for latest IMO load       Hypothyroidism 07/30/2014     Priority: Medium     Past Medical History:   Diagnosis Date     Anxiety      Callus      Cardiomyopathy (H)      Chronic back pain      Delirium      Depression      Fall      GERD (gastroesophageal reflux disease)      Hypothyroidism      Pancreatitis 12/31/2015     PD (Parkinson's disease) (H)      RBD (REM behavioral disorder) 4/3/2017     Shingles     hx     Past Surgical History:   Procedure Laterality Date     AMPUTATE TOE(S) Left 2/27/2020    Procedure: Left Foot 2nd Toe Amputation,foot/toe off- loading procedures;  Surgeon: Hakeem Markham DPM;  Location: [facility] OR     CV CORONARY ANGIOGRAM N/A 10/31/2018    Procedure: Coronary Angiogram;  Surgeon: Jose Meyer MD;  Location: [facility];  Service:      CV LEFT HEART CATHETERIZATION WITHOUT LEFT VENTRICULOGRAM Left 10/31/2018    Procedure: Left Heart Catheterization Without Left Ventriculogram;  Surgeon: Jose Meyer MD;  Location: [facility];  Service:      INGUINAL HERNIA REPAIR Bilateral 10/2013     LAMINECTOMY  10/2013     MO ERCP REMOVE CALCULI/DEBRIS BILIARY/PANCREAS DUCT N/A 5/25/2018    Procedure: ENDOSCOPIC RETROGRADE CHOLANGIOPANCREATOGRAPHY SPINCTEROTOMY BILIARY STONE EXTRACTION;  Surgeon: Curtis Mcclain MD;  Location: [facility];  Service: Gastroenterology     MO LAP,CHOLECYSTECTOMY/GRAPH N/A 5/24/2018    Procedure: LAPAROSCOPIC CHOLECYSTECTOMY;  Surgeon: Joyce Melissa MD;  Location: [facility];  Service: General     Family History   Problem Relation Age of Onset     Kidney failure Mother      Heart Disease Father 82.00     Tremor Father      Parkinsonism Paternal Grandfather      Tremor Sister      Leukemia Maternal Grandmother      Current Outpatient Medications   Medication Sig Dispense Refill     acetaminophen  (TYLENOL) 325 MG tablet Take 500 mg by mouth       ammonium lactate (AMLACTIN) 12 % external cream Apply two times a day       aspirin 81 MG EC tablet Take 81 mg by mouth daily       bisacodyl (DULCOLAX) 10 MG suppository One supp [place] qday prn constipation.  Give if no BM in prior 3 days.       bisacodyl (DULCOLAX) 5 MG EC tablet Take 5 mg by mouth daily as needed for constipation       calcium carbonate (TUMS) 500 MG chewable tablet Take 1 chew tab by mouth as needed for heartburn       Cholecalciferol (VITAMIN D3) 2000 units TABS Take 1 tablet by mouth daily        DULoxetine (CYMBALTA) 20 MG capsule TAKE 1 CAPSULE (20 MG TOTAL) BY MOUTH DAILY. 30 capsule 3     fentaNYL (DURAGESIC) 25 mcg/hr 72 hr patch Place 1 patch onto the skin every 72 hours remove old patch.       fluticasone (FLONASE) 50 MCG/ACT nasal spray Spray 2 sprays in nostril       levothyroxine (SYNTHROID/LEVOTHROID) 137 MCG tablet Take one PO QAM 90 tablet 3     morphine 5 MG solu-tab Take 5 mg by mouth every 4 hours as needed for shortness of breath / dyspnea or moderate to severe pain       nitroGLYcerin (NITROSTAT) 0.4 MG sublingual tablet Place 0.4 mg under the tongue every 5 minutes as needed for chest pain For chest pain place 1 tablet under the tongue every 5 minutes for 3 doses. If symptoms persist 5 minutes after 1st dose call 911.       nystatin (MYCOSTATIN) 086803 UNIT/GM external cream        RYTARY 23.75-95 MG CPCR per CR capsule Take 1 capsule by mouth 2 times daily 1 in the AM and 1 in the PM 60 capsule 11     RYTARY 61. MG CPCR per CR capsule Take 1 capsule by mouth 2 (two) times a day AND 2 capsules 2 (two) times a day. - Oral       SENNA-docusate sodium (SENNA S) 8.6-50 MG tablet Take 2 tablets by mouth       triamcinolone (KENALOG) 0.025 % external ointment  (Patient not taking: Reported on 11/9/2021)         Allergies   Allergen Reactions     Clonazepam      Rash      Social History     Socioeconomic History     Marital  status:      Spouse name: Not on file     Number of children: Not on file     Years of education: Not on file     Highest education level: Not on file   Occupational History     Not on file   Tobacco Use     Smoking status: Former Smoker     Packs/day: 1.00     Years: 5.00     Pack years: 5.00     Types: Cigars     Quit date:      Years since quittin.0     Smokeless tobacco: Never Used   Substance and Sexual Activity     Alcohol use: Never     Drug use: Never     Sexual activity: Not Currently     Partners: Female   Other Topics Concern     Not on file   Social History Narrative     Not on file     Social Determinants of Health     Financial Resource Strain: Low Risk      Difficulty of Paying Living Expenses: Not hard at all   Food Insecurity: No Food Insecurity     Worried About Running Out of Food in the Last Year: Never true     Ran Out of Food in the Last Year: Never true   Transportation Needs: No Transportation Needs     Lack of Transportation (Medical): No     Lack of Transportation (Non-Medical): No   Physical Activity: Inactive     Days of Exercise per Week: 0 days     Minutes of Exercise per Session: 0 min   Stress: Stress Concern Present     Feeling of Stress : To some extent   Social Connections: Socially Isolated     Frequency of Communication with Friends and Family: Twice a week     Frequency of Social Gatherings with Friends and Family: Never     Attends Sabianist Services: Never     Active Member of Clubs or Organizations: No     Attends Club or Organization Meetings: Not on file     Marital Status:    Intimate Partner Violence: Not on file   Housing Stability: Low Risk      Unable to Pay for Housing in the Last Year: No     Number of Places Lived in the Last Year: 1     Unstable Housing in the Last Year: No       Review of Systems  A comprehensive review of systems was negative except for:what is noted above    Mental Status Exam  Alertness:  alert  and slow to  respond  Appearance:  adequately groomed  Behavior/Demeanor:  pleasant and calm, with poor eye contact.  Speech:  slowed  Psychomotor:  slowed    Mood:  good  Affect:  flat and was congruent to speech content.  Thought Process/Associations: difficult to follow   Thought Content: significant for delusions.   Perception: significant for auditory hallucinations and visual hallucinations  Insight:  fair.  Judgment: limited.  Attention/Concentration:  Poor  Language:  Impaired  Fund of Knowledge:  Below average.    Memory:  Immediate recall impaired, Short-term memory impaired and Long-term memory impaired.      .  .  Diagnosis managed and treated at today's visit   Parkinson's disease  Anxiety d/t a medical condition  Chronic pain  Mild cognitive impairment  High risk for falls  Tremor  Foot ulcer  Physical deconditioning  Insomnia     Plan:  Medication Adjustment:  No medication changes     Total time spent with the patient today was 40 minutes with greater than 50% of the time spent in counseling and care coordination. The patient will call before then with any questions, concerns or problems. We will assess for the appropriateness of possible psychotropic medication trials/changes. The patient will seek out appropriate emergency services should that become necessary.    Other:   Patient will return to clinic in 3 months. They agree to call or return sooner with any questions or concerns.  Risks and benefits were discussed.  Continue with his individual therapist.     Continue with the support of the clinic, reassurance, and redirection. Staff monitoring and ongoing assessments per team plan. Current psychotropic medication appears to represent the minimum effective dosage and appears medically necessary. We will continue to monitor and reassess. This team will utilize appropriate emergency services if necessary. I will make myself available if concerns or problems arise.    Consent was obtained for this service by  one of our care team members    Video Visit Details    Type of service: Video Visit    Video Start Time: 1300    Video End Time:  1330    Total time for Video:  30 minutes    Originating Location: Patient's home    Distant Location:  [facility]     Mode of Communication: Video Conference via [employer].    10 minutes spent on the date of the encounter doing chart review, review of outside records, documentation.    General Information:  Today you had your appointment with Kelli Walker CNP     If lab work was done today as part of your evaluation you will generally be contacted via My Chart, mail, or phone with the results within 1-5 days. If there is an alarming result we will contact you by phone. Lab results come back at varying times, I generally wait until all labs are resulted before making comments on results. Please note labs are automatically released to My Chart once available.     If you need refills please contact your pharmacist. They will send a refill request to me to review. Please allow 3 business days for us to process all refill requests.     Please call or send a medical message through My Chart, with any questions or concerns    If you need any paperwork completed please fax forms to [phone]. Please state if you would like a copy of the completed paperwork, mailed or faxed back to the patient and a fax number to fax the paperwork to. Please allow up to 10 days for paperwork to be completed.    Kelli Walker CNP

## 2022-01-13 NOTE — LETTER
"    1/13/2022         RE: Silver Zamora  [address] [place] [id number]        Dear Colleague,    Thank you for referring your patient, Silver Zamora, to the [facility]. Please see a copy of my visit note below.    Video Visit  Silver Zamora is a 71 year old male who is being evaluated via a billable video visit in light of the ongoing global health crisis (COVID-19) that requires us to abide by social distancing mandates in order to reduce the risk of COVID-19 exposure.       The patient has been notified of following:     \"This Video visit will be conducted via a virtual call between you and your physician/provider. We have found that certain health care needs can be provided without the need for a physical exam.  This service lets us provide the care you need with a short video conversation.  If a prescription is necessary we can send it directly to your pharmacy.  If lab work is needed we can place an order for that and you can then stop by our lab to have the test done at a later time.    If during the course of the call the physician/provider feels a video visit is not appropriate, you will not be charged for this service.\"     Patient has given verbal consent to a Video visit? Yes    Silver Zamora chief complaint is Parkinson's Disease    ALLERGIES  Clonazepam    Neuropsychological assessment completed    Yes   Currently doing PT  No   Completed No   Currently doing OT  No   Completed No    Currently doing ST   No   Completed No   Psychology  No     Any new medication (other provider):   No   Meds started at last appointment  No   Meds increased at last appointment    No     Any concerns you would like to be addressed at this appointment? Pt is having lots of hallucinations and confusion per wife. Pt's wife feels he may have a UTI, but they are not testing him for him.  Pt was having increased anxiety, but now has left. Pt's wife wondering why this is occurring.  Pt's wife " "stated \"I never know how he's going to be from day to day\". He has lost between 40-50 pounds, are meds affected by weight? Will Doloxetine cause increase in hallucinations.                                                      Phone Start Time: 12:50pm    Phone End Time:  1:00pm    Total time of phone conversation: 10 minutes    Patient would like the video invitation sent by: [employer]   Number/e-mail address:[phone]    Ellis Coyle LAT [facility]    There were no vitals filed for this visit.    Outpatient Follow up Parkinson's Dx Evaluation     Pertinent History:   Patient is right-handed.  He was diagnosed with Parkinson's disease in 2004 at the age of about 54.  He feels that the symptoms may have started in about 1994.  Symptoms started with stiffness, slowness, soft speech, walking difficulties, and left hand tremor which spread to the right and about 2015.  Has seen Dr. Suazo in the past.  He has some bruising and tremors occasionally but there is asymmetry.  He tried Sinemet CR during the daytime and at night which did not work well.  He is been off the pramipexole since 12/2015 due to memory and behavioral concerns.  December 2010 he was taking Sinemet IR 25/100 and increased to 25/250 with a good response which also improved his back pain.  He was able to be more active.  In 2011 he was evaluated at the [facility] by , he was noted to have U PDR S score of 41 after all Parkinson's medications.  It was suggested that he take carbidopa levodopa every 3 hours.  Amantadine was discontinued because he did not have dyskinesia.  It was suggested that he switch his selegiline to Azilect or discontinue all MAO inhibitors.  It is  felt that his increased Sinemet may have exasperated his restless leg syndrome.  Gabapentin was started to control his restless leg symptoms.  He was followed by Dr. Suazo from 2002 - 2011.  He also saw Dr. Zuniga and a previous nurse practitioner at this clinic.  He did " transfer care to  in 2013.  He has tried amantadine and selegiline in the past.  He is not able to tolerate Requip, which he tried for several years at a relatively high dose but discontinued due to edema in lower extremities, he also tried Mirapex in 2015 but this caused more freezing and increased tremor.  CAT scan in July 2013 showed absent radiotracer in the putamen with diminished accumulation in the caudate, most consistent with Parkinson's disease.  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      Is patient on a controlled substance that I prescribe? No      Subjective:          HPI:   The patient returns to the Parkinson's clinic for follow-up visit, he was last seen by me on 10/13/2021, no medication changes were made at that appointment.  The patient continues to be on hospice.  Wife is worried because the patient is having a really hard time with bowel movements.  He goes over 8 days without having a BM.  Wife also states there is an increase in confusion and hallucinations.  Wife is doing well with the patient.  She does state that the patient does have some falls. The patient could be having side effects from the Sinemet. With the patient's constipation issues the medication may be released to slowly.    We discussed some treatment options and have elected to continue with current therapies, wife will talk with hospice nurse about putting the patient on a prescription strength laxative.     Current Medications: Please see chart. Medications personally reviewed.     Medication Compliance: Yes    Patient Active Problem List    Diagnosis Date Noted     Coronary artery disease involving native coronary artery of native heart with angina pectoris (H) 11/09/2021     Priority: Medium     CHF (congestive heart failure) (H) 08/03/2021     Priority: Medium     Major depression in complete remission (H) 07/19/2021     Priority: Medium     Cord compression myelopathy (H)  09/16/2019     Priority: Medium     Cervical stenosis of spinal canal 02/07/2019     Priority: Medium     Ischemic cardiomyopathy 12/14/2018     Priority: Medium     Parkinson's disease (H) 07/30/2014     Priority: Medium     Anxiety state 07/30/2014     Priority: Medium     Formatting of this note might be different from the original.  Replacement Utility updated for latest IMO load       Hypothyroidism 07/30/2014     Priority: Medium     Past Medical History:   Diagnosis Date     Anxiety      Callus      Cardiomyopathy (H)      Chronic back pain      Delirium      Depression      Fall      GERD (gastroesophageal reflux disease)      Hypothyroidism      Pancreatitis 12/31/2015     PD (Parkinson's disease) (H)      RBD (REM behavioral disorder) 4/3/2017     Shingles     hx     Past Surgical History:   Procedure Laterality Date     AMPUTATE TOE(S) Left 2/27/2020    Procedure: Left Foot 2nd Toe Amputation,foot/toe off- loading procedures;  Surgeon: Hakeem Markham DPM;  Location: [facility] OR     CV CORONARY ANGIOGRAM N/A 10/31/2018    Procedure: Coronary Angiogram;  Surgeon: Jose Meyer MD;  Location: [facility] Cath Lab;  Service:      CV LEFT HEART CATHETERIZATION WITHOUT LEFT VENTRICULOGRAM Left 10/31/2018    Procedure: Left Heart Catheterization Without Left Ventriculogram;  Surgeon: Jose Meyer MD;  Location: [facility] Cath Lab;  Service:      INGUINAL HERNIA REPAIR Bilateral 10/2013     LAMINECTOMY  10/2013     [facility] ERCP REMOVE CALCULI/DEBRIS BILIARY/PANCREAS DUCT N/A 5/25/2018    Procedure: ENDOSCOPIC RETROGRADE CHOLANGIOPANCREATOGRAPHY SPINCTEROTOMY BILIARY STONE EXTRACTION;  Surgeon: Curtis Mcclain MD;  Location: [facility] OR;  Service: Gastroenterology     VA LAP,CHOLECYSTECTOMY/GRAPH N/A 5/24/2018    Procedure: LAPAROSCOPIC CHOLECYSTECTOMY;  Surgeon: Joyce Melissa MD;  Location: [facility] Main OR;  Service: General     Family History   Problem Relation Age of Onset     Kidney failure  Mother      Heart Disease Father 82.00     Tremor Father      Parkinsonism Paternal Grandfather      Tremor Sister      Leukemia Maternal Grandmother      Current Outpatient Medications   Medication Sig Dispense Refill     acetaminophen (TYLENOL) 325 MG tablet Take 500 mg by mouth       ammonium lactate (AMLACTIN) 12 % external cream Apply two times a day       aspirin 81 MG EC tablet Take 81 mg by mouth daily       bisacodyl (DULCOLAX) 10 MG suppository One supp OH qday prn constipation.  Give if no BM in prior 3 days.       bisacodyl (DULCOLAX) 5 MG EC tablet Take 5 mg by mouth daily as needed for constipation       calcium carbonate (TUMS) 500 MG chewable tablet Take 1 chew tab by mouth as needed for heartburn       Cholecalciferol (VITAMIN D3) 2000 units TABS Take 1 tablet by mouth daily        DULoxetine (CYMBALTA) 20 MG capsule TAKE 1 CAPSULE (20 MG TOTAL) BY MOUTH DAILY. 30 capsule 3     fentaNYL (DURAGESIC) 25 mcg/hr 72 hr patch Place 1 patch onto the skin every 72 hours remove old patch.       fluticasone (FLONASE) 50 MCG/ACT nasal spray Spray 2 sprays in nostril       levothyroxine (SYNTHROID/LEVOTHROID) 137 MCG tablet Take one PO QAM 90 tablet 3     morphine 5 MG solu-tab Take 5 mg by mouth every 4 hours as needed for shortness of breath / dyspnea or moderate to severe pain       nitroGLYcerin (NITROSTAT) 0.4 MG sublingual tablet Place 0.4 mg under the tongue every 5 minutes as needed for chest pain For chest pain place 1 tablet under the tongue every 5 minutes for 3 doses. If symptoms persist 5 minutes after 1st dose call 911.       nystatin (MYCOSTATIN) 085646 UNIT/GM external cream        RYTARY 23.75-95 MG CPCR per CR capsule Take 1 capsule by mouth 2 times daily 1 in the AM and 1 in the PM 60 capsule 11     RYTARY 61. MG CPCR per CR capsule Take 1 capsule by mouth 2 (two) times a day AND 2 capsules 2 (two) times a day. - Oral       SENNA-docusate sodium (SENNA S) 8.6-50 MG tablet Take 2 tablets  by mouth       triamcinolone (KENALOG) 0.025 % external ointment  (Patient not taking: Reported on 2021)         Allergies   Allergen Reactions     Clonazepam      Rash      Social History     Socioeconomic History     Marital status:      Spouse name: Not on file     Number of children: Not on file     Years of education: Not on file     Highest education level: Not on file   Occupational History     Not on file   Tobacco Use     Smoking status: Former Smoker     Packs/day: 1.00     Years: 5.00     Pack years: 5.00     Types: Cigars     Quit date:      Years since quittin.0     Smokeless tobacco: Never Used   Substance and Sexual Activity     Alcohol use: Never     Drug use: Never     Sexual activity: Not Currently     Partners: Female   Other Topics Concern     Not on file   Social History Narrative     Not on file     Social Determinants of Health     Financial Resource Strain: Low Risk      Difficulty of Paying Living Expenses: Not hard at all   Food Insecurity: No Food Insecurity     Worried About Running Out of Food in the Last Year: Never true     Ran Out of Food in the Last Year: Never true   Transportation Needs: No Transportation Needs     Lack of Transportation (Medical): No     Lack of Transportation (Non-Medical): No   Physical Activity: Inactive     Days of Exercise per Week: 0 days     Minutes of Exercise per Session: 0 min   Stress: Stress Concern Present     Feeling of Stress : To some extent   Social Connections: Socially Isolated     Frequency of Communication with Friends and Family: Twice a week     Frequency of Social Gatherings with Friends and Family: Never     Attends Bahai Services: Never     Active Member of Clubs or Organizations: No     Attends Club or Organization Meetings: Not on file     Marital Status:    Intimate Partner Violence: Not on file   Housing Stability: Low Risk      Unable to Pay for Housing in the Last Year: No     Number of Places Lived  in the Last Year: 1     Unstable Housing in the Last Year: No       Review of Systems  A comprehensive review of systems was negative except for:what is noted above    Mental Status Exam  Alertness:  alert  and slow to respond  Appearance:  adequately groomed  Behavior/Demeanor:  pleasant and calm, with poor eye contact.  Speech:  slowed  Psychomotor:  slowed    Mood:  good  Affect:  flat and was congruent to speech content.  Thought Process/Associations: difficult to follow   Thought Content: significant for delusions.   Perception: significant for auditory hallucinations and visual hallucinations  Insight:  fair.  Judgment: limited.  Attention/Concentration:  Poor  Language:  Impaired  Fund of Knowledge:  Below average.    Memory:  Immediate recall impaired, Short-term memory impaired and Long-term memory impaired.      .  .  Diagnosis managed and treated at today's visit   Parkinson's disease  Anxiety d/t a medical condition  Chronic pain  Mild cognitive impairment  High risk for falls  Tremor  Foot ulcer  Physical deconditioning  Insomnia     Plan:  Medication Adjustment:  No medication changes     Total time spent with the patient today was 40 minutes with greater than 50% of the time spent in counseling and care coordination. The patient will call before then with any questions, concerns or problems. We will assess for the appropriateness of possible psychotropic medication trials/changes. The patient will seek out appropriate emergency services should that become necessary.    Other:   Patient will return to clinic in 3 months. They agree to call or return sooner with any questions or concerns.  Risks and benefits were discussed.  Continue with his individual therapist.     Continue with the support of the clinic, reassurance, and redirection. Staff monitoring and ongoing assessments per team plan. Current psychotropic medication appears to represent the minimum effective dosage and appears medically  necessary. We will continue to monitor and reassess. This team will utilize appropriate emergency services if necessary. I will make myself available if concerns or problems arise.    Consent was obtained for this service by one of our care team members    Video Visit Details    Type of service: Video Visit    Video Start Time: 1300    Video End Time:  1330    Total time for Video:  30 minutes    Originating Location: Patient's home    Distant Location:  [facility]     Mode of Communication: Video Conference via [employer].    10 minutes spent on the date of the encounter doing chart review, review of outside records, documentation.    General Information:  Today you had your appointment with Kelli Walker CNP     If lab work was done today as part of your evaluation you will generally be contacted via My Chart, mail, or phone with the results within 1-5 days. If there is an alarming result we will contact you by phone. Lab results come back at varying times, I generally wait until all labs are resulted before making comments on results. Please note labs are automatically released to My Chart once available.     If you need refills please contact your pharmacist. They will send a refill request to me to review. Please allow 3 business days for us to process all refill requests.     Please call or send a medical message through My Chart, with any questions or concerns    If you need any paperwork completed please fax forms to [phone]. Please state if you would like a copy of the completed paperwork, mailed or faxed back to the patient and a fax number to fax the paperwork to. Please allow up to 10 days for paperwork to be completed.    Kelli Walker CNP      Again, thank you for allowing me to participate in the care of your patient.        Sincerely,        KASHIF Rosenthal CNP

## 2022-02-08 NOTE — TELEPHONE ENCOUNTER
"Per Kelli's message:     \"Our main goal is comfort care. She does not have to wake him to give him medication. She is also able to open capsule of Rytary and sprinkle the med into applesauce, pudding, ice cream. Swallowing is always an issue with Parkinson's patients. Given the constipation she would probably be better giving the dissolving Sinemet.  I will put in a script for SL Parcopa. This medication does not last as long so she can give 2-3 a day depending on stiffness and ability to swallow. \"    Left message for pt's spouse Luz Marina to call back.    SHEEBA Smith on 2/8/2022 at 9:30 AM          "
Message was relayed to Luz Marina. She had no other questions.    SHEEBA Smith on 2/8/2022 at 11:31 AM    
Patient's wife called again this morning asking if Kelli had gotten the message, told her that Kelli got the message but had not responded yet. Wife wanted me to let Kelli know that she called again.  
Patient's wife called and wants to speak with Kelli regarding patient's hospice and medications. Please call her at [phone]  
Pt's spouse called and had questions on the medications and what your opinions are.     She wants to know if she should be waking up the pt to take his parkinson's meds because the pt sleeps more than 12 hours a day. Is she able to get sublingual meds to give the pt? Do you do adjustment on the med on the Rytary? She also wants to know if the pt should be taking the long acting of Carbidopa-levodopa  mg before he lays down to sleep? If yes, please send a Rx to the pharmacy. She also said that she holds off on the Morphine and gives it when he really needs it, the lorazepam is given as needed, Fentanyl patches are every 3rd day, Tramadol is as needed, tylenol 500 mg as needed, and she stopped giving the Melatonin.    Per spouse, the pt has been getting a lot of confusion, hallucinations, and weight loss. Pt has been constipated from the meds that he is on and is having a bowel movement every 6 days and is given Maalox and getting suppository to help with the stools.     Please advise.    SHEEBA Smith on 2/7/2022 at 1:49 PM            
full weight-bearing

## 2022-02-11 NOTE — TELEPHONE ENCOUNTER
Reason for Call:  Other update    Detailed comments: Huyen from [facility] calling with update- pt is in active stages of end of life     Family is there & they have everything they need to ensure comfort. At minimum doses of meds & pt is comfortable on them     Phone Number Patient can be reached at: Other phone number:  [phone]*    Best Time: na    Can we leave a detailed message on this number? Not Applicable- no callback needed unless questions- this is just FYI    Call taken on 2/11/2022 at 10:10 AM by Chelsey Espinoza

## 2023-03-07 NOTE — PROGRESS NOTES
[facility]  Spoke with pt's wife Luz Marina today.  Luz Marina told [facility] that the pt is being followed by  RN , Aide, PT and OT  from  [facility].  Patient  has no immediate needs from [facility] at this time. Luz Marina will call [facility] if current needs change    Alar Island Pedicle Flap Text: The defect edges were debeveled with a #15 scalpel blade.  Given the location of the defect, shape of the defect and the proximity to the alar rim an island pedicle advancement flap was deemed most appropriate.  Using a sterile surgical marker, an appropriate advancement flap was drawn incorporating the defect, outlining the appropriate donor tissue and placing the expected incisions within the nasal ala running parallel to the alar rim. The area thus outlined was incised with a #15 scalpel blade.  The skin margins were undermined minimally to an appropriate distance in all directions around the primary defect and laterally outward around the island pedicle utilizing iris scissors.  There was minimal undermining beneath the pedicle flap.
